# Patient Record
Sex: MALE | Race: ASIAN | NOT HISPANIC OR LATINO | URBAN - METROPOLITAN AREA
[De-identification: names, ages, dates, MRNs, and addresses within clinical notes are randomized per-mention and may not be internally consistent; named-entity substitution may affect disease eponyms.]

---

## 2017-07-14 ENCOUNTER — TRANSFERRED RECORDS (OUTPATIENT)
Dept: HEALTH INFORMATION MANAGEMENT | Facility: CLINIC | Age: 47
End: 2017-07-14

## 2017-07-17 ENCOUNTER — TRANSFERRED RECORDS (OUTPATIENT)
Dept: HEALTH INFORMATION MANAGEMENT | Facility: CLINIC | Age: 47
End: 2017-07-17

## 2017-07-18 ENCOUNTER — TRANSFERRED RECORDS (OUTPATIENT)
Dept: HEALTH INFORMATION MANAGEMENT | Facility: CLINIC | Age: 47
End: 2017-07-18

## 2017-07-19 ENCOUNTER — TRANSFERRED RECORDS (OUTPATIENT)
Dept: HEALTH INFORMATION MANAGEMENT | Facility: CLINIC | Age: 47
End: 2017-07-19

## 2017-08-08 ENCOUNTER — TRANSFERRED RECORDS (OUTPATIENT)
Dept: HEALTH INFORMATION MANAGEMENT | Facility: CLINIC | Age: 47
End: 2017-08-08

## 2017-08-22 ENCOUNTER — TRANSFERRED RECORDS (OUTPATIENT)
Dept: HEALTH INFORMATION MANAGEMENT | Facility: CLINIC | Age: 47
End: 2017-08-22

## 2017-08-24 ENCOUNTER — TRANSFERRED RECORDS (OUTPATIENT)
Dept: HEALTH INFORMATION MANAGEMENT | Facility: CLINIC | Age: 47
End: 2017-08-24

## 2017-08-25 ENCOUNTER — TRANSFERRED RECORDS (OUTPATIENT)
Dept: HEALTH INFORMATION MANAGEMENT | Facility: CLINIC | Age: 47
End: 2017-08-25

## 2017-08-29 ENCOUNTER — TRANSFERRED RECORDS (OUTPATIENT)
Dept: HEALTH INFORMATION MANAGEMENT | Facility: CLINIC | Age: 47
End: 2017-08-29

## 2017-08-30 ENCOUNTER — PRE VISIT (OUTPATIENT)
Dept: CARDIOLOGY | Facility: CLINIC | Age: 47
End: 2017-08-30

## 2017-08-31 ENCOUNTER — OFFICE VISIT (OUTPATIENT)
Dept: CARDIOLOGY | Facility: CLINIC | Age: 47
End: 2017-08-31
Attending: THORACIC SURGERY (CARDIOTHORACIC VASCULAR SURGERY)
Payer: COMMERCIAL

## 2017-08-31 VITALS
WEIGHT: 150 LBS | OXYGEN SATURATION: 98 % | DIASTOLIC BLOOD PRESSURE: 55 MMHG | HEIGHT: 64 IN | HEART RATE: 94 BPM | SYSTOLIC BLOOD PRESSURE: 100 MMHG | BODY MASS INDEX: 25.61 KG/M2

## 2017-08-31 DIAGNOSIS — I35.8 AORTIC VALVE ENDOCARDITIS: Primary | ICD-10-CM

## 2017-08-31 PROBLEM — Z87.891 HISTORY OF TOBACCO USE DISORDER: Status: ACTIVE | Noted: 2017-07-14

## 2017-08-31 PROBLEM — I50.31 ACUTE DIASTOLIC CONGESTIVE HEART FAILURE (H): Status: ACTIVE | Noted: 2017-08-24

## 2017-08-31 PROCEDURE — 99212 OFFICE O/P EST SF 10 MIN: CPT | Mod: ZF

## 2017-08-31 PROCEDURE — 99213 OFFICE O/P EST LOW 20 MIN: CPT | Mod: ZF

## 2017-08-31 RX ORDER — FUROSEMIDE 20 MG
20 TABLET ORAL DAILY
Status: ON HOLD | COMMUNITY
Start: 2017-08-25 | End: 2017-09-16

## 2017-08-31 RX ORDER — CEFTRIAXONE 1 G/1
2000 INJECTION, POWDER, FOR SOLUTION INTRAMUSCULAR; INTRAVENOUS EVERY 24 HOURS
Status: ON HOLD | COMMUNITY
Start: 2017-07-26 | End: 2017-09-16

## 2017-08-31 ASSESSMENT — PAIN SCALES - GENERAL: PAINLEVEL: NO PAIN (0)

## 2017-08-31 NOTE — LETTER
Date:September 19, 2017      Patient was self referred, no letter generated. Do not send.        [facility] Information

## 2017-08-31 NOTE — LETTER
8/31/2017      RE: Harvey Almanzar  [address] [place] [address]       Dear Colleague,    Thank you for the opportunity to participate in the care of your patient, Harvey Almanzar, at the [facility] at [facility]. Please see a copy of my visit note below.    ASKED BY REFERRING PHYSICIAN: Dr Jose Antonio Archer, [place], [place] to see patient in consultation of redo aortic root replacement for endocarditis.     CHIEF COMPLAINT: SOB and fatigue      HPI: Harvey is a 47 year old male with a history of persistent AV endocarditis. Patient had homograft AVR in 1986 for bicuspid AV. Revision in the [place] in 1998. In 7/2017 patient developed fevers and chills and was diagnosed with AV endocarditis (strep parasanginous). Mr Almanzar has been on IV antibiotics for this infection. Patient followed up with Dr. Archer on 8/22 as out patient. Patient developed orthopnea and presented to [facility] on 08/24 and was put on on IV diuretics and was much improved to baseline upon discharge.     Because of his surgical risk given 3nd reoperation, Dr. Archer referred patient to me for redo cardiac surgery.     Patient also with severe MR, on IZA in 7/2017, no obvious vegetation on this valve. Echocardiogram showed reduced EF from 7/2017 with EF 45%. Patient had an echo on 8/22 that showed severe AI, severe MR. Compared to prior study in 7/2017, AV vegetation appeared larger, increased LV size and reduced function.     PAST MEDICAL HISTORY:  Past Medical History:   Diagnosis Date     Acute diastolic congestive heart failure (H) 8/24/2017     Aortic valve replaced 1996    Overview:  Cadaver - tissue valve in 1996 at [facility]  Amoxicillin 2000 mg prior to dental work please.  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    FAMILY HISTORY:   No family history on file.    SOCIAL HISTORY:  Social History     Social History     Marital status: Single     Spouse name: N/A     Number of children: N/A     Years of education: N/A     Occupational History     Not on file.     Social History Main Topics     Smoking status: Former Smoker     Packs/day: 0.50     Years: 31.00     Smokeless tobacco: Not on file     Alcohol use No     Drug use: Not on file     Sexual activity: Not on file     Other Topics Concern     Not on file     Social History Narrative     No narrative on file        ALLERGIES:   No Known Allergies    CURRENT MEDICATIONS:   Prescription Medications as of 8/31/2017             cefTRIAXone (CEFTRIAXONE SODIUM) 1 GM vial Inject 2,000 mg into the vein every 24 hours    furosemide (LASIX) 20 MG tablet Take 20 mg by mouth daily          REVIEW OF SYSTEMS:   Gen: denies frequent headaches, double/blurry vision, insomnia, fatigue, unexplained weight loss/gain. No previous anesthesia reactions.  CV: denies chest pain, shortness of breath, palpitations, peripheral edema.   Pulm: denies shortness of breath, asthma or wheezing  GI/: denies liver or kidney problems, blood in stool or BRBPR, difficulty urinating  Endo: denies thyroid problems or Diabetes  Heme/Onc: denies bleeding or clotting disorders, no family problems with bleeding/clotting diorders  MS: no weakness, tremors or gait instability  Neuro: denies depression, memory problems, no dysesthesias, no previous strokes, no migraines, no dysphagia  Skin: No petechiae, purpura or rash.    PHYSICAL EXAMINATION:   /55 (BP Location: Left arm, Patient Position: Chair, Cuff Size: Adult Regular)  Pulse 94  Ht 1.626 m (5' 4\")  Wt 68 kg (150 lb)  SpO2 98%  BMI 25.75 kg/m2  General: alert and oriented x 3, pleasant, no acute " distress  CV: S1 S2, there is diastolic and systolic murmurs; No rubs or gallops, regular rate and rhythm, no peripheral edema, no carotid or abdomenal bruits, pulses in upper and lower extremities palpable  Pulm: bilateral breath sounds, clear to auscultation, easy work of breathing  GI: (+) bowel sounds, soft non-tender and non-distended  : voiding without problems  MS: moves all extremities x 4,  5+/5+ equal strength bilaterally  Neuro: pupils equal round and reactive to light, cranial nerves, II-XII grossly intact, no gross neurologic deficits noted    LABS:  BMP RESULTS:  No results found for: NA, POTASSIUM, CHLORIDE, CO2, ANIONGAP, GLC, BUN, CR, GFRESTIMATED, GFRESTBLACK, CHECO     CBC RESULTS:  No results found for: WBC, RBC, HGB, HCT, MCV, MCH, MCHC, RDW, PLT    No results found for: INR  No results found for: PTT  No results found for: UA  No results found for: A1C    PROCEDURES/IMAGING:    PROCEDURES  See scanned documents in patient's chart     ASSESSMENT/PLAN:     Harvey is a 47 year old male who presents with a history of persistent AV endocarditis. Patient had homograft AVR in 1986 for bicuspid AV. Revision in the [place] in [place]. In 7/2017 patient developed fevers and chills and was diagnosed with AV endocarditis (strep parasanginous).  Recent work up showed severe AI and Moderate to severe MR.    Redo aortic root replacement and possible MVR.  Benefits and risks of surgery were discussed with the patient. Patient wants to proceed with surgery.    Preop CT of chest with contrast for surgical planning.    Since my surgical schedule is booked to mid October I ask my colleague Dr. Leal to see patient and perform surgery because his schedule would accormodate sooner surgery time.       Approximately 50 minutes spent with this case including review of the clinical history and data; discussion with the patient and his family and coordination of the care     Thank you for including me in the care of  this kind patient. Do not hesitate to contact me with any questions.     Dr. Ulises Ruelas     Cardiothoracic Surgery  [facility]  No care team member to display  SELF, REFERRED    Please do not hesitate to contact me if you have any questions/concerns.     Sincerely,     Ulises Ruelas MD

## 2017-08-31 NOTE — PROGRESS NOTES
ASKED BY REFERRING PHYSICIAN: Dr Jose Antonio Archer, [place], [place] to see patient in consultation of redo aortic root replacement for endocarditis.     CHIEF COMPLAINT: SOB and fatigue      HPI: Harvey is a 47 year old male with a history of persistent AV endocarditis. Patient had homograft AVR in 1986 for bicuspid AV. Revision in the [place] in [place]. In 7/2017 patient developed fevers and chills and was diagnosed with AV endocarditis (strep parasanginous). Mr Almanzar has been on IV antibiotics for this infection. Patient followed up with Dr. Archer on 8/22 as out patient. Patient developed orthopnea and presented to [facility] on 08/24 and was put on on IV diuretics and was much improved to baseline upon discharge.     Because of his surgical risk given 3nd reoperation, Dr. Archer referred patient to me for redo cardiac surgery.     Patient also with severe MR, on IZA in 7/2017, no obvious vegetation on this valve. Echocardiogram showed reduced EF from 7/2017 with EF 45%. Patient had an echo on 8/22 that showed severe AI, severe MR. Compared to prior study in 7/2017, AV vegetation appeared larger, increased LV size and reduced function.     PAST MEDICAL HISTORY:  Past Medical History:   Diagnosis Date     Acute diastolic congestive heart failure (H) 8/24/2017     Aortic valve replaced 1996    Overview:  Cadaver - tissue valve in 1996 at [facility]  Amoxicillin 2000 mg prior to dental work please.  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    FAMILY HISTORY:   No family history on file.    SOCIAL HISTORY:  Social History     Social History     Marital status: Single     Spouse name: N/A     Number of children:  "N/A     Years of education: N/A     Occupational History     Not on file.     Social History Main Topics     Smoking status: Former Smoker     Packs/day: 0.50     Years: 31.00     Smokeless tobacco: Not on file     Alcohol use No     Drug use: Not on file     Sexual activity: Not on file     Other Topics Concern     Not on file     Social History Narrative     No narrative on file        ALLERGIES:   No Known Allergies    CURRENT MEDICATIONS:   Prescription Medications as of 8/31/2017             cefTRIAXone (CEFTRIAXONE SODIUM) 1 GM vial Inject 2,000 mg into the vein every 24 hours    furosemide (LASIX) 20 MG tablet Take 20 mg by mouth daily          REVIEW OF SYSTEMS:   Gen: denies frequent headaches, double/blurry vision, insomnia, fatigue, unexplained weight loss/gain. No previous anesthesia reactions.  CV: denies chest pain, shortness of breath, palpitations, peripheral edema.   Pulm: denies shortness of breath, asthma or wheezing  GI/: denies liver or kidney problems, blood in stool or BRBPR, difficulty urinating  Endo: denies thyroid problems or Diabetes  Heme/Onc: denies bleeding or clotting disorders, no family problems with bleeding/clotting diorders  MS: no weakness, tremors or gait instability  Neuro: denies depression, memory problems, no dysesthesias, no previous strokes, no migraines, no dysphagia  Skin: No petechiae, purpura or rash.    PHYSICAL EXAMINATION:   /55 (BP Location: Left arm, Patient Position: Chair, Cuff Size: Adult Regular)  Pulse 94  Ht 1.626 m (5' 4\")  Wt 68 kg (150 lb)  SpO2 98%  BMI 25.75 kg/m2  General: alert and oriented x 3, pleasant, no acute distress  CV: S1 S2, there is diastolic and systolic murmurs; No rubs or gallops, regular rate and rhythm, no peripheral edema, no carotid or abdomenal bruits, pulses in upper and lower extremities palpable  Pulm: bilateral breath sounds, clear to auscultation, easy work of breathing  GI: (+) bowel sounds, soft non-tender and " non-distended  : voiding without problems  MS: moves all extremities x 4,  5+/5+ equal strength bilaterally  Neuro: pupils equal round and reactive to light, cranial nerves, II-XII grossly intact, no gross neurologic deficits noted    LABS:  BMP RESULTS:  No results found for: NA, POTASSIUM, CHLORIDE, CO2, ANIONGAP, GLC, BUN, CR, GFRESTIMATED, GFRESTBLACK, CHECO     CBC RESULTS:  No results found for: WBC, RBC, HGB, HCT, MCV, MCH, MCHC, RDW, PLT    No results found for: INR  No results found for: PTT  No results found for: UA  No results found for: A1C    PROCEDURES/IMAGING:    PROCEDURES  See scanned documents in patient's chart     ASSESSMENT/PLAN:     Harvey is a 47 year old male who presents with a history of persistent AV endocarditis. Patient had homograft AVR in 1986 for bicuspid AV. Revision in the [place] in 1998. In 7/2017 patient developed fevers and chills and was diagnosed with AV endocarditis (strep parasanginous).  Recent work up showed severe AI and Moderate to severe MR.    Redo aortic root replacement and possible MVR.  Benefits and risks of surgery were discussed with the patient. Patient wants to proceed with surgery.    Preop CT of chest with contrast for surgical planning.    Since my surgical schedule is booked to mid October I ask my colleague Dr. Leal to see patient and perform surgery because his schedule would accormodate sooner surgery time.       Approximately 50 minutes spent with this case including review of the clinical history and data; discussion with the patient and his family and coordination of the care     Thank you for including me in the care of this kind patient. Do not hesitate to contact me with any questions.     Dr. Ulises Ruelas     Cardiothoracic Surgery  [facility]  No care team member to display  SELF, REFERRED

## 2017-08-31 NOTE — MR AVS SNAPSHOT
"              After Visit Summary   8/31/2017    Harvey Almanzar    MRN: [id number]           Patient Information     Date Of Birth          1970        Visit Information        Provider Department      8/31/2017 10:00 AM Ulises Ruelas MD [facility]        Today's Diagnoses     Aortic valve endocarditis    -  1       Follow-ups after your visit        Your next 10 appointments already scheduled     Sep 18, 2017  3:30 PM CDT   (Arrive by 3:15 PM)   New Patient Visit with Zeb Leal MD   [facility] ([facility])    [address]  3rd Floor  [facility] [address] [phone]            Sep 25, 2017   Procedure with Nicola Gorman MD   [facility], [place], Same Day Surgery (--)    [address] [address] [phone]              Who to contact     If you have questions or need follow up information about today's clinic visit or your schedule please contact [facility] directly at [phone].  Normal or non-critical lab and imaging results will be communicated to you by AMKAIhart, letter or phone within 4 business days after the clinic has received the results. If you do not hear from us within 7 days, please contact the clinic through AMKAIhart or phone. If you have a critical or abnormal lab result, we will notify you by phone as soon as possible.  Submit refill requests through Bigvest or call your pharmacy and they will forward the refill request to us. Please allow 3 business days for your refill to be completed.          Additional Information About Your Visit        AMKAIhart Information     Bigvest lets you send messages to your doctor, view your test results, renew your prescriptions, schedule appointments and more. To sign up, go to [email or web address] . Click on \"Log in\" on the left side of the screen, which will take you to the Welcome page. Then click on \"Sign up Now\" on the right side of the page. " "    You will be asked to enter the access code listed below, as well as some personal information. Please follow the directions to create your username and password.     Your access code is: [email or web address]  Expires: 2017  6:30 AM     Your access code will  in 90 days. If you need help or a new code, please call your [place] clinic or [phone].        Care EveryWhere ID     This is your Care EveryWhere ID. This could be used by other organizations to access your [place] medical records  [id number]        Your Vitals Were     Pulse Height Pulse Oximetry BMI (Body Mass Index)          94 1.626 m (5' 4\") 98% 25.75 kg/m2         Blood Pressure from Last 3 Encounters:   17 (!) 79/42   17 100/55    Weight from Last 3 Encounters:   17 64 kg (141 lb 1.5 oz)   17 68 kg (150 lb)               Primary Care Provider    None Specified       No address on file        Equal Access to Services     TE TEAGUE : Hadii aad ku hadasho Soomaali, waaxda luqadaha, qaybta kaalmada adeegyada, lamont poe . So [facility] [phone].    ATENCIÓN: Si habla español, tiene a sorensen disposición servicios gratuitos de asistencia lingüística. Llame al [phone].    We comply with applicable federal civil rights laws and [place] laws. We do not discriminate on the basis of race, color, national origin, age, disability sex, sexual orientation or gender identity.            Thank you!     Thank you for choosing [facility]  for your care. Our goal is always to provide you with excellent care. Hearing back from our patients is one way we can continue to improve our services. Please take a few minutes to complete the written survey that you may receive in the mail after your visit with us. Thank you!             Your Updated Medication List - Protect others around you: Learn how to safely use, store and throw away your medicines at www.disposemymeds.org.      Notice  As of " 8/31/2017 11:59 PM    You have not been prescribed any medications.

## 2017-08-31 NOTE — TELEPHONE ENCOUNTER
ASKED BY REFERRING PHYSICIAN: Dr Jose Antonio Archer, [place], [place]    CHIEF COMPLAINT: Pre Visit Planning - Done (New consult for Endocarditis from Dr Jose Antonio Archer at [facility] )      HPI: Harvey is a 47 year old male with a history of persistent AV endocarditis. Patient had homograft AVR in 1986 for bicuspid AV. Revision in the [place] in 1998. In 7/2017 patient developed fevers and chills and was diagnosed with AV endocarditis (strep parasanginous).  Mr Almanzar has been on IV antibiotics for this infection. Patient followed up with Dr. Archer on 8/22 as out patient.  Patient developed orthopnea and presented to [facility] on 08/24 and was put on on IV diuretics and was much improved to baseline upon discharge. CV surgery involved in his plan of care--he is high risk given 2nd reoperation. Patient also with severe MR, on IZA in 7/2017, no obvious vegetation on this valve. Echocardiogram showed reduced EF from 7/2017 with EF 45%. Patient had an echo on 8/22 that showed severe AI, severe MR. Compared to prior study in 7/2017, AV vegetation appeared larger, increased LV size and reduced function.         PROCEDURES  See scanned documents in patient's chart    ASSESSMENT/PLAN:   Harvey is a 47 year old male who presents with         Approximately 50 minutes spent with this case including review of the clinical history and data; discussion with the patient and his family and coordination of the care    Thank you for including me in the care of this kind patient. Do not hesitate to contact me with any questions.    Dr. Ulises Ruelas     Cardiothoracic Surgery  [facility]  No care team member to display

## 2017-08-31 NOTE — NURSING NOTE
Chief Complaint   Patient presents with     New Patient     New consult for Endocarditis from Dr Jose Antonio Archer at [facility]      Vitals were taken and medications were reconciled.  Selwyn Crawley, DHARA  9:32 AM

## 2017-08-31 NOTE — NURSING NOTE
Patient seen today for consultation for Aortic and mitral valve replacement, due to endocarditis and previous mitral valve repair.     Surgery procedure explained to patient and friend and all questions and concerns were answered and addressed.     Risks and benefits of surgery were discussed with patient (and all present) including risk of death, stroke, bleeding, cardiac ischemia, wound infection, renal failure, arrhythmias and possible pacemaker implantation. Patient accepts these risks and is willing to proceed with surgery.     Reviewed pre surgery tests and procedures needed and will call patient to schedule.  Patient will need CT tomorrow after his IZA at [facility] in the morning.      It is unknown if patient will have surgery at the  as Dr Ruelas will be out of town for 2 weeks and patient may need surgery sooner if he becomes symptomatic.     Pre surgery preparation folder with instructions for surgery preparation and recovery will be mailed to the patient.     Instructed patient on preparation for CT with and without contrast.     Patient verbalized understanding of all instructions and will call with any questions or concerns.

## 2017-08-31 NOTE — LETTER
8/31/2017      RE: Harvey Almanzar  [address] [place] [address]       Dear Colleague,    Thank you for the opportunity to participate in the care of your patient, Harvey Almaznar, at the [facility]. Please see a copy of my visit note below.    ASKED BY REFERRING PHYSICIAN: Dr Jose Antonio Archer, [place], [place] to see patient in consultation of redo aortic root replacement for endocarditis.     CHIEF COMPLAINT: SOB and fatigue      HPI: Harvey is a 47 year old male with a history of persistent AV endocarditis. Patient had homograft AVR in 1986 for bicuspid AV. Revision in the [place] in 1998. In 7/2017 patient developed fevers and chills and was diagnosed with AV endocarditis (strep parasanginous). Mr Almanzar has been on IV antibiotics for this infection. Patient followed up with Dr. Archer on 8/22 as out patient. Patient developed orthopnea and presented to [facility] on 08/24 and was put on on IV diuretics and was much improved to baseline upon discharge.     Because of his surgical risk given 3nd reoperation, Dr. Archer referred patient to me for redo cardiac surgery.     Patient also with severe MR, on IZA in 7/2017, no obvious vegetation on this valve. Echocardiogram showed reduced EF from 7/2017 with EF 45%. Patient had an echo on 8/22 that showed severe AI, severe MR. Compared to prior study in 7/2017, AV vegetation appeared larger, increased LV size and reduced function.     PAST MEDICAL HISTORY:  Past Medical History:   Diagnosis Date     Acute diastolic congestive heart failure (H) 8/24/2017     Aortic valve replaced 1996    Overview:  Cadaver - tissue valve in 1996 at [facility]  Amoxicillin 2000 mg prior to dental work please.  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    FAMILY HISTORY:   No family history on file.    SOCIAL HISTORY:  Social History     Social History     Marital status: Single     Spouse name: N/A     Number of children: N/A     Years of education: N/A     Occupational History     Not on file.     Social History Main Topics     Smoking status: Former Smoker     Packs/day: 0.50     Years: 31.00     Smokeless tobacco: Not on file     Alcohol use No     Drug use: Not on file     Sexual activity: Not on file     Other Topics Concern     Not on file     Social History Narrative     No narrative on file        ALLERGIES:   No Known Allergies    CURRENT MEDICATIONS:   Prescription Medications as of 8/31/2017             cefTRIAXone (CEFTRIAXONE SODIUM) 1 GM vial Inject 2,000 mg into the vein every 24 hours    furosemide (LASIX) 20 MG tablet Take 20 mg by mouth daily          REVIEW OF SYSTEMS:   Gen: denies frequent headaches, double/blurry vision, insomnia, fatigue, unexplained weight loss/gain. No previous anesthesia reactions.  CV: denies chest pain, shortness of breath, palpitations, peripheral edema.   Pulm: denies shortness of breath, asthma or wheezing  GI/: denies liver or kidney problems, blood in stool or BRBPR, difficulty urinating  Endo: denies thyroid problems or Diabetes  Heme/Onc: denies bleeding or clotting disorders, no family problems with bleeding/clotting diorders  MS: no weakness, tremors or gait instability  Neuro: denies depression, memory problems, no dysesthesias, no previous strokes, no migraines, no dysphagia  Skin: No petechiae, purpura or rash.    PHYSICAL EXAMINATION:   /55 (BP Location: Left arm, Patient Position: Chair, Cuff Size: Adult Regular)  Pulse 94  Ht 1.626 m (5' 4\")  Wt 68 kg (150 lb)  SpO2 98%  BMI 25.75 kg/m2  General: alert and oriented x 3, pleasant, no acute " distress  CV: S1 S2, there is diastolic and systolic murmurs; No rubs or gallops, regular rate and rhythm, no peripheral edema, no carotid or abdomenal bruits, pulses in upper and lower extremities palpable  Pulm: bilateral breath sounds, clear to auscultation, easy work of breathing  GI: (+) bowel sounds, soft non-tender and non-distended  : voiding without problems  MS: moves all extremities x 4,  5+/5+ equal strength bilaterally  Neuro: pupils equal round and reactive to light, cranial nerves, II-XII grossly intact, no gross neurologic deficits noted    LABS:  BMP RESULTS:  No results found for: NA, POTASSIUM, CHLORIDE, CO2, ANIONGAP, GLC, BUN, CR, GFRESTIMATED, GFRESTBLACK, CHECO     CBC RESULTS:  No results found for: WBC, RBC, HGB, HCT, MCV, MCH, MCHC, RDW, PLT    No results found for: INR  No results found for: PTT  No results found for: UA  No results found for: A1C    PROCEDURES/IMAGING:    PROCEDURES  See scanned documents in patient's chart     ASSESSMENT/PLAN:     Harvey is a 47 year old male who presents with a history of persistent AV endocarditis. Patient had homograft AVR in 1986 for bicuspid AV. Revision in the [place] in 1998. In 7/2017 patient developed fevers and chills and was diagnosed with AV endocarditis (strep parasanginous).  Recent work up showed severe AI and Moderate to severe MR.    Redo aortic root replacement and possible MVR.  Benefits and risks of surgery were discussed with the patient. Patient wants to proceed with surgery.    Preop CT of chest with contrast for surgical planning.    Since my surgical schedule is booked to mid October I ask my colleague Dr. Leal to see patient and perform surgery because his schedule would accormodate sooner surgery time.       Approximately 50 minutes spent with this case including review of the clinical history and data; discussion with the patient and his family and coordination of the care     Thank you for including me in the care of  this kind patient. Do not hesitate to contact me with any questions.     Dr. Ulises Ruelas     Cardiothoracic Surgery  [facility]  No care team member to display  SELF, REFERRED    Please do not hesitate to contact me if you have any questions/concerns.     Sincerely,     Ulises Ruelas MD

## 2017-09-05 ENCOUNTER — CARE COORDINATION (OUTPATIENT)
Dept: CARDIOLOGY | Facility: CLINIC | Age: 47
End: 2017-09-05

## 2017-09-06 NOTE — PROGRESS NOTES
I spoke to patient on Tuesday pm and explained to him that if possible he should wait for Dr Ruelas to return to do his surgery, if he doesn't want to wait he will call and ask Yara to schedule him with Dr Leal. (Dr Ruelas said he would speak to Dr Leal about this patient).  Finally if the patient becomes short of breath or has any other change in his health he is to go to the ER here at the  and he would get surgery.  Patient agreed to this plan, he was given the phone numbers to Tomasa Owens RN and Yara the surgery scheduler if he has questions.  Informed patient that he would get a call next Monday the 11th to be scheduled for surgery with Dr Ruelas.  Patient verbalized understanding.     Renée Pena RNCC  Cardiothoracic Surgery   O) [phone]

## 2017-09-08 ENCOUNTER — TELEPHONE (OUTPATIENT)
Dept: OTHER | Facility: CLINIC | Age: 47
End: 2017-09-08

## 2017-09-08 NOTE — TELEPHONE ENCOUNTER
Patient's primary care physician called questioning plan. Patient had completed his pre op CT scan. sheree Livingston  will call patient 9/11 to set up OR time. Dr Ruelas will out the town for 2 weeks, if patient wants surgery sooner Dr. Leal has agreed to proceed. Dr. Archer agrees with plan.

## 2017-09-12 ENCOUNTER — TELEPHONE (OUTPATIENT)
Dept: CARDIOLOGY | Facility: CLINIC | Age: 47
End: 2017-09-12

## 2017-09-12 NOTE — TELEPHONE ENCOUNTER
Per task, pt needs to schedule surgery with Dr. Ruelas. Talked with pt and offered him Dr. Ruelas's first opening 10/23. Pt states that they dont want to wait that long and would like to see someone else. Offered Dr. Leal for clinic 9/18 and poss surgery for 9/22. Pt ok with that. Scheduled clinic with Dr. eLal for 9/18 and surgery for 9/22. Will call back to schedule other pre-op test

## 2017-09-15 ENCOUNTER — APPOINTMENT (OUTPATIENT)
Dept: GENERAL RADIOLOGY | Facility: CLINIC | Age: 47
DRG: 216 | End: 2017-09-15
Attending: INTERNAL MEDICINE
Payer: COMMERCIAL

## 2017-09-15 ENCOUNTER — APPOINTMENT (OUTPATIENT)
Dept: CARDIOLOGY | Facility: CLINIC | Age: 47
DRG: 216 | End: 2017-09-15
Attending: INTERNAL MEDICINE
Payer: COMMERCIAL

## 2017-09-15 ENCOUNTER — HOSPITAL ENCOUNTER (INPATIENT)
Facility: CLINIC | Age: 47
LOS: 19 days | Discharge: HOME HEALTH | DRG: 216 | End: 2017-10-05
Attending: INTERNAL MEDICINE | Admitting: INTERNAL MEDICINE
Payer: COMMERCIAL

## 2017-09-15 DIAGNOSIS — I50.21 ACUTE SYSTOLIC CONGESTIVE HEART FAILURE (H): ICD-10-CM

## 2017-09-15 DIAGNOSIS — Z95.2 HX OF MITRAL VALVE REPLACEMENT WITH MECHANICAL VALVE: ICD-10-CM

## 2017-09-15 DIAGNOSIS — I35.8 ENDOCARDITIS OF AORTIC VALVE: ICD-10-CM

## 2017-09-15 DIAGNOSIS — G89.18 ACUTE POST-OPERATIVE PAIN: ICD-10-CM

## 2017-09-15 DIAGNOSIS — Z95.2 S/P AVR (AORTIC VALVE REPLACEMENT): Primary | ICD-10-CM

## 2017-09-15 DIAGNOSIS — I35.1 AORTIC VALVE INSUFFICIENCY DUE TO INFECTION: ICD-10-CM

## 2017-09-15 DIAGNOSIS — I63.512 ACUTE ISCHEMIC LEFT MCA STROKE (H): ICD-10-CM

## 2017-09-15 DIAGNOSIS — I34.0 NON-RHEUMATIC MITRAL REGURGITATION: ICD-10-CM

## 2017-09-15 LAB
ALBUMIN SERPL-MCNC: 3.1 G/DL (ref 3.4–5)
ALP SERPL-CCNC: 69 U/L (ref 40–150)
ALT SERPL W P-5'-P-CCNC: 21 U/L (ref 0–70)
ANION GAP SERPL CALCULATED.3IONS-SCNC: 15 MMOL/L (ref 3–14)
AST SERPL W P-5'-P-CCNC: 24 U/L (ref 0–45)
BASOPHILS # BLD AUTO: 0 10E9/L (ref 0–0.2)
BASOPHILS NFR BLD AUTO: 0.1 %
BILIRUB SERPL-MCNC: 2.5 MG/DL (ref 0.2–1.3)
BUN SERPL-MCNC: 36 MG/DL (ref 7–30)
CALCIUM SERPL-MCNC: 8.8 MG/DL (ref 8.5–10.1)
CHLORIDE SERPL-SCNC: 94 MMOL/L (ref 94–109)
CO2 SERPL-SCNC: 27 MMOL/L (ref 20–32)
CREAT SERPL-MCNC: 1.36 MG/DL (ref 0.66–1.25)
CRP SERPL-MCNC: 40 MG/L (ref 0–8)
DIFFERENTIAL METHOD BLD: ABNORMAL
EOSINOPHIL # BLD AUTO: 0 10E9/L (ref 0–0.7)
EOSINOPHIL NFR BLD AUTO: 0 %
ERYTHROCYTE [DISTWIDTH] IN BLOOD BY AUTOMATED COUNT: 15.5 % (ref 10–15)
ERYTHROCYTE [SEDIMENTATION RATE] IN BLOOD BY WESTERGREN METHOD: 42 MM/H (ref 0–15)
GFR SERPL CREATININE-BSD FRML MDRD: 56 ML/MIN/1.7M2
GLUCOSE SERPL-MCNC: 129 MG/DL (ref 70–99)
HCT VFR BLD AUTO: 36 % (ref 40–53)
HGB BLD-MCNC: 11.9 G/DL (ref 13.3–17.7)
IMM GRANULOCYTES # BLD: 0 10E9/L (ref 0–0.4)
IMM GRANULOCYTES NFR BLD: 0.2 %
LYMPHOCYTES # BLD AUTO: 2.2 10E9/L (ref 0.8–5.3)
LYMPHOCYTES NFR BLD AUTO: 25 %
MCH RBC QN AUTO: 29.5 PG (ref 26.5–33)
MCHC RBC AUTO-ENTMCNC: 33.1 G/DL (ref 31.5–36.5)
MCV RBC AUTO: 89 FL (ref 78–100)
MONOCYTES # BLD AUTO: 0.7 10E9/L (ref 0–1.3)
MONOCYTES NFR BLD AUTO: 7.5 %
NEUTROPHILS # BLD AUTO: 5.9 10E9/L (ref 1.6–8.3)
NEUTROPHILS NFR BLD AUTO: 67.2 %
NRBC # BLD AUTO: 0 10*3/UL
NRBC BLD AUTO-RTO: 0 /100
NT-PROBNP SERPL-MCNC: ABNORMAL PG/ML (ref 0–450)
PLATELET # BLD AUTO: 267 10E9/L (ref 150–450)
POTASSIUM SERPL-SCNC: 3.1 MMOL/L (ref 3.4–5.3)
PROT SERPL-MCNC: 7.1 G/DL (ref 6.8–8.8)
RBC # BLD AUTO: 4.04 10E12/L (ref 4.4–5.9)
SODIUM SERPL-SCNC: 137 MMOL/L (ref 133–144)
TROPONIN I SERPL-MCNC: 0.59 UG/L (ref 0–0.04)
WBC # BLD AUTO: 8.8 10E9/L (ref 4–11)

## 2017-09-15 PROCEDURE — 93010 ELECTROCARDIOGRAM REPORT: CPT | Mod: Z6 | Performed by: INTERNAL MEDICINE

## 2017-09-15 PROCEDURE — 85610 PROTHROMBIN TIME: CPT | Performed by: EMERGENCY MEDICINE

## 2017-09-15 PROCEDURE — 93306 TTE W/DOPPLER COMPLETE: CPT

## 2017-09-15 PROCEDURE — 86140 C-REACTIVE PROTEIN: CPT | Performed by: EMERGENCY MEDICINE

## 2017-09-15 PROCEDURE — 25000132 ZZH RX MED GY IP 250 OP 250 PS 637: Performed by: INTERNAL MEDICINE

## 2017-09-15 PROCEDURE — 99285 EMERGENCY DEPT VISIT HI MDM: CPT | Mod: 25 | Performed by: INTERNAL MEDICINE

## 2017-09-15 PROCEDURE — 71010 XR CHEST PORT 1 VW: CPT

## 2017-09-15 PROCEDURE — 25000128 H RX IP 250 OP 636: Performed by: INTERNAL MEDICINE

## 2017-09-15 PROCEDURE — 93306 TTE W/DOPPLER COMPLETE: CPT | Mod: 26 | Performed by: INTERNAL MEDICINE

## 2017-09-15 PROCEDURE — 99223 1ST HOSP IP/OBS HIGH 75: CPT | Mod: GC | Performed by: INTERNAL MEDICINE

## 2017-09-15 PROCEDURE — 93005 ELECTROCARDIOGRAM TRACING: CPT | Performed by: INTERNAL MEDICINE

## 2017-09-15 PROCEDURE — 96374 THER/PROPH/DIAG INJ IV PUSH: CPT | Performed by: INTERNAL MEDICINE

## 2017-09-15 PROCEDURE — 83880 ASSAY OF NATRIURETIC PEPTIDE: CPT | Performed by: EMERGENCY MEDICINE

## 2017-09-15 PROCEDURE — 85025 COMPLETE CBC W/AUTO DIFF WBC: CPT | Performed by: EMERGENCY MEDICINE

## 2017-09-15 PROCEDURE — 84484 ASSAY OF TROPONIN QUANT: CPT | Performed by: EMERGENCY MEDICINE

## 2017-09-15 PROCEDURE — 85652 RBC SED RATE AUTOMATED: CPT | Performed by: EMERGENCY MEDICINE

## 2017-09-15 PROCEDURE — 80053 COMPREHEN METABOLIC PANEL: CPT | Performed by: EMERGENCY MEDICINE

## 2017-09-15 RX ORDER — FUROSEMIDE 10 MG/ML
20 INJECTION INTRAMUSCULAR; INTRAVENOUS ONCE
Status: COMPLETED | OUTPATIENT
Start: 2017-09-15 | End: 2017-09-15

## 2017-09-15 RX ORDER — POTASSIUM CHLORIDE 1.5 G/1.58G
20 POWDER, FOR SOLUTION ORAL ONCE
Status: COMPLETED | OUTPATIENT
Start: 2017-09-15 | End: 2017-09-15

## 2017-09-15 RX ADMIN — POTASSIUM CHLORIDE 20 MEQ: 1.5 POWDER, FOR SOLUTION ORAL at 23:36

## 2017-09-15 RX ADMIN — FUROSEMIDE 20 MG: 10 INJECTION, SOLUTION INTRAVENOUS at 23:37

## 2017-09-15 ASSESSMENT — ENCOUNTER SYMPTOMS
NUMBNESS: 0
COUGH: 0
WHEEZING: 0
FEVER: 0
FATIGUE: 1
ADENOPATHY: 0
NECK PAIN: 0
ABDOMINAL PAIN: 0
LIGHT-HEADEDNESS: 0
CHILLS: 0
VOMITING: 0
HEADACHES: 0
NAUSEA: 0
DIFFICULTY URINATING: 0
PALPITATIONS: 0
WEAKNESS: 0
BACK PAIN: 0
CONFUSION: 0
SHORTNESS OF BREATH: 1

## 2017-09-15 NOTE — IP AVS SNAPSHOT
[address] [address]    Phone:  [phone]                                       After Visit Summary   9/15/2017    Harvey Almanzar    MRN: [id number]           After Visit Summary Signature Page     I have received my discharge instructions, and my questions have been answered. I have discussed any challenges I see with this plan with the nurse or doctor.    ..........................................................................................................................................  Patient/Patient Representative Signature      ..........................................................................................................................................  Patient Representative Print Name and Relationship to Patient    ..................................................               ................................................  Date                                            Time    ..........................................................................................................................................  Reviewed by Signature/Title    ...................................................              ..............................................  Date                                                            Time

## 2017-09-15 NOTE — IP AVS SNAPSHOT
MRN:[id number]                      After Visit Summary   9/15/2017    Harvey Almanzar    MRN: [id number]           Thank you!     Thank you for choosing [facility] for your care. Our goal is always to provide you with excellent care. Hearing back from our patients is one way we can continue to improve our services. Please take a few minutes to complete the written survey that you may receive in the mail after you visit with us. Thank you!        Patient Information     Date Of Birth          1970        Designated Caregiver       Most Recent Value    Caregiver    Will someone help with your care after discharge? yes    Name of designated caregiver Brother to help care for after discharge if needed     Phone number of caregiver [phone]    Caregiver address same as patient       About your hospital stay     You were admitted on:  September 16, 2017 You last received care in the:  [address] [place] [address]    You were discharged on:  October 5, 2017        Reason for your hospital stay       You had your aortic and mitral valve replaced with mechanical valves, as well as a one vessel coronary artery bypass on 9/21/17 with Dr Gorman and Dr Leal                  Who to Call     For medical emergencies, please call 911.  For non-urgent questions about your medical care, please call your primary care provider or clinic, [phone]  For questions related to your surgery, please call your surgery clinic        Attending Provider     Provider Specialty    Joaquin Winston MD Emergency Medicine    MICHELE Hua MD Cardiology    Zeb Leal MD Transplant       Primary Care Provider Office Phone # Fax #    Chapito Kaba [phone]       When to contact your care team       Call your primary doctor or surgery team if you have any of the following: temperature greater than 101 F,  increased shortness of breath, increased drainage, increased swelling or increased  pain.                  After Care Instructions     Activity       Your activity upon discharge: activity as tolerated and no driving for 3 weeks            Diet       Follow this diet upon discharge: Orders Placed This Encounter      Calorie Counts      Calorie Counts      Snacks/Supplements Adult: Ensure Plus (Adult); Between Meals      Regular Diet Adult Thin Liquids (water, ice chips, juice, milk, gelatin, ice cream, etc)            IV access       **Ordering Provider MUST call/page Care Coordinator/ to discuss arranging this service**    You are going home with the following vascular access device: PICC.            Monitor and record       blood pressure daily  pulse daily  weight every day            Wound care and dressings       Instructions to care for your wound at home:   You have dissolvable sutures under your skin that do not need to be removed.  Pat wound dressing dry after showers.  Skin glue will eventually fall off in 1-2 weeks.     Keep wound clean and dry, showers are okay after discharge, but don't let spray hit directly on incision. No baths or swimming for 1 month.  Clean wounds twice a day for 2 weeks with microklenz spray if available. Cover chest tube sites with gauze until they stop draining, then leave open.                  Follow-up Appointments     Follow Up and recommended labs and tests       ______________________________________________________    Dr. Chapito Kaba  [facility]   Phone: [phone]   Fax: [phone]    For INR and Warfarin monitoring (Goal=2.5-3.5).  Indication for Anticoagulation: Mechanical MVR.   Expected duration of therapy: Lifetime.   Dr. Chapito Kaba to follow.     Appointment with Dr. Kaba on 10/9/17 at 11:50 AM for INR lab draw and post hospitalization follow up.  ______________________________________________________            Follow Up and recommended labs and tests       Follow up with primary care  provider, NATALIE DRIVER, within 7 days to evaluate medication change, to evaluate treatment change, to evaluate after surgery, for hospital follow- up and to follow up on results.  The following labs/tests are recommended: INR check on Monday 10/9.  Needs weekly CBC, BMP, and CRP inflammation.                  Your next 10 appointments already scheduled     Oct 11, 2017 10:30 AM CDT   Cardiac Evaluation with [facility] Cardiac Rehab 1   [facility] [place] ([facility])    [address]st [address]  [facility] [id number] [phone]            Oct 17, 2017  3:00 PM CDT   (Arrive by 2:45 PM)   Return Visit with  CVTS   [facility] ([facility])    [address]  3rd Floor  [facility] [id number] [phone]            Oct 23, 2017  5:00 PM CDT   (Arrive by 4:45 PM)   New Patient Visit with Alvin Spencer MD   [facility] ([facility])    [address]  4th Floor  [facility] [id number] [phone]              Additional Services     Cardiac Rehab Referral       Your provider has referred you to: PREFERRED PROVIDERS:            Home infusion referral       Your provider has referred you to:   [facility]   Phone: [phone]   Fax: [phone]    For home IV abx and PICC line care.     Anticipated Length of Therapy: through 11/3/17.    Home Infusion Pharmacist to adjust therapy based on labs and clinical assessments: Yes    Labs:  May draw labs from Venous Catheter: Yes  Home Infusion Pharmacist to order labs based on therapy type and clinical assessments: Yes  Weekly labs to be drawn: CBC, BMP, and CRP inflammation.     Agency Staff to assess nursing needs for Infusion Therapy.    Access Device Management:  IV Access Type: PICC  Flush with Heparin and Normal Saline IVP PRN and  routine site care (per agency protocol) to maintain access device? Yes            Medication Therapy Management Referral       Reason for referral:  on more than 5 medications and managing chronic disease    This service is designed to help you get the most from your medications.  A specially trained pharmacist will work closely with you and your doctors  to solve any problems related to your medications and to help you get the   best results from taking them.      The Medication Therapy Management staff will call you to schedule an appointment.                  Further instructions from your care team       [facility]      AFTER YOU GO HOME FROM YOUR HEART SURGERY    Avoid lifting anything greater than ten pounds for 6 weeks after surgery and then less than 20 pounds for an additional 6 weeks.    No driving for 4 weeks after surgery or while on pain medication.    Avoid strenuous activities such as bowling, vacuuming, raking, shoveling, golf or tennis for 12 weeks after your surgery. It is okay to resume sex if you feel comfortable in doing so. You may have to try different positions with your partner.     Splint your chest incision by hugging a pillow or bringing your arms across your chest when coughing or sneezing. Avoid pushing off with your arms when getting up for the first month if you have had your sternum opened.    Shower or wash your incisions daily with soap and water (or as instructed), pat dry. Keep wound clean and dry, showers are okay after discharge, but don't let spray hit directly on incision. No baths or swimming for 1 month.  Clean wounds twice a day for 2 weeks with microklenz spray if available. Cover chest tube sites with gauze until they stop draining, then leave open. It is not abnormal for chest tube sites to drain yellowish/clear fluid for up to 2-3 weeks after surgery.   Watch for signs of infection: increased redness, tenderness, warmth or any drainage that  appears infected (pus like) or is persistent.  Also a temperature > 100.5 F or chills. Call your surgeon or primary care provider's office immediately. Remove any skin glue left on incisions after 10 days. This will not affect your incision and can speed up healing.    Exercise is very important in your recovery. Please follow the guidelines set up for you in your cardiac rehab classes at the hospital. If outpatient cardiac rehab was ordered for you, we highly recommend you participate. If you have problems arranging your cardiac rehab, please call [phone].     Avoid sitting for prolonged periods of time, try to walk every hour during the day. If you have a leg incision, elevate your leg often when you are not walking.    Check your weight when you get home from the hospital and continue to check it daily through your recovery for at least a month. If you notice a weight gain of 2-3 pounds in a week, notify your primary care physician, cardiologist or surgeon.    Bowel activity may be slow after surgery. If necessary, you may take an over the counter laxative such as Milk of Magnesia or Miralax. You may have stool softeners prescribed (docusate sodium, Senokot). We recommend using stool softeners while using narcotics for pain (oxycodone/percocet, hydrocodone/vicodin).      DENTAL VISITS AFTER SURGERY  If you have had your heart valve repaired or replaced, we do not recommend having any dental work done for 6 months and you will need to take an antibiotic prior to dental visits from now on.  Please notify your dentist before any procedure for the proper treatment needed. The antibiotic is taken by mouth one hour prior to visit. This includes routine cleanings.    DO NOT SMOKE.  IF YOU NEED HELP QUITTING, PLEASE TALK WITH YOUR CARDIOLOGIST OR PRIMARY DOCTOR.    You are on a blood thinner, follow the instructions you were given in the hospital and DO NOT SKIP this medication. Try and take it the same time  everyday. Your primary care physician or coumadin clinic will manage the dosing.     REGARDING PRESCRIPTION REFILLS.  If you need a refill on your pain medication contact us.  All other medications will be adjusted, discontinued and re-filled by your primary care physician and/or your cardiologist as they were prior to your surgery. We have given you enough for one to three month with possibly one refill.    POST-OPERATIVE CLINIC VISITS  You have a follow up visit with CVTS Surgery Advance Care Practitioners on 10/17/17 at 3 pm (arrive 2:45pm) at the [facility].  You will then return to the care of your primary physician and your cardiologist.   It is important to call for an appointment to see your cardiologist in 4-6 weeks after surgery and your primary care physician in 2-4 weeks after surgery. If there is a need to return to see CT Surgery please call our  at [phone].    SURGICAL QUESTIONS  Please call Renée Pena with any surgical questions, her phone number is listed below.  She can assist you with your needs and contact other surgery care team members as indicated.    For general questions or concerns, please call the Cardiothoracic Surgery Department at [phone] 8-4:30 M-F.   On weekends or after hours, please call [phone] and ask the  to   page the Cardiothoracic Surgery fellow on call.      Thank you,    Your Cardiothoracic Surgery Team  Renée Pena RN Care Coordinator-  [phone]   Thu Lema PA-C                        Warfarin Instruction     You have started taking a medicine called warfarin. This is a blood-thinning medicine (anticoagulant). It helps prevent and treat blood clots.      Before leaving the hospital, make sure you know how much to take and how long to take it.      You will need regular blood tests to make sure your blood is clotting safely. It is very important to see your  "doctor for regular blood tests.    Talk to your doctor before taking any new medicine (this includes over-the-counter drugs and herbal products). Many medicines can interact with warfarin. This may cause more bleeding or too much clotting.     Eating a lot of vitamin K--found in green, leafy vegetables--can change the way warfarin works in your body. Do NOT avoid these foods. Instead, try to eat the same amount each day.     Bleeding is the most common side-effect of warfarin. You may notice bleeding gums, a bloody nose, bruises and bleeding longer when you cut yourself. See a doctor at once if:   o You cough up blood  o You find blood in your stool (poop)  o You have a deep cut, or a cut that bleeds longer than 10 minutes   o You have a bad cut, hard fall, accident or hit your head (go to urgent care or the emergency room).    For women who can get pregnant: This medicine can harm an unborn baby. Be very careful not to get pregnant while taking this medicine. If you think you might be pregnant, call your doctor right away.    For more information, read \"Guide to Warfarin Therapy,  the booklet you received in the hospital.        Pending Results     Date and Time Order Name Status Description    10/4/2017 1444 IR PICC Vascular In process     9/21/2017 1134 Fungus Culture, non-blood Preliminary     9/21/2017 1124 Fungus Culture, non-blood Preliminary             Statement of Approval     Ordered          10/05/17 0854  I have reviewed and agree with all the recommendations and orders detailed in this document.  EFFECTIVE NOW     Approved and electronically signed by:  Diogenes Kraft PA             Admission Information     Date & Time Provider Department Dept. Phone    9/15/2017 Zeb Leal MD [address] [facility] [phone]      Your Vitals Were     Blood Pressure Pulse Temperature Respirations Height Weight    97/61 (BP Location: Left arm) 93 97.1  F (36.2  C) (Oral) 18 1.626 m (5' 4\") 64.2 kg (141 " "lb 9.6 oz)    Pulse Oximetry BMI (Body Mass Index)                97% 24.31 kg/m2          [email or web address] Information     [email or web address] lets you send messages to your doctor, view your test results, renew your prescriptions, schedule appointments and more. To sign up, go to [email or web address] . Click on \"Log in\" on the left side of the screen, which will take you to the Welcome page. Then click on \"Sign up Now\" on the right side of the page.     You will be asked to enter the access code listed below, as well as some personal information. Please follow the directions to create your username and password.     Your access code is: [email or web address]  Expires: 2017  6:30 AM     Your access code will  in 90 days. If you need help or a new code, please call your [place] clinic or [phone].        Care EveryWhere ID     This is your Care EveryWhere ID. This could be used by other organizations to access your [place] medical records  [id number]        Equal Access to Services     [facility] Hospital[facility] : Hadii mateusz ramon Somickie, waaxda luqadaha, qaybta kaalmamarguerite adefabiano, lamont poe . So [facility] [phone].    ATENCIÓN: Si habla español, tiene a sorensen disposición servicios gratuitos de asistencia lingüística. Llame al [phone].    We comply with applicable federal civil rights laws and [place] laws. We do not discriminate on the basis of race, color, national origin, age, disability, sex, sexual orientation, or gender identity.               Review of your medicines      START taking        Dose / Directions    aspirin 81 MG chewable tablet   Used for:  S/P AVR (aortic valve replacement), Hx of mitral valve replacement with mechanical valve        Dose:  81 mg   Take 1 tablet (81 mg) by mouth daily   Quantity:  120 tablet   Refills:  0       cefTRIAXone 2 GM vial   Commonly known as:  ROCEPHIN   Indication:  Endocarditis   Used for:  Endocarditis of aortic valve        Dose:  2 g   Inject " 2 g into the vein every 24 hours   Quantity:  600 mL   Refills:  0       furosemide 20 MG tablet   Commonly known as:  LASIX   Used for:  S/P AVR (aortic valve replacement), Hx of mitral valve replacement with mechanical valve        Dose:  20 mg   Take 1 tablet (20 mg) by mouth daily   Quantity:  30 tablet   Refills:  1       levofloxacin 500 MG tablet   Commonly known as:  LEVAQUIN   Indication:  Endocarditis   Used for:  Endocarditis of aortic valve        Dose:  500 mg   Take 1 tablet (500 mg) by mouth daily   Quantity:  35 tablet   Refills:  0       melatonin 3 MG tablet   Used for:  S/P AVR (aortic valve replacement), Hx of mitral valve replacement with mechanical valve        Dose:  3 mg   Take 1 tablet (3 mg) by mouth nightly as needed for sleep   Quantity:  30 tablet   Refills:  0       order for DME   Used for:  Acute systolic congestive heart failure (H), Endocarditis of aortic valve        Equipment being ordered: Walker Wheels () Treatment Diagnosis: Gait instability   Quantity:  1 each   Refills:  0       oxyCODONE 5 MG IR tablet   Commonly known as:  ROXICODONE   Used for:  Acute post-operative pain        Dose:  5 mg   Take 1 tablet (5 mg) by mouth every 6 hours as needed for moderate to severe pain   Quantity:  20 tablet   Refills:  0       potassium chloride SA 10 MEQ CR tablet   Commonly known as:  K-DUR/KLOR-CON M   Used for:  S/P AVR (aortic valve replacement), Hx of mitral valve replacement with mechanical valve        Dose:  10 mEq   Take 1 tablet (10 mEq) by mouth daily   Quantity:  30 tablet   Refills:  1       rosuvastatin 10 MG tablet   Commonly known as:  CRESTOR   Used for:  Acute ischemic left MCA stroke (H)        Dose:  10 mg   Take 1 tablet (10 mg) by mouth daily   Quantity:  30 tablet   Refills:  1       senna-docusate 8.6-50 MG per tablet   Commonly known as:  SENOKOT-S;PERICOLACE   Used for:  Acute post-operative pain        Dose:  1-2 tablet   Take 1-2 tablets by mouth 2  times daily as needed (constipation )   Quantity:  50 tablet   Refills:  0       warfarin 2 MG tablet   Commonly known as:  COUMADIN   Used for:  Hx of mitral valve replacement with mechanical valve, S/P AVR (aortic valve replacement)        Take 2 mg (1 tablet) PO at 6 pm on 10/5/17, then take 3 mg (1.5 tabs) until next INR check, then dose per goal 2.5-3.5   Quantity:  120 tablet   Refills:  1            Where to get your medicines      These medications were sent to [facility] - [place], [place] - [address]  [address] [address]     Phone:  [phone]     aspirin 81 MG chewable tablet    furosemide 20 MG tablet    levofloxacin 500 MG tablet    melatonin 3 MG tablet    potassium chloride SA 10 MEQ CR tablet    rosuvastatin 10 MG tablet    senna-docusate 8.6-50 MG per tablet    warfarin 2 MG tablet         Some of these will need a paper prescription and others can be bought over the counter. Ask your nurse if you have questions.     Bring a paper prescription for each of these medications     cefTRIAXone 2 GM vial    order for DME    oxyCODONE 5 MG IR tablet               ANTIBIOTIC INSTRUCTION     You've Been Prescribed an Antibiotic - Now What?  Your healthcare team thinks that you or your loved one might have an infection. Some infections can be treated with antibiotics, which are powerful, life-saving drugs. Like all medications, antibiotics have side effects and should only be used when necessary. There are some important things you should know about your antibiotic treatment.      Your healthcare team may run tests before you start taking an antibiotic.    Your team may take samples (e.g., from your blood, urine or other areas) to run tests to look for bacteria. These test can be important to determine if you need an antibiotic at all and, if you do, which antibiotic will work best.      Within a few days, your healthcare team might change or even stop your  antibiotic.    Your team may start you on an antibiotic while they are working to find out what is making you sick.    Your team might change your antibiotic because test results show that a different antibiotic would be better to treat your infection.    In some cases, once your team has more information, they learn that you do not need an antibiotic at all. They may find out that you don't have an infection, or that the antibiotic you're taking won't work against your infection. For example, an infection caused by a virus can't be treated with antibiotics. Staying on an antibiotic when you don't need it is more likely to be harmful than helpful.      You may experience side effects from your antibiotic.    Like all medications, antibiotics have side effects. Some of these can be serious.    Let you healthcare team know if you have any known allergies when you are admitted to the hospital.    One significant side effect of nearly all antibiotics is the risk of severe and sometimes deadly diarrhea caused by Clostridium difficile (C. Difficile). This occurs when a person takes antibiotics because some good germs are destroyed. Antibiotic use allows C. diificile to take over, putting patients at high risk for this serious infection.    As a patient or caregiver, it is important to understand your or your loved one's antibiotic treatment. It is especially important for caregivers to speak up when patients can't speak for themselves. Here are some important questions to ask your healthcare team.    What infection is this antibiotic treating and how do you know I have that infection?    What side effects might occur from this antibiotic?    How long will I need to take this antibiotic?    Is it safe to take this antibiotic with other medications or supplements (e.g., vitamins) that I am taking?     Are there any special directions I need to know about taking this antibiotic? For example, should I take it with  food?    How will I be monitored to know whether my infection is responding to the antibiotic?    What tests may help to make sure the right antibiotic is prescribed for me?      Information provided by:  www.cdc.gov/getsmart  U.S. Department of Health and Human Services  Centers for disease Control and Prevention  National Center for Emerging and Zoonotic Infectious Diseases  Division of Healthcare Quality Promotion         Protect others around you: Learn how to safely use, store and throw away your medicines at www.disposemymeds.org.             Medication List: This is a list of all your medications and when to take them. Check marks below indicate your daily home schedule. Keep this list as a reference.      Medications           Morning Afternoon Evening Bedtime As Needed    aspirin 81 MG chewable tablet   Take 1 tablet (81 mg) by mouth daily   Last time this was given:  81 mg on 10/5/2017  8:25 AM   Next Dose Due:  Due tomorrow morning 10/6/17  8 AM         Due 10/6   8 AM                       cefTRIAXone 2 GM vial   Commonly known as:  ROCEPHIN   Inject 2 g into the vein every 24 hours   Last time this was given:  2 g on 10/5/2017 11:40 AM   Next Dose Due:  Due 10/6  At 12 noon             Due 12 noon 10/6                   furosemide 20 MG tablet   Commonly known as:  LASIX   Take 1 tablet (20 mg) by mouth daily   Last time this was given:  20 mg on 10/5/2017  8:24 AM   Next Dose Due:  Due tomorrow morning 10/6   8 AM         Due 10/6  8 AM                       levofloxacin 500 MG tablet   Commonly known as:  LEVAQUIN   Take 1 tablet (500 mg) by mouth daily   Last time this was given:  500 mg on 10/5/2017 11:42 AM   Next Dose Due:  Due tomorrow 10/6  12 noon             Due 10/6   12 noon                   melatonin 3 MG tablet   Take 1 tablet (3 mg) by mouth nightly as needed for sleep   Last time this was given:  3 mg on 10/4/2017 12:06 AM   Next Dose Due:  May start any time after discharge as needed  for sleep                     May take if needed for sleep  Tonight 10 PM           order for DME   Equipment being ordered: Walker Wheels () Treatment Diagnosis: Gait instability                                oxyCODONE 5 MG IR tablet   Commonly known as:  ROXICODONE   Take 1 tablet (5 mg) by mouth every 6 hours as needed for moderate to severe pain   Last time this was given:  10 mg on 10/4/2017 11:54 PM   Next Dose Due:  May start any time after discharge if needed for pain as prescribed                                potassium chloride SA 10 MEQ CR tablet   Commonly known as:  K-DUR/KLOR-CON M   Take 1 tablet (10 mEq) by mouth daily   Last time this was given:  20 mEq on 10/5/2017 10:33 AM   Next Dose Due:  Due 10/6  8 AM         Due 10/6   8 AM                       rosuvastatin 10 MG tablet   Commonly known as:  CRESTOR   Take 1 tablet (10 mg) by mouth daily   Next Dose Due:  Due 10/6  8 AM         Due 10/6  8 AM                       senna-docusate 8.6-50 MG per tablet   Commonly known as:  SENOKOT-S;PERICOLACE   Take 1-2 tablets by mouth 2 times daily as needed (constipation )   Last time this was given:  2 tablets on 10/5/2017  8:25 AM   Next Dose Due:  May start any time after discharge as needed for constipation                                warfarin 2 MG tablet   Commonly known as:  COUMADIN   Take 2 mg (1 tablet) PO at 6 pm on 10/5/17, then take 3 mg (1.5 tabs) until next INR check, then dose per goal 2.5-3.5   Last time this was given:  4 mg on 10/4/2017  6:23 PM   Next Dose Due:  Dose due this evening 10/5 at 6 PM,  2mg ( one tablet)                 Take 2mg (1 tablet) oral at 6 PM 10/5                           More Information        Eating Heart-Healthy Foods  Eating has a big impact on your heart health. In fact, eating healthier can improve several of your heart risks at once. For instance, it helps you manage weight, cholesterol, and blood pressure. Here are ideas to help you make  heart-healthy changes without giving up all the foods and flavors you love.  Getting started    Talk with your health care provider about eating plans, such as the DASH or Mediterranean diet. You may also be referred to a dietitian.    Change a few things at a time. Give yourself time to get used to a few eating changes before adding more.    Work to create a tasty, healthy eating plan that you can stick to for the rest of your life.    Goals for healthy eating  Below are some tips to improve your eating habits:    Limit saturated fats and trans fats. Saturated fats raise your levels of cholesterol, so keep these fats to a minimum. They are found in foods such as fatty meats, whole milk, cheese, and palm and coconut oils. Avoid trans fats because they lower good cholesterol as well as raise bad cholesterol. Trans fats are most often found in processed foods.    Reduce sodium (salt) intake. Eating too much salt may increase your blood pressure. Limit your sodium intake to 2,300 milligrams (mg) per day, or less if your health care provider recommends it. Dining out less often and eating fewer processed foods are two great ways to decrease the amount of salt you consume.    Managing calories. A calorie is a unit of energy. Your body burns calories for fuel, but if you eat more calories than your body burns, the extras are stored as fat. Your health care provider can help you create a diet plan to manage your calories. This will likely include eating healthier foods as well as exercising regularly. To help you track your progress, keep a diary to record what you eat and how often you exercise.  Choose the right foods  Aim to make these foods staples of your diet. If you have diabetes, you may have different recommendations than what is listed here:    Fruits and vegetable provide plenty of nutrients without a lot of calories. At meals, fill half your plate with these foods. Split the other half of your plate between  whole grains and lean protein.    Whole grains are high in fiber and rich in vitamins and nutrients. Good choices include whole-wheat bread, pasta, and brown rice.    Lean proteins give you nutrition with less fat. Good choices include fish, skinless chicken, and beans.    Low-fat or nonfat dairy provides nutrients without a lot of fat. Try low-fat or nonfat milk, cheese, or yogurt.    Healthy fats can be good for you in small amounts. These are unsaturated fats, such as olive oil, nuts, and fish. Try to have at least 2 servings per week of fatty fish such as salmon, sardines, mackerel, rainbow trout, and albacore tuna. These contain omega-3 fatty acids, which are good for your heart. Flaxseed is another source of a heart-healthy fat.  More on heart healthy eating    Read food labels  Healthy eating starts at the grocery store. Be sure to pay attention to food labels on packaged foods. Look for products that are high in fiber and protein, and low in saturated fat, cholesterol, and sodium. Avoid products that contain trans fat. And pay close attention to serving size. For instance, if you plan to eat two servings, double all the numbers on the label.  Prepare food right  A key part of healthy cooking is cutting down on added fat and salt. Look on the internet for lower-fat, lower-sodium recipes. Also, try these tips:    Remove fat from meat and skin from poultry before cooking.    Skim fat from the surface of soups and sauces.    Broil, boil, bake, steam, grill, and microwave food without added fats.    Choose ingredients that spice up your food without adding calories, fat, or sodium. Try these items: horseradish, hot sauce, lemon, mustard, nonfat salad dressings, and vinegar. For salt-free herbs and spices, try basil, cilantro, cinnamon, pepper, and rosemary.  Date Last Reviewed: 6/25/2015 2000-2017 [employer]. [address] [place] [address], [place] [address]. All rights reserved. This information is not  intended as a substitute for professional medical care. Always follow your healthcare professional's instructions.                Exercise for a Healthier Heart  You may wonder how you can improve the health of your heart. If you re thinking about exercise, you re on the right track. You don t need to become an athlete, but you do need a certain amount of brisk exercise to help strengthen your heart. If you have been diagnosed with a heart condition, your doctor may recommend exercise to help stabilize your condition. To help make exercise a habit, choose safe, fun activities.     Exercise with a friend. When activity is fun, you're more likely to stick with it.     Be sure to check with your healthcare provider before starting an exercise program.   Why exercise?  Exercising regularly offers many healthy rewards. It can help you do all of the following:    Improve your blood cholesterol level to help prevent further heart trouble    Lower your blood pressure to help prevent a stroke or heart attack    Control diabetes, or reduce your risk of getting this disease    Improve your heart and lung function    Reach and maintain a healthy weight    Make your muscles stronger and more limber so you can stay active    Prevent falls and fractures by slowing the loss of bone mass (osteoporosis)    Manage stress better    Reduce your blood pressure    Improve your sense of self and your body image  Exercise tips  Ease into your routine. Set small goals. Then build on them.  Exercise on most days. Aim for a total of 150 or more minutes of moderate to  vigorous intensity activity each week. Consider 40 minutes, 3 to 4 times a week. For best results, activity should last for 40 minutes on average. It is OK to work up to the 40 minute period over time. Examples of moderate-intensity activity is walking 1 mile in 15 minutes or 30 to 45 minutes of yard work.  Step up your daily activity level. Along with your exercise program, try  being more active throughout the day. Walk instead of drive. Do more household tasks or yard work.  Choose one or more activities you enjoy. Walking is one of the easiest things you can do. You can also try swimming, riding a bike, dancing, or taking an exercise class.  Stop exercising and call your doctor if you:    Have chest pain or feel dizzy or lightheaded    Feel burning, tightness, pressure, or heaviness in your chest, neck, shoulders, back, or arms    Have unusual shortness of breath    Have increased joint or muscle pain    Have palpitations or an irregular heartbeat   Date Last Reviewed: 5/1/2016 2000-2017 [employer]. [address] [place] [address], [place] [address]. All rights reserved. This information is not intended as a substitute for professional medical care. Always follow your healthcare professional's instructions.                Tips for Quitting Smoking (Cardiovascular)  Quitting smoking is a gift to yourself, one of the best things you can do to keep your heart disease from getting worse. Smoking reduces oxygen flow to your heart by speeding the buildup of plaque and changing the health of your blood vessels. This increases your risk for heart attack, also known as acute myocardial infarction, or AMI. Quitting helps reduce smoking's harmful effects. You may have tried to quit before, but don t give up. Try again. Many smokers try 4 or 5 times before they succeed. It is never too early to benefit from smoking cessation, especially if you already have chronic conditions such as high blood pressure and high cholesterol that put you at increased risk for cardiovascular disease.     You ll have the best chance of success if you join a stop-smoking group and have the support of your doctor, family and friends.     Line up help    Ask for the support of your family and friends.    Join a smoking cessation class, or ask your healthcare provider for a referral to a psychologist who  specializes in helping people quit smoking.     Ask your healthcare provider about nicotine replacement products and prescription medicines that can help you quit.  Set a quit date    Choose a date within the next 2 to 4 weeks.    After picking a day, adele it in bold letters on a calendar.     Your quit list  Ideas to stop smoking include:  1. Start by giving up cigarettes at the times you least need them.  2. Keeping a piece of fruit close by at the times you are most vulnerable to reach for a cigarette.  3. Using a nicotine replacement product instead of a cigarette.  Write down a few more ideas.     Set limits    Limit where you can smoke. Pick one room or a porch, and smoke only in that place.    Make smoking outdoors a house rule. Other smokers won t tempt you as much.    Speak to smokers around you about your intent to stop smoking so they can show consideration for you and limit their smoking around you.    Hang a list of   quit benefits  in the spot where you smoke. Put one on the refrigerator and one on your car dashboard.     For more information    smokefree.gov/czeq-mj-xh-expert    National [employer] [place] Smoking Quitline: 877-44U-QUIT (171-043-4979)      Date Last Reviewed: 3/26/2016    1844-9530 [employer]. [address] [place] [address], [place] [address]. All rights reserved. This information is not intended as a substitute for professional medical care. Always follow your healthcare professional's instructions.

## 2017-09-15 NOTE — LETTER
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]  Non-rheumatic mitral regurgitation [I34.0]  Admit Date/Time: 9/15/2017 10:17 PM  Discharge Date: 10/5/2017  Payor Source: Payor: PREF[employer]EDONE / Plan: [employer] COMM HEALTH PLAN OPEN ACCESS / Product Type: HMO /     Reason for Communication Hand-off Referral: Continuation of Care    Discharge Plan: Discharge to home with home infusion and OP CR       Concern for non-adherence with plan of care:   No  Discharge Needs Assessment:  Needs       Most Recent Value    Anticipated Changes Related to Illness other (see comments) [POSSIBLE POST SURGICAL RESTRICTIONS ]    Equipment Currently Used at Home none    Other Resources Other (see comment) [[facility]]    Other -- [[facility]]    Home Infusion Provider -- [[facility] (Ph: [phone] Fax: [phone])]        Follow-up specialty is recommended: Yes    Follow-up plan:  Future Appointments  Date Time Provider Department Center   10/11/2017 10:30 AM 1, Ur Cardiac Rehab [facility]   10/17/2017 3:00 PM UC CVTS [facility]   10/23/2017 5:00 [facility]       Any outstanding tests or procedures:        Referrals     Future Labs/Procedures    Cardiac Rehab Referral     Comments:    Your provider has referred you to: PREFERRED PROVIDERS:    Home infusion referral     Comments:    Your provider has referred you to:   [facility]   Phone: [phone]   Fax: [phone]    For home IV abx and PICC line care.      Anticipated Length of Therapy: through 11/3/17.    Home Infusion Pharmacist to adjust therapy based on labs and clinical assessments: Yes    Labs:  May draw labs from Venous Catheter: Yes  Home Infusion Pharmacist to order labs based on therapy type and clinical assessments: Yes  Weekly labs to be drawn: CBC, BMP, and CRP inflammation.     Agency Staff to assess nursing needs for Infusion Therapy.    Access Device Management:  IV Access Type: PICC  Flush with Heparin and Normal Saline IVP PRN and routine site care (per agency protocol) to maintain access device? Yes    Medication Therapy Management Referral     Comments:    Reason for referral:  on more than 5 medications and managing chronic disease    This service is designed to help you get the most from your medications.  A specially trained pharmacist will work closely with you and your doctors  to solve any problems related to your medications and to help you get the   best results from taking them.      The Medication Therapy Management staff will call you to schedule an appointment.        Key Recommendations:  Post hospitalization follow up, home infusion, new INR and warfarin monitoring through PCP, has appointment 10/9/17  Lesly Jay RN BSN  6C Unit Care Coordinator  Phone number: [phone]  Pager: [phone]

## 2017-09-16 LAB
ALBUMIN UR-MCNC: 10 MG/DL
ANION GAP SERPL CALCULATED.3IONS-SCNC: 12 MMOL/L (ref 3–14)
APPEARANCE UR: CLEAR
BILIRUB UR QL STRIP: NEGATIVE
BUN SERPL-MCNC: 39 MG/DL (ref 7–30)
CALCIUM SERPL-MCNC: 8.7 MG/DL (ref 8.5–10.1)
CHLORIDE SERPL-SCNC: 95 MMOL/L (ref 94–109)
CO2 SERPL-SCNC: 29 MMOL/L (ref 20–32)
COLOR UR AUTO: YELLOW
CREAT SERPL-MCNC: 1.3 MG/DL (ref 0.66–1.25)
ERYTHROCYTE [DISTWIDTH] IN BLOOD BY AUTOMATED COUNT: 15.4 % (ref 10–15)
GFR SERPL CREATININE-BSD FRML MDRD: 59 ML/MIN/1.7M2
GLUCOSE SERPL-MCNC: 111 MG/DL (ref 70–99)
GLUCOSE UR STRIP-MCNC: NEGATIVE MG/DL
GRAN CASTS #/AREA URNS LPF: 1 /LPF
HCT VFR BLD AUTO: 35.3 % (ref 40–53)
HGB BLD-MCNC: 11.6 G/DL (ref 13.3–17.7)
HGB UR QL STRIP: NEGATIVE
HYALINE CASTS #/AREA URNS LPF: 76 /LPF (ref 0–2)
KETONES UR STRIP-MCNC: 5 MG/DL
LEUKOCYTE ESTERASE UR QL STRIP: NEGATIVE
MAGNESIUM SERPL-MCNC: 2.3 MG/DL (ref 1.6–2.3)
MCH RBC QN AUTO: 29.1 PG (ref 26.5–33)
MCHC RBC AUTO-ENTMCNC: 32.9 G/DL (ref 31.5–36.5)
MCV RBC AUTO: 89 FL (ref 78–100)
MUCOUS THREADS #/AREA URNS LPF: PRESENT /LPF
NITRATE UR QL: NEGATIVE
PH UR STRIP: 5 PH (ref 5–7)
PLATELET # BLD AUTO: 248 10E9/L (ref 150–450)
POTASSIUM SERPL-SCNC: 3.1 MMOL/L (ref 3.4–5.3)
POTASSIUM SERPL-SCNC: 3.7 MMOL/L (ref 3.4–5.3)
RBC # BLD AUTO: 3.99 10E12/L (ref 4.4–5.9)
RBC #/AREA URNS AUTO: 1 /HPF (ref 0–2)
SODIUM SERPL-SCNC: 136 MMOL/L (ref 133–144)
SOURCE: ABNORMAL
SP GR UR STRIP: 1.01 (ref 1–1.03)
SQUAMOUS #/AREA URNS AUTO: <1 /HPF (ref 0–1)
TRANS CELLS #/AREA URNS HPF: <1 /HPF (ref 0–1)
UROBILINOGEN UR STRIP-MCNC: NORMAL MG/DL (ref 0–2)
WBC # BLD AUTO: 7.9 10E9/L (ref 4–11)
WBC #/AREA URNS AUTO: 2 /HPF (ref 0–2)

## 2017-09-16 PROCEDURE — 25000128 H RX IP 250 OP 636: Performed by: INTERNAL MEDICINE

## 2017-09-16 PROCEDURE — 87040 BLOOD CULTURE FOR BACTERIA: CPT | Performed by: INTERNAL MEDICINE

## 2017-09-16 PROCEDURE — 36620 INSERTION CATHETER ARTERY: CPT

## 2017-09-16 PROCEDURE — 83735 ASSAY OF MAGNESIUM: CPT | Performed by: INTERNAL MEDICINE

## 2017-09-16 PROCEDURE — 25000132 ZZH RX MED GY IP 250 OP 250 PS 637: Performed by: INTERNAL MEDICINE

## 2017-09-16 PROCEDURE — 27210136 ZZH KIT CATH ARTERIAL EXT SUPPLY

## 2017-09-16 PROCEDURE — 80048 BASIC METABOLIC PNL TOTAL CA: CPT | Performed by: INTERNAL MEDICINE

## 2017-09-16 PROCEDURE — 36415 COLL VENOUS BLD VENIPUNCTURE: CPT | Performed by: INTERNAL MEDICINE

## 2017-09-16 PROCEDURE — 40000014 ZZH STATISTIC ARTERIAL MONITORING DAILY

## 2017-09-16 PROCEDURE — 84132 ASSAY OF SERUM POTASSIUM: CPT | Performed by: INTERNAL MEDICINE

## 2017-09-16 PROCEDURE — 25000128 H RX IP 250 OP 636: Performed by: STUDENT IN AN ORGANIZED HEALTH CARE EDUCATION/TRAINING PROGRAM

## 2017-09-16 PROCEDURE — 25000128 H RX IP 250 OP 636

## 2017-09-16 PROCEDURE — 85027 COMPLETE CBC AUTOMATED: CPT | Performed by: INTERNAL MEDICINE

## 2017-09-16 PROCEDURE — 93005 ELECTROCARDIOGRAM TRACING: CPT

## 2017-09-16 PROCEDURE — 99233 SBSQ HOSP IP/OBS HIGH 50: CPT | Mod: 25 | Performed by: INTERNAL MEDICINE

## 2017-09-16 PROCEDURE — 20000004 ZZH R&B ICU UMMC

## 2017-09-16 PROCEDURE — 81001 URINALYSIS AUTO W/SCOPE: CPT | Performed by: INTERNAL MEDICINE

## 2017-09-16 PROCEDURE — 40000275 ZZH STATISTIC RCP TIME EA 10 MIN

## 2017-09-16 PROCEDURE — 93010 ELECTROCARDIOGRAM REPORT: CPT | Mod: GC | Performed by: INTERNAL MEDICINE

## 2017-09-16 RX ORDER — AMOXICILLIN 250 MG
1-2 CAPSULE ORAL 2 TIMES DAILY PRN
Status: DISCONTINUED | OUTPATIENT
Start: 2017-09-16 | End: 2017-10-05 | Stop reason: HOSPADM

## 2017-09-16 RX ORDER — POTASSIUM CHLORIDE 14.9 MG/ML
20 INJECTION INTRAVENOUS
Status: DISCONTINUED | OUTPATIENT
Start: 2017-09-16 | End: 2017-09-17

## 2017-09-16 RX ORDER — POTASSIUM CL/LIDO/0.9 % NACL 10MEQ/0.1L
10 INTRAVENOUS SOLUTION, PIGGYBACK (ML) INTRAVENOUS
Status: DISCONTINUED | OUTPATIENT
Start: 2017-09-16 | End: 2017-09-17

## 2017-09-16 RX ORDER — FUROSEMIDE 10 MG/ML
20 INJECTION INTRAMUSCULAR; INTRAVENOUS ONCE
Status: COMPLETED | OUTPATIENT
Start: 2017-09-16 | End: 2017-09-16

## 2017-09-16 RX ORDER — MAGNESIUM SULFATE HEPTAHYDRATE 40 MG/ML
4 INJECTION, SOLUTION INTRAVENOUS EVERY 4 HOURS PRN
Status: DISCONTINUED | OUTPATIENT
Start: 2017-09-16 | End: 2017-09-17

## 2017-09-16 RX ORDER — ONDANSETRON 2 MG/ML
4 INJECTION INTRAMUSCULAR; INTRAVENOUS EVERY 6 HOURS PRN
Status: DISCONTINUED | OUTPATIENT
Start: 2017-09-16 | End: 2017-09-21

## 2017-09-16 RX ORDER — POTASSIUM CHLORIDE 1.5 G/1.58G
20-40 POWDER, FOR SOLUTION ORAL
Status: DISCONTINUED | OUTPATIENT
Start: 2017-09-16 | End: 2017-09-17

## 2017-09-16 RX ORDER — CEFTRIAXONE 2 G/1
2 INJECTION, POWDER, FOR SOLUTION INTRAMUSCULAR; INTRAVENOUS EVERY 24 HOURS
Status: DISCONTINUED | OUTPATIENT
Start: 2017-09-16 | End: 2017-09-22

## 2017-09-16 RX ORDER — POTASSIUM CHLORIDE 750 MG/1
20-40 TABLET, EXTENDED RELEASE ORAL
Status: DISCONTINUED | OUTPATIENT
Start: 2017-09-16 | End: 2017-09-17

## 2017-09-16 RX ORDER — POTASSIUM CHLORIDE 7.45 MG/ML
10 INJECTION INTRAVENOUS
Status: DISCONTINUED | OUTPATIENT
Start: 2017-09-16 | End: 2017-09-17

## 2017-09-16 RX ORDER — ONDANSETRON 4 MG/1
4 TABLET, ORALLY DISINTEGRATING ORAL EVERY 6 HOURS PRN
Status: DISCONTINUED | OUTPATIENT
Start: 2017-09-16 | End: 2017-09-21

## 2017-09-16 RX ORDER — ACETAMINOPHEN 325 MG/1
650 TABLET ORAL EVERY 4 HOURS PRN
Status: DISCONTINUED | OUTPATIENT
Start: 2017-09-16 | End: 2017-09-26

## 2017-09-16 RX ORDER — POLYETHYLENE GLYCOL 3350 17 G/17G
17 POWDER, FOR SOLUTION ORAL DAILY PRN
Status: DISCONTINUED | OUTPATIENT
Start: 2017-09-16 | End: 2017-09-26

## 2017-09-16 RX ORDER — NALOXONE HYDROCHLORIDE 0.4 MG/ML
.1-.4 INJECTION, SOLUTION INTRAMUSCULAR; INTRAVENOUS; SUBCUTANEOUS
Status: DISCONTINUED | OUTPATIENT
Start: 2017-09-16 | End: 2017-09-20

## 2017-09-16 RX ADMIN — FUROSEMIDE 20 MG: 10 INJECTION, SOLUTION INTRAVENOUS at 18:53

## 2017-09-16 RX ADMIN — SODIUM NITROPRUSSIDE 0.25 MCG/KG/MIN: 25 INJECTION, SOLUTION, CONCENTRATE INTRAVENOUS at 05:39

## 2017-09-16 RX ADMIN — ONDANSETRON 4 MG: 2 INJECTION INTRAMUSCULAR; INTRAVENOUS at 11:56

## 2017-09-16 RX ADMIN — CEFTRIAXONE 2 G: 2 INJECTION, POWDER, FOR SOLUTION INTRAMUSCULAR; INTRAVENOUS at 10:15

## 2017-09-16 RX ADMIN — POTASSIUM CHLORIDE 40 MEQ: 750 TABLET, EXTENDED RELEASE ORAL at 06:08

## 2017-09-16 RX ADMIN — ONDANSETRON 4 MG: 2 INJECTION INTRAMUSCULAR; INTRAVENOUS at 17:34

## 2017-09-16 RX ADMIN — POTASSIUM CHLORIDE 20 MEQ: 750 TABLET, EXTENDED RELEASE ORAL at 08:34

## 2017-09-16 RX ADMIN — FUROSEMIDE 20 MG: 10 INJECTION, SOLUTION INTRAVENOUS at 14:07

## 2017-09-16 ASSESSMENT — ACTIVITIES OF DAILY LIVING (ADL)
RETIRED_EATING: 0-->INDEPENDENT
TOILETING: 0-->INDEPENDENT
SWALLOWING: 0-->SWALLOWS FOODS/LIQUIDS WITHOUT DIFFICULTY
TRANSFERRING: 0-->INDEPENDENT
RETIRED_COMMUNICATION: 0-->UNDERSTANDS/COMMUNICATES WITHOUT DIFFICULTY
COGNITION: 0 - NO COGNITION ISSUES REPORTED
BATHING: 0-->INDEPENDENT
FALL_HISTORY_WITHIN_LAST_SIX_MONTHS: NO
AMBULATION: 0-->INDEPENDENT
DRESS: 0-->INDEPENDENT

## 2017-09-16 NOTE — H&P
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 No acute kidney injury present.   - Trend creatinine   - UA    FEN: NPO   PPX: Mechanical  CODE: FULL  DISPO: Requires inpatient management of decompensated heart failure and AI     Rasheed Abraham MD  PGY-3 Internal Medicine  [phone]    CARDIOLOGY STAFF NOTE  I have seen and examined the patient with the Cards 1 team. I agree with the note above that reflects my assessment and plan of care. Patient seen on 9/16/2017. Please refer to progress note dated 9/16/2017 for details of assessment and plan  Isma Knight MD [facility]  Cardiology - Electrophysiology        Chief Complaint   Dyspnea    History is obtained from the patient    History of Present Illness   Harvey Almanzar is a 47 year old male who presents with dyspnea.     He reports a remote history of a bicuspid aortic valve s/p bioprosthetic AVR. He subsequently presented to [facility] in July 2017 with fevers and was diagnosed with streptococcal endocarditis of the aortic valve. At that time,  he was found to have severe AI and moderate to severe MR (not thought to be secondary to endocarditis as no vegetations were identified on IZA). The decision was made not to operate at that time.     Since then, he has continued on IV ceftriaxone. He reports that he was doing well until he felt more short of breath early this morning. He reports orthopnea. He denies chest pain, palpitations, lightheadedness, syncope, fevers, chills, sweats, weight changes, leg swelling, or new rashes.     In the ED, he was given a dose of IV lasix. He reports that his breathing already feels improved from prior to presentation. He denies any other symptoms at this time.      Past Medical History    Bioprosthetic aortic valve  Aortic valve endocarditis  Moderate to severe MR  Chronic systolic heart failure 45-50%  Former tobacco use    Cardiac studies:  Transesophageal echo 7/17/17 -   Normal LV and RV size and systolic function.    LVEF 60%.    Mild to moderate LA dilatation.    Mild RA dilatation.    Multiple aortic valve vegetations.  **Severe AR. **  Thickened aortic    root walls- no lucencies to suggest abscess, but thickening may reflect    edema and possible early infectiion in the aortic root tissues.   **Moderate to severe MR..    Posterior jet-- anterior leaflet overrides    posterior leaflet.   No definitive vegetaion seen on mitral valve.   Mild TR.   Negative bubble study.      CT Angio coronary 7/18/17 -   Calcium scoring not performed on this study (unreliable due to significant   calcification noted in the     aortic root and atypical proximal right coronary artery calcification).    No coronary calcification was     noted in the left main, LAD, left circumflex coronary arteries.      Heavily calcified aortic homograft.      Left main, left anterior descending coronary artery and branches, and left   circumflex coronary artery and     branches: No significant occlusive atherosclerotic disease noted.      RCA:  Severely calcified ostial/proximal right coronary artery with likely   severe occlusive right coronary     artery disease.  No significant contrast was noted in the proximal right   coronary artery which has a     discrete ring-shaped area of calcification in the distal third of the   proximal right coronary artery segment.      Mid and distal right coronary artery has no significant occlusive   atherosclerotic disease     Past Surgical History   Bioprosthetic AV    Prior to Admission Medications   Prior to Admission Medications   Prescriptions Last Dose Informant Patient Reported? Taking?   cefTRIAXone (CEFTRIAXONE SODIUM) 1 GM vial   Yes No   Sig: Inject 2,000 mg into the vein every 24 hours   furosemide (LASIX) 20 MG tablet   Yes No   Sig: Take 20 mg by mouth daily      Facility-Administered Medications: None     Allergies   No Known Allergies    Social History   I have reviewed this patient's social history and updated it with pertinent information if needed. Harvey Barnettnhzack  reports that he has quit smoking. He has a 15.50 pack-year smoking history. He has never used smokeless tobacco. He reports that he does not drink alcohol.    Family History   I have reviewed this patient's family history and updated it with pertinent information if needed.   No family history on file.    Review of Systems   The 10 point Review of Systems is negative other than noted in the HPI.     Physical Exam   Temp: 97.6  F (36.4  C) Temp src: Oral BP: 106/59   Heart Rate: 100 Resp: 23 SpO2: 94 % O2 Device: None (Room air)    Vital Signs with Ranges  Temp:  [97.6  F (36.4  C)] 97.6  F (36.4  C)  Heart Rate:  [] 100  Resp:  [23-34] 23  BP: ()/(30-64) 106/59  SpO2:  [94 %-100 %] 94 %  150 lbs 0 oz    Constitutional: No acute distress  Eyes: No scleral icterus, EOMi  ENT: No oropharyngeal lesions  Respiratory: Clear to auscultation b/l, no crackles  Cardiovascular: Tachycardic, regular, III/VI diastolic murmur, warm  extremities, intact pulses  GI: BS+, soft, nontender  Lymph/Hematologic: Scant bruising lower abdomen   Genitourinary: NA  Skin: No rashes, splinter hemorrhages, or osler's nodes  Musculoskeletal: No joint effusions or erythema  Neurologic: AOx3, nonfocal   Neuropsychiatric: Pleasant affect     Data   Results for orders placed or performed during the hospital encounter of 09/15/17 (from the past 24 hour(s))   CBC with platelets differential   Result Value Ref Range    WBC 8.8 4.0 - 11.0 10e9/L    RBC Count 4.04 (L) 4.4 - 5.9 10e12/L    Hemoglobin 11.9 (L) 13.3 - 17.7 g/dL    Hematocrit 36.0 (L) 40.0 - 53.0 %    MCV 89 78 - 100 fl    MCH 29.5 26.5 - 33.0 pg    MCHC 33.1 31.5 - 36.5 g/dL    RDW 15.5 (H) 10.0 - 15.0 %    Platelet Count 267 150 - 450 10e9/L    Diff Method Automated Method     % Neutrophils 67.2 %    % Lymphocytes 25.0 %    % Monocytes 7.5 %    % Eosinophils 0.0 %    % Basophils 0.1 %    % Immature Granulocytes 0.2 %    Nucleated RBCs 0 0 /100    Absolute Neutrophil 5.9 1.6 - 8.3 10e9/L    Absolute Lymphocytes 2.2 0.8 - 5.3 10e9/L    Absolute Monocytes 0.7 0.0 - 1.3 10e9/L    Absolute Eosinophils 0.0 0.0 - 0.7 10e9/L    Absolute Basophils 0.0 0.0 - 0.2 10e9/L    Abs Immature Granulocytes 0.0 0 - 0.4 10e9/L    Absolute Nucleated RBC 0.0    Nt probnp inpatient   Result Value Ref Range    N-Terminal Pro BNP Inpatient 08638 (H) 0 - 450 pg/mL   Comprehensive metabolic panel   Result Value Ref Range    Sodium 137 133 - 144 mmol/L    Potassium 3.1 (L) 3.4 - 5.3 mmol/L    Chloride 94 94 - 109 mmol/L    Carbon Dioxide 27 20 - 32 mmol/L    Anion Gap 15 (H) 3 - 14 mmol/L    Glucose 129 (H) 70 - 99 mg/dL    Urea Nitrogen 36 (H) 7 - 30 mg/dL    Creatinine 1.36 (H) 0.66 - 1.25 mg/dL    GFR Estimate 56 (L) >60 mL/min/1.7m2    GFR Estimate If Black 68 >60 mL/min/1.7m2    Calcium 8.8 8.5 - 10.1 mg/dL    Bilirubin Total 2.5 (H) 0.2 - 1.3 mg/dL    Albumin 3.1 (L) 3.4 - 5.0 g/dL    Protein Total 7.1 6.8 - 8.8 g/dL     Alkaline Phosphatase 69 40 - 150 U/L    ALT 21 0 - 70 U/L    AST 24 0 - 45 U/L   CRP inflammation   Result Value Ref Range    CRP Inflammation 40.0 (H) 0.0 - 8.0 mg/L   Troponin I   Result Value Ref Range    Troponin I ES 0.586 (HH) 0.000 - 0.045 ug/L   Erythrocyte sedimentation rate auto   Result Value Ref Range    Sed Rate 42 (H) 0 - 15 mm/h   Chest  XR, 1 view portable    Impression    Impression:   1. Marked cardiomegaly.  2. Streaky and platelike opacities within both lung bases, likely  corresponding with intrafissural fluid noted on the prior CT as well  as mild bibasilar atelectasis.  3. Small bilateral pleural effusion.   Echocardiogram Complete    Narrative    [id number]  [id number]  [id number]  [id number]^GURVINDER^ADRIANNE^OKSANA           [facility],[place]  Echocardiography Laboratory  [address] [place] [id number]     Name: HERBERTH MENDENHALL  MRN: [id number]  : 1970  Study Date: 09/15/2017 11:10 PM  Age: 47 yrs  Gender: Male  Patient Location: [facility]  Reason For Study: Aortic Valve Disorder  Ordering Physician: ADRIANNE HOLLINS  Performed By: Angeline Joshua RDCS     BSA: 1.7 m2  Height: 64 in  Weight: 150 lb  BP: 102/58 mmHg  _____________________________________________________________________________  __        Procedure  Echocardiogram with two-dimensional, color and spectral Doppler performed.  _____________________________________________________________________________  __        Interpretation Summary  Severe left ventricular dilation is present.  Biplane LV EF 46%.  Global right ventricular function is moderately reduced.  Severe mitral insufficiency is present.  Prosthetic aortic valve with significant thickening of aortic root.Possible  vegetation or severe degenerative changes on aortic prosthetic valve with  severe aortic regurgitation.Post operative changes in aortic root or related  to infection.  Right ventricular systolic pressure is 48mmHg  above the right atrial pressure.  Dilation of the inferior vena cava is present with abnormal respiratory  variation in diameter.  No pericardial effusion is present.  _____________________________________________________________________________  __        Left Ventricle  Severe left ventricular dilation is present. Borderline concentric wall  thickening consistent with left ventricular hypertrophy is present. Left  ventricular diastolic function is indeterminate. Biplane LV EF 46%. No  regional wall motion abnormalities are seen.     Right Ventricle  Mild right ventricular dilation is present. Global right ventricular function  is moderately reduced.     Atria  The right atria appears normal. Severe left atrial enlargement is present.        Mitral Valve  Severe mitral insufficiency is present.     Aortic Valve  Prosthetic aortic valve with significant thickening of aortic root.Possible  vegetation or severe degenerative changes on aortic prosthetic valve with  severe aortic regurgitation.Post operative changes in aortic root or related  to infection. The mean AoV pressure gradient is 47.0 mmHg. The calculated  aortic valve are is 1.3 cm^2.     Tricuspid Valve  The tricuspid valve is normal. Trace to mild tricuspid insufficiency is  present. Right ventricular systolic pressure is 48mmHg above the right atrial  pressure.     Pulmonic Valve  The pulmonic valve cannot be assessed.     Vessels  The pulmonary artery cannot be assessed. Ascending aorta 4.1 cm. Dilation of  the inferior vena cava is present with abnormal respiratory variation in  diameter.     Pericardium  No pericardial effusion is present.     _____________________________________________________________________________  __  MMode/2D Measurements & Calculations  IVSd: 1.1 cm  LVIDd: 6.6 cm  LVIDs: 5.1 cm  LVPWd: 1.1 cm     FS: 23.0 %  EDV(Teich): 223.6 ml  ESV(Teich): 122.7 ml  LV mass(C)d: 332.3 grams  LV mass(C)dI: 192.0 grams/m2  asc Aorta Diam: 4.1  cm  LVOT diam: 2.3 cm  LVOT area: 4.2 cm2  LA Volume (BP): 154.0 ml  LA Volume Index (BP): 89.0 ml/m2        Doppler Measurements & Calculations  MV E max mathew: 151.0 cm/sec  MV dec slope: 1248 cm/sec2     Ao V2 max: 435.0 cm/sec  Ao max P.0 mmHg  Ao V2 mean: 321.0 cm/sec  Ao mean P.0 mmHg  Ao V2 VTI: 87.7 cm  LISA(I,D): 1.3 cm2  LISA(V,D): 1.2 cm2  AI P1/2t: 136.3 msec  LV V1 max P.2 mmHg  LV V1 max: 124.0 cm/sec  LV V1 VTI: 27.6 cm  SV(LVOT): 114.7 ml  SI(LVOT): 66.2 ml/m2  TR max mathew: 347.0 cm/sec  TR max P.2 mmHg  LISA Index (cm2/m2): 0.76  Lateral E/e': 9.2  Medial E/e': 18.3           _____________________________________________________________________________  __           Report approved by: Mark Prince 2017 01:03 AM

## 2017-09-16 NOTE — PLAN OF CARE
Problem: Goal Outcome Summary  Goal: Goal Outcome Summary  Outcome: No Change  Nipride remains stable at 0.25mcg/kg/min. No titration needed to keep MAPS 65-75 all day. Patient intermittently nauseous throughout day, given Zofran x2. Had one episode of scant clear emesis with small speckle of red color. Patient had minimal UOP all day, minimal response to 20mg IV Lasix. Patient reports dyspnea at rest, no visual signs of dyspnea present to writer. Nasal cannula at 2lpm used for when sleeping due to desatting into 80's. Patient reports he feels helpless and sad about how deconditioned he currently feels. Updated on plan of care at bedside. See flowsheets for further details.       Plan: NPO at midnight for IZA tomorrow, possible Chest CT per CVTS consult. Will update service with changes or concerns regarding patient condition.

## 2017-09-16 NOTE — CONSULTS
[facility] SERVICE CONSULTATION     Patient:  Harvey Almanzar   Date of birth 1970, Medical record number [id number]  Date of Visit:  09/16/2017  Date of Admission: 9/15/2017  Consult Requester:MICHELE Hua MD            Assessment and Recommendations:     ASSESSMENT:  1. 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 Biplane LV EF 46%.  Global right ventricular function is moderately reduced.Severe mitral insufficiency is present.Prosthetic aortic valve with significant thickening of aortic root.Possible vegetation or severe degenerative changes on aortic prosthetic valve with severe aortic regurgitation.Post operative changes in aortic root or related to infection. Right ventricular systolic pressure is 48mmHg above the right atrial pressure. Dilation of the inferior vena cava is present with abnormal respiratory variation in diameter. No pericardial effusion is present.  IZA 8/24/17  CONCLUSION:   Low normal LV systolic function.  Mild LV dilation. Mild inferior    Hypokinesis.Normal RV size and function. Bioprosthetic aortic valve with multiple mobile vegetations.  Severe aortic insufficiency.  There appears to be trace perivalvular  regurgitation which originates from the anterior portion of the sewing ring.  Thickening of the aortic root which may be consistent with root abscess as previously noted without gross dehiscence.    Moderate to severe mitral regurgitation. There are no vegetations appreciated on the mitral valve. Patient has known endocarditis of bioprosthetic aortic valve with  possible root abscess. There are no significant  changes in comparison    with the transthoracic echo from 8/24/17. Left Ventricular Ejection Fraction: 50 %   - Blood cx 7/13,  7/14, 7/15 positive for Strep parasanguinas ( sensitive to Ceftriaxone LONG 0.094 , Penicillin LONG 0.19 )   Blood cx 7/16,7/17, 8/25 - No growth  Blood cx 9/16/17 - pending x2    RECOMMENDATION:  1. Appropriately treated with Ceftriaxone 2 gram IV daily  and Genta 3mg/kg daily x 2 weeks with ongoing Cefriaxone ( week 8) ( normally - 4 weeks duration)   2. Has been afebrile, no chills, no sweating since antibiotics   3. Worsening of breathing status recently     4. Follow-up culture results     Will follow     Thank you for allowing us to participate in the care of this patient    Belinda Bryant MD, M.Med.Sc  Infectious Diseases   Pager : [phone]  ______________________________________________________________    Consult Question:.  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     Blood cx 7/13,  7/14, 7/15 positive for Strep parasanguinas ( sensitive to Ceftriaxone LONG 0.094 , Penicillin LONG 0.19 )   Blood cx 7/17,7/16, 8/25 - No growth     He was treated with Ceftriaxone 2 gram IV daily and gentamicin 3mg/kg daily x 2 weeks and is still on Ceftriaxone at home .his Cr went up to 1.51 .   He denies any more fever, chills, profuse sweating after he started  antibiotic treatment  but has developed shortness of breath.  No nausea, vomiting, diarrhea.minimal cough.    He also went to see his dentist for exam and regular cleaning  and audiologist recently.     Imagings  TTE 9/16/17   Interpretation Summary  Severe left ventricular dilation is present.  Biplane LV EF 46%.  Global right ventricular function is moderately reduced.  Severe mitral insufficiency is present.  Prosthetic aortic valve with significant thickening of aortic root.Possible vegetation or severe degenerative changes on aortic prosthetic valve with severe aortic regurgitation.Post operative changes in aortic root or related to infection.  Right ventricular systolic pressure is 48mmHg above the right atrial pressure.  Dilation of the inferior vena cava is present with abnormal respiratory variation in diameter.  No pericardial effusion is present.    CXR 9/15/17  Impression:   1. Marked cardiomegaly.  2. Streaky and platelike opacities within both lung bases, likely corresponding with intrafissural fluid noted on the prior CT as well as mild bibasilar atelectasis.  3. Small bilateral pleural effusions which are loculated in the fissures.    IZA 8/24/17  CONCLUSION:   Low normal LV systolic function.  Mild LV dilation. Mild inferior    hypokinesis.    Normal RV size and function.    Bioprosthetic aortic valve with multiple mobile vegetations.  Severe    aortic insufficiency.  There appears to be trace perivalvular    regurgitation which originates from the anterior portion of the sewing    ring.  Thickening of the aortic root which may be consistent with root    abscess as previously noted without gross dehiscence.    Moderate to severe mitral regurgitation.     There are no vegetations appreciated on the mitral valve.      Patient has known endocarditis of bioprosthetic aortic valve with    possible root abscess. There are no significant changes in comparison    with the transthoracic echo from 8/24/17.      Left Ventricular Ejection Fraction: 50 %        TTE 8/24/17  CONCLUSION:    Limited echocardiogram.     Mild LV dilatation. Mild LVH.    Mildly reduced LV systolic function, EF 45%. Inferior wall is    slightly worse than other walls (probable no significant change on side by  side comparison with 08/22/2017 echo)    Normal RV size.     Borderline RV hypokinesis.     Moderate to severe LA dilatation.     Limited doppler/color suggests at least moderate AI, Severe MR,     mild to moderate TR , Estimated RVSP 61 mmHg + RAP (Patient     known to have bioprosthetic aortic valve endocarditis)    No significant pericardial effusion    Left Ventricular Ejection Fraction: 45 %     CT chest w contrast 9/1/17   IMPRESSION:   1. Cardiomegaly with calcification of the aortic valve and proximal ascending aorta. Ascending aortic dilation measuring 4 cm. Prominent mediastinal lymph nodes.  3. Loculated bilateral pleural effusions with associated atelectasis.   4. Predominantly upper lungs centrilobular emphysema. Superimposed  mild groundglass opacities, which could represent mild edema.  5. Enlarged main pulmonary, likely pulmonary hypertension.    Recent culture results include:  Culture Micro   Date Value Ref Range Status   09/16/2017 No growth after 1 hour  Preliminary   09/16/2017 No growth after 1 hour  Preliminary          Review of Systems:   CONSTITUTIONAL:  No fevers or chills  EYES: negative for icterus  ENT:  negative for hearing loss, tinnitus and sore throat  RESPIRATORY: see HPI   CARDIOVASCULAR:  negative for chest pain, dyspnea  GASTROINTESTINAL:  negative for nausea, vomiting, diarrhea and constipation  GENITOURINARY:  negative for dysuria  HEME:  No easy bruising  INTEGUMENT:  negative for rash and pruritus  NEURO:  Negative for headache           Past Medical History:     Past Medical History:   Diagnosis Date     Acute diastolic congestive heart failure (H) 8/24/2017     Aortic valve replaced 1996     Overview:  Cadaver - tissue valve in 1996 at [facility]  Amoxicillin 2000 mg prior to dental work please.  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            Family History:   Non contributory          Social History:     Social History   Substance Use Topics     Smoking status: Former Smoker     Packs/day: 0.50     Years: 31.00     Smokeless tobacco: Never Used     Alcohol use No     History   Sexual Activity     Sexual activity: Not on file            Current Medications (antimicrobials listed in bold):       cefTRIAXone  2 g Intravenous Q24H     furosemide  20 mg Intravenous Once            Allergies:   No Known Allergies         Physical Exam:   Vitals were reviewed  Patient Vitals for the past 24 hrs:   BP Temp Temp src Heart Rate Resp SpO2 Height Weight   09/16/17 1230 99/53 - - 101 - 97 % - -   09/16/17 1215 110/57 - - 101 - 96 % - -   09/16/17 1200 109/56 97.7  F (36.5  C) Oral 102 20 95 % - -   09/16/17 1145 108/61 - - - - - - -   09/16/17 1130 113/65 - - 104 - 97 % - -   09/16/17 1115 99/54 - - 100 - 96 % - -   09/16/17 1100 95/52 - - 99 20 97 % - -   09/16/17 1045 101/55 - - 102 - 97 % - -   09/16/17 1030 97/63 - - 95 - 99 % - -   09/16/17 1015 95/57 - - 96 - 100 % - -   09/16/17 1000 (!) 85/44 - - 97 17 99 % - -   09/16/17 0945 95/58 - - 95 - 98 % - -   09/16/17 0930 95/60 - - 95 - 99 % - -   09/16/17 0915 97/58 - - 95 - 94 % - -   09/16/17 0900 104/57 - - 96 16 100 % - -   09/16/17 0845 100/58 - - 98 - 100 % - -   09/16/17 0830 97/52 - - 92 - 99 % - -   09/16/17 0815 (!) 84/40 - - 94 - 99 % - -   09/16/17 0800 102/61 97.8  F (36.6  C) Oral 96 14 100 % - -   09/16/17 0745 93/57 - - 95 - 100 % - -   09/16/17 0730 (!) 79/44 - - 94 - 99 % - -   09/16/17 0715 98/56 - - 91 - 100 % - -  "  09/16/17 0700 (!) 88/57 - - 94 16 97 % - -   09/16/17 0645 95/57 - - 94 14 98 % - -   09/16/17 0630 99/56 - - 93 - 97 % - -   09/16/17 0615 101/52 - - 94 - 100 % - -   09/16/17 0600 104/59 - - 96 12 - - -   09/16/17 0545 111/55 - - 98 - (!) 88 % - -   09/16/17 0530 103/51 - - 94 - 96 % - -   09/16/17 0515 106/57 - - 95 - 96 % - -   09/16/17 0500 105/55 - - 95 - 97 % - -   09/16/17 0445 99/55 - - 97 14 98 % - -   09/16/17 0437 100/57 - - 97 - 97 % - -   09/16/17 0430 - - - 98 - 94 % - -   09/16/17 0415 93/58 - - 97 - 96 % - -   09/16/17 0400 - 96.3  F (35.7  C) Oral 99 14 95 % 1.626 m (5' 4\") 64.8 kg (142 lb 13.7 oz)   09/16/17 0315 101/57 96.3  F (35.7  C) Oral 95 13 95 % - -   09/16/17 0300 100/45 - - 97 28 94 % - -   09/16/17 0245 102/65 - - 99 14 98 % - -   09/16/17 0230 90/45 - - 99 11 96 % - -   09/16/17 0215 97/59 - - 100 11 95 % - -   09/16/17 0200 107/58 - - 101 10 96 % - -   09/16/17 0145 98/59 - - 99 28 98 % - -   09/16/17 0142 - 98.2  F (36.8  C) Oral - - - - -   09/16/17 0130 104/58 - - 100 16 98 % - -   09/16/17 0115 (!) 87/56 - - 101 28 95 % - -   09/16/17 0045 96/57 - - - - - - -   09/16/17 0030 102/57 - - 102 27 93 % - -   09/16/17 0015 103/58 - - 100 18 98 % - -   09/16/17 0000 106/57 - - 100 9 98 % - -   09/15/17 2345 110/73 - - 102 (!) 31 99 % - -   09/15/17 2330 105/59 - - 101 24 95 % - -   09/15/17 2315 106/59 - - 100 23 94 % - -   09/15/17 2300 102/58 - - 102 (!) 34 99 % - -   09/15/17 2245 116/52 - - 105 23 96 % - -   09/15/17 2230 104/64 - - 106 26 97 % - -   09/15/17 2202 (!) 90/30 97.6  F (36.4  C) Oral 98 - 100 % 1.626 m (5' 4\") 68 kg (150 lb)     Ranges for his vital signs:  Temp:  [96.3  F (35.7  C)-98.2  F (36.8  C)] 97.7  F (36.5  C)  Heart Rate:  [] 101  Resp:  [9-34] 20  BP: ()/(30-73) 99/53  SpO2:  [88 %-100 %] 97 %    Physical Examination:  GENERAL:  Alert, oriented, in no acute distress.   HEENT:  Head is normocephalic, atraumatic   EYES:  Eyes have anicteric sclerae " without conjunctival injection or stigmata of endocarditis.    ENT:  Oropharynx is moist without exudates or ulcers. Tongue is midline  NECK:  Supple. No  Cervical lymphadenopathy  LUNGS: Clear but decreased breath sounds in bases  CARDIOVASCULAR:  Tachy with 3/6 SERINA precordial ,  Extremities - minimal pedal edema bilateral   ABDOMEN:  Normal bowel sounds, soft, nontender. No appreciable hepatosplenomegaly  SKIN:  No acute rashes.  Line(s) are in place without any surrounding erythema or exudate. No stigmata of endocarditis.  NEUROLOGIC:  Grossly nonfocal. Active x4 extremities         Laboratory Data:     Inflammatory Markers    Recent Labs   Lab Test  09/15/17   2217   SED  42*   CRP  40.0*       Hematology Studies  Recent Labs   Lab Test  09/16/17 0413  09/15/17   2217   WBC  7.9  8.8   ANEU   --   5.9   AEOS   --   0.0   HGB  11.6*  11.9*   MCV  89  89   PLT  248  267     Metabolic Studies   Recent Labs   Lab Test  09/16/17 0413  09/15/17   2217   NA  136  137   POTASSIUM  3.1*  3.1*   CHLORIDE  95  94   CO2  29  27   BUN  39*  36*   CR  1.30*  1.36*   GFRESTIMATED  59*  56*     Hepatic Studies  Recent Labs   Lab Test  09/15/17   2217   BILITOTAL  2.5*   ALKPHOS  69   ALBUMIN  3.1*   AST  24   ALT  21     Microbiology:  Culture Micro   Date Value Ref Range Status   09/16/2017 No growth after 1 hour  Preliminary   09/16/2017 No growth after 1 hour  Preliminary     Urine Studies    Recent Labs   Lab Test  09/16/17   0844   LEUKEST  Negative   WBCU  2

## 2017-09-16 NOTE — CONSULTS
Patient seen and examined. Investigations reviewed.  Will review op notes from prior surgery.  Will follow up. Full consult dictated.

## 2017-09-16 NOTE — PLAN OF CARE
Problem: Goal Outcome Summary  Goal: Goal Outcome Summary  Patient admission initiated upon arrival from Emergency department just before 0400, blood cultures sent, blood work as ordered sent, urine culture still needed supplies bedside, patient voided just prior to arrival on unit .      Arterial line placement not completed see Physician note for details, invasive procedure checklist and consent complete, right radial site with some redness. PICC line placement verified for use of Nipride via Xray by Dr. Johnson bedside.      Nipride started with goal MAP 65-75.      Nasal canula oxygen 2L while sleeping for SPO2 87-88, room air awake 92-97%, frequent dry cough.      Patient will continue to be monitored and assessed. See MAR, flow sheets, and remainder of chart for further details.

## 2017-09-16 NOTE — PROGRESS NOTES
Cardiology Progress Note  Harvey Almanzar MRN: [id number]  Age: 47 year old, : 1970  Date: @todaysdater@             Subjective     ASPEN overnight. Feels breathing improved, however has not ambulated. No chest pain, lightheadedness or palpations. No f/c (now or prior to arrival).           Objective     B/P: 102/61, T: 97.8, P: Data Unavailable, R: 14    Intake/Output Summary (Last 24 hours) at 17 0904  Last data filed at 17 0800   Gross per 24 hour   Intake              235 ml   Output              175 ml   Net               60 ml     Vitals:    09/15/17 2202 17 0400   Weight: 68 kg (150 lb) 64.8 kg (142 lb 13.7 oz)         Gen: AA&Ox3, no acute distress  HEENT:AT/ NC, PERRL b/l, EOM grossly intact, mucous membranes pink, moist without plaque or exudate  BACK: no CVA tenderness, no midline bony tenderness  PULM/THORAX: Faint bibasilar crackles.   CV: harsh diastolic murmur heard best at RUSB (c/w AI), systolic murmur at apex (c/w MR)  ABD: soft, nontender, nondistended. Normoactive bowel sounds x 4, no HSM appreciated.  EXT: trace pedal edema, no clubbing or cyanosis. LE warm well perfused No asymmetrical edema or tenderness to palpation in calves bilaterally.            Data:          Lab Results   Component Value Date     2017    Lab Results   Component Value Date    CHLORIDE 95 2017    Lab Results   Component Value Date    BUN 39 2017      Lab Results   Component Value Date    POTASSIUM 3.1 2017    Lab Results   Component Value Date    CO2 29 2017    Lab Results   Component Value Date    CR 1.30 2017        Lab Results   Component Value Date    NTBNPI 66937 (H) 09/15/2017     Lab Results   Component Value Date    WBC 7.9 2017    HGB 11.6 (L) 2017    HCT 35.3 (L) 2017    MCV 89 2017     2017               Medications     Current Facility-Administered Medications:       naloxone (NARCAN) injection 0.1-0.4 mg, 0.1-0.4 mg, Intravenous, Q2 Min PRN, Everardo Abraham MD     acetaminophen (TYLENOL) tablet 650 mg, 650 mg, Oral, Q4H PRN, Everardo Abraham MD     ondansetron (ZOFRAN-ODT) ODT tab 4 mg, 4 mg, Oral, Q6H PRN **OR** ondansetron (ZOFRAN) injection 4 mg, 4 mg, Intravenous, Q6H PRN, Everardo Abraham MD     polyethylene glycol (MIRALAX/GLYCOLAX) Packet 17 g, 17 g, Oral, Daily PRN, Evreardo Abraham MD     senna-docusate (SENOKOT-S;PERICOLACE) 8.6-50 MG per tablet 1-2 tablet, 1-2 tablet, Oral, BID PRN, Everardo Abraham MD     potassium chloride SA (K-DUR/KLOR-CON M) CR tablet 20-40 mEq, 20-40 mEq, Oral, Q2H PRN, Everardo Abraham MD, 20 mEq at 09/16/17 0834     potassium chloride (KLOR-CON) Packet 20-40 mEq, 20-40 mEq, Oral or Feeding Tube, Q2H PRN, Everardo Abraham MD     potassium chloride 10 mEq in 100 mL intermittent infusion, 10 mEq, Intravenous, Q1H PRN, Everardo Abraham MD     potassium chloride 10 mEq in 100 mL intermittent infusion with 10 mg lidocaine, 10 mEq, Intravenous, Q1H PRN, Everardo Abraham MD     potassium chloride 20 mEq in 100 mL NON-STANDARD CONC intermittent infusion, 20 mEq, Intravenous, Q1H PRN, Everardo Abraham MD     magnesium sulfate 4 g in 100 mL sterile water (premade), 4 g, Intravenous, Q4H PRN, Everardo Abraham MD     cefTRIAXone (ROCEPHIN) 2 g vial to attach to  ml bag for ADULTS or NS 50 ml bag for PEDS, 2 g, Intravenous, Q24H, Everardo Abraham MD     nitroPRUsside (NIPRIDE) 50 mg, sodium thiosulfate 500 mg in D5W 125 mL IV infusion (conc: 0.4mg/mL), 0.25-5 mcg/kg/min, Intravenous, Continuous, Ash Johnson MD, Last Rate: 2.4 mL/hr at 09/16/17 0539, 0.25 mcg/kg/min at 09/16/17 0539      ECHO 9/15  Interpretation Summary  Severe left ventricular dilation is present.  Biplane LV EF 46%.  Global right ventricular function is moderately reduced.  Severe mitral  insufficiency is present.  Prosthetic aortic valve with significant thickening of aortic root.Possible  vegetation or severe degenerative changes on aortic prosthetic valve with  severe aortic regurgitation.Post operative changes in aortic root or related  to infection.  Right ventricular systolic pressure is 48mmHg above the right atrial pressure.  Dilation of the inferior vena cava is present with abnormal respiratory  variation in diameter.  No pericardial effusion is present.  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     # Troponinemia  - Unlikely due to ACS given clinical scenario, more likely due to above      # Creatinine elevation, unclear CKD vs JANA  Creatinine labile, anywhere from 0.9 - 1.5 since July of 2017. No acute kidney injury present.   - daily BMP  - UA pending  - avoid nephrotoxins      FEN: NPO   PPX: Mechanical  CODE: FULL  DISPO: Requires inpatient management of decompensated heart  failure and AI      Patient was discussed with staff attending, Dr. Knight, who agrees with the above assessment and plan.    Frantz Pena MD  PGY-2 Internal Medicine  Cardiology Service  Pager: [phone]              CARDIOLOGY STAFF NOTE  I have seen and examined the patient with the Cards 1 team. I agree with the note above that reflects my assessment and plan of care.  Isma Knight MD [facility]  Cardiology - Electrophysiology

## 2017-09-16 NOTE — ED NOTES
Pt presents to ED with c/o SOB starting this evening. Patient has a hx of CHF and currently on abx for endocarditis. Patient hypotensive, denies chest pain.

## 2017-09-16 NOTE — ED PROVIDER NOTES
History     Chief Complaint   Patient presents with     Shortness of Breath     HPI  Harvey Almanzar is a 47 year old male who presents with dyspnea. He has a history of congenital aortic valve. He had replacement witrh a cadaver bioprosthetic valve in 1996 in [place] and reported valve revision in 1998. He presented to [facility] with dyspnea in early July of this year. He had blood cultures positive for strep parasanguinous and he was found to have multiple vegetations on the prosthetic aortic valve with severe aortic insufficiency and severe mitral regurgitation. He was treated with IV lasix for heart failure and has been on IVceftriaxone since discharge. He was to receive 4-6 weeks of antibiotics with consideration for aortic valve replacement following this. He was referred to CV surgery here due to the anticipated complexity of the procedure, but has not yet been seen by Dr Ruelas in clinic. He presents today with worsened dyspnea starting this evening. He has no chst pain. He has had no fevers or chills. He has no cough or sputum. He has no nausea or vomiting. Hehas no leg swelling.    PAST MEDICAL HISTORY:   Past Medical History:   Diagnosis Date     Acute diastolic congestive heart failure (H) 8/24/2017     Aortic valve replaced 1996    Overview:  Cadaver - tissue valve in 1996 at [facility]  Amoxicillin 2000 mg prior to dental work please.  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 No pertinent surgical history.    FAMILY HISTORY: No family history on file.    SOCIAL HISTORY:   Social History   Substance Use Topics     Smoking status: Former Smoker     Packs/day: 0.50     Years: 31.00     Smokeless tobacco: Never Used      "Alcohol use No         I have reviewed the Medications, Allergies, Past Medical and Surgical History, and Social History in the Epic system.    Review of Systems   Constitutional: Positive for fatigue. Negative for chills and fever.   HENT: Negative for congestion.    Eyes: Negative for visual disturbance.   Respiratory: Positive for shortness of breath. Negative for cough and wheezing.    Cardiovascular: Negative for chest pain, palpitations and leg swelling.   Gastrointestinal: Negative for abdominal pain, nausea and vomiting.   Genitourinary: Negative for decreased urine volume and difficulty urinating.   Musculoskeletal: Negative for back pain and neck pain.   Skin: Negative for rash.   Neurological: Negative for weakness, light-headedness, numbness and headaches.   Hematological: Negative for adenopathy.   Psychiatric/Behavioral: Negative for confusion.       Physical Exam   BP: (!) 90/30  Heart Rate: 98  Temp: 97.6  F (36.4  C)  Height: 162.6 cm (5' 4\")  Weight: 68 kg (150 lb)  SpO2: 100 %  Physical Exam   Constitutional: He is oriented to person, place, and time. He appears well-developed and well-nourished. No distress.   HENT:   Head: Normocephalic and atraumatic.   Right Ear: External ear normal.   Left Ear: External ear normal.   Nose: Nose normal.   Mouth/Throat: Oropharynx is clear and moist.   Eyes: EOM are normal. Pupils are equal, round, and reactive to light. No scleral icterus.   Neck: Normal range of motion. Neck supple. No JVD present.   Cardiovascular: Normal rate, regular rhythm, S1 normal, S2 normal and normal pulses.  Exam reveals no friction rub.    Murmur heard.   Systolic murmur is present with a grade of 5/6    Diastolic murmur is present with a grade of 5/6   Pulmonary/Chest: Effort normal and breath sounds normal. He has no wheezes. He has no rales.   Abdominal: Soft. Bowel sounds are normal. There is no tenderness. There is no rebound and no guarding.   Musculoskeletal: He exhibits no " edema or tenderness.   Lymphadenopathy:     He has no cervical adenopathy.   Neurological: He is alert and oriented to person, place, and time. He displays normal reflexes. No cranial nerve deficit. He exhibits normal muscle tone. Coordination normal.   Skin: Skin is warm and dry.   Psychiatric: He has a normal mood and affect. His behavior is normal.   Nursing note and vitals reviewed.      ED Course     ED Course     Procedures             EKG Interpretation:      Interpreted by ADRIANNE HOLLINS  Time reviewed: 2247  Symptoms at time of EKG: dyspnea   Rhythm: sinus tachycardia  Rate: 100-110  Axis: Normal  Ectopy: none  Conduction: Long QTc  ST Segments/ T Waves: ST Segement Elevation V1, V2, V3 and V4 and ST Segment Depression V5 and V6  Q Waves: none  Comparison to prior: No old EKG available    Clinical Impression: Sinus tachycardia. Long corrected QT interval. LVH/Strain pattern, cannot exclude ischemia.    Labs/Imaging    Results for orders placed or performed during the hospital encounter of 09/15/17 (from the past 24 hour(s))   CBC with platelets differential   Result Value Ref Range    WBC 8.8 4.0 - 11.0 10e9/L    RBC Count 4.04 (L) 4.4 - 5.9 10e12/L    Hemoglobin 11.9 (L) 13.3 - 17.7 g/dL    Hematocrit 36.0 (L) 40.0 - 53.0 %    MCV 89 78 - 100 fl    MCH 29.5 26.5 - 33.0 pg    MCHC 33.1 31.5 - 36.5 g/dL    RDW 15.5 (H) 10.0 - 15.0 %    Platelet Count 267 150 - 450 10e9/L    Diff Method Automated Method     % Neutrophils 67.2 %    % Lymphocytes 25.0 %    % Monocytes 7.5 %    % Eosinophils 0.0 %    % Basophils 0.1 %    % Immature Granulocytes 0.2 %    Nucleated RBCs 0 0 /100    Absolute Neutrophil 5.9 1.6 - 8.3 10e9/L    Absolute Lymphocytes 2.2 0.8 - 5.3 10e9/L    Absolute Monocytes 0.7 0.0 - 1.3 10e9/L    Absolute Eosinophils 0.0 0.0 - 0.7 10e9/L    Absolute Basophils 0.0 0.0 - 0.2 10e9/L    Abs Immature Granulocytes 0.0 0 - 0.4 10e9/L    Absolute Nucleated RBC 0.0    Nt probnp inpatient   Result Value Ref  Range    N-Terminal Pro BNP Inpatient 61523 (H) 0 - 450 pg/mL   Comprehensive metabolic panel   Result Value Ref Range    Sodium 137 133 - 144 mmol/L    Potassium 3.1 (L) 3.4 - 5.3 mmol/L    Chloride 94 94 - 109 mmol/L    Carbon Dioxide 27 20 - 32 mmol/L    Anion Gap 15 (H) 3 - 14 mmol/L    Glucose 129 (H) 70 - 99 mg/dL    Urea Nitrogen 36 (H) 7 - 30 mg/dL    Creatinine 1.36 (H) 0.66 - 1.25 mg/dL    GFR Estimate 56 (L) >60 mL/min/1.7m2    GFR Estimate If Black 68 >60 mL/min/1.7m2    Calcium 8.8 8.5 - 10.1 mg/dL    Bilirubin Total 2.5 (H) 0.2 - 1.3 mg/dL    Albumin 3.1 (L) 3.4 - 5.0 g/dL    Protein Total 7.1 6.8 - 8.8 g/dL    Alkaline Phosphatase 69 40 - 150 U/L    ALT 21 0 - 70 U/L    AST 24 0 - 45 U/L   CRP inflammation   Result Value Ref Range    CRP Inflammation 40.0 (H) 0.0 - 8.0 mg/L   Troponin I   Result Value Ref Range    Troponin I ES 0.586 (HH) 0.000 - 0.045 ug/L   Erythrocyte sedimentation rate auto   Result Value Ref Range    Sed Rate 42 (H) 0 - 15 mm/h   Chest  XR, 1 view portable    Impression    Impression:   1. Marked cardiomegaly.  2. Streaky and platelike opacities within both lung bases, likely  corresponding with intrafissural fluid noted on the prior CT as well  as mild bibasilar atelectasis.  3. Small bilateral pleural effusion.         Assessments & Plan (with Medical Decision Making)   Impression:  Middle aged male with history of bioprosthetic valve endocarditis. He presents with acute on chronic CHF. He has marginal systolic pressure with widened pulse pressure. He has acute on chronic congestive failure with cardiomegaly, interstitial edema, elevated troponin and marked elevation of BNP. He is oxygenating adequately. With severely incompetent aortic valve with mitral insufficiency, demand ischemia and marginal systolic pressures he presents a difficult acute management challenge. Given additional lasix in the ED. He appears comfortable supine at rest and is oxygenating well. Await  echocardiogram result. Will admit to 4E. May need to treat with nipride, but I am hesitant to start in the ED given his marginal systolic pressures. It may be safer to start this in the unit with availability of invasive monitoring. He appears to have significant AI and probable MR with development of LV dilation.     I have reviewed the nursing notes.    I have reviewed the findings, diagnosis, plan and need for follow up with the patient.    New Prescriptions    No medications on file       Final diagnoses:   Acute systolic congestive heart failure (H)   Aortic valve insufficiency due to infection   Non-rheumatic mitral regurgitation   Endocarditis of aortic valve       9/15/2017   [facility], [place], EMERGENCY DEPARTMENT     Joaquin Winston MD  09/16/17 0029

## 2017-09-17 ENCOUNTER — PRE VISIT (OUTPATIENT)
Dept: CARDIOLOGY | Facility: CLINIC | Age: 47
End: 2017-09-17

## 2017-09-17 LAB
ABO + RH BLD: NORMAL
ABO + RH BLD: NORMAL
ANION GAP SERPL CALCULATED.3IONS-SCNC: 11 MMOL/L (ref 3–14)
BLD GP AB SCN SERPL QL: NORMAL
BLOOD BANK CMNT PATIENT-IMP: NORMAL
BUN SERPL-MCNC: 47 MG/DL (ref 7–30)
CALCIUM SERPL-MCNC: 8.5 MG/DL (ref 8.5–10.1)
CHLORIDE SERPL-SCNC: 98 MMOL/L (ref 94–109)
CO2 SERPL-SCNC: 28 MMOL/L (ref 20–32)
CREAT SERPL-MCNC: 1.4 MG/DL (ref 0.66–1.25)
ERYTHROCYTE [DISTWIDTH] IN BLOOD BY AUTOMATED COUNT: 15.8 % (ref 10–15)
GFR SERPL CREATININE-BSD FRML MDRD: 54 ML/MIN/1.7M2
GLUCOSE SERPL-MCNC: 109 MG/DL (ref 70–99)
HCT VFR BLD AUTO: 34.2 % (ref 40–53)
HGB BLD-MCNC: 11.3 G/DL (ref 13.3–17.7)
INTERPRETATION ECG - MUSE: NORMAL
MAGNESIUM SERPL-MCNC: 2.3 MG/DL (ref 1.6–2.3)
MCH RBC QN AUTO: 29 PG (ref 26.5–33)
MCHC RBC AUTO-ENTMCNC: 33 G/DL (ref 31.5–36.5)
MCV RBC AUTO: 88 FL (ref 78–100)
PLATELET # BLD AUTO: 219 10E9/L (ref 150–450)
POTASSIUM SERPL-SCNC: 3.5 MMOL/L (ref 3.4–5.3)
RBC # BLD AUTO: 3.9 10E12/L (ref 4.4–5.9)
SODIUM SERPL-SCNC: 136 MMOL/L (ref 133–144)
SPECIMEN EXP DATE BLD: NORMAL
WBC # BLD AUTO: 6.2 10E9/L (ref 4–11)

## 2017-09-17 PROCEDURE — 86900 BLOOD TYPING SEROLOGIC ABO: CPT | Performed by: HOSPITALIST

## 2017-09-17 PROCEDURE — 83735 ASSAY OF MAGNESIUM: CPT | Performed by: INTERNAL MEDICINE

## 2017-09-17 PROCEDURE — 20000004 ZZH R&B ICU UMMC

## 2017-09-17 PROCEDURE — 36415 COLL VENOUS BLD VENIPUNCTURE: CPT | Performed by: INTERNAL MEDICINE

## 2017-09-17 PROCEDURE — 25000128 H RX IP 250 OP 636

## 2017-09-17 PROCEDURE — 86850 RBC ANTIBODY SCREEN: CPT | Performed by: HOSPITALIST

## 2017-09-17 PROCEDURE — 80048 BASIC METABOLIC PNL TOTAL CA: CPT | Performed by: INTERNAL MEDICINE

## 2017-09-17 PROCEDURE — 99233 SBSQ HOSP IP/OBS HIGH 50: CPT | Mod: GC | Performed by: INTERNAL MEDICINE

## 2017-09-17 PROCEDURE — 25000128 H RX IP 250 OP 636: Performed by: INTERNAL MEDICINE

## 2017-09-17 PROCEDURE — 86901 BLOOD TYPING SEROLOGIC RH(D): CPT | Performed by: HOSPITALIST

## 2017-09-17 PROCEDURE — 87040 BLOOD CULTURE FOR BACTERIA: CPT | Performed by: INTERNAL MEDICINE

## 2017-09-17 PROCEDURE — 85027 COMPLETE CBC AUTOMATED: CPT | Performed by: INTERNAL MEDICINE

## 2017-09-17 RX ORDER — POTASSIUM CHLORIDE 750 MG/1
20-40 TABLET, EXTENDED RELEASE ORAL
Status: DISCONTINUED | OUTPATIENT
Start: 2017-09-17 | End: 2017-09-21

## 2017-09-17 RX ORDER — POTASSIUM CHLORIDE 7.45 MG/ML
10 INJECTION INTRAVENOUS
Status: DISCONTINUED | OUTPATIENT
Start: 2017-09-17 | End: 2017-09-21

## 2017-09-17 RX ORDER — FUROSEMIDE 10 MG/ML
40 INJECTION INTRAMUSCULAR; INTRAVENOUS ONCE
Status: COMPLETED | OUTPATIENT
Start: 2017-09-17 | End: 2017-09-17

## 2017-09-17 RX ORDER — POTASSIUM CL/LIDO/0.9 % NACL 10MEQ/0.1L
10 INTRAVENOUS SOLUTION, PIGGYBACK (ML) INTRAVENOUS
Status: DISCONTINUED | OUTPATIENT
Start: 2017-09-17 | End: 2017-09-21

## 2017-09-17 RX ORDER — MAGNESIUM SULFATE HEPTAHYDRATE 40 MG/ML
4 INJECTION, SOLUTION INTRAVENOUS EVERY 4 HOURS PRN
Status: DISCONTINUED | OUTPATIENT
Start: 2017-09-17 | End: 2017-09-21

## 2017-09-17 RX ORDER — POTASSIUM CHLORIDE 1.5 G/1.58G
20-40 POWDER, FOR SOLUTION ORAL
Status: DISCONTINUED | OUTPATIENT
Start: 2017-09-17 | End: 2017-09-21

## 2017-09-17 RX ORDER — POTASSIUM CHLORIDE 14.9 MG/ML
20 INJECTION INTRAVENOUS
Status: DISCONTINUED | OUTPATIENT
Start: 2017-09-17 | End: 2017-09-21

## 2017-09-17 RX ADMIN — CEFTRIAXONE 2 G: 2 INJECTION, POWDER, FOR SOLUTION INTRAMUSCULAR; INTRAVENOUS at 11:00

## 2017-09-17 RX ADMIN — FUROSEMIDE 40 MG: 10 INJECTION, SOLUTION INTRAVENOUS at 10:06

## 2017-09-17 RX ADMIN — POTASSIUM CHLORIDE 10 MEQ: 7.46 INJECTION, SOLUTION INTRAVENOUS at 16:14

## 2017-09-17 RX ADMIN — POTASSIUM CHLORIDE 10 MEQ: 7.46 INJECTION, SOLUTION INTRAVENOUS at 10:06

## 2017-09-17 RX ADMIN — SODIUM NITROPRUSSIDE 2 MCG/KG/MIN: 25 INJECTION, SOLUTION, CONCENTRATE INTRAVENOUS at 04:48

## 2017-09-17 NOTE — PROVIDER NOTIFICATION
MD notified about pt's increasing need for more Nipride to maintain MAPs above 65. Cards resident to bedside to evaluate patient. No new orders at this time, will continue to monitor.

## 2017-09-17 NOTE — PLAN OF CARE
Problem: Goal Outcome Summary  Goal: Goal Outcome Summary  Patient transferred from  due to staffing needs; AVSS; T.max 97.7;  NSR/ST;  continues to c/o WEBBER; on RA while awake but on 2L NC when asleep /ambulating; denies chest pain/lightheadeaness/nausea; on 0.25mcg/kg/min Nipride to maintain MAP goal 65-75;  Poor appetite and po intake; remaining K+ replaced and recheck in the AM per protocol; tea colored urine; had one small soft BM; up with SBA; 1 SL PICC and 2 PIV saline locked; plan for possible IZA tomorrow; continue to monitor and with POC.

## 2017-09-17 NOTE — PROGRESS NOTES
[facility]: PROGRESS NOTE  Harvey Almanzar : 1970 Sex: male:   Medical record number [id number] Attending Physician: MICHELE Hua MD  Date of Service: 2017    ASSESSMENT :  Harvey Almanzar is a 47 year old male with history of bacterial endocarditis involving the native aortic valve s/p AVR in  with revision of the valve in , ureteral stone presented to ER on 17 for 2 weeks of fever, chills, sweatings . He was hospitalized at [facility] from -17 , diagnosed with  Streptococcus parasanguinis bacteremia and prosthetic aortic valve endocarditis with severe aortic regurgitation.     1. Recent Streptococcus parasanguinis bacteremia and prosthetic aortic valve endocarditis   - Blood cx ,  , 7/15 positive for Strep parasanguinas ( sensitive to Ceftriaxone LONG 0.094 , Penicillin LONG 0.19 )   - Blood cx ,,  - No growth  - on ceftriaxone 2 gram IV daily and 2 weeks of gentamicin ( with increasing Cr )    - TTE 17  - Severe left ventricular dilation is present. Biplane LV EF 46%.  Global right ventricular function is moderately reduced.Severe mitral insufficiency is present.Prosthetic aortic valve with significant thickening of aortic root.Possible vegetation or severe degenerative changes on aortic prosthetic valve with severe aortic regurgitation.Post operative changes in aortic root or related to infection. Right ventricular systolic pressure is 48mmHg above the right atrial pressure. Dilation of the inferior vena cava is present with abnormal respiratory variation in diameter. No pericardial effusion is present.    IZA 17  CONCLUSION:   Low normal LV systolic function.  Mild LV dilation. Mild inferior    Hypokinesis.Normal RV size and function. Bioprosthetic aortic valve with multiple mobile vegetations.  Severe aortic insufficiency.  There appears to be trace perivalvular  regurgitation which originates from  "the anterior portion of the sewing ring.  Thickening of the aortic root which may be consistent with root abscess as previously noted without gross dehiscence.    Moderate to severe mitral regurgitation. There are no vegetations appreciated on the mitral valve. Patient has known endocarditis of bioprosthetic aortic valve with  possible root abscess. There are no significant changes in comparison    with the transthoracic echo from 8/24/17. Left Ventricular Ejection Fraction: 50 %     RECOMMENDATION:  1. Appropriately treated with Ceftriaxone 2 gram IV daily  and Genta 3mg/kg daily x 2 weeks with ongoing Cefriaxone ( week 8) ( normally - 4 weeks duration)   2. Has been afebrile, no chills, no sweating since antibiotics ( no signs/symptoms of active infection)   3.  Follow-up culture results. Plan for IZA       ID will continue to follow this patient. This patient will be transferred to the [facility] ID Service on Monday for ongoing ID services. Please contact the [facility] faculty for any further questions on this patient after 8AM Monday morning.    Belinda Bryant MD, M.Med.Sc.  Pager: [phone]     SUBJECTIVE: (Recommend ? 4 systems)   No fever, chills, sweats. less short of breath. No chest pain. Gait steady     ROS:(Recommend ? 2systems)   A five-point review of systems was obtained and was negative with the exception of that which is described above.  No Known Allergies    No current outpatient prescriptions on file.   CURRENT ANTI-INFECTIVES:   Ceftriaxone    EXAMINATION: (Recommend ? 5 systems)   Vital Signs: /56  Temp 97.7  F (36.5  C) (Oral)  Resp 20  Ht 1.626 m (5' 4\")  Wt 64 kg (141 lb 1.5 oz)  SpO2 94%  BMI 24.22 kg/m2   GENERAL:  Alert, oriented, in no acute distress.   HEENT:  Head is normocephalic, atraumatic   EYES:  Eyes have anicteric sclerae without conjunctival injection    ENT:  Oropharynx is moist without exudates or ulcers. Tongue is midline  NECK:  Supple. No  " Cervical lymphadenopathy  LUNGS: Clear but decreased breath sounds in bases  CARDIOVASCULAR:  Tachy with 3/6 SERINA precordial ,  Extremities - minimal pedal edema bilateral   ABDOMEN:  Normal bowel sounds, soft, nontender. No appreciable hepatosplenomegaly  SKIN:  No acute rashes.  Line(s) are in place without any surrounding erythema or exudate. No stigmata of endocarditis.  NEUROLOGIC:  Grossly nonfocal. Active x4 extremities    NEW DATA/RESULTS:   Culture Micro   Date Value Ref Range Status   09/17/2017 No growth after 10 hours  Preliminary   09/17/2017 No growth after 10 hours  Preliminary   09/16/2017 No growth after 1 day  Preliminary   09/16/2017 No growth after 1 day  Preliminary     Recent Labs   Lab Test  09/15/17   2217   CRP  40.0*     Recent Labs   Lab Test  09/17/17   0443  09/16/17   0413  09/15/17   2217   WBC  6.2  7.9  8.8     Recent Labs   Lab Test  09/17/17   0443  09/16/17   0413  09/15/17   2217   CR  1.40*  1.30*  1.36*   GFRESTIMATED  54*  59*  56*     Hematology Studies  Recent Labs   Lab Test  09/17/17   0443  09/16/17   0413  09/15/17   2217   WBC  6.2  7.9  8.8   ANEU   --    --   5.9   AEOS   --    --   0.0   HCT  34.2*  35.3*  36.0*   PLT  219  248  267     Metabolic  Recent Labs   Lab Test  09/17/17   0443  09/16/17   0413  09/15/17   2217   NA  136  136  137   BUN  47*  39*  36*   CO2  28  29  27   CR  1.40*  1.30*  1.36*   GFRESTIMATED  54*  59*  56*     Hepatic Studies  Recent Labs   Lab Test  09/15/17   2217   BILITOTAL  2.5*   ALKPHOS  69   ALBUMIN  3.1*   AST  24   ALT  21     Immunologlobulins  Recent Labs   Lab Test  09/15/17   2217   SED  42*     Imagings  TTE 9/16/17   Interpretation Summary  Severe left ventricular dilation is present.  Biplane LV EF 46%.  Global right ventricular function is moderately reduced.  Severe mitral insufficiency is present.  Prosthetic aortic valve with significant thickening of aortic root.Possible vegetation or severe degenerative changes on  aortic prosthetic valve with severe aortic regurgitation.Post operative changes in aortic root or related to infection.  Right ventricular systolic pressure is 48mmHg above the right atrial pressure.  Dilation of the inferior vena cava is present with abnormal respiratory variation in diameter.  No pericardial effusion is present.     CXR 9/15/17  Impression:   1. Marked cardiomegaly.  2. Streaky and platelike opacities within both lung bases, likely corresponding with intrafissural fluid noted on the prior CT as well as mild bibasilar atelectasis.  3. Small bilateral pleural effusions which are loculated in the fissures.     IZA 8/24/17  CONCLUSION:   Low normal LV systolic function.  Mild LV dilation. Mild inferior    hypokinesis.    Normal RV size and function.    Bioprosthetic aortic valve with multiple mobile vegetations.  Severe    aortic insufficiency.  There appears to be trace perivalvular    regurgitation which originates from the anterior portion of the sewing    ring.  Thickening of the aortic root which may be consistent with root    abscess as previously noted without gross dehiscence.    Moderate to severe mitral regurgitation.     There are no vegetations appreciated on the mitral valve.      Patient has known endocarditis of bioprosthetic aortic valve with    possible root abscess. There are no significant changes in comparison    with the transthoracic echo from 8/24/17.     Left Ventricular Ejection Fraction: 50 %       TTE 8/24/17  CONCLUSION:    Limited echocardiogram.     Mild LV dilatation. Mild LVH.    Mildly reduced LV systolic function, EF 45%. Inferior wall is  slightly worse than other walls (probable no significant change on side  by side comparison with 08/22/2017 echo)    Normal RV size.     Borderline RV hypokinesis.     Moderate to severe LA dilatation.     Limited doppler/color suggests at least moderate AI, Severe MR,     mild to moderate TR , Estimated RVSP 61 mmHg + RAP  (Patient     known to have bioprosthetic aortic valve endocarditis)    No significant pericardial effusion    Left Ventricular Ejection Fraction: 45 %     CT chest w contrast 9/1/17   IMPRESSION:   1. Cardiomegaly with calcification of the aortic valve and proximal ascending aorta. Ascending aortic dilation measuring 4 cm. Prominent mediastinal lymph nodes.  3. Loculated bilateral pleural effusions with associated atelectasis.   4. Predominantly upper lungs centrilobular emphysema. Superimposed  mild groundglass opacities, which could represent mild edema.  5. Enlarged main pulmonary, likely pulmonary hypertension.

## 2017-09-17 NOTE — PLAN OF CARE
Problem: Goal Outcome Summary  Goal: Goal Outcome Summary  Outcome: No Change  Neuro: Alert and oriented x4. Calls appropriately. Denies pain. Afebrile.  Cardiac: Sinus rhythm in the 90s overnight. BP on the lower end, requiring more Nipride overnight. Currently at 2 mcg/kg/min. 2+ pulses on all extremities.  Resp: On 2L NC overnight, satting well. Pt reports dyspnea, but not visually dyspneic to this writer. Lung sounds diminished.   GI: Bowel sounds active. NPO except for meds for IZA today. No BM on shift.   : Pt voiding spontaneously using urinal.   Drips: Nipride at 2 mcg/kg/min.   Skin: Intact, no issues.   Access: R) single lumen midline. L) PIV, saline locked.      Plan: IZA scheduled for today. Continue with current plan of care. Notify MD of any changes.

## 2017-09-18 ENCOUNTER — APPOINTMENT (OUTPATIENT)
Dept: CARDIOLOGY | Facility: CLINIC | Age: 47
DRG: 216 | End: 2017-09-18
Attending: PHYSICIAN ASSISTANT
Payer: COMMERCIAL

## 2017-09-18 LAB
ANION GAP SERPL CALCULATED.3IONS-SCNC: 14 MMOL/L (ref 3–14)
BUN SERPL-MCNC: 56 MG/DL (ref 7–30)
CALCIUM SERPL-MCNC: 8.2 MG/DL (ref 8.5–10.1)
CHLORIDE SERPL-SCNC: 96 MMOL/L (ref 94–109)
CO2 SERPL-SCNC: 26 MMOL/L (ref 20–32)
CREAT SERPL-MCNC: 1.72 MG/DL (ref 0.66–1.25)
ERYTHROCYTE [DISTWIDTH] IN BLOOD BY AUTOMATED COUNT: 15.9 % (ref 10–15)
GFR SERPL CREATININE-BSD FRML MDRD: 43 ML/MIN/1.7M2
GLUCOSE SERPL-MCNC: 104 MG/DL (ref 70–99)
HCT VFR BLD AUTO: 31.9 % (ref 40–53)
HGB BLD-MCNC: 10.6 G/DL (ref 13.3–17.7)
INTERPRETATION ECG - MUSE: NORMAL
MAGNESIUM SERPL-MCNC: 2.5 MG/DL (ref 1.6–2.3)
MCH RBC QN AUTO: 29.2 PG (ref 26.5–33)
MCHC RBC AUTO-ENTMCNC: 33.2 G/DL (ref 31.5–36.5)
MCV RBC AUTO: 88 FL (ref 78–100)
PLATELET # BLD AUTO: 190 10E9/L (ref 150–450)
POTASSIUM SERPL-SCNC: 4.2 MMOL/L (ref 3.4–5.3)
RBC # BLD AUTO: 3.63 10E12/L (ref 4.4–5.9)
SODIUM SERPL-SCNC: 136 MMOL/L (ref 133–144)
WBC # BLD AUTO: 8.3 10E9/L (ref 4–11)

## 2017-09-18 PROCEDURE — 99232 SBSQ HOSP IP/OBS MODERATE 35: CPT | Mod: 25 | Performed by: INTERNAL MEDICINE

## 2017-09-18 PROCEDURE — 25000132 ZZH RX MED GY IP 250 OP 250 PS 637: Performed by: HOSPITALIST

## 2017-09-18 PROCEDURE — 93312 ECHO TRANSESOPHAGEAL: CPT | Mod: 26 | Performed by: INTERNAL MEDICINE

## 2017-09-18 PROCEDURE — 25000128 H RX IP 250 OP 636: Performed by: INTERNAL MEDICINE

## 2017-09-18 PROCEDURE — 87040 BLOOD CULTURE FOR BACTERIA: CPT | Performed by: INTERNAL MEDICINE

## 2017-09-18 PROCEDURE — 93325 DOPPLER ECHO COLOR FLOW MAPG: CPT | Mod: 26 | Performed by: INTERNAL MEDICINE

## 2017-09-18 PROCEDURE — 93320 DOPPLER ECHO COMPLETE: CPT

## 2017-09-18 PROCEDURE — 83735 ASSAY OF MAGNESIUM: CPT | Performed by: INTERNAL MEDICINE

## 2017-09-18 PROCEDURE — 25000125 ZZHC RX 250: Performed by: INTERNAL MEDICINE

## 2017-09-18 PROCEDURE — 25000132 ZZH RX MED GY IP 250 OP 250 PS 637: Performed by: INTERNAL MEDICINE

## 2017-09-18 PROCEDURE — 80048 BASIC METABOLIC PNL TOTAL CA: CPT | Performed by: INTERNAL MEDICINE

## 2017-09-18 PROCEDURE — 85027 COMPLETE CBC AUTOMATED: CPT | Performed by: INTERNAL MEDICINE

## 2017-09-18 PROCEDURE — 20000004 ZZH R&B ICU UMMC

## 2017-09-18 PROCEDURE — 93320 DOPPLER ECHO COMPLETE: CPT | Mod: 26 | Performed by: INTERNAL MEDICINE

## 2017-09-18 RX ORDER — HEPARIN SODIUM 5000 [USP'U]/.5ML
5000 INJECTION, SOLUTION INTRAVENOUS; SUBCUTANEOUS EVERY 12 HOURS
Status: DISCONTINUED | OUTPATIENT
Start: 2017-09-18 | End: 2017-09-20

## 2017-09-18 RX ORDER — FENTANYL CITRATE 50 UG/ML
25 INJECTION, SOLUTION INTRAMUSCULAR; INTRAVENOUS
Status: DISPENSED | OUTPATIENT
Start: 2017-09-18 | End: 2017-09-18

## 2017-09-18 RX ORDER — LANOLIN ALCOHOL/MO/W.PET/CERES
3 CREAM (GRAM) TOPICAL
Status: DISCONTINUED | OUTPATIENT
Start: 2017-09-18 | End: 2017-10-05 | Stop reason: HOSPADM

## 2017-09-18 RX ADMIN — SODIUM NITROPRUSSIDE 0.65 MCG/KG/MIN: 25 INJECTION, SOLUTION, CONCENTRATE INTRAVENOUS at 13:26

## 2017-09-18 RX ADMIN — MIDAZOLAM 1 MG: 1 INJECTION INTRAMUSCULAR; INTRAVENOUS at 10:35

## 2017-09-18 RX ADMIN — FENTANYL CITRATE 50 MCG: 50 INJECTION INTRAMUSCULAR; INTRAVENOUS at 10:35

## 2017-09-18 RX ADMIN — LIDOCAINE HYDROCHLORIDE 30 ML: 20 SOLUTION ORAL; TOPICAL at 21:47

## 2017-09-18 RX ADMIN — MIDAZOLAM 0.5 MG: 1 INJECTION INTRAMUSCULAR; INTRAVENOUS at 10:44

## 2017-09-18 RX ADMIN — FENTANYL CITRATE 25 MCG: 50 INJECTION INTRAMUSCULAR; INTRAVENOUS at 10:31

## 2017-09-18 RX ADMIN — OMEPRAZOLE 20 MG: 20 CAPSULE, DELAYED RELEASE ORAL at 08:28

## 2017-09-18 RX ADMIN — MIDAZOLAM 0.5 MG: 1 INJECTION INTRAMUSCULAR; INTRAVENOUS at 10:30

## 2017-09-18 RX ADMIN — MELATONIN TAB 3 MG 3 MG: 3 TAB at 00:43

## 2017-09-18 RX ADMIN — HEPARIN SODIUM 5000 UNITS: 5000 INJECTION, SOLUTION INTRAVENOUS; SUBCUTANEOUS at 21:05

## 2017-09-18 RX ADMIN — HEPARIN SODIUM 5000 UNITS: 5000 INJECTION, SOLUTION INTRAVENOUS; SUBCUTANEOUS at 11:20

## 2017-09-18 RX ADMIN — CEFTRIAXONE 2 G: 2 INJECTION, POWDER, FOR SOLUTION INTRAMUSCULAR; INTRAVENOUS at 11:19

## 2017-09-18 NOTE — PROGRESS NOTES
Cardiology Progress Note  Harvey Almanzar MRN: [id number]  Age: 47 year old, : 1970  Date: 2017              Subjective     ASPEN overnight. Notes ongoing nausea and heartburn, no abdomnal pain, has been having BM's no diarrhea. Tolerating PO intake. No chest pain. Has not ambulated in halls. No f/c.           Objective     B/P: 102/61, T: 97.8, P: Data Unavailable, R: 14    Intake/Output Summary (Last 24 hours) at 17 0904  Last data filed at 17 0800   Gross per 24 hour   Intake              235 ml   Output              175 ml   Net               60 ml     Vitals:    09/15/17 2202 17 0400 17 0615   Weight: 68 kg (150 lb) 64.8 kg (142 lb 13.7 oz) 64 kg (141 lb 1.5 oz)     Gen: AA&Ox3, no acute distress  HEENT:AT/ NC, PERRL b/l, EOM grossly intact, mucous membranes pink, moist without plaque or exudate  BACK: no CVA tenderness, no midline bony tenderness  PULM/THORAX: Faint bibasilar crackles.   CV: harsh diastolic murmur heard best at RUSB (c/w AI), systolic murmur at apex (c/w MR)  ABD: soft, nontender, nondistended. Normoactive bowel sounds x 4, no HSM appreciated.  EXT: trace pedal edema, no clubbing or cyanosis. LE warm well perfused No asymmetrical edema or tenderness to palpation in calves bilaterally.            Data:          Lab Results   Component Value Date     2017    Lab Results   Component Value Date    CHLORIDE 95 2017    Lab Results   Component Value Date    BUN 39 2017      Lab Results   Component Value Date    POTASSIUM 3.1 2017    Lab Results   Component Value Date    CO2 29 2017    Lab Results   Component Value Date    CR 1.30 2017        Lab Results   Component Value Date    NTBNPI 00216 (H) 09/15/2017     Lab Results   Component Value Date    WBC 8.3 2017    HGB 10.6 (L) 2017    HCT 31.9 (L) 2017    MCV 88 2017     2017               Medications      Current Facility-Administered Medications:      melatonin tablet 3 mg, 3 mg, Oral, At Bedtime PRN, Quique Luevano MD, 3 mg at 09/18/17 0043     sodium chloride (PF) 0.9% PF flush 3 mL, 3 mL, Intravenous, Q1H PRN, Frantz Pena MD     sodium chloride (PF) 0.9% PF flush 3 mL, 3 mL, Intravenous, Q8H, Frantz Pena MD, 3 mL at 09/17/17 1605     May take oral meds with a sip of water, the morning of IZA procedure., , Does not apply, Continuous PRN, Frantz Pena MD     HOLD: Insulin - REGULAR AM of procedure IF diabetic, , Does not apply, Jean ROMERO Joseph J, MD     HOLD: Insulin - RAPID/SHORT acting AM of procedure IF diabetic, , Does not apply, Jean ROMERO Joseph J, MD     HOLD: Oral hypoglycemics AM of procedure IF diabetic, , Does not apply, Jean ROMERO Joseph J, MD     Give   of usual dose of LONG ACTING insulin AM of procedure IF diabetic, , Does not apply, Continuous PRN, Frantz Pena MD     magnesium sulfate 2 g in NS intermittent infusion (PharMEDium or FV Cmpd), 2 g, Intravenous, Daily PRN, Frantz Pena MD     magnesium sulfate 4 g in 100 mL sterile water (premade), 4 g, Intravenous, Q4H PRN, Frantz Pena MD     potassium chloride SA (K-DUR/KLOR-CON M) CR tablet 20-40 mEq, 20-40 mEq, Oral, Q2H PRN, Frantz Pena MD     potassium chloride (KLOR-CON) Packet 20-40 mEq, 20-40 mEq, Oral or Feeding Tube, Q2H PRN, Frantz Pena MD     potassium chloride 10 mEq in 100 mL intermittent infusion, 10 mEq, Intravenous, Q1H PRN, Frantz Pena MD, Last Rate: 100 mL/hr at 09/17/17 1614, 10 mEq at 09/17/17 1614     potassium chloride 10 mEq in 100 mL intermittent infusion with 10 mg lidocaine, 10 mEq, Intravenous, Q1H PRN, Frantz Pena MD     potassium chloride 20 mEq in 100 mL NON-STANDARD CONC intermittent infusion, 20 mEq, Intravenous, Q1H PRN, Frantz Pena MD     pneumococcal vaccine (PNEUMOVAX 23-rita) injection 0.5 mL, 0.5 mL, Intramuscular, Prior to  discharge, MICHELE Hua MD     naloxone (NARCAN) injection 0.1-0.4 mg, 0.1-0.4 mg, Intravenous, Q2 Min PRN, Everardo Abraham MD     acetaminophen (TYLENOL) tablet 650 mg, 650 mg, Oral, Q4H PRN, Everardo Abraham MD     ondansetron (ZOFRAN-ODT) ODT tab 4 mg, 4 mg, Oral, Q6H PRN **OR** ondansetron (ZOFRAN) injection 4 mg, 4 mg, Intravenous, Q6H PRN, Everardo Abraham MD, 4 mg at 09/16/17 1734     polyethylene glycol (MIRALAX/GLYCOLAX) Packet 17 g, 17 g, Oral, Daily PRN, Everardo Abraham MD     senna-docusate (SENOKOT-S;PERICOLACE) 8.6-50 MG per tablet 1-2 tablet, 1-2 tablet, Oral, BID PRN, Everardo Abraham MD     cefTRIAXone (ROCEPHIN) 2 g vial to attach to  ml bag for ADULTS or NS 50 ml bag for PEDS, 2 g, Intravenous, Q24H, Everardo Abraham MD, Last Rate: 200 mL/hr at 09/16/17 1015, 2 g at 09/17/17 1100     nitroPRUsside (NIPRIDE) 50 mg, sodium thiosulfate 500 mg in D5W 125 mL IV infusion (conc: 0.4mg/mL), 0.25-5 mcg/kg/min, Intravenous, Continuous, Frantz Pena MD, Last Rate: 6.3 mL/hr at 09/18/17 0640, 0.65 mcg/kg/min at 09/18/17 0640      ECHO 9/15  Interpretation Summary  Severe left ventricular dilation is present.  Biplane LV EF 46%.  Global right ventricular function is moderately reduced.  Severe mitral insufficiency is present.  Prosthetic aortic valve with significant thickening of aortic root.Possible  vegetation or severe degenerative changes on aortic prosthetic valve with  severe aortic regurgitation.Post operative changes in aortic root or related  to infection.  Right ventricular systolic pressure is 48mmHg above the right atrial pressure.  Dilation of the inferior vena cava is present with abnormal respiratory  variation in diameter.  No pericardial effusion is present.  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 Ruelas to see today)   - nipride gtt ( slow titrate: 0.25  mcg/kg/min every 30 minutes to keep MAP 65-75  mmHg)  - hold diuresis with rising Cr   - Continue ceftriaxone Q24H  - ID consult, appreciate recs for antibiotic management (no change in abx)   - IZA today  - consider coronary cath (has RCA dz on CT coronary angio from sept '17) if required still by [facility].     # Troponinemia  - Unlikely due to ACS given clinical scenario, more likely due to above      # Creatinine elevation, unclear CKD vs JANA  Creatinine labile, anywhere from 0.9 - 1.5 since July of 2017. No acute kidney injury present. UA unremarkable. 9/18 Cr up to 1.72, will hold diuresis.   - hold diuresis 9/18  - daily BMP  - avoid nephrotoxins      FEN: NPO   PPX: Mechanical, sub q heparin   CODE: FULL  DISPO: Requires inpatient management of decompensated heart failure and AI      Patient was discussed with staff attending, Dr. Austin, who agrees with the above assessment and plan.    Frantz Pena MD  PGY-2 Internal Medicine  Cardiology Service  Pager: [phone]

## 2017-09-18 NOTE — PROGRESS NOTES
[facility] ID SERVICE: PROGRESS NOTE  Harvey Almanzar : 1970 Sex: male:   Medical record number [id number] Attending Physician: MICHELE Hua MD  Date of Service: 2017    ASSESSMENT :  Harvey Almanzar is a 47 year old male with history of bacterial endocarditis involving the native aortic valve s/p AVR in  with revision of the valve in , ureteral stone presented to ER on 17 for 2 weeks of fever, chills, sweatings . He was hospitalized at 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     1. Recent Streptococcus parasanguinis bacteremia and prosthetic aortic valve endocarditis   - Blood cx ,  , 7/15 positive for Strep parasanguinas (sensitive to Ceftriaxone LONG 0.094 ,Penicillin LONG 0.19 )   - Blood cx ,,  - No growth  - on ceftriaxone 2 gram IV daily and 2 weeks of gentamicin ( with increasing Cr )    - TTE 17  - Severe left ventricular dilation is present. Biplane LV EF 46%.  Global right ventricular function is moderately reduced.Severe mitral insufficiency is present.Prosthetic aortic valve with significant thickening of aortic root.Possible vegetation or severe degenerative changes on aortic prosthetic valve with severe aortic regurgitation.Post operative changes in aortic root or related to infection. Right ventricular systolic pressure is 48mmHg above the right atrial pressure. Dilation of the inferior vena cava is present with abnormal respiratory variation in diameter. No pericardial effusion is present.    IZA 17  CONCLUSION:   Low normal LV systolic function.  Mild LV dilation. Mild inferior    Hypokinesis.Normal RV size and function. Bioprosthetic aortic valve with multiple mobile vegetations.  Severe aortic insufficiency.  There appears to be trace perivalvular  regurgitation which originates from the  anterior portion of the sewing ring.  Thickening of the aortic root which may be consistent with root abscess as previously noted without gross dehiscence.    Moderate to severe mitral regurgitation. There are no vegetations appreciated on the mitral valve. Patient has known endocarditis of bioprosthetic aortic valve with  possible root abscess. There are no significant changes in comparison    with the transthoracic echo from 8/24/17. Left Ventricular Ejection Fraction: 50 %     IZA 9/18/17  Interpretation Summary  Severely degenerated bioprosthetic valve in the aortic position. Leaflets are heavily calcified and prolapse into the LVOT, resulting in severe aortic insufficiency ( ms with flow reversal in the descending aorta). There is some degree of aortic stenosis that is difficult to quantify but is likely moderate (peak velocity 4.1 m/s, mean gradient 34 mmHg). There is no convincing evidence of vegetation or aortic root abscess. No vegetations seen on other valves. Severe left ventricular dilation is present. Mildly (EF 45-50%) reduced left  ventricular function is present. Global right ventricular function is moderately to severely reduced. Moderate functional tricuspid insufficiency secondary to RV dilatation. Moderate-severe functional mitral regurgitation secondary to LV dilatation.    RECOMMENDATION:  1. Appropriately treated with Ceftriaxone 2 gram IV daily  and Genta 3mg/kg daily x 2 weeks with ongoing Cefriaxone ( week 8) ( normally - 4 weeks duration)   - blood cx 9/16 x2, 9/17 x2 - negative  9/18 x 2 - pending    2. Has been afebrile, no chills, no sweating since antibiotics ( no signs/symptoms of active infection)   3. Continue Ceftraxone for now. Await surgical input. Do not think this is active infection      ID will continue to follow this patient.     Belinda Bryant MD, M.Med.Sc.  Pager: [phone]     SUBJECTIVE: (Recommend ? 4 systems)   No fever, chills, sweats. less short of breath. No  "chest pain. No diarrhea    ROS:(Recommend ? 2systems)   A five-point review of systems was obtained and was negative with the exception of that which is described above.  No Known Allergies    No current outpatient prescriptions on file.   CURRENT ANTI-INFECTIVES:   Ceftriaxone    EXAMINATION: (Recommend ? 5 systems)   Vital Signs: BP 99/58  Temp 97.2  F (36.2  C) (Axillary)  Resp 22  Ht 1.626 m (5' 4\")  Wt 64 kg (141 lb 1.5 oz)  SpO2 96%  BMI 24.22 kg/m2   GENERAL:  Alert, oriented, in no acute distress.   HEENT:  Head is normocephalic, atraumatic   EYES:  Eyes have anicteric sclerae without conjunctival injection    ENT:  Oropharynx is moist without exudates or ulcers. Tongue is midline  NECK:  Supple. No  Cervical lymphadenopathy  LUNGS: Clear but decreased breath sounds in bases  CARDIOVASCULAR:  Tachy with 3/6 SERINA precordial ,  Extremities - minimal pedal edema bilateral   ABDOMEN:  Normal bowel sounds, soft, nontender. No appreciable hepatosplenomegaly  SKIN:  No acute rashes.  Line(s) are in place without any surrounding erythema or exudate. No stigmata of endocarditis.  NEUROLOGIC:  Grossly nonfocal. Active x4 extremities    NEW DATA/RESULTS:   Culture Micro   Date Value Ref Range Status   09/18/2017 No growth after 11 hours  Preliminary   09/18/2017 No growth after 11 hours  Preliminary   09/17/2017 No growth after 1 day  Preliminary   09/17/2017 No growth after 1 day  Preliminary   09/16/2017 No growth after 2 days  Preliminary     Recent Labs   Lab Test  09/15/17   2217   CRP  40.0*     Recent Labs   Lab Test  09/18/17   0327 09/17/17   0443  09/16/17   0413  09/15/17   2217   WBC  8.3  6.2  7.9  8.8     Recent Labs   Lab Test  09/18/17   0327 09/17/17   0443  09/16/17   0413  09/15/17   2217   CR  1.72*  1.40*  1.30*  1.36*   GFRESTIMATED  43*  54*  59*  56*     Hematology Studies  Recent Labs   Lab Test  09/18/17   0327  09/17/17   0443  09/16/17   0413  09/15/17   2217   WBC  8.3  6.2  7.9  8.8 "   ANEU   --    --    --   5.9   AEOS   --    --    --   0.0   HCT  31.9*  34.2*  35.3*  36.0*   PLT  190  219  248  267     Metabolic  Recent Labs   Lab Test  09/18/17   0327  09/17/17   0443  09/16/17   0413   NA  136  136  136   BUN  56*  47*  39*   CO2  26  28  29   CR  1.72*  1.40*  1.30*   GFRESTIMATED  43*  54*  59*     Hepatic Studies  Recent Labs   Lab Test  09/15/17   2217   BILITOTAL  2.5*   ALKPHOS  69   ALBUMIN  3.1*   AST  24   ALT  21     Immunologlobulins  Recent Labs   Lab Test  09/15/17   2217   SED  42*     Imagings  TTE 9/16/17   Interpretation Summary  Severe left ventricular dilation is present.  Biplane LV EF 46%.  Global right ventricular function is moderately reduced.  Severe mitral insufficiency is present.  Prosthetic aortic valve with significant thickening of aortic root.Possible vegetation or severe degenerative changes on aortic prosthetic valve with severe aortic regurgitation.Post operative changes in aortic root or related to infection.  Right ventricular systolic pressure is 48mmHg above the right atrial pressure.  Dilation of the inferior vena cava is present with abnormal respiratory variation in diameter.  No pericardial effusion is present.     CXR 9/15/17  Impression:   1. Marked cardiomegaly.  2. Streaky and platelike opacities within both lung bases, likely corresponding with intrafissural fluid noted on the prior CT as well as mild bibasilar atelectasis.  3. Small bilateral pleural effusions which are loculated in the fissures.     IZA 8/24/17  CONCLUSION:   Low normal LV systolic function.  Mild LV dilation. Mild inferior    hypokinesis.    Normal RV size and function.    Bioprosthetic aortic valve with multiple mobile vegetations.  Severe    aortic insufficiency.  There appears to be trace perivalvular    regurgitation which originates from the anterior portion of the sewing    ring.  Thickening of the aortic root which may be consistent with root    abscess as previously  noted without gross dehiscence.    Moderate to severe mitral regurgitation.     There are no vegetations appreciated on the mitral valve.      Patient has known endocarditis of bioprosthetic aortic valve with    possible root abscess. There are no significant changes in comparison    with the transthoracic echo from 8/24/17.     Left Ventricular Ejection Fraction: 50 %       TTE 8/24/17  CONCLUSION:    Limited echocardiogram.     Mild LV dilatation. Mild LVH.    Mildly reduced LV systolic function, EF 45%. Inferior wall is  slightly worse than other walls (probable no significant change on side  by side comparison with 08/22/2017 echo)    Normal RV size.     Borderline RV hypokinesis.     Moderate to severe LA dilatation.     Limited doppler/color suggests at least moderate AI, Severe MR,     mild to moderate TR , Estimated RVSP 61 mmHg + RAP (Patient     known to have bioprosthetic aortic valve endocarditis)    No significant pericardial effusion    Left Ventricular Ejection Fraction: 45 %     CT chest w contrast 9/1/17   IMPRESSION:   1. Cardiomegaly with calcification of the aortic valve and proximal ascending aorta. Ascending aortic dilation measuring 4 cm. Prominent mediastinal lymph nodes.  3. Loculated bilateral pleural effusions with associated atelectasis.   4. Predominantly upper lungs centrilobular emphysema. Superimposed  mild groundglass opacities, which could represent mild edema.  5. Enlarged main pulmonary, likely pulmonary hypertension.

## 2017-09-18 NOTE — PLAN OF CARE
Problem: Goal Outcome Summary  Goal: Goal Outcome Summary  Outcome: No Change  Neuro: Intact.     CV: Hemodynamically unstable, Sinus rhythm, Maintaining maps between 65 and 75. Nipride currently on hold.      Resp: Sounds clear and sating 96% on 2 LPM NC.     GI: On a low saturated fat diet. 1 BM today.     : Void spontaneously using urinal.      Skin: Intact. Access: single lumen picc and 1 piv.      Plan: Hemodynamic monitoring, Possible IZA today.

## 2017-09-18 NOTE — PROGRESS NOTES
Care Coordinator- Assessment Note     Admission Date/Time:  9/15/2017  Attending MD:  MICHELE Hua MD     Data  Chart reviewed, discussed with interdisciplinary team.   Patient was admitted for:   1. Acute systolic congestive heart failure (H)    2. Aortic valve insufficiency due to infection    3. Non-rheumatic mitral regurgitation    4. Endocarditis of aortic valve         RNCC Assessment  Concerns with insurance coverage for discharge needs: None.  Current Living Situation: Patient lives with spouse.  Support System: Supportive  Services Involved: none at admit  Transportation: Family or Friend will provide  Barriers to Discharge: none identified    Assessment: Per Chart review: the patient doesn't appear to have any complex needs during admission or at discharge.  Patient is independent in mobility.  Plan is for the patient to return to previous situation. RNCC will continue to follow and assess if needs arise.       Plan  Anticipated Discharge Date: TBD  Anticipated Discharge Plan:  Anticipate home    CTS Handoff completed: jessica Benites RN, MSN, PHN  RNCCPH: [phone]  Pager: [phone]  Covering for : Eric Esparza, ICU RNCC

## 2017-09-18 NOTE — PLAN OF CARE
Problem: Goal Outcome Summary  Goal: Goal Outcome Summary  Neuro: AAOX4 but has been sleeping most of the day since having IZA earlier today; pt had received  Total of 75mcg Fentanyl and 2mg Versed during exam; arouses easy to voice; denies pain; MAETC; PERRLA; received      CV: Nipride gtt titrated to maintain MAP goal 65-75, currently at 0.5mcg/kg/min;  Denies chest pain; T.max 98.2; IZA done at bedside      Pulm: lung sounds CTA; on 2L NC    GI: on low sat fat diet but has poor appetite and intake even with encouragement; no BM, started on scheduled Omeprazole 2/2 c/o heartburn.      :  Voids spont; dark bjorn urine     Lines: SL PICC and 1 PIV

## 2017-09-19 ENCOUNTER — APPOINTMENT (OUTPATIENT)
Dept: GENERAL RADIOLOGY | Facility: CLINIC | Age: 47
DRG: 216 | End: 2017-09-19
Attending: PHYSICIAN ASSISTANT
Payer: COMMERCIAL

## 2017-09-19 ENCOUNTER — APPOINTMENT (OUTPATIENT)
Dept: CARDIOLOGY | Facility: CLINIC | Age: 47
DRG: 216 | End: 2017-09-19
Attending: HOSPITALIST
Payer: COMMERCIAL

## 2017-09-19 LAB
ALBUMIN UR-MCNC: 30 MG/DL
ANION GAP SERPL CALCULATED.3IONS-SCNC: 15 MMOL/L (ref 3–14)
ANION GAP SERPL CALCULATED.3IONS-SCNC: 23 MMOL/L (ref 3–14)
APPEARANCE UR: ABNORMAL
BASE DEFICIT BLDV-SCNC: 1.3 MMOL/L
BILIRUB UR QL STRIP: NEGATIVE
BUN SERPL-MCNC: 71 MG/DL (ref 7–30)
BUN SERPL-MCNC: 84 MG/DL (ref 7–30)
CALCIUM SERPL-MCNC: 8.4 MG/DL (ref 8.5–10.1)
CALCIUM SERPL-MCNC: 8.6 MG/DL (ref 8.5–10.1)
CHLORIDE SERPL-SCNC: 91 MMOL/L (ref 94–109)
CHLORIDE SERPL-SCNC: 93 MMOL/L (ref 94–109)
CO2 SERPL-SCNC: 17 MMOL/L (ref 20–32)
CO2 SERPL-SCNC: 24 MMOL/L (ref 20–32)
COLOR UR AUTO: YELLOW
CREAT SERPL-MCNC: 2.04 MG/DL (ref 0.66–1.25)
CREAT SERPL-MCNC: 2.08 MG/DL (ref 0.66–1.25)
ERYTHROCYTE [DISTWIDTH] IN BLOOD BY AUTOMATED COUNT: 16.7 % (ref 10–15)
ERYTHROCYTE [DISTWIDTH] IN BLOOD BY AUTOMATED COUNT: 17 % (ref 10–15)
GFR SERPL CREATININE-BSD FRML MDRD: 34 ML/MIN/1.7M2
GFR SERPL CREATININE-BSD FRML MDRD: 35 ML/MIN/1.7M2
GLUCOSE SERPL-MCNC: 129 MG/DL (ref 70–99)
GLUCOSE SERPL-MCNC: 86 MG/DL (ref 70–99)
GLUCOSE UR STRIP-MCNC: NEGATIVE MG/DL
HCO3 BLDV-SCNC: 24 MMOL/L (ref 21–28)
HCT VFR BLD AUTO: 32.2 % (ref 40–53)
HCT VFR BLD AUTO: 35.1 % (ref 40–53)
HGB BLD-MCNC: 10.4 G/DL (ref 13.3–17.7)
HGB BLD-MCNC: 11.3 G/DL (ref 13.3–17.7)
HGB UR QL STRIP: ABNORMAL
HYALINE CASTS #/AREA URNS LPF: 31 /LPF (ref 0–2)
INR PPP: 2.57 (ref 0.86–1.14)
KETONES UR STRIP-MCNC: 5 MG/DL
LACTATE BLD-SCNC: 11.1 MMOL/L (ref 0.7–2)
LACTATE BLD-SCNC: 11.5 MMOL/L (ref 0.7–2)
LACTATE BLD-SCNC: 5.6 MMOL/L (ref 0.7–2)
LEUKOCYTE ESTERASE UR QL STRIP: NEGATIVE
MAGNESIUM SERPL-MCNC: 3 MG/DL (ref 1.6–2.3)
MCH RBC QN AUTO: 29.3 PG (ref 26.5–33)
MCH RBC QN AUTO: 29.4 PG (ref 26.5–33)
MCHC RBC AUTO-ENTMCNC: 32.2 G/DL (ref 31.5–36.5)
MCHC RBC AUTO-ENTMCNC: 32.3 G/DL (ref 31.5–36.5)
MCV RBC AUTO: 91 FL (ref 78–100)
MCV RBC AUTO: 91 FL (ref 78–100)
MUCOUS THREADS #/AREA URNS LPF: PRESENT /LPF
NITRATE UR QL: NEGATIVE
O2/TOTAL GAS SETTING VFR VENT: NORMAL %
OXYHGB MFR BLDV: 29 %
PCO2 BLDV: 44 MM HG (ref 40–50)
PH BLDV: 7.35 PH (ref 7.32–7.43)
PH UR STRIP: 5 PH (ref 5–7)
PLATELET # BLD AUTO: 133 10E9/L (ref 150–450)
PLATELET # BLD AUTO: 173 10E9/L (ref 150–450)
PO2 BLDV: 25 MM HG (ref 25–47)
POTASSIUM BLD-SCNC: 4.2 MMOL/L (ref 3.4–5.3)
POTASSIUM SERPL-SCNC: 4.2 MMOL/L (ref 3.4–5.3)
POTASSIUM SERPL-SCNC: 4.3 MMOL/L (ref 3.4–5.3)
RBC # BLD AUTO: 3.54 10E12/L (ref 4.4–5.9)
RBC # BLD AUTO: 3.86 10E12/L (ref 4.4–5.9)
RBC #/AREA URNS AUTO: 1 /HPF (ref 0–2)
SODIUM SERPL-SCNC: 132 MMOL/L (ref 133–144)
SODIUM SERPL-SCNC: 132 MMOL/L (ref 133–144)
SOURCE: ABNORMAL
SP GR UR STRIP: 1.01 (ref 1–1.03)
SQUAMOUS #/AREA URNS AUTO: <1 /HPF (ref 0–1)
TRANS CELLS #/AREA URNS HPF: <1 /HPF (ref 0–1)
UROBILINOGEN UR STRIP-MCNC: NORMAL MG/DL (ref 0–2)
UUN UR-MCNC: 438 MG/DL
UUN/CREAT 24H UR: 3 G/G CR
WBC # BLD AUTO: 11.9 10E9/L (ref 4–11)
WBC # BLD AUTO: 12.3 10E9/L (ref 4–11)
WBC #/AREA URNS AUTO: 2 /HPF (ref 0–2)

## 2017-09-19 PROCEDURE — 25000128 H RX IP 250 OP 636: Performed by: INTERNAL MEDICINE

## 2017-09-19 PROCEDURE — 80048 BASIC METABOLIC PNL TOTAL CA: CPT | Performed by: HOSPITALIST

## 2017-09-19 PROCEDURE — 4A133B3 MONITORING OF ARTERIAL PRESSURE, PULMONARY, PERCUTANEOUS APPROACH: ICD-10-PCS | Performed by: INTERNAL MEDICINE

## 2017-09-19 PROCEDURE — 82805 BLOOD GASES W/O2 SATURATION: CPT | Performed by: INTERNAL MEDICINE

## 2017-09-19 PROCEDURE — 27210946 ZZH KIT HC TOTES DISP CR8

## 2017-09-19 PROCEDURE — 20000004 ZZH R&B ICU UMMC

## 2017-09-19 PROCEDURE — 40000196 ZZH STATISTIC RAPCV CVP MONITORING

## 2017-09-19 PROCEDURE — 25000132 ZZH RX MED GY IP 250 OP 250 PS 637: Performed by: HOSPITALIST

## 2017-09-19 PROCEDURE — 85027 COMPLETE CBC AUTOMATED: CPT | Performed by: INTERNAL MEDICINE

## 2017-09-19 PROCEDURE — C1769 GUIDE WIRE: HCPCS

## 2017-09-19 PROCEDURE — 27210787 ZZH MANIFOLD CR2

## 2017-09-19 PROCEDURE — C1894 INTRO/SHEATH, NON-LASER: HCPCS

## 2017-09-19 PROCEDURE — 99152 MOD SED SAME PHYS/QHP 5/>YRS: CPT | Performed by: INTERNAL MEDICINE

## 2017-09-19 PROCEDURE — 40000048 ZZH STATISTIC DAILY SWAN MONITORING

## 2017-09-19 PROCEDURE — 85027 COMPLETE CBC AUTOMATED: CPT | Performed by: HOSPITALIST

## 2017-09-19 PROCEDURE — 86900 BLOOD TYPING SEROLOGIC ABO: CPT | Performed by: HOSPITALIST

## 2017-09-19 PROCEDURE — 84132 ASSAY OF SERUM POTASSIUM: CPT | Performed by: INTERNAL MEDICINE

## 2017-09-19 PROCEDURE — B2111ZZ FLUOROSCOPY OF MULTIPLE CORONARY ARTERIES USING LOW OSMOLAR CONTRAST: ICD-10-PCS | Performed by: INTERNAL MEDICINE

## 2017-09-19 PROCEDURE — 25000128 H RX IP 250 OP 636: Performed by: HOSPITALIST

## 2017-09-19 PROCEDURE — 83735 ASSAY OF MAGNESIUM: CPT | Performed by: INTERNAL MEDICINE

## 2017-09-19 PROCEDURE — 99153 MOD SED SAME PHYS/QHP EA: CPT

## 2017-09-19 PROCEDURE — 84540 ASSAY OF URINE/UREA-N: CPT | Performed by: INTERNAL MEDICINE

## 2017-09-19 PROCEDURE — 40000275 ZZH STATISTIC RCP TIME EA 10 MIN

## 2017-09-19 PROCEDURE — 83605 ASSAY OF LACTIC ACID: CPT | Performed by: INTERNAL MEDICINE

## 2017-09-19 PROCEDURE — 4A023N8 MEASUREMENT OF CARDIAC SAMPLING AND PRESSURE, BILATERAL, PERCUTANEOUS APPROACH: ICD-10-PCS | Performed by: INTERNAL MEDICINE

## 2017-09-19 PROCEDURE — 4A1239Z MONITORING OF CARDIAC OUTPUT, PERCUTANEOUS APPROACH: ICD-10-PCS | Performed by: INTERNAL MEDICINE

## 2017-09-19 PROCEDURE — 99233 SBSQ HOSP IP/OBS HIGH 50: CPT | Mod: 25 | Performed by: INTERNAL MEDICINE

## 2017-09-19 PROCEDURE — 93460 R&L HRT ART/VENTRICLE ANGIO: CPT

## 2017-09-19 PROCEDURE — 81001 URINALYSIS AUTO W/SCOPE: CPT | Performed by: INTERNAL MEDICINE

## 2017-09-19 PROCEDURE — 85610 PROTHROMBIN TIME: CPT | Performed by: HOSPITALIST

## 2017-09-19 PROCEDURE — 86901 BLOOD TYPING SEROLOGIC RH(D): CPT | Performed by: HOSPITALIST

## 2017-09-19 PROCEDURE — 25000132 ZZH RX MED GY IP 250 OP 250 PS 637: Performed by: INTERNAL MEDICINE

## 2017-09-19 PROCEDURE — 86923 COMPATIBILITY TEST ELECTRIC: CPT | Performed by: HOSPITALIST

## 2017-09-19 PROCEDURE — 02HQ32Z INSERTION OF MONITORING DEVICE INTO RIGHT PULMONARY ARTERY, PERCUTANEOUS APPROACH: ICD-10-PCS | Performed by: INTERNAL MEDICINE

## 2017-09-19 PROCEDURE — 71010 XR CHEST PORT 1 VW: CPT

## 2017-09-19 PROCEDURE — 93456 R HRT CORONARY ARTERY ANGIO: CPT | Mod: 26 | Performed by: INTERNAL MEDICINE

## 2017-09-19 PROCEDURE — 25000125 ZZHC RX 250: Performed by: INTERNAL MEDICINE

## 2017-09-19 PROCEDURE — 27211089 ZZH KIT ACIST INJECTOR CR3

## 2017-09-19 PROCEDURE — 80048 BASIC METABOLIC PNL TOTAL CA: CPT | Performed by: INTERNAL MEDICINE

## 2017-09-19 PROCEDURE — 99152 MOD SED SAME PHYS/QHP 5/>YRS: CPT

## 2017-09-19 PROCEDURE — 27211181 ZZH BALLOON TIP PRESSURE CR5

## 2017-09-19 PROCEDURE — 86850 RBC ANTIBODY SCREEN: CPT | Performed by: HOSPITALIST

## 2017-09-19 RX ORDER — ARGATROBAN 1 MG/ML
150 INJECTION, SOLUTION INTRAVENOUS
Status: DISCONTINUED | OUTPATIENT
Start: 2017-09-19 | End: 2017-09-19

## 2017-09-19 RX ORDER — FENTANYL CITRATE 50 UG/ML
25-50 INJECTION, SOLUTION INTRAMUSCULAR; INTRAVENOUS
Status: DISCONTINUED | OUTPATIENT
Start: 2017-09-19 | End: 2017-09-19

## 2017-09-19 RX ORDER — NITROGLYCERIN 5 MG/ML
100-500 VIAL (ML) INTRAVENOUS
Status: COMPLETED | OUTPATIENT
Start: 2017-09-19 | End: 2017-09-19

## 2017-09-19 RX ORDER — HEPARIN SODIUM 1000 [USP'U]/ML
1000-10000 INJECTION, SOLUTION INTRAVENOUS; SUBCUTANEOUS EVERY 5 MIN PRN
Status: DISCONTINUED | OUTPATIENT
Start: 2017-09-19 | End: 2017-09-19

## 2017-09-19 RX ORDER — DOPAMINE HYDROCHLORIDE 160 MG/100ML
2-20 INJECTION, SOLUTION INTRAVENOUS CONTINUOUS PRN
Status: DISCONTINUED | OUTPATIENT
Start: 2017-09-19 | End: 2017-09-19

## 2017-09-19 RX ORDER — POTASSIUM CHLORIDE 29.8 MG/ML
20 INJECTION INTRAVENOUS
Status: DISCONTINUED | OUTPATIENT
Start: 2017-09-19 | End: 2017-09-19

## 2017-09-19 RX ORDER — PROTAMINE SULFATE 10 MG/ML
25-100 INJECTION, SOLUTION INTRAVENOUS EVERY 5 MIN PRN
Status: DISCONTINUED | OUTPATIENT
Start: 2017-09-19 | End: 2017-09-19

## 2017-09-19 RX ORDER — NICARDIPINE HYDROCHLORIDE 2.5 MG/ML
100 INJECTION INTRAVENOUS
Status: DISCONTINUED | OUTPATIENT
Start: 2017-09-19 | End: 2017-09-19

## 2017-09-19 RX ORDER — LORAZEPAM 2 MG/ML
.5-2 INJECTION INTRAMUSCULAR EVERY 4 HOURS PRN
Status: DISCONTINUED | OUTPATIENT
Start: 2017-09-19 | End: 2017-09-19

## 2017-09-19 RX ORDER — ONDANSETRON 2 MG/ML
4 INJECTION INTRAMUSCULAR; INTRAVENOUS EVERY 4 HOURS PRN
Status: DISCONTINUED | OUTPATIENT
Start: 2017-09-19 | End: 2017-09-19

## 2017-09-19 RX ORDER — HEPARIN SODIUM 10000 [USP'U]/100ML
100-1000 INJECTION, SOLUTION INTRAVENOUS CONTINUOUS PRN
Status: DISCONTINUED | OUTPATIENT
Start: 2017-09-19 | End: 2017-09-19

## 2017-09-19 RX ORDER — METOPROLOL TARTRATE 1 MG/ML
5 INJECTION, SOLUTION INTRAVENOUS EVERY 5 MIN PRN
Status: DISCONTINUED | OUTPATIENT
Start: 2017-09-19 | End: 2017-09-19

## 2017-09-19 RX ORDER — ASPIRIN 325 MG
325 TABLET ORAL
Status: DISCONTINUED | OUTPATIENT
Start: 2017-09-19 | End: 2017-09-19

## 2017-09-19 RX ORDER — PHENYLEPHRINE HCL IN 0.9% NACL 1 MG/10 ML
20-100 SYRINGE (ML) INTRAVENOUS
Status: DISCONTINUED | OUTPATIENT
Start: 2017-09-19 | End: 2017-09-19

## 2017-09-19 RX ORDER — CLOPIDOGREL BISULFATE 75 MG/1
300-600 TABLET ORAL
Status: DISCONTINUED | OUTPATIENT
Start: 2017-09-19 | End: 2017-09-19

## 2017-09-19 RX ORDER — ADENOSINE 3 MG/ML
12-12000 INJECTION, SOLUTION INTRAVENOUS
Status: DISCONTINUED | OUTPATIENT
Start: 2017-09-19 | End: 2017-09-19

## 2017-09-19 RX ORDER — NALOXONE HYDROCHLORIDE 0.4 MG/ML
0.4 INJECTION, SOLUTION INTRAMUSCULAR; INTRAVENOUS; SUBCUTANEOUS EVERY 5 MIN PRN
Status: DISCONTINUED | OUTPATIENT
Start: 2017-09-19 | End: 2017-09-19

## 2017-09-19 RX ORDER — METHYLPREDNISOLONE SODIUM SUCCINATE 125 MG/2ML
125 INJECTION, POWDER, LYOPHILIZED, FOR SOLUTION INTRAMUSCULAR; INTRAVENOUS
Status: DISCONTINUED | OUTPATIENT
Start: 2017-09-19 | End: 2017-09-19

## 2017-09-19 RX ORDER — NALOXONE HYDROCHLORIDE 0.4 MG/ML
.2-.4 INJECTION, SOLUTION INTRAMUSCULAR; INTRAVENOUS; SUBCUTANEOUS
Status: ACTIVE | OUTPATIENT
Start: 2017-09-19 | End: 2017-09-20

## 2017-09-19 RX ORDER — PHYTONADIONE 5 MG/1
10 TABLET ORAL ONCE
Status: COMPLETED | OUTPATIENT
Start: 2017-09-19 | End: 2017-09-19

## 2017-09-19 RX ORDER — DOBUTAMINE HYDROCHLORIDE 200 MG/100ML
2-20 INJECTION INTRAVENOUS CONTINUOUS PRN
Status: DISCONTINUED | OUTPATIENT
Start: 2017-09-19 | End: 2017-09-19

## 2017-09-19 RX ORDER — CLOPIDOGREL BISULFATE 75 MG/1
75 TABLET ORAL
Status: DISCONTINUED | OUTPATIENT
Start: 2017-09-19 | End: 2017-09-19

## 2017-09-19 RX ORDER — NITROGLYCERIN 20 MG/100ML
.07-2 INJECTION INTRAVENOUS CONTINUOUS PRN
Status: DISCONTINUED | OUTPATIENT
Start: 2017-09-19 | End: 2017-09-19

## 2017-09-19 RX ORDER — ATROPINE SULFATE 0.1 MG/ML
0.5 INJECTION INTRAVENOUS EVERY 5 MIN PRN
Status: ACTIVE | OUTPATIENT
Start: 2017-09-19 | End: 2017-09-20

## 2017-09-19 RX ORDER — ENALAPRILAT 1.25 MG/ML
1.25-2.5 INJECTION INTRAVENOUS
Status: DISCONTINUED | OUTPATIENT
Start: 2017-09-19 | End: 2017-09-19

## 2017-09-19 RX ORDER — VERAPAMIL HYDROCHLORIDE 2.5 MG/ML
1-5 INJECTION, SOLUTION INTRAVENOUS
Status: DISCONTINUED | OUTPATIENT
Start: 2017-09-19 | End: 2017-09-19

## 2017-09-19 RX ORDER — IOPAMIDOL 755 MG/ML
50 INJECTION, SOLUTION INTRAVASCULAR ONCE
Status: COMPLETED | OUTPATIENT
Start: 2017-09-19 | End: 2017-09-19

## 2017-09-19 RX ORDER — MAGNESIUM HYDROXIDE 1200 MG/15ML
1000 LIQUID ORAL CONTINUOUS PRN
Status: DISCONTINUED | OUTPATIENT
Start: 2017-09-19 | End: 2017-09-19

## 2017-09-19 RX ORDER — ATROPINE SULFATE 0.1 MG/ML
.5-1 INJECTION INTRAVENOUS
Status: DISCONTINUED | OUTPATIENT
Start: 2017-09-19 | End: 2017-09-19

## 2017-09-19 RX ORDER — ARGATROBAN 1 MG/ML
350 INJECTION, SOLUTION INTRAVENOUS
Status: DISCONTINUED | OUTPATIENT
Start: 2017-09-19 | End: 2017-09-19

## 2017-09-19 RX ORDER — FLUMAZENIL 0.1 MG/ML
0.2 INJECTION, SOLUTION INTRAVENOUS
Status: DISCONTINUED | OUTPATIENT
Start: 2017-09-19 | End: 2017-09-19

## 2017-09-19 RX ORDER — NALOXONE HYDROCHLORIDE 0.4 MG/ML
.1-.4 INJECTION, SOLUTION INTRAMUSCULAR; INTRAVENOUS; SUBCUTANEOUS
Status: DISCONTINUED | OUTPATIENT
Start: 2017-09-19 | End: 2017-09-21

## 2017-09-19 RX ORDER — PROMETHAZINE HYDROCHLORIDE 25 MG/ML
6.25-25 INJECTION, SOLUTION INTRAMUSCULAR; INTRAVENOUS EVERY 4 HOURS PRN
Status: DISCONTINUED | OUTPATIENT
Start: 2017-09-19 | End: 2017-09-19

## 2017-09-19 RX ORDER — PRASUGREL 10 MG/1
10-60 TABLET, FILM COATED ORAL
Status: DISCONTINUED | OUTPATIENT
Start: 2017-09-19 | End: 2017-09-19

## 2017-09-19 RX ORDER — PROTAMINE SULFATE 10 MG/ML
1-5 INJECTION, SOLUTION INTRAVENOUS
Status: DISCONTINUED | OUTPATIENT
Start: 2017-09-19 | End: 2017-09-19

## 2017-09-19 RX ORDER — LIDOCAINE HYDROCHLORIDE 10 MG/ML
30 INJECTION, SOLUTION EPIDURAL; INFILTRATION; INTRACAUDAL; PERINEURAL
Status: DISCONTINUED | OUTPATIENT
Start: 2017-09-19 | End: 2017-09-19

## 2017-09-19 RX ORDER — SODIUM CHLORIDE 9 MG/ML
INJECTION, SOLUTION INTRAVENOUS
Status: DISCONTINUED
Start: 2017-09-19 | End: 2017-09-19 | Stop reason: HOSPADM

## 2017-09-19 RX ORDER — CALCIUM CARBONATE 500 MG/1
500-1000 TABLET, CHEWABLE ORAL ONCE
Status: COMPLETED | OUTPATIENT
Start: 2017-09-19 | End: 2017-09-19

## 2017-09-19 RX ORDER — NITROGLYCERIN 5 MG/ML
100-200 VIAL (ML) INTRAVENOUS
Status: DISCONTINUED | OUTPATIENT
Start: 2017-09-19 | End: 2017-09-19

## 2017-09-19 RX ORDER — NITROGLYCERIN 0.4 MG/1
0.4 TABLET SUBLINGUAL EVERY 5 MIN PRN
Status: DISCONTINUED | OUTPATIENT
Start: 2017-09-19 | End: 2017-09-19

## 2017-09-19 RX ORDER — DIPHENHYDRAMINE HYDROCHLORIDE 50 MG/ML
25-50 INJECTION INTRAMUSCULAR; INTRAVENOUS
Status: DISCONTINUED | OUTPATIENT
Start: 2017-09-19 | End: 2017-09-19

## 2017-09-19 RX ORDER — EPTIFIBATIDE 2 MG/ML
1 INJECTION, SOLUTION INTRAVENOUS CONTINUOUS PRN
Status: DISCONTINUED | OUTPATIENT
Start: 2017-09-19 | End: 2017-09-19

## 2017-09-19 RX ORDER — DEXTROSE MONOHYDRATE 25 G/50ML
12.5-5 INJECTION, SOLUTION INTRAVENOUS EVERY 30 MIN PRN
Status: DISCONTINUED | OUTPATIENT
Start: 2017-09-19 | End: 2017-09-19

## 2017-09-19 RX ORDER — FUROSEMIDE 10 MG/ML
40 INJECTION INTRAMUSCULAR; INTRAVENOUS ONCE
Status: COMPLETED | OUTPATIENT
Start: 2017-09-19 | End: 2017-09-19

## 2017-09-19 RX ORDER — SODIUM NITROPRUSSIDE 25 MG/ML
100-200 INJECTION INTRAVENOUS
Status: DISCONTINUED | OUTPATIENT
Start: 2017-09-19 | End: 2017-09-19

## 2017-09-19 RX ORDER — EPTIFIBATIDE 2 MG/ML
180 INJECTION, SOLUTION INTRAVENOUS EVERY 10 MIN PRN
Status: DISCONTINUED | OUTPATIENT
Start: 2017-09-19 | End: 2017-09-19

## 2017-09-19 RX ORDER — FLUMAZENIL 0.1 MG/ML
0.2 INJECTION, SOLUTION INTRAVENOUS
Status: ACTIVE | OUTPATIENT
Start: 2017-09-19 | End: 2017-09-20

## 2017-09-19 RX ORDER — FUROSEMIDE 10 MG/ML
20-100 INJECTION INTRAMUSCULAR; INTRAVENOUS
Status: DISCONTINUED | OUTPATIENT
Start: 2017-09-19 | End: 2017-09-19

## 2017-09-19 RX ORDER — ASPIRIN 81 MG/1
81-324 TABLET, CHEWABLE ORAL
Status: DISCONTINUED | OUTPATIENT
Start: 2017-09-19 | End: 2017-09-19

## 2017-09-19 RX ORDER — HYDRALAZINE HYDROCHLORIDE 20 MG/ML
10-20 INJECTION INTRAMUSCULAR; INTRAVENOUS
Status: DISCONTINUED | OUTPATIENT
Start: 2017-09-19 | End: 2017-09-19

## 2017-09-19 RX ORDER — POTASSIUM CHLORIDE 7.45 MG/ML
10 INJECTION INTRAVENOUS
Status: DISCONTINUED | OUTPATIENT
Start: 2017-09-19 | End: 2017-09-19

## 2017-09-19 RX ORDER — NIFEDIPINE 10 MG/1
10 CAPSULE ORAL
Status: DISCONTINUED | OUTPATIENT
Start: 2017-09-19 | End: 2017-09-19

## 2017-09-19 RX ADMIN — PHYTONADIONE 10 MG: 5 TABLET ORAL at 18:35

## 2017-09-19 RX ADMIN — IOPAMIDOL 70 ML: 755 INJECTION, SOLUTION INTRAVASCULAR at 15:30

## 2017-09-19 RX ADMIN — CALCIUM CARBONATE (ANTACID) CHEW TAB 500 MG 1000 MG: 500 CHEW TAB at 00:21

## 2017-09-19 RX ADMIN — NITROGLYCERIN 200 MCG: 5 INJECTION, SOLUTION INTRAVENOUS at 14:46

## 2017-09-19 RX ADMIN — OMEPRAZOLE 20 MG: 20 CAPSULE, DELAYED RELEASE ORAL at 07:28

## 2017-09-19 RX ADMIN — FUROSEMIDE 40 MG: 10 INJECTION, SOLUTION INTRAVENOUS at 17:44

## 2017-09-19 RX ADMIN — MIDAZOLAM HYDROCHLORIDE 0.5 MG: 1 INJECTION, SOLUTION INTRAMUSCULAR; INTRAVENOUS at 14:58

## 2017-09-19 RX ADMIN — FENTANYL CITRATE 25 MCG: 50 INJECTION, SOLUTION INTRAMUSCULAR; INTRAVENOUS at 14:58

## 2017-09-19 RX ADMIN — HEPARIN SODIUM 5000 UNITS: 1000 INJECTION, SOLUTION INTRAVENOUS; SUBCUTANEOUS at 14:46

## 2017-09-19 RX ADMIN — SODIUM CHLORIDE 500 ML: 9 INJECTION, SOLUTION INTRAVENOUS at 07:29

## 2017-09-19 RX ADMIN — CEFTRIAXONE 2 G: 2 INJECTION, POWDER, FOR SOLUTION INTRAMUSCULAR; INTRAVENOUS at 10:23

## 2017-09-19 RX ADMIN — HEPARIN SODIUM 5000 UNITS: 5000 INJECTION, SOLUTION INTRAVENOUS; SUBCUTANEOUS at 22:20

## 2017-09-19 RX ADMIN — MIDAZOLAM HYDROCHLORIDE 0.5 MG: 1 INJECTION, SOLUTION INTRAMUSCULAR; INTRAVENOUS at 14:27

## 2017-09-19 RX ADMIN — MELATONIN TAB 3 MG 3 MG: 3 TAB at 01:15

## 2017-09-19 RX ADMIN — FENTANYL CITRATE 25 MCG: 50 INJECTION, SOLUTION INTRAMUSCULAR; INTRAVENOUS at 14:27

## 2017-09-19 RX ADMIN — NICARDIPINE HYDROCHLORIDE 200 MCG: 2.5 INJECTION INTRAVENOUS at 14:46

## 2017-09-19 RX ADMIN — SODIUM CHLORIDE 2.5 MG/HR: 9 INJECTION, SOLUTION INTRAVENOUS at 07:43

## 2017-09-19 RX ADMIN — Medication 1500 UNITS: at 14:46

## 2017-09-19 NOTE — PROCEDURES
CARDIAC CATH REPORT:   PROCEDURES PERFORMED:   Right heart catherization  Aortagram  Coronary Angiography  Left Heart Catheterization    PHYSICIANS:  Attending Physician: Neo Trujillo MD PhD  Cardiology Fellow: Gloria Wilcox    INDICATION:  Harvey Almanzar is a 47 year old male with history of bicuspid valve s/p bioprosthetic aortic valve replacement and homograft and reimplantation of the coronary arteries.  He is admitted with aortic valve endocarditis with planning for repeat AVR and homograft replacement.  He is referred for pre-operative coronary angiogram and RHC.    Because of his dyspnea, he was transferred on BiPap and was on 10/5 40% during the case.    DESCRIPTION:  1. Consent obtained with discussion of risks.  All questions were answered.  2. Sterile prep and procedure.  3. Location with Sheaths:   Lf Radial Arterial  5 Fr Slender 10 cm [short]  Rt IJ  7 Fr 10 cm [short]  4. Access: Local anesthetic with lidocaine.  A standard 18 guage needle with ultrasound guidance was used to establish vascular access using a modified Seldinger technique within the right internal jugular vein.  A micropuncture 21 guage needle with ultrasound guidance was used to establish vascular access using a modified Seldinger technique within the left radial artery.  5. Diagnostic Catheters:   4 Fr  JL 4 to engage the LM; RCA could not be engaged despite use of multiple catheters.  It was closed on aortogram.  7 Fr  Absaraka Marcello  6. Guiding Catheters:  None  6. Estimated blood loss: < 25 ml    MEDICATIONS:  The procedure was performed under conscious sedation for 52 minutes from 14:27 to 15:19.  The patient was assessed immediately before the first sedation medication was administered.  Midazolam 1 mg and Fentanyl 50 mcg were administered.  Heart rate, BP, respiration, oxygen saturation and patient responses were monitored throughout the procedure with the assistance of the RN under my supervision.  >> IA Nicardipine and  IA NTG were administered to prophylax against radial artery spasm.  >> IV UFH 5000 U was administered to achieve anticoagulation.  >> Antiplatelet Therapy: None    Procedures:    HEMODYNAMICS:  BSA 1.7  1. HR 78 bpm  2. BP 94/53/71 mmHg  3. RA 21/19/17   4. RV 64/17  5. PA 64/37/47   6. PCW 33/47/37   7. PA sat 39.1%   9. Hgb 10.6 g/dL   10. Roseanna CO 2.92   11. Roseanna CI 1.73   12. TD CO 2.47   13. TD CI 1.46   14. PVR 3.43   15. SVR 1489     CORONARY ANGIOGRAM:   Patient has prosthetic aortic valve with ascending aortic graft with reimplanted coronaries.    2. Dominance: Right  3. The LM has some distal mild luminal irregularities.  4. LAD: Type 3 [LAD supplies the entire apex].. The LAD is a large vessel and gives rise to septal perforators, large D1 and tiny D2 and D3.  The pLAD has a 20% stenosis at the D1 bifurcation. mLAD has a 30% stenosis, dLAD has luminal irregularities.  D1 has luminal irregularities.    5. LCX is initially a large vessel and gives rise to a very large OM1 with multiple small branches.  The LCx becomes a small vessel distal to OM1.  LCx has luminal irregularities.  The pOM1 has a 20% stenosis.   6. RCA has  with perfusion of the dRCA, RPDA, and RPLA by left to right collaterals.     LEFT HEART CATHETERIZATION:  1. LVEDP 37 mmHg.  2. Left ventriculography not performed  3. Severe AI is present  4. Severe MR is present    Sheath Removal:  The left radial  sheath was manually removed in the cardiac catheterization laboratory.  Right internal jugular Phoenix Marcello sutured in.    Contrast: Isovue, 70 ml     Fluoroscopy Time: 18.5 min    COMPLICATIONS:  1. None    SUMMARY:   >> High right sided filling pressures.  >> High left sided filling pressures.  >> Severe pulmonary artery hypertension, post-capillary  >> Reduced cardiac output, 2.92 L/min with index 1.73 L/min/m2  >> Single vessel obstructive CAD - RCA   >> Severe Aortic Insufficiency  >> Severe Mitral Valve Regurgitation    PLAN:   >>  Continued medical management and lifestyle modification for cardiovascular risk factor optimization.   >> Return to the primary inpatient team for further evaluation and management.    The attending interventional cardiologist was present and supervised all critical aspects the procedure.    Findings discussed with Dr. Trujillo.    See CVIS report for final draft.    Gloria Wilcox   Cardiology Fellow      ATTENDING ATTESTATION: I was present and supervised the cardiology fellow throughout the procedure and reviewed the findings with the fellow at the completion of the procedure.  I agree with the documentation above.    Neo Trujillo MD, PhD  Interventional/Critical Care Cardiology  [phone]    September 19, 2017

## 2017-09-19 NOTE — PLAN OF CARE
Problem: Goal Outcome Summary  Goal: Goal Outcome Summary  Outcome: No Change  Neuro: Intact.      CV: Hemodynamically unstable, Sinus rhythm, Maintaining maps between 65 and 75. Nipride currently on 0.34      Resp: Sounds coarse and diminished bases, sating 96% on 2 LPM NC. Complains of SOB. MD aware.      GI: NPO except meds since 0000.      : Inadequate urine production. MD notified regarding elevated creatinine. Awaiting orders.      Skin: Intact. Access: single lumen picc and 1 piv.       Plan: Hemodynamic monitoring, Possible angiogram today.

## 2017-09-19 NOTE — PLAN OF CARE
Problem: Goal Outcome Summary  Goal: Goal Outcome Summary  Outcome: No Change  Alert and oriented. Cath lab with SWAN placement. Marginal urine output. VSS. Nicardipine running. Slightly hypotensive when sleeping. LS diminished. On 2L. Came up from Cath lab on bipap currently on stand-by. Continue to monitor, notify MD with any concerns.

## 2017-09-19 NOTE — PROGRESS NOTES
Cardiology Progress Note  Harvey Almanzar MRN: [id number]  Age: 47 year old, : 1970  Date: 2017              Subjective     ASPEN overnight. Notes SOB overnight which has since improved. Tolerating PO intake. No chest pain. Has not ambulated in halls. No f/c.           Objective     B/P: 102/61, T: 97.8, P: Data Unavailable, R: 14    Intake/Output Summary (Last 24 hours) at 17 0904  Last data filed at 17 0800   Gross per 24 hour   Intake              235 ml   Output              175 ml   Net               60 ml     Vitals:    09/15/17 2202 17 0400 17 0615   Weight: 68 kg (150 lb) 64.8 kg (142 lb 13.7 oz) 64 kg (141 lb 1.5 oz)     Gen: AA&Ox3, no acute distress  HEENT:AT/ NC, PERRL b/l, EOM grossly intact, mucous membranes pink, moist without plaque or exudate  BACK: no CVA tenderness, no midline bony tenderness  PULM/THORAX: Faint bibasilar crackles.   CV: harsh diastolic murmur heard best at RUSB (c/w AI), systolic murmur at apex (c/w MR)  ABD: soft, nontender, nondistended. Normoactive bowel sounds x 4, no HSM appreciated.  EXT: trace pedal edema, no clubbing or cyanosis. LE warm well perfused No asymmetrical edema or tenderness to palpation in calves bilaterally.            Data:          Lab Results   Component Value Date     2017    Lab Results   Component Value Date    CHLORIDE 95 2017    Lab Results   Component Value Date    BUN 39 2017      Lab Results   Component Value Date    POTASSIUM 3.1 2017    Lab Results   Component Value Date    CO2 29 2017    Lab Results   Component Value Date    CR 1.30 2017        Lab Results   Component Value Date    NTBNPI 12441 (H) 09/15/2017     Lab Results   Component Value Date    WBC 11.9 (H) 2017    HGB 11.3 (L) 2017    HCT 35.1 (L) 2017    MCV 91 2017     2017               Medications     Current Facility-Administered  Medications:      niCARdipine (CARDENE) 100 mg in NaCl 0.9 % 250 mL infusion, 2.5-15 mg/hr, Intravenous, Continuous, Frantz Pena MD, Stopped at 09/19/17 1159     abciximab (REOPRO) injection 16 mg, 0.25 mg/kg, Intravenous, Once PRN, Gloria Wilcox MD     abciximab (REOPRO) 9 mg in D5W 250 mL infusion, 0.125 mcg/kg/min, Intravenous, Continuous PRN, Gloria Wilcox MD     adenosine (ADENOCARD) injection 12-12,000 mcg, 12-12,000 mcg, INTRACORONARY, q1 min prn, Gloria Wilcox MD     adenosine (diagnostic) (ADENOSCAN) IV infusion - for Cath Lab (FFR), 140 mcg/kg/min, Intravenous, Continuous PRN, Gloria Wilcox MD     amiodarone (NEXTERONE) injection 150-300 mg, 150-300 mg, Intravenous, Q10 Min PRN, Gloria Wilcox MD     aspirin chewable tablet  mg,  mg, Oral, Once PRN, Gloria Wilcox MD     aspirin tablet 325 mg, 325 mg, Oral, Once PRN, Gloria Wilcox MD     atropine injection 0.5-1 mg, 0.5-1 mg, Intravenous, q1 min prn, Gloria Wilcox MD     argatroban (ACOVA) bolus from infusion pump 22,400 mcg, 350 mcg/kg, Intravenous, Once PRN, Gloria Wilcox MD     argatroban (ACOVA) bolus from infusion pump 9,600 mcg, 150 mcg/kg, Intravenous, Once PRN, Gloria Wilcox MD     argatroban (ACOVA) infusion 250 mg/250 mL, 25 mcg/kg/min, Intravenous, Continuous PRN, Gloria Wilcox MD     bivalirudin (ANGIOMAX) bolus from infusion pump 48 mg, 0.75 mg/kg, Intravenous, Once PRN, Gloria Wilcox MD     bivalirudin (ANGIOMAX) 250 mg in NaCl 0.9 % 50 mL infusion, 1.75 mg/kg/hr, Intravenous, Continuous PRN, Gloria Wilcox MD     clopidogrel (PLAVIX) tablet 75 mg, 75 mg, Oral, Once PRN, Gloria Wilcox MD     clopidogrel (PLAVIX) tablet 300-600 mg, 300-600 mg, Oral, Once PRN, Gloria Wilcox MD     dextrose 50 % injection 12.5-50 mL, 12.5-50 mL, Intravenous, Q30 Min PRN, Gloria Wilcox MD     diphenhydrAMINE (BENADRYL) injection 25-50 mg, 25-50 mg,  Intravenous, Once PRN, Gloria Wilcox MD     DOBUTamine 500 mg in dextrose 5% 250 mL (adult std), 2-20 mcg/kg/min, Intravenous, Continuous PRN, Gloria Wilcox MD     DOPamine 400 mg in dextrose 5% 250 mL (adult std) - premix, 2-20 mcg/kg/min, Intravenous, Continuous PRN, Gloria Wilcox MD     enalaprilat (VASOTEC) injection 1.25-2.5 mg, 1.25-2.5 mg, Intravenous, Once PRN, Gloria Wilcox MD     EPINEPHrine (ADRENALIN) injection 0.3 mg, 0.3 mg, Subcutaneous, Q3 Min PRN, Gloria Wilcox MD     EPINEPHrine (ADRENALIN) injection 0.3 mg, 0.3 mg, Intravenous, Q5 Min PRN, Gloria Wilcox MD     EPINEPHrine (ADRENALIN) injection 1 mg, 1 mg, Intravenous, Q5 Min PRN, Gloria Wilcox MD     epoprostenol (FLOLAN) 0.2 mg in glycine diluent 100 mL infusion, 2 ng/kg/min, Intravenous, Continuous PRN, Gloria Wilcox MD     eptifibatide (INTEGRILIN) 2 mg/mL loading dose, 180 mcg/kg, Intravenous, Q10 Min PRN, Gloria Wilcox MD     eptifibatide (INTEGRILIN) 200 mg/100 mL infusion, 1 mcg/kg/min, Intravenous, Continuous PRN, Gloria Wilcox MD     flumazenil (ROMAZICON) injection 0.2 mg, 0.2 mg, Intravenous, q1 min prn, Gloria Wilcox MD     furosemide (LASIX) injection  mg,  mg, Intravenous, Once PRN, Gloria Wilcox MD     heparin (porcine) injection 1,000-10,000 Units, 1,000-10,000 Units, Intravenous, Q5 Min PRN, Gloria Wilcox MD, 5,000 Units at 09/19/17 1446     heparin infusion 25,000 units in D5W 250 mL, 100-1,000 Units/hr, Intravenous, Continuous PRN, Gloria Wilcox MD     hydrALAZINE (APRESOLINE) injection 10-20 mg, 10-20 mg, Intravenous, Q15 Min PRN, Gloria Wilcox MD     lidocaine (PF) (XYLOCAINE) 1 % injection 300 mg, 30 mL, Subcutaneous, Once PRN, Gloria Wilcox MD     LORazepam (ATIVAN) injection 0.5-2 mg, 0.5-2 mg, Intravenous, Q4H PRN, Gloria Wilcox MD     methylPREDNISolone sodium succinate (solu-MEDROL) injection 125 mg, 125 mg,  Intravenous, Once PRN, Gloria Wilcox MD     metoprolol (LOPRESSOR) injection 5 mg, 5 mg, Intravenous, Q5 Min PRN, Gloria Wilcox MD     naloxone (NARCAN) injection 0.4 mg, 0.4 mg, Intravenous, Q5 Min PRN, Gloria Wilcox MD     niCARdipine (CARDENE) injection 100 mcg, 100 mcg, INTRA-ARTERIAL, q1 min prn, Gloria Wilcox MD, 200 mcg at 09/19/17 1446     niCARdipine (CARDENE) injection 100 mcg, 100 mcg, INTRACORONARY, q1 min prn, Gloria Wilcox MD     NIFEdipine (PROCARDIA) capsule 10 mg, 10 mg, Oral, Once PRN, Gloria Wilcox MD     nitroGLYcerin (NITROSTAT) sublingual tablet 0.4 mg, 0.4 mg, Sublingual, Q5 Min PRN, Gloria Wilcox MD     nitroGLYcerin injection 100-200 mcg, 100-200 mcg, INTRACORONARY, q1 min prn, Gloria Wilcox MD     nitroGLYcerin 50 mg in D5W 250 mL (adult std) infusion, 0.07-2 mcg/kg/min, Intravenous, Continuous PRN, Gloria Wilcox MD     nitroPRUsside (NIPRIDE) injection 100-200 mcg, 100-200 mcg, INTRACORONARY, Q2 Min PRN, Gloria Wilcox MD     nitroPRUsside (NIPRIDE) 50 mg, sodium thiosulfate 500 mg in D5W 125 mL IV infusion (conc: 0.4mg/mL), 0.25-10 mcg/kg/min, Intravenous, Continuous PRN, Gloria Wilcox MD     0.9% sodium chloride BOLUS, 500-1,000 mL, Intravenous, Once PRN, Gloria Wilcox MD     ondansetron (ZOFRAN) injection 4 mg, 4 mg, Intravenous, Q4H PRN, Gloria Wilcox MD     phenylephrine (VASILE-SYNEPHRINE) injection  mcg,  mcg, INTRACORONARY, Q3 Min PRN, Gloria Wilcox MD     phenylephrine (VASILE-SYNEPHRINE) 50 mg in NaCl 0.9 % 250 mL infusion, 0.5-6 mcg/kg/min, Intravenous, Continuous, Gloria Wilcox MD     potassium chloride 10 mEq in 100 mL intermittent infusion, 10 mEq, Intravenous, Once PRN, Gloria Wilcox MD     potassium chloride 20 mEq in 50 mL intermittent infusion, 20 mEq, Intravenous, Once PRN, Gloria Wilcox MD     prasugrel (EFFIENT) tablet 10-60 mg, 10-60 mg, Oral, Once PRN, Jeri  Gloria SAGE MD     promethazine (PHENERGAN) IV injection 6.25-25 mg, 6.25-25 mg, Intravenous, Q4H PRN, Gloria Wilcox MD     protamine injection 1-5 mg, 1-5 mg, Intravenous, Once PRN, Gloria Wilcox MD     protamine injection  mg,  mg, Intravenous, Q5 Min PRN, Gloria Wilcox MD     sodium bicarbonate 8.4 % injection 50 mEq, 50 mEq, Intravenous, Q5 Min PRN, Gloria Wilcox MD     ticagrelor (BRILINTA) tablet  mg,  mg, Oral, Once PRN, Gloria Wilcox MD     vasopressin (PITRESSIN) injection 40 Units, 40 Units, Intravenous, Once PRN, Gloria Wilcox MD     verapamil (ISOPTIN) injection 1-5 mg, 1-5 mg, INTRA-ARTERIAL, Once PRN, Gloria Wilcox MD     fentaNYL (PF) (SUBLIMAZE) injection 25-50 mcg, 25-50 mcg, Intravenous, Q2 Min PRN, Gloria Wilcox MD, 25 mcg at 09/19/17 1427     midazolam (VERSED) injection 0.5-2 mg, 0.5-2 mg, Intravenous, Q2 Min PRN, Gloria Wilcox MD, 0.5 mg at 09/19/17 1427     heparin 1500 units in 1500 mL NS (1:1 concentration)-Table Solution, 1,500 mL, TABLE SOLN, Continuous PRN, Gloria Wilcox MD, 1,500 Units at 09/19/17 1446     heparin 500 units in 500 mL NS (1:1 concentration)- Table Solution for ACIST catheter, 500 mL, TABLE SOLN, Continuous PRN, Gloria Wilcox MD     sodium chloride 0.9% (bottle) irrigation 1,000 mL, 1,000 mL, TABLE SOLN, Continuous PRN, Gloria Wilcox MD     melatonin tablet 3 mg, 3 mg, Oral, At Bedtime PRN, Quique Luevano MD, 3 mg at 09/19/17 0115     omeprazole (priLOSEC) CR capsule 20 mg, 20 mg, Oral, QAM AC, Frantz Pena MD, 20 mg at 09/19/17 0728     heparin sodium PF injection 5,000 Units, 5,000 Units, Subcutaneous, Q12H, Frantz Pena MD, Stopped at 09/19/17 0937     sodium chloride (PF) 0.9% PF flush 3 mL, 3 mL, Intravenous, Q1H PRN, Frantz Pena MD     sodium chloride (PF) 0.9% PF flush 3 mL, 3 mL, Intravenous, Q8H, Frantz Pena MD, 3 mL at 09/19/17 0729     May  take oral meds with a sip of water, the morning of IZA procedure., , Does not apply, Continuous PRN, Frantz Pena MD     HOLD: Insulin - REGULAR AM of procedure IF diabetic, , Does not apply, Jean ROMERO Joseph J, MD     HOLD: Insulin - RAPID/SHORT acting AM of procedure IF diabetic, , Does not apply, Jean ROMERO Joseph J, MD     HOLD: Oral hypoglycemics AM of procedure IF diabetic, , Does not apply, Jean ROMERO Joseph J, MD     Give   of usual dose of LONG ACTING insulin AM of procedure IF diabetic, , Does not apply, Continuous PRN, Frantz Pena MD     magnesium sulfate 2 g in NS intermittent infusion (PharMEDium or [facility]V Cmpd), 2 g, Intravenous, Daily PRN, Frantz Pena MD     magnesium sulfate 4 g in 100 mL sterile water (premade), 4 g, Intravenous, Q4H PRN, Frantz Pena MD     potassium chloride SA (K-DUR/KLOR-CON M) CR tablet 20-40 mEq, 20-40 mEq, Oral, Q2H PRN, Frantz Pena MD     potassium chloride (KLOR-CON) Packet 20-40 mEq, 20-40 mEq, Oral or Feeding Tube, Q2H PRN, Frantz Pena MD     potassium chloride 10 mEq in 100 mL intermittent infusion, 10 mEq, Intravenous, Q1H PRN, Frantz Pena MD, Last Rate: 100 mL/hr at 09/17/17 1614, 10 mEq at 09/17/17 1614     potassium chloride 10 mEq in 100 mL intermittent infusion with 10 mg lidocaine, 10 mEq, Intravenous, Q1H PRN, Frantz Pena MD     potassium chloride 20 mEq in 100 mL NON-STANDARD CONC intermittent infusion, 20 mEq, Intravenous, Q1H PRN, Frantz Pena MD     pneumococcal vaccine (PNEUMOVAX 23-rita) injection 0.5 mL, 0.5 mL, Intramuscular, Prior to discharge, MICHELE Hua MD     naloxone (NARCAN) injection 0.1-0.4 mg, 0.1-0.4 mg, Intravenous, Q2 Min PRN, Everardo Abraham MD     acetaminophen (TYLENOL) tablet 650 mg, 650 mg, Oral, Q4H PRN, Everardo Abraham MD     ondansetron (ZOFRAN-ODT) ODT tab 4 mg, 4 mg, Oral, Q6H PRN **OR** ondansetron (ZOFRAN) injection 4 mg, 4 mg, Intravenous, Q6H PRN,  Everardo Abraham MD, 4 mg at 09/16/17 1734     polyethylene glycol (MIRALAX/GLYCOLAX) Packet 17 g, 17 g, Oral, Daily PRN, Everardo Abraham MD     senna-docusate (SENOKOT-S;PERICOLACE) 8.6-50 MG per tablet 1-2 tablet, 1-2 tablet, Oral, BID PRN, Everardo Abraham MD     cefTRIAXone (ROCEPHIN) 2 g vial to attach to  ml bag for ADULTS or NS 50 ml bag for PEDS, 2 g, Intravenous, Q24H, Everardo Abraham MD, Last Rate: 200 mL/hr at 09/16/17 1015, 2 g at 09/19/17 1023      ECHO 9/15  Interpretation Summary  Severe left ventricular dilation is present.  Biplane LV EF 46%.  Global right ventricular function is moderately reduced.  Severe mitral insufficiency is present.  Prosthetic aortic valve with significant thickening of aortic root.Possible  vegetation or severe degenerative changes on aortic prosthetic valve with  severe aortic regurgitation.Post operative changes in aortic root or related  to infection.  Right ventricular systolic pressure is 48mmHg above the right atrial pressure.  Dilation of the inferior vena cava is present with abnormal respiratory  variation in diameter.  No pericardial effusion is present.  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  # Severe mitral regurgitation   -CV surgery consult, appreciate recs   - d/c nipride gtt (possibly contributing to lactic acidosis)   - start nicardipine  - hold diuresis with rising Cr   - Continue ceftriaxone Q24H  - ID consult,  appreciate recs for antibiotic management (no change in abx)   - RHC today (showed elevated right and left sided pressures with low CI (1.7)     # Lactic Acidosis   Lactic acid 11, possibly 2/2 to cardiogenic shock vs nipride  -500 ml bolus this AM, lactate re-check is down trending   -diureses with 40 mg IV lasix given high left sided pressures     # Troponinemia  - Unlikely due to ACS given clinical scenario, more likely due to above      # Creatinine elevation, unclear CKD vs JANA  Creatinine labile, anywhere from 0.9 - 1.5 since July of 2017. No acute kidney injury present. UA unremarkable. 9/18 Cr up to 1.72, held diuresis, continues to rise 9/19, suspect 2/2 to cardiogenic shock.    - hold diuresis 9/18  - daily BMP  - avoid nephrotoxins      FEN: NPO   PPX: Mechanical, sub q heparin   CODE: FULL  DISPO: Requires inpatient management of decompensated heart failure and AI      Patient was discussed with staff attending, Dr. Austin, who agrees with the above assessment and plan.    Frantz Pena MD  PGY-2 Internal Medicine  Cardiology Service  Pager: [phone]

## 2017-09-20 ENCOUNTER — TRANSFERRED RECORDS (OUTPATIENT)
Dept: HEALTH INFORMATION MANAGEMENT | Facility: CLINIC | Age: 47
End: 2017-09-20

## 2017-09-20 ENCOUNTER — ANESTHESIA EVENT (OUTPATIENT)
Dept: SURGERY | Facility: CLINIC | Age: 47
DRG: 216 | End: 2017-09-20
Payer: COMMERCIAL

## 2017-09-20 LAB
ABO + RH BLD: NORMAL
ABO + RH BLD: NORMAL
ANION GAP SERPL CALCULATED.3IONS-SCNC: 17 MMOL/L (ref 3–14)
BASE DEFICIT BLDV-SCNC: 1.8 MMOL/L
BLD GP AB SCN SERPL QL: NORMAL
BLD PROD TYP BPU: NORMAL
BLOOD BANK CMNT PATIENT-IMP: NORMAL
BUN SERPL-MCNC: 91 MG/DL (ref 7–30)
CALCIUM SERPL-MCNC: 8.4 MG/DL (ref 8.5–10.1)
CHLORIDE SERPL-SCNC: 93 MMOL/L (ref 94–109)
CO2 SERPL-SCNC: 21 MMOL/L (ref 20–32)
CREAT SERPL-MCNC: 1.93 MG/DL (ref 0.66–1.25)
ERYTHROCYTE [DISTWIDTH] IN BLOOD BY AUTOMATED COUNT: 17.2 % (ref 10–15)
GFR SERPL CREATININE-BSD FRML MDRD: 37 ML/MIN/1.7M2
GLUCOSE SERPL-MCNC: 96 MG/DL (ref 70–99)
HCO3 BLDV-SCNC: 24 MMOL/L (ref 21–28)
HCT VFR BLD AUTO: 33 % (ref 40–53)
HGB BLD-MCNC: 10.6 G/DL (ref 13.3–17.7)
INR PPP: 2.65 (ref 0.86–1.14)
LACTATE BLD-SCNC: 6.7 MMOL/L (ref 0.7–2)
MAGNESIUM SERPL-MCNC: 3.4 MG/DL (ref 1.6–2.3)
MCH RBC QN AUTO: 29.3 PG (ref 26.5–33)
MCHC RBC AUTO-ENTMCNC: 32.1 G/DL (ref 31.5–36.5)
MCV RBC AUTO: 91 FL (ref 78–100)
NUM BPU REQUESTED: 4
O2/TOTAL GAS SETTING VFR VENT: NORMAL %
OXYHGB MFR BLDV: 35 %
PCO2 BLDV: 42 MM HG (ref 40–50)
PH BLDV: 7.36 PH (ref 7.32–7.43)
PLATELET # BLD AUTO: 115 10E9/L (ref 150–450)
PO2 BLDV: 27 MM HG (ref 25–47)
POTASSIUM SERPL-SCNC: 4.2 MMOL/L (ref 3.4–5.3)
RBC # BLD AUTO: 3.62 10E12/L (ref 4.4–5.9)
SODIUM SERPL-SCNC: 131 MMOL/L (ref 133–144)
SPECIMEN EXP DATE BLD: NORMAL
WBC # BLD AUTO: 13.1 10E9/L (ref 4–11)

## 2017-09-20 PROCEDURE — 99233 SBSQ HOSP IP/OBS HIGH 50: CPT | Mod: GC | Performed by: INTERNAL MEDICINE

## 2017-09-20 PROCEDURE — 20000004 ZZH R&B ICU UMMC

## 2017-09-20 PROCEDURE — 93010 ELECTROCARDIOGRAM REPORT: CPT | Performed by: INTERNAL MEDICINE

## 2017-09-20 PROCEDURE — 83735 ASSAY OF MAGNESIUM: CPT | Performed by: INTERNAL MEDICINE

## 2017-09-20 PROCEDURE — 25000128 H RX IP 250 OP 636: Performed by: INTERNAL MEDICINE

## 2017-09-20 PROCEDURE — 25000128 H RX IP 250 OP 636: Performed by: HOSPITALIST

## 2017-09-20 PROCEDURE — 85027 COMPLETE CBC AUTOMATED: CPT | Performed by: INTERNAL MEDICINE

## 2017-09-20 PROCEDURE — 25000132 ZZH RX MED GY IP 250 OP 250 PS 637: Performed by: INTERNAL MEDICINE

## 2017-09-20 PROCEDURE — 25000125 ZZHC RX 250: Performed by: PHYSICIAN ASSISTANT

## 2017-09-20 PROCEDURE — 82805 BLOOD GASES W/O2 SATURATION: CPT | Performed by: INTERNAL MEDICINE

## 2017-09-20 PROCEDURE — 93005 ELECTROCARDIOGRAM TRACING: CPT

## 2017-09-20 PROCEDURE — 40000048 ZZH STATISTIC DAILY SWAN MONITORING

## 2017-09-20 PROCEDURE — 85610 PROTHROMBIN TIME: CPT | Performed by: INTERNAL MEDICINE

## 2017-09-20 PROCEDURE — 80048 BASIC METABOLIC PNL TOTAL CA: CPT | Performed by: INTERNAL MEDICINE

## 2017-09-20 PROCEDURE — 83605 ASSAY OF LACTIC ACID: CPT | Performed by: INTERNAL MEDICINE

## 2017-09-20 PROCEDURE — 40000275 ZZH STATISTIC RCP TIME EA 10 MIN

## 2017-09-20 RX ORDER — CEFAZOLIN SODIUM 1 G/3ML
1 INJECTION, POWDER, FOR SOLUTION INTRAMUSCULAR; INTRAVENOUS SEE ADMIN INSTRUCTIONS
Status: DISCONTINUED | OUTPATIENT
Start: 2017-09-21 | End: 2017-09-21 | Stop reason: HOSPADM

## 2017-09-20 RX ORDER — VANCOMYCIN HYDROCHLORIDE 1 G/200ML
1000 INJECTION, SOLUTION INTRAVENOUS
Status: DISCONTINUED | OUTPATIENT
Start: 2017-09-20 | End: 2017-09-21 | Stop reason: HOSPADM

## 2017-09-20 RX ORDER — VANCOMYCIN HYDROCHLORIDE 1 G/200ML
1000 INJECTION, SOLUTION INTRAVENOUS SEE ADMIN INSTRUCTIONS
Status: DISCONTINUED | OUTPATIENT
Start: 2017-09-21 | End: 2017-09-21 | Stop reason: HOSPADM

## 2017-09-20 RX ORDER — MUPIROCIN 20 MG/G
1 OINTMENT TOPICAL 2 TIMES DAILY
Status: COMPLETED | OUTPATIENT
Start: 2017-09-20 | End: 2017-09-20

## 2017-09-20 RX ORDER — DOBUTAMINE HYDROCHLORIDE 200 MG/100ML
5 INJECTION INTRAVENOUS CONTINUOUS
Status: DISCONTINUED | OUTPATIENT
Start: 2017-09-20 | End: 2017-09-22

## 2017-09-20 RX ORDER — HEPARIN SODIUM 5000 [USP'U]/.5ML
5000 INJECTION, SOLUTION INTRAVENOUS; SUBCUTANEOUS EVERY 12 HOURS
Status: DISPENSED | OUTPATIENT
Start: 2017-09-20 | End: 2017-09-21

## 2017-09-20 RX ORDER — FUROSEMIDE 10 MG/ML
40 INJECTION INTRAMUSCULAR; INTRAVENOUS ONCE
Status: COMPLETED | OUTPATIENT
Start: 2017-09-20 | End: 2017-09-20

## 2017-09-20 RX ORDER — CEFAZOLIN SODIUM 2 G/100ML
2 INJECTION, SOLUTION INTRAVENOUS
Status: COMPLETED | OUTPATIENT
Start: 2017-09-20 | End: 2017-09-21

## 2017-09-20 RX ADMIN — OMEPRAZOLE 20 MG: 20 CAPSULE, DELAYED RELEASE ORAL at 07:58

## 2017-09-20 RX ADMIN — MUPIROCIN 1 G: 20 OINTMENT TOPICAL at 11:02

## 2017-09-20 RX ADMIN — FUROSEMIDE 40 MG: 10 INJECTION, SOLUTION INTRAVENOUS at 11:02

## 2017-09-20 RX ADMIN — FUROSEMIDE 40 MG: 10 INJECTION, SOLUTION INTRAVENOUS at 07:21

## 2017-09-20 RX ADMIN — HEPARIN SODIUM 5000 UNITS: 5000 INJECTION, SOLUTION INTRAVENOUS; SUBCUTANEOUS at 09:34

## 2017-09-20 RX ADMIN — MUPIROCIN 1 G: 20 OINTMENT TOPICAL at 19:41

## 2017-09-20 RX ADMIN — DOBUTAMINE HYDROCHLORIDE 5 MCG/KG/MIN: 200 INJECTION INTRAVENOUS at 10:05

## 2017-09-20 RX ADMIN — CEFTRIAXONE 2 G: 2 INJECTION, POWDER, FOR SOLUTION INTRAMUSCULAR; INTRAVENOUS at 09:34

## 2017-09-20 NOTE — PROGRESS NOTES
"                              Cardiology Progress Note  Harvye Almanzar MRN: [id number]  Age: 47 year old, : 1970  Date: 2017              Subjective     Overnight had soft MAPs, stopped nicardipine and held next lasix dose  Feels OK this AM, breathing is stable          Objective     BP (!) 98/31  Temp 98.5  F (36.9  C) (Oral)  Resp 18  Ht 1.626 m (5' 4\")  Wt 64 kg (141 lb 1.5 oz)  SpO2 100%  BMI 24.22 kg/m2        Intake/Output Summary (Last 24 hours) at 17 0720  Last data filed at 17 0200   Gross per 24 hour   Intake           905.84 ml   Output             1000 ml   Net           -94.16 ml         Vitals:    09/15/17 2202 17 0400 17 0615   Weight: 68 kg (150 lb) 64.8 kg (142 lb 13.7 oz) 64 kg (141 lb 1.5 oz)     Gen: AA&Ox3, no acute distress  HEENT:AT/ NC, PERRL b/l, EOM grossly intact, mucous membranes pink, moist without plaque or exudate  BACK: no CVA tenderness, no midline bony tenderness  PULM/THORAX: Faint bibasilar crackles.   CV: harsh diastolic murmur heard best at RUSB (c/w AI), systolic murmur at apex (c/w MR)  ABD: soft, nontender, nondistended. Normoactive bowel sounds x 4, no HSM appreciated.  EXT: trace pedal edema, no clubbing or cyanosis. LE warm well perfused No asymmetrical edema or tenderness to palpation in calves bilaterally.            Data:          Lab Results   Component Value Date     2017    Lab Results   Component Value Date    CHLORIDE 95 2017    Lab Results   Component Value Date    BUN 39 2017      Lab Results   Component Value Date    POTASSIUM 3.1 2017    Lab Results   Component Value Date    CO2 29 2017    Lab Results   Component Value Date    CR 1.30 2017        Lab Results   Component Value Date    NTBNPI 35397 (H) 09/15/2017     Lab Results   Component Value Date    WBC 13.1 (H) 2017    HGB 10.6 (L) 2017    HCT 33.0 (L) 2017    MCV 91 2017     (L) " 09/20/2017               Medications     Current Facility-Administered Medications:      furosemide (LASIX) injection 40 mg, 40 mg, Intravenous, Once, MICHELE Hua MD     niCARdipine (CARDENE) 100 mg in NaCl 0.9 % 250 mL infusion, 2.5-15 mg/hr, Intravenous, Continuous, Frantz Pena MD, Stopped at 09/19/17 1932     HOLD:  Metformin and metformin containing medications if patient received IV contrast, , Does not apply, HOLD, Gloria Wilcox MD     naloxone (NARCAN) injection 0.1-0.4 mg, 0.1-0.4 mg, Intravenous, Q2 Min PRN, Gloria Wilcox MD     melatonin tablet 3 mg, 3 mg, Oral, At Bedtime PRN, Quique Luevano MD, 3 mg at 09/19/17 0115     omeprazole (priLOSEC) CR capsule 20 mg, 20 mg, Oral, QAM AC, Frantz Pena MD, 20 mg at 09/19/17 0728     heparin sodium PF injection 5,000 Units, 5,000 Units, Subcutaneous, Q12H, Frantz Pena MD, 5,000 Units at 09/19/17 2220     magnesium sulfate 2 g in NS intermittent infusion ([employer]ar[employer] or [facility] Cmpd), 2 g, Intravenous, Daily PRN, Frantz Pena MD     magnesium sulfate 4 g in 100 mL sterile water (premade), 4 g, Intravenous, Q4H PRN, Frantz Pena MD     potassium chloride SA (K-DUR/KLOR-CON M) CR tablet 20-40 mEq, 20-40 mEq, Oral, Q2H PRN, Frantz Pena MD     potassium chloride (KLOR-CON) Packet 20-40 mEq, 20-40 mEq, Oral or Feeding Tube, Q2H PRN, Frantz Pena MD     potassium chloride 10 mEq in 100 mL intermittent infusion, 10 mEq, Intravenous, Q1H PRN, Frantz Pena MD, Last Rate: 100 mL/hr at 09/17/17 1614, 10 mEq at 09/17/17 1614     potassium chloride 10 mEq in 100 mL intermittent infusion with 10 mg lidocaine, 10 mEq, Intravenous, Q1H PRN, Frantz Pena MD     potassium chloride 20 mEq in 100 mL NON-STANDARD CONC intermittent infusion, 20 mEq, Intravenous, Q1H PRN, Frantz Pena MD     pneumococcal vaccine (PNEUMOVAX 23-rita) injection 0.5 mL, 0.5 mL, Intramuscular, Prior to discharge, MICHELE Hua,  MD     naloxone (NARCAN) injection 0.1-0.4 mg, 0.1-0.4 mg, Intravenous, Q2 Min PRN, Everardo Abraham MD     acetaminophen (TYLENOL) tablet 650 mg, 650 mg, Oral, Q4H PRN, Everardo Abraham MD     ondansetron (ZOFRAN-ODT) ODT tab 4 mg, 4 mg, Oral, Q6H PRN **OR** ondansetron (ZOFRAN) injection 4 mg, 4 mg, Intravenous, Q6H PRN, Everardo Abraham MD, 4 mg at 09/16/17 1734     polyethylene glycol (MIRALAX/GLYCOLAX) Packet 17 g, 17 g, Oral, Daily PRN, Everardo Abraham MD     senna-docusate (SENOKOT-S;PERICOLACE) 8.6-50 MG per tablet 1-2 tablet, 1-2 tablet, Oral, BID PRN, Everardo Abraham MD     cefTRIAXone (ROCEPHIN) 2 g vial to attach to  ml bag for ADULTS or NS 50 ml bag for PEDS, 2 g, Intravenous, Q24H, Everardo Abraham MD, Last Rate: 200 mL/hr at 09/16/17 1015, 2 g at 09/19/17 1023      ECHO 9/15  Interpretation Summary  Severe left ventricular dilation is present.  Biplane LV EF 46%.  Global right ventricular function is moderately reduced.  Severe mitral insufficiency is present.  Prosthetic aortic valve with significant thickening of aortic root.Possible  vegetation or severe degenerative changes on aortic prosthetic valve with  severe aortic regurgitation.Post operative changes in aortic root or related  to infection.  Right ventricular systolic pressure is 48mmHg above the right atrial pressure.  Dilation of the inferior vena cava is present with abnormal respiratory  variation in diameter.  No pericardial effusion is present.  _____________________________________________________________________________      RHC:  BSA 1.7  1. HR 78 bpm  2. BP 94/53/71 mmHg  3. RA 21/19/17   4. RV 64/19  5. PA 64/37/47   6. PCW 33/47/37   7. PA sat 39.1%   8. PCW sat not performed  9. Hgb 10.6 g/dL   10. Roseanna CO 2.92   11. Roseanna CI 1.73   12. TD CO 2.47   13. TD CI 1.46   14. PVR 3.43   15. TPR 16.12    CORONARY ANGIOGRAM:      Patient has prosthetic aortic valve with ascending aortic  graft with reimplanted coronaries.    1. Both coronary arteries arise from their respective cusps.  2. Dominance: Right  3. The LM has some distal mild luminal irregularities.  4. LAD: Type 3 [LAD supplies the entire apex].. The LAD is a large vessel and gives rise to septal perforators, large D1 and tiny D2 and D3.  The pLAD has a 20% stenosis near D1 takeoff prior to first large septal.  mLAD has a 30% stenosis, dLAD has luminal irregularities.  D1 has luminal irregularities.    5. LCX is initially a large vessel and gives rise to a very large OM1 with multiple small branches.  The LCx becomes a small vessel after OM1 takeoff.  LCx has luminal irregularities.  The pOM1 has a 20% stenosis.      An ascending aortagram was performed to find RCA ostium.     6. RCA has  with evidence of left to right collaterals.         Assessment and Plan:   Harvey Almanzar is a 47 year old male with history of bicuspid valve s/p bioprosthetic aortic valve replacement and recent diagnosis of aortic valve endocarditis on IV antibiotics who presents with dyspnea secondary to acute on chronic decompensated heart failure in the setting of severe AI and MR.      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  # Severe mitral regurgitation   - CV surgery consult, surgery tomorrow given clinical decline  - Nicardipine as tolerated for afterload reduction  - Diuresis as able given elevated pressures, unfortunately this has been limited by his MAPs  - Continue ceftriaxone Q24H  - ID consult, appreciate recs for antibiotic management (no change in abx)   - Will try low dose dobutamine today    # Coronary Artery Disease  - Consideration of a bypass to the rPDA given mid-RCA  should be considered by operative team  - Given findings of CAD, he needs an ASA and statin at discharge    # Lactic Acidosis   Improved from 11 to 5 and is now staying around  5-6.  MAPs dropped yesterday but now appear stable.  Needs close hemodynamic monitoring but as we are unable to afterload reduce or diurese will not trend unless his vitals change    # Troponinemia  - Unlikely due to ACS given clinical scenario, more likely due to above      # Creatinine elevation, unclear CKD vs JANA  Creatinine labile, anywhere from 0.9 - 1.5 since July of 2017. No acute kidney injury present. UA unremarkable. Cr stable ~2.0 in setting of cardiogenic shock 2/2 valvular disease and endocarditis.  - daily BMP  - avoid nephrotoxins      FEN: NPO at midnight  PPX: Mechanical, sub q heparin held at midnight  CODE: FULL  DISPO: Surgery tomorrow then on CVTS team      Patient was discussed with staff attending, Dr. Austin, who agrees with the above assessment and plan.    Neo Gaston MD  Cardiology Fellow

## 2017-09-20 NOTE — PROVIDER NOTIFICATION
Resident and team repeatedly notified about low blood pressures, with MAP in the 50s, and BP 70s/40s. Remains hemodynamically stable otherwise. Dobutamine remains at a straight rate, see flowsheets. Nicardipine has been off since early morning. Continue to monitor.

## 2017-09-20 NOTE — PLAN OF CARE
Problem: Goal Outcome Summary  Goal: Goal Outcome Summary  Outcome: No Change  Status: Intermittently uncomfortable overnight due to bed positions.  D/I: Patient on unit 4A Surgical/Neuro ICU s/p: diagnostic swan placement for surgery to remove occlusion on Thursday 09/22/17.  Neuro- intact. Moves all extremities spontaneously, anxious. Patient was restless overnight, switching from recliner to bed and back.  CV- VSS, swan @56, just in diagnostically. Keep MAPs above 60. Cardene turned off at 2030 on 09/19/17 due to pressures dropping. MD called for 2 cuff pressures where MAPs were below 60 and patient changed positions and blood pressure went back up.    Pulm-Alternated between BiPAP @40% and 3 liters nasal cannula. Lung sounds diminished.    GI- low carb, low sodium diet. Frequent ice water replacement. 40mg IV lasix given for low urine output. Urine output about 200cc every 2 hours.   - urine output low, lasix given.  Skin-intact. Right radial board on to prevent twisting of arm. Skin on right arm bruised.   Gtts- 5cc TKO.  Pain-denies  Electrolytes- replaced per protocol.  See flow sheets for further interventions and assessments.  P: Continue to monitor pt closely, Notify MD of changes/concerns.

## 2017-09-20 NOTE — PROGRESS NOTES
CVTS:     Planning AV replacement tomorrow with Dr Leal.   Pre-op orders written.   Consent to be obtained by CVTS fellow.       Diogenes Kraft PA-C  Cardiothoracic Surgery  Pager [phone]    9:50 AM   September 20, 2017

## 2017-09-20 NOTE — ANESTHESIA PREPROCEDURE EVALUATION
Anesthesia Evaluation     .             ROS/MED HX    ENT/Pulmonary:  - neg pulmonary ROS     Neurologic:  - neg neurologic ROS     Cardiovascular:     (+) ----. : . CHF Last EF: 60 date: 9/18/17 . . :. 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 Previous cardiac testing Echodate:[id number]:Interpretation Summary  Severely degenerated bioprosthetic valve in the aortic position. Leaflets are  heavily calcified and prolapse into the LVOT, resulting in severe aortic  insufficiency ( ms with flow reversal in the descending aorta). There  is some degree of aortic stenosis that is difficult to quantify but is likely  moderate (peak velocity 4.1 m/s, mean gradient 34 mmHg). There is no  convincing evidence of vegetation or aortic root abscess.  No vegetations seen on other valves.  Severe left ventricular dilation is present. Mildly (EF 45-50%) reduced left  ventricular function is present.  Global right ventricular function is moderately to severely reduced.  Moderate functional tricuspid insufficiency secondary to RV dilatation.  Moderate-severe functional mitral regurgitation secondary to LV dilatation.date: results:ECG reviewed date:9-20-17 results:Sinus, atrial enlargementCath date: 9-19-17 results:Obstructing lesion in RCA  PA 64/37 (47)  CO 2.92  CI 1.73            METS/Exercise Tolerance:     Hematologic:  - neg hematologic  ROS       Musculoskeletal:  - neg musculoskeletal ROS       GI/Hepatic:  - neg GI/hepatic ROS       Renal/Genitourinary:     (+) chronic renal disease, type: ARF,       Endo:  - neg endo ROS       Psychiatric:  - neg psychiatric ROS       Infectious Disease:   (+) Other Infectious Disease strep endocarditis      Malignancy:      - no malignancy   Other:                   ANESTHESIA PREOP EVALUATION    Procedure: Procedure(s):  Redo Sternotomy, Aortic Valve Repair Possible Replacement, Mitral Valve Repair or Replacement - Wound Class:    - Wound Class:    -  Wound Class:    - Wound Class:     HPI: Harvey Almanzar is a 47 year old male who has a previous history of AVR in 1996 who has 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 He has been stabalized in the ICU on furosemide and nicardipine in conjunction with antibiotics. His systolic function is affected but preserved. There is reversal of flow in the pulmonary veins but he has no history of RV dysfunction or pulmonary hypertension.     PMHx/PSHx/ROS:  Past Medical History:   Diagnosis Date     Acute diastolic congestive heart failure (H) 8/24/2017     Aortic valve replaced 1996    Overview:  Cadaver - tissue valve in 1996 at [facility]  Amoxicillin 2000 mg prior to dental work please.  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 No pertinent surgical history.    ROS as stated above    Soc Hx:   Social History   Substance Use Topics     Smoking status: Former Smoker     Packs/day: 0.50     Years: 31.00     Smokeless tobacco: Never Used     Alcohol use No       Allergies: No Known Allergies    Meds:   No prescriptions prior to admission.       No current outpatient prescriptions on file.       Physical Exam:  VS: Temp:  [36.6  C (97.8  F)-36.9  C (98.5  F)] 36.6  C (97.8  F)  Heart Rate:  [75-94] 90  Resp:  [16-26] 18  BP: ()/(30-68) 81/47  FiO2 (%):  [40 %] 40 %  SpO2:  [88 %-100 %] 90 %   90%, Weight   Wt Readings from Last 2 Encounters:   09/17/17 64 kg (141 lb 1.5 oz)   08/31/17 68 kg (150 lb)       Labs:    BMP:  Recent Labs   Lab Test  09/20/17   0409   NA  131*   POTASSIUM  4.2   CHLORIDE  93*   CO2  21   BUN  91*   CR  1.93*   GLC  96   CHECO  8.4*      LFTs:   Recent Labs   Lab Test  09/15/17   2217   PROTTOTAL  7.1   ALBUMIN  3.1*   BILITOTAL  2.5*   ALKPHOS  69   AST  24   ALT  21     CBC:   Recent Labs   Lab Test  09/20/17   0409   WBC  13.1*   RBC  3.62*   HGB  10.6*   HCT  33.0*   MCV  91   MCH  29.3   MCHC  32.1   RDW  17.2*   PLT  115*     Coags:  Recent Labs   Lab Test  09/20/17   0409   INR  2.65*           Patient discussed with Staff Anesthesiologist.    Philip White  Anesthesia Resident CA-3  Pager [phone]  September 20, 2017, 5:33 PM      Physical Exam      Airway     Dental     Cardiovascular   Rhythm and rate: regular and normal      Pulmonary                     Anesthesia Plan      History & Physical Review  History and physical reviewed and following examination; no interval change.    ASA Status:  4 .    NPO Status:  > 8 hours    Plan for General and ETT with Intravenous induction. Maintenance will be Balanced.    PONV prophylaxis:  Dexamethasone or Solumedrol  Additional equipment: 2nd IV, Arterial Line, Central Line, PA Catheter and IZA      Postoperative Care  Postoperative pain management:  IV analgesics, Oral pain medications and Multi-modal analgesia.  Plan for postoperative opioid use.    Consents  Anesthetic plan, risks, benefits and alternatives discussed with:  Patient.  Use of blood products discussed: Yes.   Use of blood products discussed with Patient.  Consented to blood products.  .        I have seen and evaluated the patient myself and agree with the above assessment and plan.  I have explained the risks/benefits of anesthesia to the patient who understands and agrees to proceed.    Whitney Dill MD  Staff Anesthesiologist  Pager [phone]

## 2017-09-20 NOTE — PROGRESS NOTES
[facility] ID SERVICE: PROGRESS NOTE  Harvey Almanzar : 1970 Sex: male:   Medical record number [id number] Attending Physician: MICHELE Hua MD  Date of Service: 2017    ASSESSMENT :  Harvey Almanzar is a 47 year old male with history of bacterial endocarditis involving the native aortic valve s/p AVR in  with revision of the valve in , ureteral stone presented to ER on 17 for 2 weeks of fever, chills, sweatings . 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     1. Recent Streptococcus parasanguinis bacteremia and prosthetic aortic valve endocarditis   - Blood cx ,  , 7/15 positive for Strep parasanguinas (sensitive to Ceftriaxone LONG 0.094 ,Penicillin LONG 0.19 )   - Blood cx ,,  - No growth  - on ceftriaxone 2 gram IV daily and 2 weeks of gentamicin ( with increasing Cr )    - TTE 17  - Severe left ventricular dilation is present. Biplane LV EF 46%.  Global right ventricular function is moderately reduced.Severe mitral insufficiency is present.Prosthetic aortic valve with significant thickening of aortic root.Possible vegetation or severe degenerative changes on aortic prosthetic valve with severe aortic regurgitation.Post operative changes in aortic root or related to infection. Right ventricular systolic pressure is 48mmHg above the right atrial pressure. Dilation of the inferior vena cava is present with abnormal respiratory variation in diameter. No pericardial effusion is present.    IZA 17  CONCLUSION:   Low normal LV systolic function.  Mild LV dilation. Mild inferior    Hypokinesis.Normal RV size and function. Bioprosthetic aortic valve with multiple mobile vegetations.  Severe aortic insufficiency.  There appears to be trace perivalvular  regurgitation which originates from the  anterior portion of the sewing ring.  Thickening of the aortic root which may be consistent with root abscess as previously noted without gross dehiscence.    Moderate to severe mitral regurgitation. There are no vegetations appreciated on the mitral valve. Patient has known endocarditis of bioprosthetic aortic valve with  possible root abscess. There are no significant changes in comparison    with the transthoracic echo from 8/24/17. Left Ventricular Ejection Fraction: 50 %     IZA 9/18/17  Interpretation Summary  Severely degenerated bioprosthetic valve in the aortic position. Leaflets are heavily calcified and prolapse into the LVOT, resulting in severe aortic insufficiency ( ms with flow reversal in the descending aorta). There is some degree of aortic stenosis that is difficult to quantify but is likely moderate (peak velocity 4.1 m/s, mean gradient 34 mmHg). There is no convincing evidence of vegetation or aortic root abscess. No vegetations seen on other valves. Severe left ventricular dilation is present. Mildly (EF 45-50%) reduced left  ventricular function is present. Global right ventricular function is moderately to severely reduced. Moderate functional tricuspid insufficiency secondary to RV dilatation. Moderate-severe functional mitral regurgitation secondary to LV dilatation.    RECOMMENDATION:  1. Appropriately treated with Ceftriaxone 2 gram IV daily  and Genta 3mg/kg daily x 2 weeks with ongoing Cefriaxone ( week 8) ( normally - 4 weeks duration)   - blood cx 9/16 x2, 9/17 x2 - negative  9/18 x 2 - pending    2. Has been afebrile, no chills, no sweating since antibiotics  ( no signs/symptoms of active infection)   3. Do not think this is active infection but continue Ceftriaxone until after Surgery . Plan for surgery tomorrow.      ID will continue to follow this patient.     Belinda Bryant MD, M.Med.Sc.  Pager: [phone]     SUBJECTIVE: (Recommend ? 4 systems)   No fever, chills, sweats.  "less short of breath. No chest pain. No diarrhea    ROS:(Recommend ? 2systems)   A five-point review of systems was obtained and was negative with the exception of that which is described above.  No Known Allergies    No current outpatient prescriptions on file.   CURRENT ANTI-INFECTIVES:   Ceftriaxone    EXAMINATION: (Recommend ? 5 systems)   Vital Signs: BP (!) 98/31  Temp 98.5  F (36.9  C) (Oral)  Resp 18  Ht 1.626 m (5' 4\")  Wt 64 kg (141 lb 1.5 oz)  SpO2 100%  BMI 24.22 kg/m2   GENERAL:  Alert, oriented, in no acute distress.   HEENT:  Head is normocephalic, atraumatic   EYES:  Eyes have anicteric sclerae without conjunctival injection    ENT:  Oropharynx is moist without exudates or ulcers. Tongue is midline  NECK:  Supple. No  Cervical lymphadenopathy  LUNGS: Clear but decreased breath sounds in bases  CARDIOVASCULAR:  Tachy with 3/6 SERINA precordial ,  Extremities - minimal pedal edema bilateral   ABDOMEN:  Normal bowel sounds, soft, nontender. No appreciable hepatosplenomegaly  SKIN:  No acute rashes.  Line(s) are in place without any surrounding erythema or exudate. No stigmata of endocarditis.  NEUROLOGIC:  Grossly nonfocal. Active x4 extremities    NEW DATA/RESULTS:   Culture Micro   Date Value Ref Range Status   09/18/2017 No growth after 2 days  Preliminary   09/18/2017 No growth after 2 days  Preliminary   09/17/2017 No growth after 3 days  Preliminary   09/17/2017 No growth after 3 days  Preliminary   09/16/2017 No growth after 4 days  Preliminary     Recent Labs   Lab Test  09/15/17   2217   CRP  40.0*     Recent Labs   Lab Test  09/20/17   0409  09/19/17   1658  09/19/17   0412  09/18/17   0327  09/17/17   0443  09/16/17   0413   WBC  13.1*  12.3*  11.9*  8.3  6.2  7.9     Recent Labs   Lab Test  09/20/17   0409  09/19/17   1658  09/19/17   0412  09/18/17   0327   CR  1.93*  2.04*  2.08*  1.72*   GFRESTIMATED  37*  35*  34*  43*     Hematology Studies  Recent Labs   Lab Test  09/20/17   0409  " 09/19/17   1658  09/19/17   0412  09/18/17   0327  09/17/17   0443  09/16/17   0413  09/15/17   2217   WBC  13.1*  12.3*  11.9*  8.3  6.2  7.9  8.8   ANEU   --    --    --    --    --    --   5.9   AEOS   --    --    --    --    --    --   0.0   HCT  33.0*  32.2*  35.1*  31.9*  34.2*  35.3*  36.0*   PLT  115*  133*  173  190  219  248  267     Metabolic  Recent Labs   Lab Test  09/20/17   0409  09/19/17   1658  09/19/17   0412   NA  131*  132*  132*   BUN  91*  84*  71*   CO2  21  24  17*   CR  1.93*  2.04*  2.08*   GFRESTIMATED  37*  35*  34*     Hepatic Studies  Recent Labs   Lab Test  09/15/17   2217   BILITOTAL  2.5*   ALKPHOS  69   ALBUMIN  3.1*   AST  24   ALT  21     Immunologlobulins  Recent Labs   Lab Test  09/15/17   2217   SED  42*     Imagings  TTE 9/16/17   Interpretation Summary  Severe left ventricular dilation is present.  Biplane LV EF 46%.  Global right ventricular function is moderately reduced.  Severe mitral insufficiency is present.  Prosthetic aortic valve with significant thickening of aortic root.Possible vegetation or severe degenerative changes on aortic prosthetic valve with severe aortic regurgitation.Post operative changes in aortic root or related to infection.  Right ventricular systolic pressure is 48mmHg above the right atrial pressure.  Dilation of the inferior vena cava is present with abnormal respiratory variation in diameter.  No pericardial effusion is present.     CXR 9/15/17  Impression:   1. Marked cardiomegaly.  2. Streaky and platelike opacities within both lung bases, likely corresponding with intrafissural fluid noted on the prior CT as well as mild bibasilar atelectasis.  3. Small bilateral pleural effusions which are loculated in the fissures.     IZA 8/24/17  CONCLUSION:   Low normal LV systolic function.  Mild LV dilation. Mild inferior    hypokinesis.    Normal RV size and function.    Bioprosthetic aortic valve with multiple mobile vegetations.  Severe    aortic  insufficiency.  There appears to be trace perivalvular    regurgitation which originates from the anterior portion of the sewing    ring.  Thickening of the aortic root which may be consistent with root    abscess as previously noted without gross dehiscence.    Moderate to severe mitral regurgitation.     There are no vegetations appreciated on the mitral valve.      Patient has known endocarditis of bioprosthetic aortic valve with    possible root abscess. There are no significant changes in comparison    with the transthoracic echo from 8/24/17.     Left Ventricular Ejection Fraction: 50 %       TTE 8/24/17  CONCLUSION:    Limited echocardiogram.     Mild LV dilatation. Mild LVH.    Mildly reduced LV systolic function, EF 45%. Inferior wall is  slightly worse than other walls (probable no significant change on side  by side comparison with 08/22/2017 echo)    Normal RV size.     Borderline RV hypokinesis.     Moderate to severe LA dilatation.     Limited doppler/color suggests at least moderate AI, Severe MR,     mild to moderate TR , Estimated RVSP 61 mmHg + RAP (Patient     known to have bioprosthetic aortic valve endocarditis)    No significant pericardial effusion    Left Ventricular Ejection Fraction: 45 %     CT chest w contrast 9/1/17   IMPRESSION:   1. Cardiomegaly with calcification of the aortic valve and proximal ascending aorta. Ascending aortic dilation measuring 4 cm. Prominent mediastinal lymph nodes.  3. Loculated bilateral pleural effusions with associated atelectasis.   4. Predominantly upper lungs centrilobular emphysema. Superimposed  mild groundglass opacities, which could represent mild edema.  5. Enlarged main pulmonary, likely pulmonary hypertension.

## 2017-09-20 NOTE — PROGRESS NOTES
Social Work Services Progress Note    Hospital Day: 6  Date of Initial Social Work Evaluation:  Not yet completed  Collaborated with:  Team rounds, chart review    Data:    Plan for valve replacement surgery on Thursday.    Intervention:    Attempted to meet with pt to complete SW assessment, but he was sound asleep and no family was present.      Assessment:   Per RNCC assessment, pt lives at home with his spouse and was independent. No needs were identified and pt will likely be able to return home upon d/c.    Plan:    Anticipated Disposition:  TBD pending medical course, but likely will be able to return home.    Barriers to d/c plan:  Plan for OR tomorrow.    Follow Up:  SW will continue to follow to complete SW assessment and to make arrangements for a safe d/c plan if appropriate.    MENDY Alvarado, [facility]  Adult ICU Clinical   Pager [phone]

## 2017-09-20 NOTE — PLAN OF CARE
Problem: Goal Outcome Summary  Goal: Goal Outcome Summary  Pt remains neuro unchanged. Hypotensive while sleeping, MD aware. Nicardipine has been off all day. Dobutamine at straight rate. Adequate urine output. PA pressures 50s-60s/20s. VSS. Continue to monitor, notify MD with any concerns.

## 2017-09-21 ENCOUNTER — APPOINTMENT (OUTPATIENT)
Dept: GENERAL RADIOLOGY | Facility: CLINIC | Age: 47
DRG: 216 | End: 2017-09-21
Attending: THORACIC SURGERY (CARDIOTHORACIC VASCULAR SURGERY)
Payer: COMMERCIAL

## 2017-09-21 ENCOUNTER — ANESTHESIA (OUTPATIENT)
Dept: SURGERY | Facility: CLINIC | Age: 47
DRG: 216 | End: 2017-09-21
Payer: COMMERCIAL

## 2017-09-21 ENCOUNTER — APPOINTMENT (OUTPATIENT)
Dept: CARDIOLOGY | Facility: CLINIC | Age: 47
DRG: 216 | End: 2017-09-21
Attending: INTERNAL MEDICINE
Payer: COMMERCIAL

## 2017-09-21 LAB
ALBUMIN SERPL-MCNC: 2.7 G/DL (ref 3.4–5)
ALP SERPL-CCNC: 68 U/L (ref 40–150)
ALT SERPL W P-5'-P-CCNC: 2305 U/L (ref 0–70)
ANGLE RATE OF CLOT GROWTH: 34.7 DEG (ref 59–74)
ANGLE RATE OF CLOT GROWTH: 55.6 DEG (ref 59–74)
ANGLE RATE OF CLOT GROWTH: 70 DEG (ref 59–74)
ANGLE RATE OF CLOT GROWTH: 71.2 DEG (ref 59–74)
ANGLE RATE OF CLOT GROWTH: ABNORMAL DEG (ref 59–74)
ANION GAP SERPL CALCULATED.3IONS-SCNC: 12 MMOL/L (ref 3–14)
ANION GAP SERPL CALCULATED.3IONS-SCNC: 12 MMOL/L (ref 3–14)
ANION GAP SERPL CALCULATED.3IONS-SCNC: 13 MMOL/L (ref 3–14)
APTT PPP: 37 SEC (ref 22–37)
APTT PPP: 48 SEC (ref 22–37)
APTT PPP: 48 SEC (ref 22–37)
APTT PPP: 57 SEC (ref 22–37)
APTT PPP: >240 SEC (ref 22–37)
AST SERPL W P-5'-P-CCNC: 2729 U/L (ref 0–45)
BASE DEFICIT BLDA-SCNC: 0.9 MMOL/L
BASE DEFICIT BLDA-SCNC: 1.2 MMOL/L
BASE DEFICIT BLDA-SCNC: 1.6 MMOL/L
BASE DEFICIT BLDA-SCNC: 1.7 MMOL/L
BASE DEFICIT BLDA-SCNC: 2.4 MMOL/L
BASE DEFICIT BLDA-SCNC: 2.4 MMOL/L
BASE DEFICIT BLDA-SCNC: 3.4 MMOL/L
BASE DEFICIT BLDA-SCNC: 3.6 MMOL/L
BASE DEFICIT BLDV-SCNC: 1.6 MMOL/L
BASE EXCESS BLDA CALC-SCNC: 1.1 MMOL/L
BASE EXCESS BLDA CALC-SCNC: 1.3 MMOL/L
BASE EXCESS BLDA CALC-SCNC: 1.9 MMOL/L
BASE EXCESS BLDA CALC-SCNC: 3.6 MMOL/L
BASE EXCESS BLDV CALC-SCNC: 3.4 MMOL/L
BASE EXCESS BLDV CALC-SCNC: 4.2 MMOL/L
BASOPHILS # BLD AUTO: 0 10E9/L (ref 0–0.2)
BASOPHILS NFR BLD AUTO: 0.1 %
BILIRUB SERPL-MCNC: 4.3 MG/DL (ref 0.2–1.3)
BLD PROD TYP BPU: NORMAL
BLD UNIT ID BPU: 0
BLOOD PRODUCT CODE: NORMAL
BPU ID: NORMAL
BUN SERPL-MCNC: 57 MG/DL (ref 7–30)
BUN SERPL-MCNC: 58 MG/DL (ref 7–30)
BUN SERPL-MCNC: 78 MG/DL (ref 7–30)
CA-I BLD-MCNC: 3.1 MG/DL (ref 4.4–5.2)
CA-I BLD-MCNC: 3.5 MG/DL (ref 4.4–5.2)
CA-I BLD-MCNC: 3.5 MG/DL (ref 4.4–5.2)
CA-I BLD-MCNC: 3.6 MG/DL (ref 4.4–5.2)
CA-I BLD-MCNC: 3.7 MG/DL (ref 4.4–5.2)
CA-I BLD-MCNC: 4 MG/DL (ref 4.4–5.2)
CA-I BLD-MCNC: 4.2 MG/DL (ref 4.4–5.2)
CA-I BLD-MCNC: 4.6 MG/DL (ref 4.4–5.2)
CA-I BLD-MCNC: 4.8 MG/DL (ref 4.4–5.2)
CA-I BLD-MCNC: 6.2 MG/DL (ref 4.4–5.2)
CALCIUM SERPL-MCNC: 10.3 MG/DL (ref 8.5–10.1)
CALCIUM SERPL-MCNC: 8.1 MG/DL (ref 8.5–10.1)
CALCIUM SERPL-MCNC: 9.8 MG/DL (ref 8.5–10.1)
CHLORIDE SERPL-SCNC: 105 MMOL/L (ref 94–109)
CHLORIDE SERPL-SCNC: 108 MMOL/L (ref 94–109)
CHLORIDE SERPL-SCNC: 94 MMOL/L (ref 94–109)
CI HYPERCOAGULATION INDEX: 0 RATIO (ref 0–3)
CI HYPERCOAGULATION INDEX: 1 RATIO (ref 0–3)
CI HYPERCOAGULATION INDEX: ABNORMAL RATIO (ref 0–3)
CI HYPOCOAGULATION INDEX: 6 RATIO (ref 0–3)
CI HYPOCOAGULATION INDEX: 8.9 RATIO (ref 0–3)
CI HYPOCOAGULATION INDEX: ABNORMAL RATIO (ref 0–3)
CLOT LYSIS 30M P MA LENFR BLD TEG: 0 % (ref 0–8)
CLOT LYSIS 30M P MA LENFR BLD TEG: 2 % (ref 0–8)
CLOT LYSIS 30M P MA LENFR BLD TEG: ABNORMAL % (ref 0–8)
CLOT STRENGTH BLD TEG: 2.6 KD/SC (ref 5.3–13.2)
CLOT STRENGTH BLD TEG: 6.1 KD/SC (ref 5.3–13.2)
CLOT STRENGTH BLD TEG: 9 KD/SC (ref 5.3–13.2)
CLOT STRENGTH BLD TEG: 9.3 KD/SC (ref 5.3–13.2)
CLOT STRENGTH BLD TEG: ABNORMAL KD/SC (ref 5.3–13.2)
CO2 SERPL-SCNC: 24 MMOL/L (ref 20–32)
CO2 SERPL-SCNC: 24 MMOL/L (ref 20–32)
CO2 SERPL-SCNC: 26 MMOL/L (ref 20–32)
CREAT SERPL-MCNC: 1.07 MG/DL (ref 0.66–1.25)
CREAT SERPL-MCNC: 1.18 MG/DL (ref 0.66–1.25)
CREAT SERPL-MCNC: 1.23 MG/DL (ref 0.66–1.25)
DIFFERENTIAL METHOD BLD: ABNORMAL
EOSINOPHIL # BLD AUTO: 0 10E9/L (ref 0–0.7)
EOSINOPHIL NFR BLD AUTO: 0 %
ERYTHROCYTE [DISTWIDTH] IN BLOOD BY AUTOMATED COUNT: 15.9 % (ref 10–15)
ERYTHROCYTE [DISTWIDTH] IN BLOOD BY AUTOMATED COUNT: 16 % (ref 10–15)
ERYTHROCYTE [DISTWIDTH] IN BLOOD BY AUTOMATED COUNT: 17.9 % (ref 10–15)
ERYTHROCYTE [DISTWIDTH] IN BLOOD BY AUTOMATED COUNT: 18 % (ref 10–15)
FIBRINOGEN PPP-MCNC: 102 MG/DL (ref 200–420)
FIBRINOGEN PPP-MCNC: 151 MG/DL (ref 200–420)
FIBRINOGEN PPP-MCNC: 183 MG/DL (ref 200–420)
FIBRINOGEN PPP-MCNC: 250 MG/DL (ref 200–420)
GFR SERPL CREATININE-BSD FRML MDRD: 63 ML/MIN/1.7M2
GFR SERPL CREATININE-BSD FRML MDRD: 66 ML/MIN/1.7M2
GFR SERPL CREATININE-BSD FRML MDRD: 74 ML/MIN/1.7M2
GLUCOSE BLD-MCNC: 105 MG/DL (ref 70–99)
GLUCOSE BLD-MCNC: 121 MG/DL (ref 70–99)
GLUCOSE BLD-MCNC: 123 MG/DL (ref 70–99)
GLUCOSE BLD-MCNC: 125 MG/DL (ref 70–99)
GLUCOSE BLD-MCNC: 128 MG/DL (ref 70–99)
GLUCOSE BLD-MCNC: 130 MG/DL (ref 70–99)
GLUCOSE BLD-MCNC: 130 MG/DL (ref 70–99)
GLUCOSE BLD-MCNC: 132 MG/DL (ref 70–99)
GLUCOSE BLD-MCNC: 135 MG/DL (ref 70–99)
GLUCOSE BLD-MCNC: 153 MG/DL (ref 70–99)
GLUCOSE BLD-MCNC: 161 MG/DL (ref 70–99)
GLUCOSE BLDC GLUCOMTR-MCNC: 107 MG/DL (ref 70–99)
GLUCOSE BLDC GLUCOMTR-MCNC: 120 MG/DL (ref 70–99)
GLUCOSE BLDC GLUCOMTR-MCNC: 141 MG/DL (ref 70–99)
GLUCOSE SERPL-MCNC: 108 MG/DL (ref 70–99)
GLUCOSE SERPL-MCNC: 116 MG/DL (ref 70–99)
GLUCOSE SERPL-MCNC: 128 MG/DL (ref 70–99)
GRAM STN SPEC: NORMAL
HCO3 BLD-SCNC: 21 MMOL/L (ref 21–28)
HCO3 BLD-SCNC: 22 MMOL/L (ref 21–28)
HCO3 BLD-SCNC: 23 MMOL/L (ref 21–28)
HCO3 BLD-SCNC: 24 MMOL/L (ref 21–28)
HCO3 BLD-SCNC: 25 MMOL/L (ref 21–28)
HCO3 BLD-SCNC: 26 MMOL/L (ref 21–28)
HCO3 BLDV-SCNC: 25 MMOL/L (ref 21–28)
HCO3 BLDV-SCNC: 28 MMOL/L (ref 21–28)
HCO3 BLDV-SCNC: 30 MMOL/L (ref 21–28)
HCT VFR BLD AUTO: 27.2 % (ref 40–53)
HCT VFR BLD AUTO: 28.2 % (ref 40–53)
HCT VFR BLD AUTO: 31 % (ref 40–53)
HCT VFR BLD AUTO: 31.3 % (ref 40–53)
HGB BLD-MCNC: 10.1 G/DL (ref 13.3–17.7)
HGB BLD-MCNC: 10.3 G/DL (ref 13.3–17.7)
HGB BLD-MCNC: 6.3 G/DL (ref 13.3–17.7)
HGB BLD-MCNC: 7.1 G/DL (ref 13.3–17.7)
HGB BLD-MCNC: 7.6 G/DL (ref 13.3–17.7)
HGB BLD-MCNC: 7.7 G/DL (ref 13.3–17.7)
HGB BLD-MCNC: 8.1 G/DL (ref 13.3–17.7)
HGB BLD-MCNC: 8.3 G/DL (ref 13.3–17.7)
HGB BLD-MCNC: 8.3 G/DL (ref 13.3–17.7)
HGB BLD-MCNC: 8.5 G/DL (ref 13.3–17.7)
HGB BLD-MCNC: 8.9 G/DL (ref 13.3–17.7)
HGB BLD-MCNC: 9 G/DL (ref 13.3–17.7)
HGB BLD-MCNC: 9.1 G/DL (ref 13.3–17.7)
HGB BLD-MCNC: 9.2 G/DL (ref 13.3–17.7)
HGB BLD-MCNC: 9.4 G/DL (ref 13.3–17.7)
HGB BLD-MCNC: 9.9 G/DL (ref 13.3–17.7)
IMM GRANULOCYTES # BLD: 0 10E9/L (ref 0–0.4)
IMM GRANULOCYTES NFR BLD: 0.3 %
INR PPP: 1.04 (ref 0.86–1.14)
INR PPP: 1.18 (ref 0.86–1.14)
INR PPP: 1.24 (ref 0.86–1.14)
INR PPP: 2.01 (ref 0.86–1.14)
INR PPP: 2.48 (ref 0.86–1.14)
INR PPP: 2.54 (ref 0.86–1.14)
INR PPP: 5.83 (ref 0.86–1.14)
INTERPRETATION ECG - MUSE: NORMAL
K TIME TO SPEC CLOT STRENGTH: 1.3 MIN (ref 1–3)
K TIME TO SPEC CLOT STRENGTH: 1.4 MIN (ref 1–3)
K TIME TO SPEC CLOT STRENGTH: 2.6 MIN (ref 1–3)
K TIME TO SPEC CLOT STRENGTH: 7.8 MIN (ref 1–3)
K TIME TO SPEC CLOT STRENGTH: ABNORMAL MIN (ref 1–3)
LACTATE BLD-SCNC: 2 MMOL/L (ref 0.7–2)
LACTATE BLD-SCNC: 2.1 MMOL/L (ref 0.7–2)
LACTATE BLD-SCNC: 2.2 MMOL/L (ref 0.7–2)
LACTATE BLD-SCNC: 2.2 MMOL/L (ref 0.7–2)
LACTATE BLD-SCNC: 2.3 MMOL/L (ref 0.7–2)
LACTATE BLD-SCNC: 4.4 MMOL/L (ref 0.7–2)
LACTATE BLD-SCNC: 5.3 MMOL/L (ref 0.7–2)
LACTATE BLD-SCNC: 5.7 MMOL/L (ref 0.7–2)
LACTATE BLD-SCNC: 6.4 MMOL/L (ref 0.7–2)
LACTATE BLD-SCNC: 6.7 MMOL/L (ref 0.7–2)
LACTATE BLD-SCNC: 6.8 MMOL/L (ref 0.7–2)
LY60 LYSIS AT 60 MINUTES: 0.4 % (ref 0–15)
LY60 LYSIS AT 60 MINUTES: 1.1 % (ref 0–15)
LY60 LYSIS AT 60 MINUTES: 4.6 % (ref 0–15)
LY60 LYSIS AT 60 MINUTES: ABNORMAL % (ref 0–15)
LYMPHOCYTES # BLD AUTO: 0.2 10E9/L (ref 0.8–5.3)
LYMPHOCYTES NFR BLD AUTO: 3.4 %
MA MAXIMUM CLOT STRENGTH: 33.9 MM (ref 55–74)
MA MAXIMUM CLOT STRENGTH: 54.8 MM (ref 55–74)
MA MAXIMUM CLOT STRENGTH: 64.4 MM (ref 55–74)
MA MAXIMUM CLOT STRENGTH: 65 MM (ref 55–74)
MA MAXIMUM CLOT STRENGTH: ABNORMAL MM (ref 55–74)
MAGNESIUM SERPL-MCNC: 3.9 MG/DL (ref 1.6–2.3)
MAGNESIUM SERPL-MCNC: 4 MG/DL (ref 1.6–2.3)
MCH RBC QN AUTO: 29.1 PG (ref 26.5–33)
MCH RBC QN AUTO: 29.2 PG (ref 26.5–33)
MCH RBC QN AUTO: 29.3 PG (ref 26.5–33)
MCH RBC QN AUTO: 29.3 PG (ref 26.5–33)
MCHC RBC AUTO-ENTMCNC: 31.9 G/DL (ref 31.5–36.5)
MCHC RBC AUTO-ENTMCNC: 32.3 G/DL (ref 31.5–36.5)
MCHC RBC AUTO-ENTMCNC: 32.3 G/DL (ref 31.5–36.5)
MCHC RBC AUTO-ENTMCNC: 32.7 G/DL (ref 31.5–36.5)
MCV RBC AUTO: 90 FL (ref 78–100)
MCV RBC AUTO: 90 FL (ref 78–100)
MCV RBC AUTO: 91 FL (ref 78–100)
MCV RBC AUTO: 91 FL (ref 78–100)
MONOCYTES # BLD AUTO: 0.4 10E9/L (ref 0–1.3)
MONOCYTES NFR BLD AUTO: 5.7 %
NEUTROPHILS # BLD AUTO: 6.1 10E9/L (ref 1.6–8.3)
NEUTROPHILS NFR BLD AUTO: 90.5 %
NRBC # BLD AUTO: 0.1 10*3/UL
NRBC BLD AUTO-RTO: 2 /100
NUM BPU REQUESTED: 15
NUM BPU REQUESTED: 5
O2/TOTAL GAS SETTING VFR VENT: 100 %
O2/TOTAL GAS SETTING VFR VENT: 70 %
O2/TOTAL GAS SETTING VFR VENT: 96 %
O2/TOTAL GAS SETTING VFR VENT: ABNORMAL %
O2/TOTAL GAS SETTING VFR VENT: ABNORMAL %
OXYHGB MFR BLD: 96 % (ref 92–100)
OXYHGB MFR BLD: 98 % (ref 92–100)
OXYHGB MFR BLDV: 61 %
OXYHGB MFR BLDV: 62 %
PCO2 BLD: 30 MM HG (ref 35–45)
PCO2 BLD: 35 MM HG (ref 35–45)
PCO2 BLD: 38 MM HG (ref 35–45)
PCO2 BLD: 38 MM HG (ref 35–45)
PCO2 BLD: 39 MM HG (ref 35–45)
PCO2 BLD: 40 MM HG (ref 35–45)
PCO2 BLD: 40 MM HG (ref 35–45)
PCO2 BLD: 41 MM HG (ref 35–45)
PCO2 BLD: 44 MM HG (ref 35–45)
PCO2 BLD: 44 MM HG (ref 35–45)
PCO2 BLD: 46 MM HG (ref 35–45)
PCO2 BLD: 57 MM HG (ref 35–45)
PCO2 BLDV: 37 MM HG (ref 40–50)
PCO2 BLDV: 50 MM HG (ref 40–50)
PCO2 BLDV: 55 MM HG (ref 40–50)
PH BLD: 7.26 PH (ref 7.35–7.45)
PH BLD: 7.32 PH (ref 7.35–7.45)
PH BLD: 7.34 PH (ref 7.35–7.45)
PH BLD: 7.37 PH (ref 7.35–7.45)
PH BLD: 7.38 PH (ref 7.35–7.45)
PH BLD: 7.38 PH (ref 7.35–7.45)
PH BLD: 7.39 PH (ref 7.35–7.45)
PH BLD: 7.39 PH (ref 7.35–7.45)
PH BLD: 7.41 PH (ref 7.35–7.45)
PH BLD: 7.41 PH (ref 7.35–7.45)
PH BLD: 7.43 PH (ref 7.35–7.45)
PH BLD: 7.54 PH (ref 7.35–7.45)
PH BLDV: 7.3 PH (ref 7.32–7.43)
PH BLDV: 7.34 PH (ref 7.32–7.43)
PH BLDV: 7.48 PH (ref 7.32–7.43)
PHOSPHATE SERPL-MCNC: 1.3 MG/DL (ref 2.5–4.5)
PHOSPHATE SERPL-MCNC: 3.4 MG/DL (ref 2.5–4.5)
PLATELET # BLD AUTO: 121 10E9/L (ref 150–450)
PLATELET # BLD AUTO: 135 10E9/L (ref 150–450)
PLATELET # BLD AUTO: 27 10E9/L (ref 150–450)
PLATELET # BLD AUTO: 80 10E9/L (ref 150–450)
PLATELET # BLD AUTO: 82 10E9/L (ref 150–450)
PLATELET # BLD AUTO: 82 10E9/L (ref 150–450)
PO2 BLD: 114 MM HG (ref 80–105)
PO2 BLD: 168 MM HG (ref 80–105)
PO2 BLD: 170 MM HG (ref 80–105)
PO2 BLD: 259 MM HG (ref 80–105)
PO2 BLD: 263 MM HG (ref 80–105)
PO2 BLD: 305 MM HG (ref 80–105)
PO2 BLD: 356 MM HG (ref 80–105)
PO2 BLD: 358 MM HG (ref 80–105)
PO2 BLD: 364 MM HG (ref 80–105)
PO2 BLD: 396 MM HG (ref 80–105)
PO2 BLD: 416 MM HG (ref 80–105)
PO2 BLD: 99 MM HG (ref 80–105)
PO2 BLDV: 30 MM HG (ref 25–47)
PO2 BLDV: 36 MM HG (ref 25–47)
PO2 BLDV: 51 MM HG (ref 25–47)
POTASSIUM BLD-SCNC: 3.7 MMOL/L (ref 3.4–5.3)
POTASSIUM BLD-SCNC: 3.9 MMOL/L (ref 3.4–5.3)
POTASSIUM BLD-SCNC: 4.1 MMOL/L (ref 3.4–5.3)
POTASSIUM BLD-SCNC: 4.2 MMOL/L (ref 3.4–5.3)
POTASSIUM BLD-SCNC: 4.4 MMOL/L (ref 3.4–5.3)
POTASSIUM BLD-SCNC: 4.8 MMOL/L (ref 3.4–5.3)
POTASSIUM BLD-SCNC: 4.9 MMOL/L (ref 3.4–5.3)
POTASSIUM BLD-SCNC: 5 MMOL/L (ref 3.4–5.3)
POTASSIUM BLD-SCNC: 5 MMOL/L (ref 3.4–5.3)
POTASSIUM BLD-SCNC: 5.3 MMOL/L (ref 3.4–5.3)
POTASSIUM BLD-SCNC: 5.7 MMOL/L (ref 3.4–5.3)
POTASSIUM SERPL-SCNC: 3.5 MMOL/L (ref 3.4–5.3)
POTASSIUM SERPL-SCNC: 4 MMOL/L (ref 3.4–5.3)
POTASSIUM SERPL-SCNC: 4.3 MMOL/L (ref 3.4–5.3)
PROT SERPL-MCNC: 5.7 G/DL (ref 6.8–8.8)
R TIME UNTIL CLOT FORMS: 12.6 MIN (ref 4–9)
R TIME UNTIL CLOT FORMS: 6.8 MIN (ref 4–9)
R TIME UNTIL CLOT FORMS: 7.3 MIN (ref 4–9)
R TIME UNTIL CLOT FORMS: 7.6 MIN (ref 4–9)
R TIME UNTIL CLOT FORMS: >90 MIN (ref 4–9)
RBC # BLD AUTO: 3.04 10E12/L (ref 4.4–5.9)
RBC # BLD AUTO: 3.12 10E12/L (ref 4.4–5.9)
RBC # BLD AUTO: 3.4 10E12/L (ref 4.4–5.9)
RBC # BLD AUTO: 3.45 10E12/L (ref 4.4–5.9)
SODIUM BLD-SCNC: 129 MMOL/L (ref 133–144)
SODIUM BLD-SCNC: 131 MMOL/L (ref 133–144)
SODIUM BLD-SCNC: 132 MMOL/L (ref 133–144)
SODIUM BLD-SCNC: 133 MMOL/L (ref 133–144)
SODIUM BLD-SCNC: 134 MMOL/L (ref 133–144)
SODIUM BLD-SCNC: 139 MMOL/L (ref 133–144)
SODIUM SERPL-SCNC: 133 MMOL/L (ref 133–144)
SODIUM SERPL-SCNC: 142 MMOL/L (ref 133–144)
SODIUM SERPL-SCNC: 144 MMOL/L (ref 133–144)
SPECIMEN SOURCE: NORMAL
SPECIMEN SOURCE: NORMAL
TRANSFUSION STATUS PATIENT QL: NORMAL
WBC # BLD AUTO: 6 10E9/L (ref 4–11)
WBC # BLD AUTO: 6.6 10E9/L (ref 4–11)
WBC # BLD AUTO: 6.7 10E9/L (ref 4–11)
WBC # BLD AUTO: 7.2 10E9/L (ref 4–11)

## 2017-09-21 PROCEDURE — 83605 ASSAY OF LACTIC ACID: CPT

## 2017-09-21 PROCEDURE — 25000555 ZZHC RX FACTOR IP 250 OP 636: Performed by: ANESTHESIOLOGY

## 2017-09-21 PROCEDURE — 83605 ASSAY OF LACTIC ACID: CPT | Performed by: THORACIC SURGERY (CARDIOTHORACIC VASCULAR SURGERY)

## 2017-09-21 PROCEDURE — 02RG0JZ REPLACEMENT OF MITRAL VALVE WITH SYNTHETIC SUBSTITUTE, OPEN APPROACH: ICD-10-PCS | Performed by: SURGERY

## 2017-09-21 PROCEDURE — 40000275 ZZH STATISTIC RCP TIME EA 10 MIN

## 2017-09-21 PROCEDURE — 85027 COMPLETE CBC AUTOMATED: CPT | Performed by: INTERNAL MEDICINE

## 2017-09-21 PROCEDURE — 25000132 ZZH RX MED GY IP 250 OP 250 PS 637: Performed by: THORACIC SURGERY (CARDIOTHORACIC VASCULAR SURGERY)

## 2017-09-21 PROCEDURE — 82803 BLOOD GASES ANY COMBINATION: CPT | Performed by: INTERNAL MEDICINE

## 2017-09-21 PROCEDURE — 27210995 ZZH RX 272: Performed by: SURGERY

## 2017-09-21 PROCEDURE — 25000128 H RX IP 250 OP 636: Performed by: INTERNAL MEDICINE

## 2017-09-21 PROCEDURE — 82805 BLOOD GASES W/O2 SATURATION: CPT | Performed by: THORACIC SURGERY (CARDIOTHORACIC VASCULAR SURGERY)

## 2017-09-21 PROCEDURE — 87102 FUNGUS ISOLATION CULTURE: CPT | Performed by: SURGERY

## 2017-09-21 PROCEDURE — 83605 ASSAY OF LACTIC ACID: CPT | Performed by: INTERNAL MEDICINE

## 2017-09-21 PROCEDURE — S0017 INJECTION, AMINOCAPROIC ACID: HCPCS | Performed by: STUDENT IN AN ORGANIZED HEALTH CARE EDUCATION/TRAINING PROGRAM

## 2017-09-21 PROCEDURE — 93010 ELECTROCARDIOGRAM REPORT: CPT | Performed by: INTERNAL MEDICINE

## 2017-09-21 PROCEDURE — 37000008 ZZH ANESTHESIA TECHNICAL FEE, 1ST 30 MIN: Performed by: SURGERY

## 2017-09-21 PROCEDURE — P9037 PLATE PHERES LEUKOREDU IRRAD: HCPCS | Performed by: INTERNAL MEDICINE

## 2017-09-21 PROCEDURE — 82803 BLOOD GASES ANY COMBINATION: CPT | Performed by: ANESTHESIOLOGY

## 2017-09-21 PROCEDURE — 85396 CLOTTING ASSAY WHOLE BLOOD: CPT | Performed by: ANESTHESIOLOGY

## 2017-09-21 PROCEDURE — 27210447 ZZH PACK CELL SAVER CSP: Performed by: SURGERY

## 2017-09-21 PROCEDURE — 40000986 XR ABDOMEN PORT F1 VW

## 2017-09-21 PROCEDURE — 85610 PROTHROMBIN TIME: CPT | Performed by: THORACIC SURGERY (CARDIOTHORACIC VASCULAR SURGERY)

## 2017-09-21 PROCEDURE — 82330 ASSAY OF CALCIUM: CPT

## 2017-09-21 PROCEDURE — 84132 ASSAY OF SERUM POTASSIUM: CPT | Performed by: INTERNAL MEDICINE

## 2017-09-21 PROCEDURE — 25000125 ZZHC RX 250: Performed by: SURGERY

## 2017-09-21 PROCEDURE — 82330 ASSAY OF CALCIUM: CPT | Performed by: INTERNAL MEDICINE

## 2017-09-21 PROCEDURE — 85396 CLOTTING ASSAY WHOLE BLOOD: CPT | Performed by: INTERNAL MEDICINE

## 2017-09-21 PROCEDURE — 27210460 ZZH PUMP APP ADULT PERFUSION: Performed by: SURGERY

## 2017-09-21 PROCEDURE — 82330 ASSAY OF CALCIUM: CPT | Performed by: ANESTHESIOLOGY

## 2017-09-21 PROCEDURE — 85384 FIBRINOGEN ACTIVITY: CPT | Performed by: INTERNAL MEDICINE

## 2017-09-21 PROCEDURE — P9041 ALBUMIN (HUMAN),5%, 50ML: HCPCS

## 2017-09-21 PROCEDURE — 27210794 ZZH OR GENERAL SUPPLY STERILE: Performed by: SURGERY

## 2017-09-21 PROCEDURE — 82947 ASSAY GLUCOSE BLOOD QUANT: CPT

## 2017-09-21 PROCEDURE — 25000128 H RX IP 250 OP 636: Performed by: PHYSICIAN ASSISTANT

## 2017-09-21 PROCEDURE — 25000125 ZZHC RX 250: Performed by: THORACIC SURGERY (CARDIOTHORACIC VASCULAR SURGERY)

## 2017-09-21 PROCEDURE — 82330 ASSAY OF CALCIUM: CPT | Performed by: THORACIC SURGERY (CARDIOTHORACIC VASCULAR SURGERY)

## 2017-09-21 PROCEDURE — 25000128 H RX IP 250 OP 636

## 2017-09-21 PROCEDURE — 87075 CULTR BACTERIA EXCEPT BLOOD: CPT | Performed by: SURGERY

## 2017-09-21 PROCEDURE — 87077 CULTURE AEROBIC IDENTIFY: CPT | Performed by: SURGERY

## 2017-09-21 PROCEDURE — 85730 THROMBOPLASTIN TIME PARTIAL: CPT | Performed by: INTERNAL MEDICINE

## 2017-09-21 PROCEDURE — 25000128 H RX IP 250 OP 636: Performed by: SURGERY

## 2017-09-21 PROCEDURE — 93325 DOPPLER ECHO COLOR FLOW MAPG: CPT

## 2017-09-21 PROCEDURE — 84100 ASSAY OF PHOSPHORUS: CPT | Performed by: INTERNAL MEDICINE

## 2017-09-21 PROCEDURE — 27211024 ZZHC OR SUPPLY OTHER OPNP: Performed by: SURGERY

## 2017-09-21 PROCEDURE — 25800025 ZZH RX 258: Performed by: THORACIC SURGERY (CARDIOTHORACIC VASCULAR SURGERY)

## 2017-09-21 PROCEDURE — 87070 CULTURE OTHR SPECIMN AEROBIC: CPT | Performed by: SURGERY

## 2017-09-21 PROCEDURE — 25000125 ZZHC RX 250: Performed by: STUDENT IN AN ORGANIZED HEALTH CARE EDUCATION/TRAINING PROGRAM

## 2017-09-21 PROCEDURE — 85018 HEMOGLOBIN: CPT | Performed by: INTERNAL MEDICINE

## 2017-09-21 PROCEDURE — P9016 RBC LEUKOCYTES REDUCED: HCPCS | Performed by: HOSPITALIST

## 2017-09-21 PROCEDURE — 84295 ASSAY OF SERUM SODIUM: CPT | Performed by: INTERNAL MEDICINE

## 2017-09-21 PROCEDURE — 85610 PROTHROMBIN TIME: CPT | Performed by: HOSPITALIST

## 2017-09-21 PROCEDURE — 36000074 ZZH SURGERY LEVEL 6 1ST 30 MIN - UMMC: Performed by: SURGERY

## 2017-09-21 PROCEDURE — 40000986 XR CHEST PORT 1 VW

## 2017-09-21 PROCEDURE — 06BQ4ZZ EXCISION OF LEFT SAPHENOUS VEIN, PERCUTANEOUS ENDOSCOPIC APPROACH: ICD-10-PCS | Performed by: SURGERY

## 2017-09-21 PROCEDURE — 41000019 ZZH PERA-PERFUSION EACH ADDTL 15 MIN: Performed by: SURGERY

## 2017-09-21 PROCEDURE — 85025 COMPLETE CBC W/AUTO DIFF WBC: CPT | Performed by: INTERNAL MEDICINE

## 2017-09-21 PROCEDURE — 37000009 ZZH ANESTHESIA TECHNICAL FEE, EACH ADDTL 15 MIN: Performed by: SURGERY

## 2017-09-21 PROCEDURE — 85027 COMPLETE CBC AUTOMATED: CPT | Performed by: THORACIC SURGERY (CARDIOTHORACIC VASCULAR SURGERY)

## 2017-09-21 PROCEDURE — 82947 ASSAY GLUCOSE BLOOD QUANT: CPT | Performed by: INTERNAL MEDICINE

## 2017-09-21 PROCEDURE — 36000076 ZZH SURGERY LEVEL 6 EA 15 ADDTL MIN - UMMC: Performed by: SURGERY

## 2017-09-21 PROCEDURE — 82947 ASSAY GLUCOSE BLOOD QUANT: CPT | Performed by: ANESTHESIOLOGY

## 2017-09-21 PROCEDURE — 021009W BYPASS CORONARY ARTERY, ONE ARTERY FROM AORTA WITH AUTOLOGOUS VENOUS TISSUE, OPEN APPROACH: ICD-10-PCS | Performed by: SURGERY

## 2017-09-21 PROCEDURE — 25000128 H RX IP 250 OP 636: Performed by: STUDENT IN AN ORGANIZED HEALTH CARE EDUCATION/TRAINING PROGRAM

## 2017-09-21 PROCEDURE — 80053 COMPREHEN METABOLIC PANEL: CPT | Performed by: HOSPITALIST

## 2017-09-21 PROCEDURE — 20000004 ZZH R&B ICU UMMC

## 2017-09-21 PROCEDURE — 84100 ASSAY OF PHOSPHORUS: CPT | Performed by: THORACIC SURGERY (CARDIOTHORACIC VASCULAR SURGERY)

## 2017-09-21 PROCEDURE — 94002 VENT MGMT INPAT INIT DAY: CPT

## 2017-09-21 PROCEDURE — 00000146 ZZHCL STATISTIC GLUCOSE BY METER IP

## 2017-09-21 PROCEDURE — 84132 ASSAY OF SERUM POTASSIUM: CPT

## 2017-09-21 PROCEDURE — 93005 ELECTROCARDIOGRAM TRACING: CPT

## 2017-09-21 PROCEDURE — 87186 SC STD MICRODIL/AGAR DIL: CPT | Performed by: SURGERY

## 2017-09-21 PROCEDURE — 41000018 ZZH PER-PERFUSION 1ST 30 MIN: Performed by: SURGERY

## 2017-09-21 PROCEDURE — 85730 THROMBOPLASTIN TIME PARTIAL: CPT | Performed by: THORACIC SURGERY (CARDIOTHORACIC VASCULAR SURGERY)

## 2017-09-21 PROCEDURE — 80048 BASIC METABOLIC PNL TOTAL CA: CPT | Performed by: THORACIC SURGERY (CARDIOTHORACIC VASCULAR SURGERY)

## 2017-09-21 PROCEDURE — 02BF0ZZ EXCISION OF AORTIC VALVE, OPEN APPROACH: ICD-10-PCS | Performed by: SURGERY

## 2017-09-21 PROCEDURE — 84295 ASSAY OF SERUM SODIUM: CPT

## 2017-09-21 PROCEDURE — 5A1221Z PERFORMANCE OF CARDIAC OUTPUT, CONTINUOUS: ICD-10-PCS | Performed by: SURGERY

## 2017-09-21 PROCEDURE — 83735 ASSAY OF MAGNESIUM: CPT | Performed by: INTERNAL MEDICINE

## 2017-09-21 PROCEDURE — 84295 ASSAY OF SERUM SODIUM: CPT | Performed by: ANESTHESIOLOGY

## 2017-09-21 PROCEDURE — 87176 TISSUE HOMOGENIZATION CULTR: CPT | Performed by: SURGERY

## 2017-09-21 PROCEDURE — 82803 BLOOD GASES ANY COMBINATION: CPT

## 2017-09-21 PROCEDURE — 85610 PROTHROMBIN TIME: CPT | Performed by: INTERNAL MEDICINE

## 2017-09-21 PROCEDURE — 27810169 ZZH OR IMPLANT GENERAL: Performed by: SURGERY

## 2017-09-21 PROCEDURE — 83735 ASSAY OF MAGNESIUM: CPT | Performed by: THORACIC SURGERY (CARDIOTHORACIC VASCULAR SURGERY)

## 2017-09-21 PROCEDURE — P9059 PLASMA, FRZ BETWEEN 8-24HOUR: HCPCS | Performed by: INTERNAL MEDICINE

## 2017-09-21 PROCEDURE — 02RF0JZ REPLACEMENT OF AORTIC VALVE WITH SYNTHETIC SUBSTITUTE, OPEN APPROACH: ICD-10-PCS | Performed by: SURGERY

## 2017-09-21 PROCEDURE — 84132 ASSAY OF SERUM POTASSIUM: CPT | Performed by: ANESTHESIOLOGY

## 2017-09-21 PROCEDURE — 93503 INSERT/PLACE HEART CATHETER: CPT

## 2017-09-21 PROCEDURE — C9132 KCENTRA, PER I.U.: HCPCS | Performed by: ANESTHESIOLOGY

## 2017-09-21 PROCEDURE — 85027 COMPLETE CBC AUTOMATED: CPT | Performed by: HOSPITALIST

## 2017-09-21 PROCEDURE — 40000344 ZZHCL STATISTIC THAWING COMPONENT: Performed by: INTERNAL MEDICINE

## 2017-09-21 PROCEDURE — 40000048 ZZH STATISTIC DAILY SWAN MONITORING

## 2017-09-21 PROCEDURE — 25000125 ZZHC RX 250

## 2017-09-21 PROCEDURE — P9012 CRYOPRECIPITATE EACH UNIT: HCPCS | Performed by: INTERNAL MEDICINE

## 2017-09-21 PROCEDURE — 85049 AUTOMATED PLATELET COUNT: CPT | Performed by: INTERNAL MEDICINE

## 2017-09-21 PROCEDURE — 83605 ASSAY OF LACTIC ACID: CPT | Performed by: ANESTHESIOLOGY

## 2017-09-21 PROCEDURE — 87205 SMEAR GRAM STAIN: CPT | Performed by: SURGERY

## 2017-09-21 PROCEDURE — 93355 ECHO TRANSESOPHAGEAL (TEE): CPT | Mod: 26 | Performed by: INTERNAL MEDICINE

## 2017-09-21 PROCEDURE — 80048 BASIC METABOLIC PNL TOTAL CA: CPT | Performed by: INTERNAL MEDICINE

## 2017-09-21 DEVICE — VALVE MITRAL ST JUDE 27MM 27MJ-501: Type: IMPLANTABLE DEVICE | Site: HEART | Status: FUNCTIONAL

## 2017-09-21 DEVICE — IMPLANTABLE DEVICE: Type: IMPLANTABLE DEVICE | Site: HEART | Status: FUNCTIONAL

## 2017-09-21 RX ORDER — PROCHLORPERAZINE MALEATE 5 MG
5-10 TABLET ORAL EVERY 6 HOURS PRN
Status: DISCONTINUED | OUTPATIENT
Start: 2017-09-21 | End: 2017-10-05 | Stop reason: HOSPADM

## 2017-09-21 RX ORDER — AMOXICILLIN 250 MG
1-2 CAPSULE ORAL 2 TIMES DAILY
Status: DISCONTINUED | OUTPATIENT
Start: 2017-09-21 | End: 2017-10-04

## 2017-09-21 RX ORDER — MEPERIDINE HYDROCHLORIDE 25 MG/ML
12.5-25 INJECTION INTRAMUSCULAR; INTRAVENOUS; SUBCUTANEOUS
Status: DISCONTINUED | OUTPATIENT
Start: 2017-09-21 | End: 2017-09-24

## 2017-09-21 RX ORDER — POTASSIUM CHLORIDE 1.5 G/1.58G
20-40 POWDER, FOR SOLUTION ORAL
Status: DISCONTINUED | OUTPATIENT
Start: 2017-09-21 | End: 2017-10-05 | Stop reason: HOSPADM

## 2017-09-21 RX ORDER — MAGNESIUM SULFATE HEPTAHYDRATE 40 MG/ML
4 INJECTION, SOLUTION INTRAVENOUS EVERY 4 HOURS PRN
Status: DISCONTINUED | OUTPATIENT
Start: 2017-09-21 | End: 2017-10-05 | Stop reason: HOSPADM

## 2017-09-21 RX ORDER — DEXTROSE MONOHYDRATE 25 G/50ML
25-50 INJECTION, SOLUTION INTRAVENOUS
Status: DISCONTINUED | OUTPATIENT
Start: 2017-09-21 | End: 2017-10-05 | Stop reason: HOSPADM

## 2017-09-21 RX ORDER — LIDOCAINE 40 MG/G
CREAM TOPICAL
Status: DISCONTINUED | OUTPATIENT
Start: 2017-09-21 | End: 2017-10-05 | Stop reason: HOSPADM

## 2017-09-21 RX ORDER — NALOXONE HYDROCHLORIDE 0.4 MG/ML
.1-.4 INJECTION, SOLUTION INTRAMUSCULAR; INTRAVENOUS; SUBCUTANEOUS
Status: DISCONTINUED | OUTPATIENT
Start: 2017-09-21 | End: 2017-09-21

## 2017-09-21 RX ORDER — HYDROMORPHONE HYDROCHLORIDE 1 MG/ML
.3-.5 INJECTION, SOLUTION INTRAMUSCULAR; INTRAVENOUS; SUBCUTANEOUS
Status: DISCONTINUED | OUTPATIENT
Start: 2017-09-21 | End: 2017-10-05 | Stop reason: HOSPADM

## 2017-09-21 RX ORDER — DEXTROSE MONOHYDRATE, SODIUM CHLORIDE, AND POTASSIUM CHLORIDE 50; 1.49; 4.5 G/1000ML; G/1000ML; G/1000ML
INJECTION, SOLUTION INTRAVENOUS CONTINUOUS
Status: DISCONTINUED | OUTPATIENT
Start: 2017-09-21 | End: 2017-09-22

## 2017-09-21 RX ORDER — PROPOFOL 10 MG/ML
5-75 INJECTION, EMULSION INTRAVENOUS CONTINUOUS
Status: DISCONTINUED | OUTPATIENT
Start: 2017-09-21 | End: 2017-09-23

## 2017-09-21 RX ORDER — PROTAMINE SULFATE 10 MG/ML
INJECTION, SOLUTION INTRAVENOUS PRN
Status: DISCONTINUED | OUTPATIENT
Start: 2017-09-21 | End: 2017-09-21

## 2017-09-21 RX ORDER — HYDRALAZINE HYDROCHLORIDE 20 MG/ML
10 INJECTION INTRAMUSCULAR; INTRAVENOUS EVERY 30 MIN PRN
Status: DISCONTINUED | OUTPATIENT
Start: 2017-09-21 | End: 2017-10-05 | Stop reason: HOSPADM

## 2017-09-21 RX ORDER — SODIUM CHLORIDE 9 MG/ML
INJECTION, SOLUTION INTRAVENOUS CONTINUOUS PRN
Status: DISCONTINUED | OUTPATIENT
Start: 2017-09-21 | End: 2017-09-21

## 2017-09-21 RX ORDER — DIPHENHYDRAMINE HYDROCHLORIDE 50 MG/ML
25 INJECTION INTRAMUSCULAR; INTRAVENOUS EVERY 6 HOURS PRN
Status: DISCONTINUED | OUTPATIENT
Start: 2017-09-21 | End: 2017-10-05 | Stop reason: HOSPADM

## 2017-09-21 RX ORDER — HEPARIN SODIUM 1000 [USP'U]/ML
INJECTION, SOLUTION INTRAVENOUS; SUBCUTANEOUS PRN
Status: DISCONTINUED | OUTPATIENT
Start: 2017-09-21 | End: 2017-09-21

## 2017-09-21 RX ORDER — ETOMIDATE 2 MG/ML
INJECTION INTRAVENOUS PRN
Status: DISCONTINUED | OUTPATIENT
Start: 2017-09-21 | End: 2017-09-21

## 2017-09-21 RX ORDER — MUPIROCIN 20 MG/G
0.5 OINTMENT TOPICAL 2 TIMES DAILY
Status: DISPENSED | OUTPATIENT
Start: 2017-09-21 | End: 2017-09-26

## 2017-09-21 RX ORDER — PROPOFOL 10 MG/ML
INJECTION, EMULSION INTRAVENOUS CONTINUOUS PRN
Status: DISCONTINUED | OUTPATIENT
Start: 2017-09-21 | End: 2017-09-21

## 2017-09-21 RX ORDER — LIDOCAINE 50 MG/G
2 PATCH TOPICAL
Status: DISCONTINUED | OUTPATIENT
Start: 2017-09-22 | End: 2017-10-05 | Stop reason: HOSPADM

## 2017-09-21 RX ORDER — CYCLOBENZAPRINE HCL 5 MG
10 TABLET ORAL 3 TIMES DAILY PRN
Status: DISCONTINUED | OUTPATIENT
Start: 2017-09-21 | End: 2017-10-05 | Stop reason: HOSPADM

## 2017-09-21 RX ORDER — ALBUMIN, HUMAN INJ 5% 5 %
500-1000 SOLUTION INTRAVENOUS
Status: DISCONTINUED | OUTPATIENT
Start: 2017-09-21 | End: 2017-10-05 | Stop reason: HOSPADM

## 2017-09-21 RX ORDER — ASPIRIN 325 MG
325 TABLET ORAL DAILY
Status: DISCONTINUED | OUTPATIENT
Start: 2017-09-22 | End: 2017-09-28

## 2017-09-21 RX ORDER — DIPHENHYDRAMINE HCL 25 MG
25 CAPSULE ORAL EVERY 6 HOURS PRN
Status: DISCONTINUED | OUTPATIENT
Start: 2017-09-21 | End: 2017-10-05 | Stop reason: HOSPADM

## 2017-09-21 RX ORDER — ALBUTEROL SULFATE 90 UG/1
6 AEROSOL, METERED RESPIRATORY (INHALATION) EVERY 4 HOURS PRN
Status: DISCONTINUED | OUTPATIENT
Start: 2017-09-21 | End: 2017-10-05 | Stop reason: HOSPADM

## 2017-09-21 RX ORDER — POTASSIUM CHLORIDE 14.9 MG/ML
20 INJECTION INTRAVENOUS
Status: DISCONTINUED | OUTPATIENT
Start: 2017-09-21 | End: 2017-09-23 | Stop reason: RX

## 2017-09-21 RX ORDER — ALBUTEROL SULFATE 0.83 MG/ML
2.5 SOLUTION RESPIRATORY (INHALATION)
Status: DISCONTINUED | OUTPATIENT
Start: 2017-09-21 | End: 2017-10-05 | Stop reason: HOSPADM

## 2017-09-21 RX ORDER — POTASSIUM CHLORIDE 750 MG/1
20-40 TABLET, EXTENDED RELEASE ORAL
Status: DISCONTINUED | OUTPATIENT
Start: 2017-09-21 | End: 2017-10-05 | Stop reason: HOSPADM

## 2017-09-21 RX ORDER — NICOTINE POLACRILEX 4 MG
15-30 LOZENGE BUCCAL
Status: DISCONTINUED | OUTPATIENT
Start: 2017-09-21 | End: 2017-10-05 | Stop reason: HOSPADM

## 2017-09-21 RX ORDER — ACETAMINOPHEN 325 MG/1
975 TABLET ORAL EVERY 8 HOURS
Status: DISCONTINUED | OUTPATIENT
Start: 2017-09-21 | End: 2017-09-21

## 2017-09-21 RX ORDER — ACETAMINOPHEN 325 MG/1
650 TABLET ORAL EVERY 4 HOURS PRN
Status: DISCONTINUED | OUTPATIENT
Start: 2017-09-24 | End: 2017-09-21

## 2017-09-21 RX ORDER — VANCOMYCIN HYDROCHLORIDE 1 G/200ML
1000 INJECTION, SOLUTION INTRAVENOUS EVERY 12 HOURS
Status: COMPLETED | OUTPATIENT
Start: 2017-09-22 | End: 2017-09-22

## 2017-09-21 RX ORDER — OXYCODONE HYDROCHLORIDE 5 MG/1
5-10 TABLET ORAL
Status: DISCONTINUED | OUTPATIENT
Start: 2017-09-21 | End: 2017-10-05 | Stop reason: HOSPADM

## 2017-09-21 RX ORDER — POTASSIUM CL/LIDO/0.9 % NACL 10MEQ/0.1L
10 INTRAVENOUS SOLUTION, PIGGYBACK (ML) INTRAVENOUS
Status: DISCONTINUED | OUTPATIENT
Start: 2017-09-21 | End: 2017-10-05 | Stop reason: HOSPADM

## 2017-09-21 RX ORDER — PROPOFOL 10 MG/ML
10-20 INJECTION, EMULSION INTRAVENOUS EVERY 30 MIN PRN
Status: DISCONTINUED | OUTPATIENT
Start: 2017-09-21 | End: 2017-09-23

## 2017-09-21 RX ORDER — ONDANSETRON 4 MG/1
4 TABLET, ORALLY DISINTEGRATING ORAL EVERY 6 HOURS PRN
Status: DISCONTINUED | OUTPATIENT
Start: 2017-09-21 | End: 2017-10-05 | Stop reason: HOSPADM

## 2017-09-21 RX ORDER — MILRINONE LACTATE 0.2 MG/ML
.125-.75 INJECTION, SOLUTION INTRAVENOUS CONTINUOUS
Status: DISCONTINUED | OUTPATIENT
Start: 2017-09-21 | End: 2017-09-22

## 2017-09-21 RX ORDER — FENTANYL CITRATE 50 UG/ML
50-100 INJECTION, SOLUTION INTRAMUSCULAR; INTRAVENOUS
Status: DISCONTINUED | OUTPATIENT
Start: 2017-09-21 | End: 2017-09-22

## 2017-09-21 RX ORDER — SODIUM CHLORIDE, SODIUM LACTATE, POTASSIUM CHLORIDE, CALCIUM CHLORIDE 600; 310; 30; 20 MG/100ML; MG/100ML; MG/100ML; MG/100ML
INJECTION, SOLUTION INTRAVENOUS CONTINUOUS PRN
Status: DISCONTINUED | OUTPATIENT
Start: 2017-09-21 | End: 2017-09-21

## 2017-09-21 RX ORDER — DEXMEDETOMIDINE HYDROCHLORIDE 4 UG/ML
.2-.7 INJECTION, SOLUTION INTRAVENOUS CONTINUOUS
Status: DISCONTINUED | OUTPATIENT
Start: 2017-09-21 | End: 2017-09-24

## 2017-09-21 RX ORDER — POTASSIUM CHLORIDE 7.45 MG/ML
10 INJECTION INTRAVENOUS
Status: DISCONTINUED | OUTPATIENT
Start: 2017-09-21 | End: 2017-10-05 | Stop reason: HOSPADM

## 2017-09-21 RX ORDER — FENTANYL CITRATE 50 UG/ML
INJECTION, SOLUTION INTRAMUSCULAR; INTRAVENOUS PRN
Status: DISCONTINUED | OUTPATIENT
Start: 2017-09-21 | End: 2017-09-21

## 2017-09-21 RX ORDER — NALOXONE HYDROCHLORIDE 0.4 MG/ML
.1-.4 INJECTION, SOLUTION INTRAMUSCULAR; INTRAVENOUS; SUBCUTANEOUS
Status: DISCONTINUED | OUTPATIENT
Start: 2017-09-21 | End: 2017-10-05 | Stop reason: HOSPADM

## 2017-09-21 RX ORDER — CALCIUM CHLORIDE 100 MG/ML
INJECTION INTRAVENOUS; INTRAVENTRICULAR PRN
Status: DISCONTINUED | OUTPATIENT
Start: 2017-09-21 | End: 2017-09-21

## 2017-09-21 RX ORDER — CEFAZOLIN SODIUM 1 G/3ML
1 INJECTION, POWDER, FOR SOLUTION INTRAMUSCULAR; INTRAVENOUS EVERY 8 HOURS
Status: DISCONTINUED | OUTPATIENT
Start: 2017-09-21 | End: 2017-09-21

## 2017-09-21 RX ORDER — ONDANSETRON 2 MG/ML
4 INJECTION INTRAMUSCULAR; INTRAVENOUS EVERY 6 HOURS PRN
Status: DISCONTINUED | OUTPATIENT
Start: 2017-09-21 | End: 2017-10-05 | Stop reason: HOSPADM

## 2017-09-21 RX ORDER — LIDOCAINE HYDROCHLORIDE 20 MG/ML
INJECTION, SOLUTION INFILTRATION; PERINEURAL PRN
Status: DISCONTINUED | OUTPATIENT
Start: 2017-09-21 | End: 2017-09-21

## 2017-09-21 RX ADMIN — ROCURONIUM BROMIDE 25 MG: 10 INJECTION INTRAVENOUS at 16:30

## 2017-09-21 RX ADMIN — OXYCODONE HYDROCHLORIDE 5 MG: 5 TABLET ORAL at 22:43

## 2017-09-21 RX ADMIN — SODIUM CHLORIDE: 9 INJECTION, SOLUTION INTRAVENOUS at 08:35

## 2017-09-21 RX ADMIN — NOREPINEPHRINE BITARTRATE 0.03 MCG/KG/MIN: 1 INJECTION INTRAVENOUS at 13:29

## 2017-09-21 RX ADMIN — ROCURONIUM BROMIDE 50 MG: 10 INJECTION INTRAVENOUS at 11:54

## 2017-09-21 RX ADMIN — POTASSIUM CHLORIDE 20 MEQ: 14.9 INJECTION, SOLUTION INTRAVENOUS at 04:22

## 2017-09-21 RX ADMIN — EPINEPHRINE 0.03 MCG/KG/MIN: 1 INJECTION INTRAMUSCULAR; INTRAVENOUS; SUBCUTANEOUS at 13:29

## 2017-09-21 RX ADMIN — AMINOCAPROIC ACID 5 G/HR: 250 INJECTION, SOLUTION INTRAVENOUS at 08:48

## 2017-09-21 RX ADMIN — MIDAZOLAM HYDROCHLORIDE 2 MG: 1 INJECTION, SOLUTION INTRAMUSCULAR; INTRAVENOUS at 16:19

## 2017-09-21 RX ADMIN — MIDAZOLAM HYDROCHLORIDE 1 MG: 1 INJECTION, SOLUTION INTRAMUSCULAR; INTRAVENOUS at 10:55

## 2017-09-21 RX ADMIN — ROCURONIUM BROMIDE 40 MG: 10 INJECTION INTRAVENOUS at 15:44

## 2017-09-21 RX ADMIN — FENTANYL CITRATE 50 MCG: 50 INJECTION, SOLUTION INTRAMUSCULAR; INTRAVENOUS at 14:12

## 2017-09-21 RX ADMIN — HEPARIN SODIUM 5000 UNITS: 1000 INJECTION, SOLUTION INTRAVENOUS; SUBCUTANEOUS at 09:36

## 2017-09-21 RX ADMIN — HUMAN INSULIN 0.2 UNITS/HR: 100 INJECTION, SOLUTION SUBCUTANEOUS at 10:21

## 2017-09-21 RX ADMIN — MIDAZOLAM HYDROCHLORIDE 2 MG: 1 INJECTION, SOLUTION INTRAMUSCULAR; INTRAVENOUS at 07:29

## 2017-09-21 RX ADMIN — ROCURONIUM BROMIDE 50 MG: 10 INJECTION INTRAVENOUS at 13:45

## 2017-09-21 RX ADMIN — CALCIUM CHLORIDE 1000 MG: 100 INJECTION, SOLUTION INTRAVENOUS at 15:43

## 2017-09-21 RX ADMIN — AMINOCAPROIC ACID 1 G/HR: 250 INJECTION, SOLUTION INTRAVENOUS at 09:48

## 2017-09-21 RX ADMIN — MIDAZOLAM HYDROCHLORIDE 1 MG: 1 INJECTION, SOLUTION INTRAMUSCULAR; INTRAVENOUS at 13:45

## 2017-09-21 RX ADMIN — FENTANYL CITRATE 200 MCG: 50 INJECTION, SOLUTION INTRAMUSCULAR; INTRAVENOUS at 14:00

## 2017-09-21 RX ADMIN — CEFAZOLIN 1 G: 1 INJECTION, POWDER, FOR SOLUTION INTRAMUSCULAR; INTRAVENOUS at 11:56

## 2017-09-21 RX ADMIN — FENTANYL CITRATE 250 MCG: 50 INJECTION, SOLUTION INTRAMUSCULAR; INTRAVENOUS at 08:54

## 2017-09-21 RX ADMIN — ROCURONIUM BROMIDE 100 MG: 10 INJECTION INTRAVENOUS at 07:29

## 2017-09-21 RX ADMIN — ROCURONIUM BROMIDE 50 MG: 10 INJECTION INTRAVENOUS at 08:54

## 2017-09-21 RX ADMIN — MIDAZOLAM HYDROCHLORIDE 1 MG: 1 INJECTION, SOLUTION INTRAMUSCULAR; INTRAVENOUS at 12:10

## 2017-09-21 RX ADMIN — CEFAZOLIN SODIUM 2 G: 2 INJECTION, SOLUTION INTRAVENOUS at 07:56

## 2017-09-21 RX ADMIN — CALCIUM CHLORIDE 1000 MG: 100 INJECTION, SOLUTION INTRAVENOUS at 16:43

## 2017-09-21 RX ADMIN — ROCURONIUM BROMIDE 50 MG: 10 INJECTION INTRAVENOUS at 10:30

## 2017-09-21 RX ADMIN — PROPOFOL 40 MCG/KG/MIN: 10 INJECTION, EMULSION INTRAVENOUS at 16:15

## 2017-09-21 RX ADMIN — MIDAZOLAM HYDROCHLORIDE 2 MG: 1 INJECTION, SOLUTION INTRAMUSCULAR; INTRAVENOUS at 15:37

## 2017-09-21 RX ADMIN — CEFAZOLIN 1 G: 1 INJECTION, POWDER, FOR SOLUTION INTRAMUSCULAR; INTRAVENOUS at 13:59

## 2017-09-21 RX ADMIN — POTASSIUM CHLORIDE, DEXTROSE MONOHYDRATE AND SODIUM CHLORIDE: 150; 5; 450 INJECTION, SOLUTION INTRAVENOUS at 19:00

## 2017-09-21 RX ADMIN — MUPIROCIN 0.5 G: 20 OINTMENT TOPICAL at 20:14

## 2017-09-21 RX ADMIN — LIDOCAINE HYDROCHLORIDE 100 MG: 20 INJECTION, SOLUTION INFILTRATION; PERINEURAL at 07:29

## 2017-09-21 RX ADMIN — CEFAZOLIN 1 G: 1 INJECTION, POWDER, FOR SOLUTION INTRAMUSCULAR; INTRAVENOUS at 09:55

## 2017-09-21 RX ADMIN — PROTHROMBIN, COAGULATION FACTOR VII HUMAN, COAGULATION FACTOR IX HUMAN, COAGULATION FACTOR X HUMAN, PROTEIN C, PROTEIN S HUMAN, AND WATER 1657 UNITS: KIT at 08:28

## 2017-09-21 RX ADMIN — SODIUM CHLORIDE: 9 INJECTION, SOLUTION INTRAVENOUS at 07:56

## 2017-09-21 RX ADMIN — VANCOMYCIN HYDROCHLORIDE 1000 MG: 1 INJECTION, SOLUTION INTRAVENOUS at 07:56

## 2017-09-21 RX ADMIN — PROTAMINE SULFATE 200 MG: 10 INJECTION, SOLUTION INTRAVENOUS at 14:32

## 2017-09-21 RX ADMIN — CEFAZOLIN 1 G: 1 INJECTION, POWDER, FOR SOLUTION INTRAMUSCULAR; INTRAVENOUS at 16:00

## 2017-09-21 RX ADMIN — COAGULATION FACTOR VIIA (RECOMBINANT) 3 MG: KIT at 15:32

## 2017-09-21 RX ADMIN — FENTANYL CITRATE 150 MCG: 50 INJECTION, SOLUTION INTRAMUSCULAR; INTRAVENOUS at 09:24

## 2017-09-21 RX ADMIN — SENNOSIDES AND DOCUSATE SODIUM 1 TABLET: 8.6; 5 TABLET ORAL at 20:15

## 2017-09-21 RX ADMIN — SODIUM CHLORIDE: 9 INJECTION, SOLUTION INTRAVENOUS at 07:04

## 2017-09-21 RX ADMIN — CEFTRIAXONE 2 G: 2 INJECTION, POWDER, FOR SOLUTION INTRAMUSCULAR; INTRAVENOUS at 20:15

## 2017-09-21 RX ADMIN — HEPARIN SODIUM 21000 UNITS: 1000 INJECTION, SOLUTION INTRAVENOUS; SUBCUTANEOUS at 10:19

## 2017-09-21 RX ADMIN — VANCOMYCIN HYDROCHLORIDE 1000 MG: 1 INJECTION, SOLUTION INTRAVENOUS at 15:56

## 2017-09-21 RX ADMIN — ETOMIDATE 6 MG: 2 INJECTION INTRAVENOUS at 07:29

## 2017-09-21 RX ADMIN — FENTANYL CITRATE 250 MCG: 50 INJECTION, SOLUTION INTRAMUSCULAR; INTRAVENOUS at 07:29

## 2017-09-21 RX ADMIN — SODIUM CHLORIDE, POTASSIUM CHLORIDE, SODIUM LACTATE AND CALCIUM CHLORIDE: 600; 310; 30; 20 INJECTION, SOLUTION INTRAVENOUS at 07:32

## 2017-09-21 RX ADMIN — SODIUM CHLORIDE: 9 INJECTION, SOLUTION INTRAVENOUS at 07:42

## 2017-09-21 RX ADMIN — HEPARIN SODIUM 5000 UNITS: 1000 INJECTION, SOLUTION INTRAVENOUS; SUBCUTANEOUS at 09:05

## 2017-09-21 NOTE — ANESTHESIA PROCEDURE NOTES
Central Line Procedure Note  Staff:     Anesthesiologist:  SIN WHITTEN    Resident/CRNA:  DO LUGO    Central line placed by Resident/CRNA in the presence of a teaching physician    Location: In OR after induction  Procedure Start/Stop Times:     patient identified, IV checked, risks and benefits discussed, informed consent, monitors and equipment checked, pre-op evaluation and at physician/surgeon's request      Correct Patient: Yes      Correct Position: Yes      Correct Site: Yes      Correct Procedure: Yes      Correct Laterality:  N/A  Line Placement:     Procedure:  Central Line    Insertion laterality:  Left    Insertion site:  Internal Jugular    Position:  Trendelenburg      Maximal Sterile Barriers: All elements of maximal sterile barrier technique followed      (Maximal sterile barriers include:   Sterile gown, Sterile Gloves, Mask, Cap, Whole body draped, hand hygiene and acceptable skin prep).Skin Prep: Chloraprep         Injection Technique:  Seldinger Technique and ultrasound guided    Sterile Ultrasound Technique:  Sterile probe cover and Sterile gel    Vein evaluated via U/S for patency/adequacy of catheter insertion and is adequate.  Using realtime U/S imaging the vein was punctured, and needle was observed entering vein on U/S      Permanent Image entered into patient's record      Local skin infiltration:  None    Catheter size:  9 Fr, 2 lumen 11.5 cm (MAC)    Catheter length at skin (cm):  15    Cath secured with: suture      Dressing:  Tegaderm    Complications:  None obvious    Blood aspirated all lumens: Yes      All Lumens Flushed: Yes      Verification method:  Placement to be verified post-op

## 2017-09-21 NOTE — ANESTHESIA PROCEDURE NOTES
Arterial Line Procedure Note  Staff:     Anesthesiologist:  SIN WHITTEN    Resident/CRNA:  DO LUGO    Arterial line performed by resident/CRNA in presence of a teaching physician    Location: ICU  Procedure Start/Stop Times:      9/21/2017 6:40 AM     9/21/2017 6:50 AM    patient identified, IV checked, site marked, risks and benefits discussed, informed consent, monitors and equipment checked, pre-op evaluation and at physician/surgeon's request      Correct Patient: Yes      Correct Position: Yes      Correct Site: Yes      Correct Procedure: Yes      Correct Laterality:  Yes    Site Marked:  Yes  Line Placement:     Procedure:  Arterial Line    Insertion Site:  Radial    Insertion laterality:  Left    Skin Prep: Chloraprep      Patient Prep: patient draped, mask, sterile gloves, hat and hand hygiene      Local skin infiltration:  1% lidocaine    amount (mL):  2    Ultrasound Guided?: Yes      Artery evaluated via ultrasound confirming patency.   Using realtime imaging, the artery was punctured and the needle was observed entering the artery.      A permanent image is entered into patient's chart.      Catheter size:  20 gauge, 12 cm    Cath secured with: suture      Dressing:  Tegaderm    Complications:  None obvious    Arterial waveform: Yes      IBP within 10% of NIBP: Yes

## 2017-09-21 NOTE — PLAN OF CARE
Problem: Goal Outcome Summary  Goal: Goal Outcome Summary  Outcome: No Change  Status  D/I: Patient on unit 4A Surgical/Neuro ICU   Neuro- alert and oriented, no neuro deficits noted  CV- sinus rhythm, occasional PAC's, sitting in the 90's  Pulm- BP low 100's to 70's systolic at times, goal of MAP between 60-75, tends to drop in the 50's MAP positionally, service aware; 4L nasal cannula, reports of feeling short of breath  GI/- voiding spontaneously, bjorn urine, no bowel movement over night, has been NPO since midnight for procedure today  Gtts- Dobutamine at 9.6  Electrolytes- Potassium replaced  Notified Cards team about elevated ALT/AST labs  Patient scheduled for OR procedure today at 0700, presurgical scrub done, NPO since midnight, SQ heparin held  See flow sheets for further interventions and assessments.  P: Continue to monitor pt closely, Notify MD of changes/concerns.

## 2017-09-21 NOTE — ANESTHESIA PROCEDURE NOTES
Perioperative IZA Report  Anesthesia Information  IZA probe placed and report generated by:   HARMAN PRESTON in the presence of a teaching physician :  SIN WHITTEN    I personally reviewed the images and the fellow/resident interpretation.  I agree with the fellow/resident interpretation as amended by myself. SIN WHITTEN  Images for this study have been archived.     Echocardiogram Comments: Pre-procedure: Tricuspid annulus measures 3.7cm. Mild to Moderate tricuspid regurgitation. At least Moderate RV systolic dysfunction (TAPSE 10mm). LV is dilated, and hypertrophied with low normal EF. The infero-septal wall appears hypokinetic. MR is at least moderate, eccentric posteriorly directed jet 2/2 restriction of both, P>A leaflets. There is restricted opening too with at least mild MS. The left atrium appears quite dilated. A bioprosthetic AV is seen to prolapse into the LVOT. AI is severe; there was reversal of flow in the descending aorta. The IVC is large with decreased respiratory variability. There appears to be a loculated pleural effusion on the left.     Post-bypass: EF is reduced ~45%. Gradient across mechanical MV is low and there is no significant leak, leaflets open appropriately. The AV with mild AI seen from deep TG view; gradient initially 30-38, but decreased to 28 with reducing epinephrine and fluid challenge.     Preanesthesia Checklist:  Patient identified.  General Procedure Information  Modalities:  3D, 2D, CW Doppler and PW Doppler    AVR/MVR  Echocardiographic and Doppler Measurements  Right Ventricle:  Cavity size normal.   Hypertrophy not present.   Thrombus not present.    Global function moderately impaired.     Left Ventricle:  Cavity size dilated.   Hypertrophy present.   Thrombus not present.   Global Function mildly impaired.   Ejection Fraction 50%.        Valves  Aortic Valve: Annulus bioprosthetic.  Stenosis not present.  Pressure Half-time: 150 ms.  Regurgitation +4.   Leaflets perforated and thickened.  Leaflet motions prolapsed.    Mitral Valve: Annulus dilated.  Stenosis mild.  Mean Gradient: 5 mmHg.  Regurgitation +3.  Leaflets thickened.  Leaflet motions restricted.    Tricuspid Valve: Annulus normal.  Stenosis not present.  Regurgitation +2.  Leaflets normal.    Pulmonic Valve: Annulus normal.  Regurgitation +1.      Aorta: Ascending Aorta: Dissection not present.    Aortic Arch: Dissection not present.     Descending Aorta: Size normal.   Dissection not present.         Right Atrium:  Size normal.    Left Atrium: Size dilated.  Left atrial appendage normal.     Atrial Septum: Intra-atrial septal morphology normal.     Ventricular Septum: Intra-ventricular septum morphology normal.       Other Findings:   Pericardium:  normal.  Pleural Effusion:  left. .  Pulmonary Venous Flow:  blunted (decreased) systolic flow.  Cornoary sinus catheter present.  Coronary sinus size (mm):  0.8.  .  Post Intervention Findings  Procedure(s) performed:  Mitral Valve Repair/Replace and Aortic Valve Repair/Replace. Global function:  Worsened (See comments).   Regional wall motion:  Unchanged   Surgeon(s) notified of all postintervention findings:  Yes  .  Mitral Valve:  Valve replaced with mechanical valve  No paravalvular leak.  .   Aortic Valve:. No EVETTE present.  No paravalvular leak.  .  .  .  .  .  .    Aortic valve gradient between 30-39 on several exams, surgeon aware; following turning epi down and fluid challenge, gradient of 28 was achieved.     Echocardiogram Comments  Pre-procedure: Tricuspid annulus measures 3.7cm. Mild to Moderate tricuspid regurgitation. At least Moderate RV systolic dysfunction (TAPSE 10mm). LV is dilated, and hypertrophied with low normal EF. The infero-septal wall appears hypokinetic. MR is at least moderate, eccentric posteriorly directed jet 2/2 restriction of both, P>A leaflets. There is restricted opening too with at least mild MS. The left atrium appears  quite dilated. A bioprosthetic AV is seen to prolapse into the LVOT. AI is severe; there was reversal of flow in the descending aorta. The IVC is large with decreased respiratory variability. There appears to be a loculated pleural effusion on the left.     Post-bypass: EF is reduced ~45%. Gradient across mechanical MV is low and there is no significant leak, leaflets open appropriately. The AV with mild AI seen from deep TG view; gradient initially 30-38, but decreased to 28 with reducing epinephrine and fluid challenge.

## 2017-09-21 NOTE — ANESTHESIA PROCEDURE NOTES
IZA Probe Insertion Note  Probe Inserted by:  HARMAN PRESTON in the presence of a teaching physician  SIN WHITTEN   Insertion method: IZA probe easily inserted   Bite block used:   Yes      Probe type:  Adult 3D   Insertion complications: No complications.      Billing for IZA report is being done by Anesthesiology

## 2017-09-21 NOTE — BRIEF OP NOTE
[facility], [place]    Brief Operative Note    Pre-operative diagnosis: Aortic Stenosis   Post-operative diagnosis Endocarditis  Procedure: Procedure(s):  Left groin cutdown, Redo Median Sternotomy, Lysis of adhesions, Left mini-thoracotomy,Coronary artery bypass graft x 1 using the left greater saphenous vein, using endovein harvest, Mitral valve replacement using St. Jerald Mechanical 27mm, Aortic Valve Replacement using a 17mm St. Jerald Mechanical, On Cardiopulmonary Bypass Pump - Wound Class: I-Clean   - Wound Class: I-Clean   - Wound Class: I-Clean  Surgeon: Surgeon(s) and Role:     * Nicola Goramn MD - Primary     * Zeb Leal MD - Assisting     * Ho Mcleod MD - Assisting     * Thu Castillo PA-C - Assisting  Anesthesia: General   Estimated blood loss: 1000cc  Drains: 32  Fr angle anterior mediastinal chest tube, 32 straight anterior mediastinal chest tube, 28 Fr Right pleural chest tube  Specimens:   ID Type Source Tests Collected by Time Destination   1 : Aortic Leaflet Tissue Heart ANAEROBIC BACTERIAL CULTURE, FUNGUS CULTURE, GRAM STAIN, TISSUE CULTURE AEROBIC BACTERIAL Nicola Gorman MD 9/21/2017 11:22 AM    2 : mitral valve leaflet Tissue Heart ANAEROBIC BACTERIAL CULTURE, FUNGUS CULTURE, GRAM STAIN, TISSUE CULTURE AEROBIC BACTERIAL Nicola Gorman MD 9/21/2017 11:33 AM    A : delete this specimen Tissue Heart SURGICAL PATHOLOGY EXAM Nicola Gorman MD 9/21/2017 12:03 PM      Findings:   Significant calcification to previous homograft.  Attempt to left intercostal incision for mini-thoracotomy.  Significant scar tissue.  Replacement of mitral valve and aortic valve.  Diffuse ooze present during case...  Complications: None.  Implants: None.

## 2017-09-21 NOTE — ANESTHESIA PROCEDURE NOTES
PA Catheter Insertion Note  Anesthesiologist: SIN WHITTEN  Resident:  DO LUGO   PA Catheter placed by resident/CRNA in the presence of a teaching physician.  Introducer: Introducer placed as part of procedure (SEE separate note)   Skin prep:  Chloraprep Cap, hand hygiene, Sterile gloves, Sterile gown, Mask and Full body drape    PA Catheter type:  CCO    Distance catheter advanced:  54 cm.    Appropriate RA, RV, PA  waveforms?: Yes    Dressing:  Biopatch and Tegaderm    Complications:  None apparent

## 2017-09-21 NOTE — ANESTHESIA POSTPROCEDURE EVALUATION
Patient: Harvey Almanzar    Procedure(s):  Left groin cutdown, Redo Median Sternotomy, Lysis of adhesions, Left mini-thoracotomy,Coronary artery bypass graft x 1 using the left greater saphenous vein, using endovein harvest, Mitral valve replacement using St. Jerald Mechanical 27mm, Aortic Valve Replacement using a 17mm St. Jerald Mechanical, On Cardiopulmonary Bypass Pump - Wound Class: I-Clean   - Wound Class: I-Clean   - Wound Class: I-Clean    Diagnosis:Aortic Stenosis   Diagnosis Additional Information: No value filed.    Anesthesia Type:  General, ETT    Note:  Anesthesia Post Evaluation    Patient location during evaluation: ICU  Patient participation: Unable to evaluate secondary to administered sedation  Level of consciousness: obtunded/minimal responses  Pain management: adequate  Airway patency: patent  Cardiovascular status: acceptable  Respiratory status: acceptable and ETT  Hydration status: acceptable  PONV: none     Anesthetic complications: None    Comments: Patient transported directly from the OR to the ICU sedated and intubated.  VSS throughout.  On epinephrine and norepinephrine infusions.  Report given to ICU service.        Last vitals:  Vitals:    09/21/17 0615 09/21/17 0630 09/21/17 0645   BP: (!) 89/47 92/54 91/49   Resp:      Temp:      SpO2: 100%  98%         Electronically Signed By: Whitney Dill MD  September 21, 2017  5:32 PM

## 2017-09-21 NOTE — ANESTHESIA CARE TRANSFER NOTE
Patient: Harvey Almanzar    Procedure(s):  Left groin cutdown, Redo Median Sternotomy, Lysis of adhesions, Left mini-thoracotomy,Coronary artery bypass graft x 1 using the left greater saphenous vein, using endovein harvest, Mitral valve replacement using St. Jerald Mechanical 27mm, Aortic Valve Replacement using a 17mm St. Jerald Mechanical, On Cardiopulmonary Bypass Pump - Wound Class: I-Clean   - Wound Class: I-Clean   - Wound Class: I-Clean    Diagnosis: Aortic Stenosis   Diagnosis Additional Information: No value filed.    Anesthesia Type:   General, ETT     Note:  Airway :ETT  Patient transferred to:ICU  Comments: Pt placed on full monitors. Ambu @ 10L/min. Tx'd to 4E. VSS en route. Placed on vent on arrival. Report to RN.       Vitals: (Last set prior to Anesthesia Care Transfer)    CRNA VITALS  9/21/2017 1642 - 9/21/2017 1735      9/21/2017             EKG: Sinus rhythm                Electronically Signed By: EMEKA Conway CRNA  September 21, 2017  5:35 PM

## 2017-09-22 ENCOUNTER — APPOINTMENT (OUTPATIENT)
Dept: GENERAL RADIOLOGY | Facility: CLINIC | Age: 47
DRG: 216 | End: 2017-09-22
Attending: THORACIC SURGERY (CARDIOTHORACIC VASCULAR SURGERY)
Payer: COMMERCIAL

## 2017-09-22 ENCOUNTER — APPOINTMENT (OUTPATIENT)
Dept: GENERAL RADIOLOGY | Facility: CLINIC | Age: 47
DRG: 216 | End: 2017-09-22
Attending: PHYSICIAN ASSISTANT
Payer: COMMERCIAL

## 2017-09-22 LAB
ANION GAP SERPL CALCULATED.3IONS-SCNC: 6 MMOL/L (ref 3–14)
ANION GAP SERPL CALCULATED.3IONS-SCNC: 7 MMOL/L (ref 3–14)
ANION GAP SERPL CALCULATED.3IONS-SCNC: 8 MMOL/L (ref 3–14)
BACTERIA SPEC CULT: NO GROWTH
BACTERIA SPEC CULT: NO GROWTH
BACTERIA SPEC CULT: NORMAL
BASE EXCESS BLDA CALC-SCNC: 3.2 MMOL/L
BASE EXCESS BLDA CALC-SCNC: 4.7 MMOL/L
BASE EXCESS BLDA CALC-SCNC: 4.9 MMOL/L
BASE EXCESS BLDA CALC-SCNC: 5.2 MMOL/L
BASE EXCESS BLDV CALC-SCNC: 3.9 MMOL/L
BASE EXCESS BLDV CALC-SCNC: 5.2 MMOL/L
BASE EXCESS BLDV CALC-SCNC: 6.2 MMOL/L
BASE EXCESS BLDV CALC-SCNC: 6.4 MMOL/L
BLD PROD TYP BPU: NORMAL
BLD UNIT ID BPU: 0
BLD UNIT ID BPU: 0
BLOOD PRODUCT CODE: NORMAL
BLOOD PRODUCT CODE: NORMAL
BPU ID: NORMAL
BPU ID: NORMAL
BUN SERPL-MCNC: 57 MG/DL (ref 7–30)
BUN SERPL-MCNC: 58 MG/DL (ref 7–30)
BUN SERPL-MCNC: 63 MG/DL (ref 7–30)
CA-I BLD-MCNC: 5 MG/DL (ref 4.4–5.2)
CALCIUM SERPL-MCNC: 8.5 MG/DL (ref 8.5–10.1)
CALCIUM SERPL-MCNC: 9.1 MG/DL (ref 8.5–10.1)
CALCIUM SERPL-MCNC: 9.1 MG/DL (ref 8.5–10.1)
CHLORIDE SERPL-SCNC: 108 MMOL/L (ref 94–109)
CHLORIDE SERPL-SCNC: 109 MMOL/L (ref 94–109)
CHLORIDE SERPL-SCNC: 110 MMOL/L (ref 94–109)
CO2 SERPL-SCNC: 27 MMOL/L (ref 20–32)
CO2 SERPL-SCNC: 27 MMOL/L (ref 20–32)
CO2 SERPL-SCNC: 28 MMOL/L (ref 20–32)
CREAT SERPL-MCNC: 1.28 MG/DL (ref 0.66–1.25)
CREAT SERPL-MCNC: 1.37 MG/DL (ref 0.66–1.25)
CREAT SERPL-MCNC: 1.43 MG/DL (ref 0.66–1.25)
ERYTHROCYTE [DISTWIDTH] IN BLOOD BY AUTOMATED COUNT: 17.6 % (ref 10–15)
ERYTHROCYTE [DISTWIDTH] IN BLOOD BY AUTOMATED COUNT: 19.1 % (ref 10–15)
ERYTHROCYTE [DISTWIDTH] IN BLOOD BY AUTOMATED COUNT: 19.7 % (ref 10–15)
GFR SERPL CREATININE-BSD FRML MDRD: 53 ML/MIN/1.7M2
GFR SERPL CREATININE-BSD FRML MDRD: 55 ML/MIN/1.7M2
GFR SERPL CREATININE-BSD FRML MDRD: 60 ML/MIN/1.7M2
GLUCOSE BLDC GLUCOMTR-MCNC: 107 MG/DL (ref 70–99)
GLUCOSE BLDC GLUCOMTR-MCNC: 112 MG/DL (ref 70–99)
GLUCOSE BLDC GLUCOMTR-MCNC: 114 MG/DL (ref 70–99)
GLUCOSE BLDC GLUCOMTR-MCNC: 116 MG/DL (ref 70–99)
GLUCOSE BLDC GLUCOMTR-MCNC: 133 MG/DL (ref 70–99)
GLUCOSE BLDC GLUCOMTR-MCNC: 134 MG/DL (ref 70–99)
GLUCOSE BLDC GLUCOMTR-MCNC: 146 MG/DL (ref 70–99)
GLUCOSE BLDC GLUCOMTR-MCNC: 149 MG/DL (ref 70–99)
GLUCOSE BLDC GLUCOMTR-MCNC: 97 MG/DL (ref 70–99)
GLUCOSE SERPL-MCNC: 108 MG/DL (ref 70–99)
GLUCOSE SERPL-MCNC: 123 MG/DL (ref 70–99)
GLUCOSE SERPL-MCNC: 139 MG/DL (ref 70–99)
HBA1C MFR BLD: 5.9 % (ref 4.3–6)
HCO3 BLD-SCNC: 27 MMOL/L (ref 21–28)
HCO3 BLD-SCNC: 27 MMOL/L (ref 21–28)
HCO3 BLD-SCNC: 28 MMOL/L (ref 21–28)
HCO3 BLD-SCNC: 28 MMOL/L (ref 21–28)
HCO3 BLDV-SCNC: 29 MMOL/L (ref 21–28)
HCO3 BLDV-SCNC: 30 MMOL/L (ref 21–28)
HCT VFR BLD AUTO: 24.5 % (ref 40–53)
HCT VFR BLD AUTO: 26.7 % (ref 40–53)
HCT VFR BLD AUTO: 26.8 % (ref 40–53)
HGB BLD-MCNC: 7.8 G/DL (ref 13.3–17.7)
HGB BLD-MCNC: 8.6 G/DL (ref 13.3–17.7)
HGB BLD-MCNC: 8.7 G/DL (ref 13.3–17.7)
INR PPP: 1.62 (ref 0.86–1.14)
INTERPRETATION ECG - MUSE: NORMAL
INTERPRETATION ECG - MUSE: NORMAL
LACTATE BLD-SCNC: 2.8 MMOL/L (ref 0.7–2)
LACTATE BLD-SCNC: 3.9 MMOL/L (ref 0.7–2)
LACTATE BLD-SCNC: 6.5 MMOL/L (ref 0.7–2)
Lab: NORMAL
MAGNESIUM SERPL-MCNC: 3.2 MG/DL (ref 1.6–2.3)
MAGNESIUM SERPL-MCNC: 3.6 MG/DL (ref 1.6–2.3)
MCH RBC QN AUTO: 28.4 PG (ref 26.5–33)
MCH RBC QN AUTO: 29 PG (ref 26.5–33)
MCH RBC QN AUTO: 29.1 PG (ref 26.5–33)
MCHC RBC AUTO-ENTMCNC: 31.8 G/DL (ref 31.5–36.5)
MCHC RBC AUTO-ENTMCNC: 32.2 G/DL (ref 31.5–36.5)
MCHC RBC AUTO-ENTMCNC: 32.5 G/DL (ref 31.5–36.5)
MCV RBC AUTO: 88 FL (ref 78–100)
MCV RBC AUTO: 90 FL (ref 78–100)
MCV RBC AUTO: 91 FL (ref 78–100)
NUM BPU REQUESTED: 4
O2/TOTAL GAS SETTING VFR VENT: 40 %
OXYHGB MFR BLD: 96 % (ref 92–100)
OXYHGB MFR BLD: 97 % (ref 92–100)
OXYHGB MFR BLDV: 47 %
OXYHGB MFR BLDV: 53 %
OXYHGB MFR BLDV: 60 %
OXYHGB MFR BLDV: 63 %
PCO2 BLD: 30 MM HG (ref 35–45)
PCO2 BLD: 32 MM HG (ref 35–45)
PCO2 BLD: 36 MM HG (ref 35–45)
PCO2 BLD: 37 MM HG (ref 35–45)
PCO2 BLDV: 37 MM HG (ref 40–50)
PCO2 BLDV: 39 MM HG (ref 40–50)
PCO2 BLDV: 43 MM HG (ref 40–50)
PCO2 BLDV: 46 MM HG (ref 40–50)
PH BLD: 7.47 PH (ref 7.35–7.45)
PH BLD: 7.5 PH (ref 7.35–7.45)
PH BLD: 7.55 PH (ref 7.35–7.45)
PH BLD: 7.56 PH (ref 7.35–7.45)
PH BLDV: 7.41 PH (ref 7.32–7.43)
PH BLDV: 7.45 PH (ref 7.32–7.43)
PH BLDV: 7.5 PH (ref 7.32–7.43)
PH BLDV: 7.52 PH (ref 7.32–7.43)
PHOSPHATE SERPL-MCNC: 3.3 MG/DL (ref 2.5–4.5)
PLATELET # BLD AUTO: 101 10E9/L (ref 150–450)
PLATELET # BLD AUTO: 53 10E9/L (ref 150–450)
PLATELET # BLD AUTO: 71 10E9/L (ref 150–450)
PO2 BLD: 136 MM HG (ref 80–105)
PO2 BLD: 137 MM HG (ref 80–105)
PO2 BLD: 141 MM HG (ref 80–105)
PO2 BLD: 149 MM HG (ref 80–105)
PO2 BLDV: 27 MM HG (ref 25–47)
PO2 BLDV: 28 MM HG (ref 25–47)
PO2 BLDV: 32 MM HG (ref 25–47)
PO2 BLDV: 34 MM HG (ref 25–47)
POTASSIUM SERPL-SCNC: 4.4 MMOL/L (ref 3.4–5.3)
POTASSIUM SERPL-SCNC: 4.7 MMOL/L (ref 3.4–5.3)
POTASSIUM SERPL-SCNC: 4.9 MMOL/L (ref 3.4–5.3)
RBC # BLD AUTO: 2.69 10E12/L (ref 4.4–5.9)
RBC # BLD AUTO: 2.99 10E12/L (ref 4.4–5.9)
RBC # BLD AUTO: 3.03 10E12/L (ref 4.4–5.9)
SODIUM SERPL-SCNC: 143 MMOL/L (ref 133–144)
SODIUM SERPL-SCNC: 143 MMOL/L (ref 133–144)
SODIUM SERPL-SCNC: 144 MMOL/L (ref 133–144)
SPECIMEN SOURCE: NORMAL
TRANSFUSION STATUS PATIENT QL: NORMAL
WBC # BLD AUTO: 7.4 10E9/L (ref 4–11)
WBC # BLD AUTO: 8.3 10E9/L (ref 4–11)
WBC # BLD AUTO: 8.3 10E9/L (ref 4–11)

## 2017-09-22 PROCEDURE — 93005 ELECTROCARDIOGRAM TRACING: CPT

## 2017-09-22 PROCEDURE — 85610 PROTHROMBIN TIME: CPT | Performed by: INTERNAL MEDICINE

## 2017-09-22 PROCEDURE — 87040 BLOOD CULTURE FOR BACTERIA: CPT | Performed by: ANESTHESIOLOGY

## 2017-09-22 PROCEDURE — 25000125 ZZHC RX 250: Performed by: THORACIC SURGERY (CARDIOTHORACIC VASCULAR SURGERY)

## 2017-09-22 PROCEDURE — 25000132 ZZH RX MED GY IP 250 OP 250 PS 637: Performed by: SURGERY

## 2017-09-22 PROCEDURE — 40000986 XR ABDOMEN PORT F1 VW

## 2017-09-22 PROCEDURE — 94003 VENT MGMT INPAT SUBQ DAY: CPT

## 2017-09-22 PROCEDURE — 93010 ELECTROCARDIOGRAM REPORT: CPT | Performed by: INTERNAL MEDICINE

## 2017-09-22 PROCEDURE — 36415 COLL VENOUS BLD VENIPUNCTURE: CPT | Performed by: SURGERY

## 2017-09-22 PROCEDURE — 0DHA7UZ INSERTION OF FEEDING DEVICE INTO JEJUNUM, VIA NATURAL OR ARTIFICIAL OPENING: ICD-10-PCS | Performed by: SURGERY

## 2017-09-22 PROCEDURE — 25000132 ZZH RX MED GY IP 250 OP 250 PS 637: Performed by: INTERNAL MEDICINE

## 2017-09-22 PROCEDURE — 20000004 ZZH R&B ICU UMMC

## 2017-09-22 PROCEDURE — 83735 ASSAY OF MAGNESIUM: CPT | Performed by: INTERNAL MEDICINE

## 2017-09-22 PROCEDURE — 87070 CULTURE OTHR SPECIMN AEROBIC: CPT | Performed by: SURGERY

## 2017-09-22 PROCEDURE — 40000048 ZZH STATISTIC DAILY SWAN MONITORING

## 2017-09-22 PROCEDURE — 27210437 ZZH NUTRITION PRODUCT SEMIELEM INTERMED LITER

## 2017-09-22 PROCEDURE — 82805 BLOOD GASES W/O2 SATURATION: CPT | Performed by: THORACIC SURGERY (CARDIOTHORACIC VASCULAR SURGERY)

## 2017-09-22 PROCEDURE — 84100 ASSAY OF PHOSPHORUS: CPT | Performed by: THORACIC SURGERY (CARDIOTHORACIC VASCULAR SURGERY)

## 2017-09-22 PROCEDURE — 25000128 H RX IP 250 OP 636: Performed by: THORACIC SURGERY (CARDIOTHORACIC VASCULAR SURGERY)

## 2017-09-22 PROCEDURE — 99291 CRITICAL CARE FIRST HOUR: CPT | Mod: GC | Performed by: SURGERY

## 2017-09-22 PROCEDURE — 87040 BLOOD CULTURE FOR BACTERIA: CPT | Performed by: SURGERY

## 2017-09-22 PROCEDURE — 83605 ASSAY OF LACTIC ACID: CPT | Performed by: THORACIC SURGERY (CARDIOTHORACIC VASCULAR SURGERY)

## 2017-09-22 PROCEDURE — 40000196 ZZH STATISTIC RAPCV CVP MONITORING

## 2017-09-22 PROCEDURE — 00000146 ZZHCL STATISTIC GLUCOSE BY METER IP

## 2017-09-22 PROCEDURE — 80048 BASIC METABOLIC PNL TOTAL CA: CPT | Performed by: THORACIC SURGERY (CARDIOTHORACIC VASCULAR SURGERY)

## 2017-09-22 PROCEDURE — 25000125 ZZHC RX 250: Performed by: ANESTHESIOLOGY

## 2017-09-22 PROCEDURE — 82330 ASSAY OF CALCIUM: CPT | Performed by: THORACIC SURGERY (CARDIOTHORACIC VASCULAR SURGERY)

## 2017-09-22 PROCEDURE — 83735 ASSAY OF MAGNESIUM: CPT | Performed by: THORACIC SURGERY (CARDIOTHORACIC VASCULAR SURGERY)

## 2017-09-22 PROCEDURE — 25000132 ZZH RX MED GY IP 250 OP 250 PS 637: Performed by: ANESTHESIOLOGY

## 2017-09-22 PROCEDURE — 40000986 XR CHEST PORT 1 VW

## 2017-09-22 PROCEDURE — 85027 COMPLETE CBC AUTOMATED: CPT | Performed by: INTERNAL MEDICINE

## 2017-09-22 PROCEDURE — 85027 COMPLETE CBC AUTOMATED: CPT | Performed by: THORACIC SURGERY (CARDIOTHORACIC VASCULAR SURGERY)

## 2017-09-22 PROCEDURE — 25000128 H RX IP 250 OP 636: Performed by: ANESTHESIOLOGY

## 2017-09-22 PROCEDURE — 80048 BASIC METABOLIC PNL TOTAL CA: CPT | Performed by: INTERNAL MEDICINE

## 2017-09-22 PROCEDURE — 36415 COLL VENOUS BLD VENIPUNCTURE: CPT | Performed by: ANESTHESIOLOGY

## 2017-09-22 PROCEDURE — 40000275 ZZH STATISTIC RCP TIME EA 10 MIN

## 2017-09-22 PROCEDURE — P9041 ALBUMIN (HUMAN),5%, 50ML: HCPCS | Performed by: ANESTHESIOLOGY

## 2017-09-22 PROCEDURE — 25000132 ZZH RX MED GY IP 250 OP 250 PS 637: Performed by: THORACIC SURGERY (CARDIOTHORACIC VASCULAR SURGERY)

## 2017-09-22 PROCEDURE — 83036 HEMOGLOBIN GLYCOSYLATED A1C: CPT | Performed by: INTERNAL MEDICINE

## 2017-09-22 PROCEDURE — 40000014 ZZH STATISTIC ARTERIAL MONITORING DAILY

## 2017-09-22 RX ORDER — ALBUMIN, HUMAN INJ 5% 5 %
250 SOLUTION INTRAVENOUS ONCE
Status: COMPLETED | OUTPATIENT
Start: 2017-09-22 | End: 2017-09-22

## 2017-09-22 RX ORDER — HEPARIN SODIUM 5000 [USP'U]/.5ML
5000 INJECTION, SOLUTION INTRAVENOUS; SUBCUTANEOUS EVERY 8 HOURS
Status: DISCONTINUED | OUTPATIENT
Start: 2017-09-23 | End: 2017-09-23

## 2017-09-22 RX ORDER — AMINO ACIDS/PROTEIN HYDROLYS 11G-40/45
2 LIQUID IN PACKET (ML) ORAL DAILY
Status: DISCONTINUED | OUTPATIENT
Start: 2017-09-22 | End: 2017-10-03

## 2017-09-22 RX ORDER — WARFARIN SODIUM 5 MG/1
5 TABLET ORAL
Status: DISCONTINUED | OUTPATIENT
Start: 2017-09-22 | End: 2017-09-22

## 2017-09-22 RX ORDER — WARFARIN SODIUM 1 MG/1
1 TABLET ORAL
Status: COMPLETED | OUTPATIENT
Start: 2017-09-22 | End: 2017-09-22

## 2017-09-22 RX ADMIN — DEXMEDETOMIDINE HYDROCHLORIDE 0.2 MCG/KG/HR: 4 INJECTION, SOLUTION INTRAVENOUS at 12:02

## 2017-09-22 RX ADMIN — HYDROMORPHONE HYDROCHLORIDE 0.5 MG: 1 INJECTION, SOLUTION INTRAMUSCULAR; INTRAVENOUS; SUBCUTANEOUS at 14:44

## 2017-09-22 RX ADMIN — LIDOCAINE 2 PATCH: 50 PATCH CUTANEOUS at 07:04

## 2017-09-22 RX ADMIN — SENNOSIDES AND DOCUSATE SODIUM 1 TABLET: 8.6; 5 TABLET ORAL at 20:24

## 2017-09-22 RX ADMIN — Medication 2 PACKET: at 17:32

## 2017-09-22 RX ADMIN — ACETAMINOPHEN 650 MG: 325 TABLET, FILM COATED ORAL at 00:43

## 2017-09-22 RX ADMIN — ASPIRIN 325 MG ORAL TABLET 325 MG: 325 PILL ORAL at 07:44

## 2017-09-22 RX ADMIN — LIDOCAINE HYDROCHLORIDE 5 ML: 20 SOLUTION ORAL; TOPICAL at 15:17

## 2017-09-22 RX ADMIN — MUPIROCIN 0.5 G: 20 OINTMENT TOPICAL at 20:18

## 2017-09-22 RX ADMIN — MEROPENEM 2 G: 1 INJECTION, POWDER, FOR SOLUTION INTRAVENOUS at 15:43

## 2017-09-22 RX ADMIN — VANCOMYCIN HYDROCHLORIDE 1000 MG: 1 INJECTION, SOLUTION INTRAVENOUS at 04:30

## 2017-09-22 RX ADMIN — EPINEPHRINE 0.05 MCG/KG/MIN: 1 INJECTION INTRAMUSCULAR; INTRAVENOUS; SUBCUTANEOUS at 19:23

## 2017-09-22 RX ADMIN — ALBUMIN (HUMAN) 250 ML: 12.5 SOLUTION INTRAVENOUS at 16:42

## 2017-09-22 RX ADMIN — PROPOFOL 20 MCG/KG/MIN: 10 INJECTION, EMULSION INTRAVENOUS at 02:00

## 2017-09-22 RX ADMIN — SENNOSIDES AND DOCUSATE SODIUM 2 TABLET: 8.6; 5 TABLET ORAL at 07:43

## 2017-09-22 RX ADMIN — POTASSIUM PHOSPHATE, MONOBASIC AND POTASSIUM PHOSPHATE, DIBASIC 20 MMOL: 224; 236 INJECTION, SOLUTION INTRAVENOUS at 02:02

## 2017-09-22 RX ADMIN — HYDROMORPHONE HYDROCHLORIDE 0.5 MG: 1 INJECTION, SOLUTION INTRAMUSCULAR; INTRAVENOUS; SUBCUTANEOUS at 08:44

## 2017-09-22 RX ADMIN — WARFARIN SODIUM 1 MG: 1 TABLET ORAL at 17:33

## 2017-09-22 RX ADMIN — MUPIROCIN 0.5 G: 20 OINTMENT TOPICAL at 07:44

## 2017-09-22 RX ADMIN — PANTOPRAZOLE SODIUM 40 MG: 40 INJECTION, POWDER, FOR SOLUTION INTRAVENOUS at 07:45

## 2017-09-22 RX ADMIN — OXYCODONE HYDROCHLORIDE 5 MG: 5 TABLET ORAL at 12:03

## 2017-09-22 RX ADMIN — VANCOMYCIN HYDROCHLORIDE 1000 MG: 1 INJECTION, SOLUTION INTRAVENOUS at 16:29

## 2017-09-22 RX ADMIN — OXYCODONE HYDROCHLORIDE 5 MG: 5 TABLET ORAL at 02:20

## 2017-09-22 RX ADMIN — EPINEPHRINE 0.05 MCG/KG/MIN: 1 INJECTION INTRAMUSCULAR; INTRAVENOUS; SUBCUTANEOUS at 19:24

## 2017-09-22 RX ADMIN — MULTIVITAMIN 15 ML: LIQUID ORAL at 16:42

## 2017-09-22 RX ADMIN — HYDROMORPHONE HYDROCHLORIDE 0.5 MG: 1 INJECTION, SOLUTION INTRAMUSCULAR; INTRAVENOUS; SUBCUTANEOUS at 20:58

## 2017-09-22 NOTE — PHARMACY-CONSULT NOTE
Pharmacy Tube Feeding Consult    Medication reviewed for administration by feeding tube and for potential food/drug interactions.    Recommendation: No changes are needed at this time.     Pharmacy will continue to follow as new medications are ordered.    Joan Cohen, PharmD  September 22, 2017

## 2017-09-22 NOTE — PLAN OF CARE
Problem: Goal Outcome Summary  Goal: Goal Outcome Summary  Outcome: Improving  Patient remains intubated and sedated. Current vent settings: CMV- FiO2 40%, tV 450, Rate 16, Peep 7. Lung sounds clear, diminished. Scant secretions. NSR 60s-80s. Temp pacer DDD at 60. MAPs >65. Afebrile. Patient withdraws to pain, wiggles all extremities, but does not follow commands. Pupils equal and reactive, but sluggish. Propofol, Insulin, Epi, Levo and D5 1/2NS drips infusing. Beltran intact with 50-100cc/hr output. OG to low inter suction- 0cc output. No bm. Chest tubes x3 to suction with 240cc total serosang output overnight. Hemodynamic Roseanna numbers: CVP 12-13, PAP 30s-40s/10s-20s. SVO2 53-63, CO 3.7-5.0, CI 2.2-2.9, -1370. Replaced KPhos x1 per protocol. Given prn Tylenol and Oxy IR for pain/discomfort. Dr. Greg Downs updated throughout night. Will continue to monitor and update MDs with any changes.

## 2017-09-22 NOTE — PLAN OF CARE
Problem: Individualization  Goal: Patient Preferences  Outcome: Improving  D: Pt arrives from surgery per OR team and anesthesia.  Pt VSS on arrival.  Pt has ETT in place.  Pt has left IJ swan with epi, levo infusing for BP stability.  Pt Has insulin gtt infusing and blood sugar is stable.  Pt is sedated with propofol.  A: Pt settled.  Xray, EKG and hemodynamics are done.  R: Pt tolerates all treatments.  Family at bedside and verbalize understanding of post op education.  P: monitor pt close for changes in condition.

## 2017-09-22 NOTE — OP NOTE
TODAY'S DATE:  09/21/2017      PREOPERATIVE DIAGNOSIS:  Severe homograft aortic valve insufficiency, severe mitral valve regurgitation, occluded saphenous vein graft to the right coronary artery.      POSTOPERATIVE DIAGNOSIS:  Severe homograft aortic valve insufficiency, severe mitral valve regurgitation, occluded saphenous vein graft to the right coronary artery.      SURGEON:  Nicola Gorman MD       COSURGEON:  Zeb Leal MD.  Please note that a second attending was required due to the complexity of this operation.      ASSISTANT:  Ho Mcleod MD, and Thu Castillo PA-C.      NAME OF OPERATION:  Redo sternotomy, aortic valve replacement with a 17 mm St. Jerald Masters mechanical valve, mitral valve replacement with a 27 mm St. Jerald Masters mechanical valve, coronary artery bypass grafting x1 with a reverse saphenous vein graft to the posterior descending artery, intraoperative IZA, endoscopic vein harvest from the left lower extremity, left femoral arterial cannulation.      ANESTHESIA:  General orotracheal.      INDICATIONS FOR PROCEDURE:  Mr. Harvey Almanzar is a 47-year-old gentleman with a very complicated cardiac history.  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  He was recently admitted to our hospital with heart failure.  The patient was placed on broad-spectrum antibiotics.  Over the week he clinically deteriorated and was taken to the operating room today for aortic valve replacement and mitral valve replacement.      OPERATIVE FINDINGS:  The patient had a severely calcified  homograft as expected.  The leaflets were also calcified and degenerative but there was no evidence of active infection and we did not see an abscess or an abscess cavity. Intraoperative gram stain did not show any organisms.  The mitral valve was severely regurgitant but, again, there was no evidence of infection or vegetations.  The aortic root was severely calcified including the coronary buttons and we decided that taking down the homograft and replacing it will be extremely risky.  For this reason, we decided to take the valve out and placed on mechanical valve in the aortic position.      DESCRIPTION OF PROCEDURE:  After informed consent was obtained, the patient brought down to the operating room and was placed on the OR table in a supine position.  Intravenous and intra-arterial lines were begun.  While monitoring his blood pressure and EKG tracing, he was anesthetized and intubated using a single lumen endotracheal tube.  A Bern-Marcello catheter was also placed.  His entire chest, abdomen, both groins and legs were circumferentially prepped down to the toes using multiple layers of DuraPrep.  He was draped in a sterile field.  The left groin cutdown was performed.  A 5 cm incision was made parallel to the inguinal crease 1 cm above and the femoral artery and vein was exposed.  10,000 units of heparin was given IV.  Using 5-0 pursestring sutures the femoral artery and the femoral vein were cannulated using a 15-Venezuelan catheter and a 25-Venezuelan venous catheters respectively.  Both catheters were placed using Seldinger technique using IZA guidance.  The sternal incision was then reopened and all prior sternotomy wires were removed.  Using a handheld oscillating saw the chest was safely reentered.  We also made a 5 cm limited left anterior thoracotomy in the fifth intercostal space in case we needed an LV vent emergently after going on bypass.  Using electrocautery and Metzenbaum scissors we dissected out the  heart.  The patient had significant adhesions as expected from the 2 prior sternotomies  We eventually fully heparinized the patient and went on cardiopulmonary bypass using femoral cannulae.  We had excellent decompression of the heart.  An angled metal tip cannula was placed in the SVC for better drainage.  The aorta was then cross-clamped and a liter of retrograde cardioplegia was given to fully arrest the heart.  The patient went into good diastolic arrest without any LV distention.  Following this, intermittent retrograde cardioplegia doses were given on average every 15 minutes for myocardial protection while the aorta was crossclamped.      First, the aorta, a transverse aortotomy was made immediately distal to the prior homograft to aorta suture line and the homograft aortic valve leaflets were exposed.  Findings are noted above.  All 3 leaflets were excised and the annulus was debrided. Next, the posterior descending artery was bypassed.  Conduit used was a saphenous vein.  Anastomosis was performed in an end-to-side fashion using running 7-0 Prolene. Cardioplegia was given down the graft along with each retrograde administration.    A standard left atriotomy was then made and the mitral valve was exposed.  Findings are noted above.  The anterior leaflet was excised and the posterior leaflet was preserved.  Annular sutures were placed using horizontal mattress sutures of 2-0 Ethibond with supra-annular pledgets.  The annulus was sized to a 27 mm St. Jerald Masters mechanical valve.  The valve was brought to the field and all sutures were brought through the sewing ring and the valve was seated down without difficulty.  All sutures were tied down securely.  The left atriotomy was closed in 2 layers of running 4-0 Prolene.      Attention was then turned back towards the homograft. The annulus was sized to a 17 mm St. Jerald mechanical valve.  Annular sutures were placed using horizontal mattress sutures with  either subannular or supra-annular pledgets.  This was extremely difficult due to the calcifications but were able to sew the sutures in place.  The valve was brought to the field and all sutures were brought through the sewing ring and the valve was seated down without difficulty.  All sutures were tied down securely.  The aortotomy was closed in 2 layers of running 4-0 Prolene. The proximal vein graft anastomosis was then performed to the ascending aorta using running 6-0 Prolene in an end-to-side fashion after creating a 4 mm aortotomy.      A liter of warm blood cardioplegia was given as a retrograde hot shot and the aortic crossclamp was removed.  The ascending aorta was continuously vented to deair the heart.  The patient was eventually weaned off cardiopulmonary bypass with epinephrine and norepinephrine support.  LV and RV function were good.  Once the patient remained stable off bypass, the venous cannula was removed and protamine was given.  The aortic cannula was subsequently removed as well.  Temporary ventricular pacing wires placed in the RV muscle.  32-Estonian angled and straight chest tubes x2 were placed in mediastinum as well.  These were all brought out percutaneously below the sternotomy incision, secured to skin using 2-0 Ethibond.  The mediastinum was irrigated with antibiotic saline and hemostasis was eventually achieved.  The sternum was reapproximated using multiple interrupted single and double wires.  The incision was closed in layers of running Vicryl suture.  Skin was closed using 3-0 Vicryl and was sealed using Dermabond.  The groin incision was also closed using running layers of running Vicryl suture, and the skin was closed using running Vicryl suture and was sealed using Dermabond.  The mini left thoracotomy incision was also closed in layers of running Vicryl suture.  Skin was also closed using running 3-0 Vicryl and was sealed using Dermabond.      There were no intraoperative  complications and the patient tolerated the operation well.  We had Cardiology do a formal echo and both valves were seated down appropriately with no significant paravalvular leak.  The mean gradient in the prosthetic aortic valve was 25 mmHg. Aortic crossclamp time was 184 minutes. Total cardiopulmonary bypass time was 237 minutes.     The patient was brought to the ICU intubated in hemodynamically stable condition.      Revised account 2017 melvin JOSHI MD             D: 2017 16:57   T: 2017 18:25   MT: MICHELLE      Name:     HERBERTH MENDENHALL   MRN:      [id number]        Account:        [id number]   :      1970           Procedure Date: 2017      Document: [id number]

## 2017-09-22 NOTE — PHARMACY-ANTICOAGULATION SERVICE
Pharmacy Empiric Dose Change Per Policy    Original Dose Ordered: warfarin 5 mg x 1  Dose Changed To: warfarin 1 mg x 1    Given patient's elevated INR=2.57 prior to surgery and elevated LFTs, will dose conservatively this evening as it is very difficult to predict how significant the warfarin effect will be. The patient was given KCentra prior to surgery and the patient's INR was WNL but since then is has steadily been increasing back up.    This dose change was based on the pharmacist's assessment of this patient's age, weight, concurrent drug therapy, treatment goals, whether patient's creatinine clearance adequately indicates renal function (factoring in age, muscle mass, fluid and clinical status), and, if applicable, prior pharmacokinetic data.    Will continue to follow and modify dosage according to levels, organ function and clinical condition.    Joan Cohen, PharmD  September 22, 2017

## 2017-09-22 NOTE — PROGRESS NOTES
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     1. Recent Streptococcus parasanguinis bacteremia and prosthetic aortic valve endocarditis DX 17  - Blood cx ,  , 7/15 positive for Strep parasanguinas (sensitive to Ceftriaxone LONG 0.094 ,Penicillin LONG 0.19 )   - Blood cx ,,  - No growth  - on ceftriaxone 2 gram IV daily and 2 weeks of gentamicin ( with increasing Cr )  - blood cx - , ,  - negative   - s/p  Redo Median Sternotomy, Lysis of adhesions, Left mini-thoracotomy,Coronary artery bypass graft x 1 using the left greater saphenous vein, using endovein harvest, Mitral valve replacement using [employer] Mechanical 27mm, Aortic Valve Replacement using a 17mm [employer] Mechanical, On Cardiopulmonary Bypass Pump - 17 - Mitral valve tissue - Gram Neg Rods , Aotic valve tissue - Neg so far       - TTE 17  - Severe left ventricular dilation is present. Biplane LV EF 46%.  Global right ventricular function is moderately reduced.Severe mitral insufficiency is present.Prosthetic aortic valve with significant thickening of aortic root.Possible vegetation or severe degenerative changes on aortic prosthetic valve with severe aortic regurgitation.Post operative changes in aortic root or related to infection. Right ventricular systolic pressure is 48mmHg above the right  atrial pressure. Dilation of the inferior vena cava is present with abnormal respiratory variation in diameter. No pericardial effusion is present.    IZA 8/24/17  CONCLUSION:   Low normal LV systolic function.  Mild LV dilation. Mild inferior    Hypokinesis.Normal RV size and function. Bioprosthetic aortic valve with multiple mobile vegetations.  Severe aortic insufficiency.  There appears to be trace perivalvular  regurgitation which originates from the anterior portion of the sewing ring.  Thickening of the aortic root which may be consistent with root abscess as previously noted without gross dehiscence.    Moderate to severe mitral regurgitation. There are no vegetations appreciated on the mitral valve. Patient has known endocarditis of bioprosthetic aortic valve with  possible root abscess. There are no significant changes in comparison    with the transthoracic echo from 8/24/17. Left Ventricular Ejection Fraction: 50 %     IZA 9/18/17  Interpretation Summary  Severely degenerated bioprosthetic valve in the aortic position. Leaflets are heavily calcified and prolapse into the LVOT, resulting in severe aortic insufficiency ( ms with flow reversal in the descending aorta). There is some degree of aortic stenosis that is difficult to quantify but is likely moderate (peak velocity 4.1 m/s, mean gradient 34 mmHg). There is no convincing evidence of vegetation or aortic root abscess. No vegetations seen on other valves. Severe left ventricular dilation is present. Mildly (EF 45-50%) reduced left  ventricular function is present. Global right ventricular function is moderately to severely reduced. Moderate functional tricuspid insufficiency secondary to RV dilatation. Moderate-severe functional mitral regurgitation secondary to LV dilatation.    RECOMMENDATION:    1. Blood cultures - 9/16, 17, 18 , negative, and has been on Ceftriaxone 2 gram IV daily since July 2017 for Strep parasangunis endocarditis ,  "asymptomatic - denies fever, chills, sweatings. But Mitral valev tissue - shows gram Neg Rods. Called lab and confirmed findings. ID pending and sensitivity will be done tomorrow, with results the following day. Will Start meropenem 2 gram IV q8hr and stop Ceftriaxone. Due to current renal function, will hold off on aminoglycoside for now.   - repeat blood cultures today   - remove PICC line - spoke with RN this morning     Plan discussed with Dr Steven     ID will continue to follow this patient. Dr Sweeney will be on call for Infectious disease service  this weekend     This patient will be transferred to the [facility] ID Service on Monday for ongoing ID services. Please contact the [facility] ID Service faculty for any further questions on this patient after 8AM Monday morning.    Belinda Bryant MD, M.Med.Sc.  Pager: [phone]     SUBJECTIVE: (Recommend ? 4 systems)   Intubated . Spoke with his RN this morning. Stable and plan on possible extubation later today , and plan to pull the PICC.     ROS:(Recommend ? 2systems)   A five-point review of systems was obtained and was negative with the exception of that which is described above.  No Known Allergies    No current outpatient prescriptions on file.   CURRENT ANTI-INFECTIVES:   Ceftriaxone  Vanco - roxanna-op    EXAMINATION: (Recommend ? 5 systems)   Vital Signs: BP (!) 83/60  Temp 100  F (37.8  C) (Pulmonary Artery)  Resp 16  Ht 1.626 m (5' 4\")  Wt 67.1 kg (147 lb 14.9 oz)  SpO2 99%  BMI 25.39 kg/m2   GENERAL:  intubated/sedated   HEENT: pale   ENT:  intubated  LUNGS: decreased breath sounds in bases  CARDIOVASCULAR:  reg , S1S2, extremities - trace edema   ABDOMEN:  Normal bowel sounds, soft, nontender. No appreciable hepatosplenomegaly  SKIN:  No acute rashes.  Line(s) are in place without any surrounding erythema or exudate. No stigmata of endocarditis.  NEUROLOGIC:  sedated   chest tubes   moore    NEW DATA/RESULTS:   Culture Micro   Date Value " Ref Range Status   09/22/2017 Canceled, Test credited  Final   09/22/2017 Test canceled by PCU/Clinic  Final   09/22/2017   Final    Per Jessica SAENZ from , test was ordered in error. I reordered the test to aerobic cath tip   culture.9/22/17 at 1150.TV.     09/21/2017 Culture negative monitoring continues  Preliminary   09/21/2017 Culture negative after 1 day  Preliminary   09/21/2017 Light growth  Gram negative rods   (A)  Preliminary   09/21/2017 Culture in progress  Preliminary     Recent Labs   Lab Test  09/15/17   2217   CRP  40.0*     Recent Labs   Lab Test  09/22/17   1150  09/22/17   0425  09/21/17   1728  09/21/17   1620  09/21/17   0840  09/21/17   0248   WBC  8.3  8.3  6.6  6.7  6.0  7.2     Recent Labs   Lab Test  09/22/17   1150  09/22/17   0425  09/21/17   2115 09/21/17   1728   CR  1.43*  1.28*  1.18  1.07   GFRESTIMATED  53*  60*  66  74     Hematology Studies  Recent Labs   Lab Test  09/22/17   1150  09/22/17   0425  09/21/17   1728  09/21/17   1620   09/21/17   0840  09/21/17   0248   09/15/17   2217   WBC  8.3  8.3  6.6  6.7   --   6.0  7.2   < >  8.8   ANEU   --    --    --   6.1   --    --    --    --   5.9   AEOS   --    --    --   0.0   --    --    --    --   0.0   HCT  26.8*  26.7*  27.2*  28.2*   --   31.0*  31.3*   < >  36.0*   PLT  71*  101*  121*  82*   < >  80*  82*   < >  267    < > = values in this interval not displayed.     Metabolic  Recent Labs   Lab Test  09/22/17   1150  09/22/17   0425  09/21/17   2115   NA  143  144  144   BUN  57*  58*  57*   CO2  27  28  24   CR  1.43*  1.28*  1.18   GFRESTIMATED  53*  60*  66     Hepatic Studies  Recent Labs   Lab Test  09/21/17   0248  09/15/17   2217   BILITOTAL  4.3*  2.5*   ALKPHOS  68  69   ALBUMIN  2.7*  3.1*   AST  2729*  24   ALT  2305*  21     Immunologlobulins  Recent Labs   Lab Test  09/15/17   2217   SED  42*     Imagings  TTE 9/16/17   Interpretation Summary  Severe left ventricular dilation is present.  Biplane LV EF  46%.  Global right ventricular function is moderately reduced.  Severe mitral insufficiency is present.  Prosthetic aortic valve with significant thickening of aortic root.Possible vegetation or severe degenerative changes on aortic prosthetic valve with severe aortic regurgitation.Post operative changes in aortic root or related to infection.  Right ventricular systolic pressure is 48mmHg above the right atrial pressure.  Dilation of the inferior vena cava is present with abnormal respiratory variation in diameter.  No pericardial effusion is present.     CXR 9/15/17  Impression:   1. Marked cardiomegaly.  2. Streaky and platelike opacities within both lung bases, likely corresponding with intrafissural fluid noted on the prior CT as well as mild bibasilar atelectasis.  3. Small bilateral pleural effusions which are loculated in the fissures.     IZA 8/24/17  CONCLUSION:   Low normal LV systolic function.  Mild LV dilation. Mild inferior    hypokinesis.    Normal RV size and function.    Bioprosthetic aortic valve with multiple mobile vegetations.  Severe    aortic insufficiency.  There appears to be trace perivalvular    regurgitation which originates from the anterior portion of the sewing    ring.  Thickening of the aortic root which may be consistent with root    abscess as previously noted without gross dehiscence.    Moderate to severe mitral regurgitation.     There are no vegetations appreciated on the mitral valve.      Patient has known endocarditis of bioprosthetic aortic valve with    possible root abscess. There are no significant changes in comparison    with the transthoracic echo from 8/24/17.     Left Ventricular Ejection Fraction: 50 %       TTE 8/24/17  CONCLUSION:    Limited echocardiogram.     Mild LV dilatation. Mild LVH.    Mildly reduced LV systolic function, EF 45%. Inferior wall is  slightly worse than other walls (probable no significant change on side  by side comparison with 08/22/2017  echo)    Normal RV size.     Borderline RV hypokinesis.     Moderate to severe LA dilatation.     Limited doppler/color suggests at least moderate AI, Severe MR,     mild to moderate TR , Estimated RVSP 61 mmHg + RAP (Patient     known to have bioprosthetic aortic valve endocarditis)    No significant pericardial effusion    Left Ventricular Ejection Fraction: 45 %     CT chest w contrast 9/1/17   IMPRESSION:   1. Cardiomegaly with calcification of the aortic valve and proximal ascending aorta. Ascending aortic dilation measuring 4 cm. Prominent mediastinal lymph nodes.  3. Loculated bilateral pleural effusions with associated atelectasis.   4. Predominantly upper lungs centrilobular emphysema. Superimposed  mild groundglass opacities, which could represent mild edema.  5. Enlarged main pulmonary, likely pulmonary hypertension.

## 2017-09-22 NOTE — PLAN OF CARE
Problem: Goal Outcome Summary  Goal: Goal Outcome Summary  Outcome: Declining  D: Pt weaned down to only 0.01 of epi today.  Pt was off sedation but restless and never able to follow commands.  Pt was pulling at lines and tubes and not redirectable. Pt U.O drifting down to 35 ml's/hr A: Pt was started on precedex.  Pt BP started to drift down and is now on epi at 0.06 and levo at 0.04.  Pt labs are stable.  Pt given 250 ml's of albumin.  R: Pt BP stable on pressors and with albumin.  P; Continue to monitor.

## 2017-09-22 NOTE — PHARMACY-ANTICOAGULATION SERVICE
Clinical Pharmacy - Warfarin Dosing Consult     Pharmacy has been consulted to manage this patient s warfarin therapy.  Indication: Mechanical Mitral Valve Replacement  Therapy Goal: INR 2.5-3.5  Provider/Team: CVTS  Warfarin Prior to Admission: No  Recent documented change in oral intake/nutrition: Unknown    INR   Date Value Ref Range Status   09/22/2017 1.62 (H) 0.86 - 1.14 Final   09/21/2017 1.18 (H) 0.86 - 1.14 Final       A:  Pt has elevated LFT's, including total bilirubin, ALT and AST.  Enzymes may be due to surgery 9/21.  He has not been on warfarin prior to admissiom, but his INR was elevated to 2.57 on 9/19.  Recommend warfarin 5 mg today.  Pharmacy will monitor Harvey Almanzar daily and order warfarin doses to achieve specified goal.      Please contact pharmacy as soon as possible if the warfarin needs to be held for a procedure or if the warfarin goals change.    Mansoor Gandhi, PharmD  Pager [phone]

## 2017-09-22 NOTE — PROGRESS NOTES
"CLINICAL NUTRITION SERVICES - ASSESSMENT NOTE     Nutrition Prescription    RECOMMENDATIONS FOR MDs/PROVIDERS TO ORDER:  -Closely monitor electrolytes and replace as needed  -Free water flush adjustment per MD discretion pending sodium and fluid status    Malnutrition Status:    Severe malnutrition in the context of acute on illness    Recommendations already ordered by Registered Dietitian (RD):  -Once FT verified post-pyloric, K/Mg are WNL, and Phos >1.9, start fiber-free TF via NDT: Peptamen 1.5 @ 10 mL/hr  -If lytes remain WNL, adv TF by 10 mL q8h to goal 50 ml/hr (1200 ml/day) to provide 1800 kcals (28+ kcal/kg/day), 82 g PRO (1.3+ g/kg/day), 924 ml free H2O, 67 g Fat (70% from MCTs), 226 g CHO and no Fiber daily.  -1 packet Prosource BID to provide an additional 80 kcal and 22g PRO  -TF @ goal + Prosource packets provide total provisions of: 1880 kcal (29 kcal/kg) and 104 g PRO (1.6 g/kg)  -15 mL liquid MVI for micronutrient needs  -30 mL water flush q4h for tube patency    Future/Additional Recommendations:  -FT position  -EN start, adv, lytes  -Metabolic cart study prior to next reassessment if appropriate  -Follow-up with post-op CV surg diet ed as able/approp     REASON FOR ASSESSMENT  Harvey Almanzar is a/an 47 year old male assessed by the dietitian for Provider Order - Registered Dietitian to Assess and Order TF per Medical Nutrition Therapy Protocol and received consult for Post Op Cardiovascular surgery    NUTRITION HISTORY  Unable to obtain r/t pt intubated and sedated upon visit without family at the bedside.    CURRENT NUTRITION ORDERS  Diet: NPO  Intake/Tolerance: Pt was on cardiac diet from 9/17-9/21 and per RN charting, pt was eating 50% with fair appetite    LABS  9/21: Phos = 1.3 (L)    MEDICATIONS  Epi  Propofol    ANTHROPOMETRICS  Height: 162.6 cm (5' 4\")  Most Recent Weight: 67.1 kg (147 lb 14.9 oz)    IBW: 59.1 kg  BMI: 25.4; Overweight BMI 25-29.9  Weight History:   Wt Readings " from Last 10 Encounters:   09/22/17 67.1 kg (147 lb 14.9 oz)   08/31/17 68 kg (150 lb)     Dosing Weight: 64 kg (actual based on lowest admit wt of 64 kg on 9/17, IBW = 59.1 kg)    ASSESSED NUTRITION NEEDS  Estimated Energy Needs: 5157-4157+ kcals/day (25 - 30+ kcals/kg)  Justification: Post-op - increased needs  Estimated Protein Needs:  grams protein/day (1.5 - 2 grams of pro/kg)  Justification: Post-op  Estimated Fluid Needs: (1 mL/kcal)   Justification: Per provider pending fluid status    PHYSICAL FINDINGS  See malnutrition section below.     MALNUTRITION  % Intake: </= 50% for >/= 5 days (severe)  % Weight Loss: Unable to assess  Subcutaneous Fat Loss: Lower arm and Thoracic/intercostal: Mild  Muscle Loss: Temporal, Lower arm  (forearm), Upper leg (quadricep, hamstring), Patellar region and Posterior calf:  Mild-Moderate  Fluid Accumulation/Edema: None noted  Malnutrition Diagnosis: Severe malnutrition in the context of acute on illness    NUTRITION DIAGNOSIS  Inadequate protein-energy intake related to poor appetite, now post-op as evidenced by RN documentation of 50% intake at meals with fair appetite, now intubated, NPO day 2      INTERVENTIONS  Implementation  Nutrition Education: Unable to complete due to pt intubated/sedated, will follow-up with post-op CV surg diet ed as able/appropriate  Collaboration with other providers - Discussed EN start with providers  Enteral Nutrition - Initiate  Feeding tube flush  Multivitamin/mineral supplement therapy     Goals  Total avg nutritional intake to meet a minimum of 25 kcal/kg and 1.5 g PRO/kg daily (per dosing wt 64 kg).     Monitoring/Evaluation  Progress toward goals will be monitored and evaluated per protocol.    Gretchen Roberts RDN, LD  Pgr: [phone]

## 2017-09-22 NOTE — PROGRESS NOTES
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  Non-rheumatic mitral regurgitation  Endocarditis of aortic valve    Subjective: no acute events overnight, blood pressures stabilized, sedation weaned but not awake yet    Objective:  Temp:  [98.1  F (36.7  C)-99.1  F (37.3  C)] 98.4  F (36.9  C)  Heart Rate:  [68-86] 81  Resp:  [16-22] 16  BP: (83-94)/(59-71) 83/60  MAP:  [63 mmHg-92 mmHg] 80 mmHg  Arterial Line BP: ()/(51-90) 101/66  FiO2 (%):  [40 %-80 %] 40 %  SpO2:  [99 %-100 %] 100 %    Ventilation Mode: CMV/AC  FiO2 (%): 40 %  Rate Set (breaths/minute): 20 breaths/min  Tidal Volume Set (mL): 450 mL  PEEP (cm H2O): 5 cmH2O  Oxygen Concentration (%): 40 %  Resp: 16    I/O last 3 completed shifts:  In: 7204.98 [I.V.:2924.98; Other:410; NG/GT:120]  Out: 2855 [Urine:1505; Blood:1000; Chest Tube:350]    PE:  General: Intubated, not awakening  Neck: Supple, no tracheal deviation  Cardiovascular: RRR  Pumonary: Non labored breathing on MV  Abdomen: Soft, non distended, non tender.   Ext: W/o edema, warm  Neuro: moves extremities spontaneously but not following commands    BMP  Recent Labs  Lab 09/22/17  0425 09/21/17  2115 09/21/17  1728 09/21/17  1620  09/21/17  0248 09/20/17  0409    144 142 139  < > 133 131*   POTASSIUM 4.4 4.3 4.0 4.2  < > 3.5 4.2   CHLORIDE 108 108 105  --   --  94 93*   CO2 28 24 24  --   --  26 21   BUN 58* 57* 58*  --   --  78* 91*   CR 1.28* 1.18 1.07  --   --  1.23 1.93*   * 116* 128* 161*  < > 108* 96   MAG 3.6* 3.9* 4.0*  --   --   --  3.4*   PHOS  --  1.3* 3.4  --   --   --   --    < > = values in this interval not displayed.  CBC  Recent Labs  Lab 09/22/17  0425 09/21/17  2115 09/21/17  1728 09/21/17  1620  09/21/17  1503  09/21/17  0840   WBC 8.3  --  6.6 6.7  --   --   --  6.0   HGB 8.6* 9.0* 8.9* 9.1*  9.2*  < > 6.3*  < > 9.9*   *  --  121* 82*  --  135*  < > 80*   < > = values in this interval  not displayed.  INR/PTT  Recent Labs  Lab 09/22/17  0425 09/21/17  1728 09/21/17  1620 09/21/17  1503 09/21/17  1326   INR 1.62* 1.18* 1.04 2.48* 5.83*   PTT  --  48* 48* 57* >240*     ABG  Recent Labs  Lab 09/22/17  0425 09/22/17  0016 09/21/17  2115 09/21/17  1728   PH 7.55* 7.56* 7.54* 7.34*   PCO2 32* 30* 30* 46*   PO2 137* 149* 263* 168*   HCO3 28 27 26 25     LFT  Recent Labs  Lab 09/21/17  0248 09/15/17  2217   AST 2729* 24   ALT 2305* 21   ALKPHOS 68 69   BILITOTAL 4.3* 2.5*   ALBUMIN 2.7* 3.1*       A/P   47 year old with history of prosthetic aortic valve with development of endocarditis, now s/p redo sternotomy with mechanical aortic and mitral valve replacements.   Neuro: tylenol, dilaudid/oxycodone for pain, hold sedation until able to assess neuro status for extubation   Resp: minimal vent settings, wean to extubate as mental status allows  CV: keep low dose epi today, wean other pressors   GI/FEN: obtain feeding tube later today if unable to extubate   Renal: no lasix today, monitor UOP, creatinine bump today  Endo: Insulin gtt   ID: continues on ceftriaxone and vancomycin  Heme: no ongoing bleeding concerns but diffuse intra-op oozing so hold off on heparin today per surgeon  Prophylaxis: Protonix, start warfarin tonight and heparin tomorrow  Dispo: CV ICU    Tramaine Holt MD  Surgery PGY-3

## 2017-09-22 NOTE — PROCEDURES
Bridle Placement:   Reason for bridle placement: securement of FT, pt gets agitated per RN   Medicine delivered during procedure: lubricating jelly   Procedure: Successful   Location of top of clip on FT: @ 90 cm marker   Condition of nose/skin at time of bridle placement: Unremarkable   Face to Face time with patient: <5 minutes.    Nilda Bianchi RD, LD, CNSC  CVICU Dietitian  Pager: [phone]

## 2017-09-22 NOTE — PROGRESS NOTES
SW assisting ICU SW with assessment as pt is POD 1 in ICU.  Pt is currently sedated and not able to participate in assessment process.  SW will be calling brother to introduce SW role on ICU and offer support.      SW attempted to call brother x2 and phone line is busy with no calls going through.  Will attempt to call again later today.  Will also ask weekend SW to assist with assessment and start of dc planning as able.    Update - attempted to call again and phone giving busy signal.  Will hand off to weekend SW for assessment.  Updated ICU     KINZA Mcdaniels, APSW  6C Unit  - assisting with ICU coverage.  Phone: [phone]  Pager: [phone]  Unit: [phone]

## 2017-09-22 NOTE — PLAN OF CARE
Problem: Goal Outcome Summary  Goal: Goal Outcome Summary  OT/CR 4E: Cancel.  Acknowledge orders placed for OT/CR eval and treat.  Pt currently intubated and sedated.  Other caregivers present for in-room procedure upon attempt this PM.  Will monitor pt status and initiate services as appropriate.

## 2017-09-22 NOTE — PROGRESS NOTES
CVICU PROGRESS NOTE:  September 22, 2017   Harvey Almanzar  [id number]  Admitted: 9/15/2017 10:17 PM    Assessment: Harvey Almanzar is a 47 year old male s/p redo sternotomy, AVR, MVR, and 1v CABG on 9/21 with remote history of AVR for bicuspid AV who developed endocarditis and acutely worsening heart failure thus requiring surgery.     Plan:     Neuro:     Received significant amount of versed during case, slow to awaken, will give sedation holiday and continue to closely monitor neuro status.    Oxy and dilaudid PRN for pain, Tylenol, Lidoderm   CVS:     Pre op IZA revealing severe AI, MR, dilated LV and RV, with mod red RV function. Now s/p procedure as stated above. Will continue low dose epi for right heart and inotropic support, wean off levophed as able.     ASA  Pulm:     Mechanically ventilated post op, mental status recovering. On minimal vent settings, no issues with oxy/vent.     PST when able  GI/FEN:     NPO while intubated, will reassess for FT later today if neuro status not improved and unable to extubate.     Bowel regimen    GI Prophylaxis: PPI       Renal:     Making adequate UOP, Cr trending up, will continue to monitor. Check noon labs.   ID:     ID has been following for endocarditis, will follow up on recommendations. Patient continues on ceftriaxone and periop vanco.   Endo:     Insulin gtt per protocol  Heme:     Significant intraop resuscitation, currently stable from bleeding/coag standpoint.     DVT Prophylaxis: Hold heparin today, will give coumadin tonight  Dispo: CVICU    Lines/Drains: LIJ CVC/PAC, remove femoral a line, rad a line, ETT, Ruby, CTs    Patient seen and discussed with Dr. Ashley Steven MD PGY4  ___________________________________________________________  SUBJECTIVE  No acute events overnight, remained intubated able to wean down on vent settings. Still not following commands, but spontaneously moves all extremities.     OBJECTIVE  Temp:   [98.1  F (36.7  C)-99.1  F (37.3  C)] 98.4  F (36.9  C)  Heart Rate:  [68-86] 81  Resp:  [16-22] 16  BP: (83-94)/(59-71) 83/60  MAP:  [63 mmHg-92 mmHg] 80 mmHg  Arterial Line BP: ()/(51-90) 101/66  FiO2 (%):  [40 %-80 %] 40 %  SpO2:  [99 %-100 %] 100 %    Ventilation Mode: CMV/AC  FiO2 (%): 40 %  Rate Set (breaths/minute): 20 breaths/min  Tidal Volume Set (mL): 450 mL  PEEP (cm H2O): 5 cmH2O  Oxygen Concentration (%): 40 %  Resp: 16    I/O last 3 completed shifts:  In: 7204.98 [I.V.:2924.98; Other:410; NG/GT:120]  Out: 2855 [Urine:1505; Blood:1000; Chest Tube:350]    Physical Exam  - Gen:Male lying in bed intubated and sedated  - CVS: RRR  - Pulm: clear mechanical breath sounds bilaterally  - Abd: soft, non-tender, non-distended, no guarding  - Ext: warm, well-perfused; trace edema    LABS  CMP  Recent Labs  Lab 09/22/17  0425 09/21/17  2115  09/21/17  0248    144  < > 133   POTASSIUM 4.4 4.3  < > 3.5   CHLORIDE 108 108  < > 94   CO2 28 24  < > 26   ANIONGAP 8 12  < > 12   * 116*  < > 108*   BUN 58* 57*  < > 78*   CR 1.28* 1.18  < > 1.23   GFRESTIMATED 60* 66  < > 63   GFRESTBLACK 73 80  < > 76   CHECO 9.1 9.8  < > 8.1*   MAG 3.6* 3.9*  < >  --    PHOS  --  1.3*  < >  --    PROTTOTAL  --   --   --  5.7*   ALBUMIN  --   --   --  2.7*   BILITOTAL  --   --   --  4.3*   ALKPHOS  --   --   --  68   AST  --   --   --  2729*   ALT  --   --   --  2305*   < > = values in this interval not displayed.    CBC  Recent Labs  Lab 09/22/17 0425   WBC 8.3   HGB 8.6*   *   HCT 26.7*   MCV 88   RBC 3.03*   MCH 28.4   MCHC 32.2   RDW 17.6*       INR  Recent Labs  Lab 09/22/17 0425   INR 1.62*       Arterial Blood Gas    Recent Labs  Lab 09/22/17  0426 09/22/17  0425 09/22/17  0016 09/21/17  2115 09/21/17  1728   PH  --  7.55* 7.56* 7.54* 7.34*   PCO2  --  32* 30* 30* 46*   PO2  --  137* 149* 263* 168*   HCO3  --  28 27 26 25   O2PER 40 40 40  40 70%  70% 100.0  100.0          RADIOLOGY  Recent Results (from  the past 24 hour(s))   XR Chest Port 1 View    Narrative    XR CHEST PORT 1 VW  9/21/2017 5:40 PM      HISTORY: endotracheal tube positioning    COMPARISON: 9/19/2017    FINDINGS: Sternotomy wires. Prosthetic aortic valve. Endotracheal tube  tip projects over the distal trachea, approximately 3.1 cm from the  georgie. Left IJ approach Fairbanks-Marcello catheter tip projects over the main  pulmonary artery. Right IJ approach central venous catheter tip  projects over the mid SVC. Right arm PICC tip projects over the low  SVC. Enteric tube sidehole projects over the body the stomach.  Significant enlargement of the cardiac silhouette, stable bilateral  pleural effusions. Diffuse interstitial opacities. Bibasilar  opacities.      Impression    IMPRESSION:   1. ET tube tip projects over the distal trachea, approximately 3.1 cm  from the georgie.  2. No significant change in diffuse interstitial opacities, likely  representing pulmonary edema.  3. Bibasilar opacities may represent atelectasis or pneumonia.  4. Bilateral small pleural effusions.  5. Enlarged cardiac silhouette, stable.    I have personally reviewed the examination and initial interpretation  and I agree with the findings.    DANIELA OLEARY MD   XR Abdomen Port 1 View    Narrative    XR ABDOMEN PORT F1 VW  9/21/2017 5:42 PM      HISTORY: OG tube placement    COMPARISON: 9/19/2017    FINDINGS: Sternotomy wires. Prosthetic aortic valve. Epicardial pacer  wires. Fairbanks-Marcello catheter tip projects over the main pulmonary artery.  Mediastinal drains. Enteric tube sidehole projects over the body the  stomach. Stool projects over the left colon. Nonobstructive bowel gas  pattern. No portal venous gas or pneumatosis is now loss. Bilateral  pleural effusions. Enlarged cardiac silhouette. Bibasilar opacities.      Impression    IMPRESSION:   1. Enteric tube sidehole projects over the body the stomach.  Nonobstructive bowel gas pattern.  2. Enlarged cardiac silhouette.  3.  Bilateral pleural effusions.  4. Bibasilar opacities.    I have personally reviewed the examination and initial interpretation  and I agree with the findings.    DANIELA OLEARY MD   XR Chest Port 1 View    Narrative    Exam: XR CHEST PORT 1 VW, 9/22/2017 2:35 AM    Indication: Postop    Comparison: 9/21/2017    Findings:   Single AP view of the chest. Endotracheal tube tip projects 4.5 cm  above the georgie. Gastric tube courses below the diaphragm. Prosthetic  valves. Median sternotomy wires. Right IJ central venous catheter is  been removed. Right upper extremity PICC projects over the mid SVC.  Stable mediastinal chest tubes. Epicardial leads. Left IJ Brownell-Marcello  catheter tip projects over the main pulmonary artery.     Large cardiac silhouette. Moderate loculated appearing right pleural  effusion, mildly decreased and stable small left pleural effusions,  with patchy basilar opacities, not significantly changed. Diffuse  interstitial opacities.      Impression    Impression:   1. Mildly decreased loculated right pleural effusions and stable small  left pleural effusion. Unchanged associated basilar opacities.  2. Stable cardiomegaly and interstitial pulmonary edema.  3. Right IJ central venous catheter tip has been removed. Otherwise  stable lines and tubes.    I have personally reviewed the examination and initial interpretation  and I agree with the findings.    SATISH CRAIN MD         SCHEDULED MEDICATIONS    sodium chloride (PF)  3 mL Intracatheter Q8H     insulin aspart   Subcutaneous TID w/meals     pantoprazole (PROTONIX) IV PEDS/NICU  40 mg Intravenous Daily     mupirocin  0.5 g Both Nostrils BID     vancomycin (VANCOCIN) IV  1,000 mg Intravenous Q12H     aspirin  325 mg Oral Daily     lidocaine  2 patch Transdermal Q24H     lidocaine   Transdermal Q24h     lidocaine   Transdermal Q8H     senna-docusate  1-2 tablet Oral BID     pneumococcal vaccine  0.5 mL Intramuscular Prior to discharge      cefTRIAXone  2 g Intravenous Q24H

## 2017-09-23 ENCOUNTER — APPOINTMENT (OUTPATIENT)
Dept: CT IMAGING | Facility: CLINIC | Age: 47
DRG: 216 | End: 2017-09-23
Attending: PHYSICIAN ASSISTANT
Payer: COMMERCIAL

## 2017-09-23 ENCOUNTER — APPOINTMENT (OUTPATIENT)
Dept: ULTRASOUND IMAGING | Facility: CLINIC | Age: 47
DRG: 216 | End: 2017-09-23
Attending: PHYSICIAN ASSISTANT
Payer: COMMERCIAL

## 2017-09-23 LAB
ABO + RH BLD: NORMAL
ABO + RH BLD: NORMAL
ALBUMIN SERPL-MCNC: 2.3 G/DL (ref 3.4–5)
ALP SERPL-CCNC: 52 U/L (ref 40–150)
ALT SERPL W P-5'-P-CCNC: 307 U/L (ref 0–70)
ANION GAP SERPL CALCULATED.3IONS-SCNC: 4 MMOL/L (ref 3–14)
ANION GAP SERPL CALCULATED.3IONS-SCNC: 9 MMOL/L (ref 3–14)
AST SERPL W P-5'-P-CCNC: 226 U/L (ref 0–45)
BACTERIA SPEC CULT: NO GROWTH
BACTERIA SPEC CULT: NO GROWTH
BASE EXCESS BLDA CALC-SCNC: 4.2 MMOL/L
BASE EXCESS BLDA CALC-SCNC: 4.8 MMOL/L
BASE EXCESS BLDV CALC-SCNC: 5.5 MMOL/L
BASE EXCESS BLDV CALC-SCNC: 6.3 MMOL/L
BASE EXCESS BLDV CALC-SCNC: 7 MMOL/L
BILIRUB DIRECT SERPL-MCNC: 8.1 MG/DL (ref 0–0.2)
BILIRUB SERPL-MCNC: 12.7 MG/DL (ref 0.2–1.3)
BLD GP AB SCN SERPL QL: NORMAL
BLD PROD TYP BPU: NORMAL
BLD PROD TYP BPU: NORMAL
BLD UNIT ID BPU: 0
BLOOD BANK CMNT PATIENT-IMP: NORMAL
BLOOD PRODUCT CODE: NORMAL
BPU ID: NORMAL
BUN SERPL-MCNC: 46 MG/DL (ref 7–30)
BUN SERPL-MCNC: 58 MG/DL (ref 7–30)
CALCIUM SERPL-MCNC: 8 MG/DL (ref 8.5–10.1)
CALCIUM SERPL-MCNC: 8.2 MG/DL (ref 8.5–10.1)
CHLORIDE SERPL-SCNC: 111 MMOL/L (ref 94–109)
CHLORIDE SERPL-SCNC: 114 MMOL/L (ref 94–109)
CO2 SERPL-SCNC: 26 MMOL/L (ref 20–32)
CO2 SERPL-SCNC: 29 MMOL/L (ref 20–32)
CREAT SERPL-MCNC: 0.91 MG/DL (ref 0.66–1.25)
CREAT SERPL-MCNC: 1.25 MG/DL (ref 0.66–1.25)
ERYTHROCYTE [DISTWIDTH] IN BLOOD BY AUTOMATED COUNT: 19.9 % (ref 10–15)
ERYTHROCYTE [DISTWIDTH] IN BLOOD BY AUTOMATED COUNT: 20.1 % (ref 10–15)
ERYTHROCYTE [DISTWIDTH] IN BLOOD BY AUTOMATED COUNT: 20.2 % (ref 10–15)
ERYTHROCYTE [DISTWIDTH] IN BLOOD BY AUTOMATED COUNT: NORMAL % (ref 10–15)
GFR SERPL CREATININE-BSD FRML MDRD: 62 ML/MIN/1.7M2
GFR SERPL CREATININE-BSD FRML MDRD: 89 ML/MIN/1.7M2
GLUCOSE BLDC GLUCOMTR-MCNC: 126 MG/DL (ref 70–99)
GLUCOSE BLDC GLUCOMTR-MCNC: 134 MG/DL (ref 70–99)
GLUCOSE BLDC GLUCOMTR-MCNC: 144 MG/DL (ref 70–99)
GLUCOSE BLDC GLUCOMTR-MCNC: 157 MG/DL (ref 70–99)
GLUCOSE SERPL-MCNC: 141 MG/DL (ref 70–99)
GLUCOSE SERPL-MCNC: 143 MG/DL (ref 70–99)
HCO3 BLD-SCNC: 28 MMOL/L (ref 21–28)
HCO3 BLD-SCNC: 28 MMOL/L (ref 21–28)
HCO3 BLDV-SCNC: 30 MMOL/L (ref 21–28)
HCO3 BLDV-SCNC: 31 MMOL/L (ref 21–28)
HCO3 BLDV-SCNC: 31 MMOL/L (ref 21–28)
HCT VFR BLD AUTO: 24 % (ref 40–53)
HCT VFR BLD AUTO: 24.4 % (ref 40–53)
HCT VFR BLD AUTO: 27.8 % (ref 40–53)
HCT VFR BLD AUTO: NORMAL % (ref 40–53)
HGB BLD-MCNC: 7.7 G/DL (ref 13.3–17.7)
HGB BLD-MCNC: 7.9 G/DL (ref 13.3–17.7)
HGB BLD-MCNC: 8.9 G/DL (ref 13.3–17.7)
HGB BLD-MCNC: NORMAL G/DL (ref 13.3–17.7)
INR PPP: 2.01 (ref 0.86–1.14)
MAGNESIUM SERPL-MCNC: 2.8 MG/DL (ref 1.6–2.3)
MAGNESIUM SERPL-MCNC: 3.3 MG/DL (ref 1.6–2.3)
MCH RBC QN AUTO: 29 PG (ref 26.5–33)
MCH RBC QN AUTO: 29.6 PG (ref 26.5–33)
MCH RBC QN AUTO: 29.8 PG (ref 26.5–33)
MCH RBC QN AUTO: NORMAL PG (ref 26.5–33)
MCHC RBC AUTO-ENTMCNC: 32 G/DL (ref 31.5–36.5)
MCHC RBC AUTO-ENTMCNC: 32.1 G/DL (ref 31.5–36.5)
MCHC RBC AUTO-ENTMCNC: 32.4 G/DL (ref 31.5–36.5)
MCHC RBC AUTO-ENTMCNC: NORMAL G/DL (ref 31.5–36.5)
MCV RBC AUTO: 91 FL (ref 78–100)
MCV RBC AUTO: 92 FL (ref 78–100)
MCV RBC AUTO: 92 FL (ref 78–100)
MCV RBC AUTO: NORMAL FL (ref 78–100)
NUM BPU REQUESTED: 1
O2/TOTAL GAS SETTING VFR VENT: 40 %
OXYHGB MFR BLD: 97 % (ref 92–100)
OXYHGB MFR BLD: 97 % (ref 92–100)
OXYHGB MFR BLDV: 45 %
OXYHGB MFR BLDV: 45 %
OXYHGB MFR BLDV: 59 %
PCO2 BLD: 34 MM HG (ref 35–45)
PCO2 BLD: 37 MM HG (ref 35–45)
PCO2 BLDV: 43 MM HG (ref 40–50)
PCO2 BLDV: 43 MM HG (ref 40–50)
PCO2 BLDV: 44 MM HG (ref 40–50)
PH BLD: 7.48 PH (ref 7.35–7.45)
PH BLD: 7.52 PH (ref 7.35–7.45)
PH BLDV: 7.44 PH (ref 7.32–7.43)
PH BLDV: 7.46 PH (ref 7.32–7.43)
PH BLDV: 7.47 PH (ref 7.32–7.43)
PHOSPHATE SERPL-MCNC: 1.7 MG/DL (ref 2.5–4.5)
PHOSPHATE SERPL-MCNC: 3.4 MG/DL (ref 2.5–4.5)
PLATELET # BLD AUTO: 45 10E9/L (ref 150–450)
PLATELET # BLD AUTO: 45 10E9/L (ref 150–450)
PLATELET # BLD AUTO: 51 10E9/L (ref 150–450)
PLATELET # BLD AUTO: NORMAL 10E9/L (ref 150–450)
PO2 BLD: 125 MM HG (ref 80–105)
PO2 BLD: 133 MM HG (ref 80–105)
PO2 BLDV: 25 MM HG (ref 25–47)
PO2 BLDV: 26 MM HG (ref 25–47)
PO2 BLDV: 31 MM HG (ref 25–47)
POTASSIUM SERPL-SCNC: 4.1 MMOL/L (ref 3.4–5.3)
POTASSIUM SERPL-SCNC: 5 MMOL/L (ref 3.4–5.3)
PROT SERPL-MCNC: 4.6 G/DL (ref 6.8–8.8)
RADIOLOGIST FLAGS: ABNORMAL
RBC # BLD AUTO: 2.6 10E12/L (ref 4.4–5.9)
RBC # BLD AUTO: 2.65 10E12/L (ref 4.4–5.9)
RBC # BLD AUTO: 3.07 10E12/L (ref 4.4–5.9)
RBC # BLD AUTO: NORMAL 10E12/L (ref 4.4–5.9)
SODIUM SERPL-SCNC: 145 MMOL/L (ref 133–144)
SODIUM SERPL-SCNC: 148 MMOL/L (ref 133–144)
SPECIMEN EXP DATE BLD: NORMAL
SPECIMEN SOURCE: NORMAL
SPECIMEN SOURCE: NORMAL
TRANSFUSION STATUS PATIENT QL: NORMAL
TRANSFUSION STATUS PATIENT QL: NORMAL
WBC # BLD AUTO: 8.2 10E9/L (ref 4–11)
WBC # BLD AUTO: 8.3 10E9/L (ref 4–11)
WBC # BLD AUTO: 9.4 10E9/L (ref 4–11)
WBC # BLD AUTO: NORMAL 10E9/L (ref 4–11)

## 2017-09-23 PROCEDURE — 25000132 ZZH RX MED GY IP 250 OP 250 PS 637: Performed by: ANESTHESIOLOGY

## 2017-09-23 PROCEDURE — 25000128 H RX IP 250 OP 636: Performed by: RADIOLOGY

## 2017-09-23 PROCEDURE — 25000132 ZZH RX MED GY IP 250 OP 250 PS 637: Performed by: SURGERY

## 2017-09-23 PROCEDURE — 86900 BLOOD TYPING SEROLOGIC ABO: CPT | Performed by: THORACIC SURGERY (CARDIOTHORACIC VASCULAR SURGERY)

## 2017-09-23 PROCEDURE — 86850 RBC ANTIBODY SCREEN: CPT | Performed by: THORACIC SURGERY (CARDIOTHORACIC VASCULAR SURGERY)

## 2017-09-23 PROCEDURE — 20000004 ZZH R&B ICU UMMC

## 2017-09-23 PROCEDURE — 82805 BLOOD GASES W/O2 SATURATION: CPT | Performed by: THORACIC SURGERY (CARDIOTHORACIC VASCULAR SURGERY)

## 2017-09-23 PROCEDURE — 80048 BASIC METABOLIC PNL TOTAL CA: CPT | Performed by: INTERNAL MEDICINE

## 2017-09-23 PROCEDURE — 70450 CT HEAD/BRAIN W/O DYE: CPT | Mod: XS

## 2017-09-23 PROCEDURE — 00000146 ZZHCL STATISTIC GLUCOSE BY METER IP

## 2017-09-23 PROCEDURE — 86022 PLATELET ANTIBODIES: CPT | Performed by: SURGERY

## 2017-09-23 PROCEDURE — 40000048 ZZH STATISTIC DAILY SWAN MONITORING

## 2017-09-23 PROCEDURE — 80076 HEPATIC FUNCTION PANEL: CPT | Performed by: SURGERY

## 2017-09-23 PROCEDURE — 40000276 ZZH STATISTIC RCP TIME ED VENT EA 10 MIN

## 2017-09-23 PROCEDURE — 86901 BLOOD TYPING SEROLOGIC RH(D): CPT | Performed by: THORACIC SURGERY (CARDIOTHORACIC VASCULAR SURGERY)

## 2017-09-23 PROCEDURE — 40000141 ZZH STATISTIC PERIPHERAL IV START W/O US GUIDANCE

## 2017-09-23 PROCEDURE — 84100 ASSAY OF PHOSPHORUS: CPT | Performed by: INTERNAL MEDICINE

## 2017-09-23 PROCEDURE — 85027 COMPLETE CBC AUTOMATED: CPT | Performed by: INTERNAL MEDICINE

## 2017-09-23 PROCEDURE — 99291 CRITICAL CARE FIRST HOUR: CPT | Mod: GC | Performed by: SURGERY

## 2017-09-23 PROCEDURE — 25000132 ZZH RX MED GY IP 250 OP 250 PS 637: Performed by: THORACIC SURGERY (CARDIOTHORACIC VASCULAR SURGERY)

## 2017-09-23 PROCEDURE — 25000125 ZZHC RX 250: Performed by: SURGERY

## 2017-09-23 PROCEDURE — 94003 VENT MGMT INPAT SUBQ DAY: CPT

## 2017-09-23 PROCEDURE — 70498 CT ANGIOGRAPHY NECK: CPT

## 2017-09-23 PROCEDURE — 85610 PROTHROMBIN TIME: CPT | Performed by: SURGERY

## 2017-09-23 PROCEDURE — 83735 ASSAY OF MAGNESIUM: CPT | Performed by: SURGERY

## 2017-09-23 PROCEDURE — 27210437 ZZH NUTRITION PRODUCT SEMIELEM INTERMED LITER

## 2017-09-23 PROCEDURE — 40000275 ZZH STATISTIC RCP TIME EA 10 MIN

## 2017-09-23 PROCEDURE — 27210913 ZZH NUTRITION PRODUCT INTERMEDIATE PACKET

## 2017-09-23 PROCEDURE — 85027 COMPLETE CBC AUTOMATED: CPT | Performed by: SURGERY

## 2017-09-23 PROCEDURE — 25000131 ZZH RX MED GY IP 250 OP 636 PS 637: Performed by: SURGERY

## 2017-09-23 PROCEDURE — 40000281 ZZH STATISTIC TRANSPORT TIME EA 15 MIN

## 2017-09-23 PROCEDURE — P9016 RBC LEUKOCYTES REDUCED: HCPCS | Performed by: THORACIC SURGERY (CARDIOTHORACIC VASCULAR SURGERY)

## 2017-09-23 PROCEDURE — 25000128 H RX IP 250 OP 636: Performed by: SURGERY

## 2017-09-23 PROCEDURE — 76705 ECHO EXAM OF ABDOMEN: CPT

## 2017-09-23 PROCEDURE — 83735 ASSAY OF MAGNESIUM: CPT | Performed by: INTERNAL MEDICINE

## 2017-09-23 PROCEDURE — 40000014 ZZH STATISTIC ARTERIAL MONITORING DAILY

## 2017-09-23 PROCEDURE — 84100 ASSAY OF PHOSPHORUS: CPT | Performed by: SURGERY

## 2017-09-23 PROCEDURE — 25000128 H RX IP 250 OP 636: Performed by: ANESTHESIOLOGY

## 2017-09-23 PROCEDURE — 80048 BASIC METABOLIC PNL TOTAL CA: CPT | Performed by: SURGERY

## 2017-09-23 PROCEDURE — 40000196 ZZH STATISTIC RAPCV CVP MONITORING

## 2017-09-23 PROCEDURE — 25000125 ZZHC RX 250: Performed by: THORACIC SURGERY (CARDIOTHORACIC VASCULAR SURGERY)

## 2017-09-23 PROCEDURE — 86923 COMPATIBILITY TEST ELECTRIC: CPT | Performed by: THORACIC SURGERY (CARDIOTHORACIC VASCULAR SURGERY)

## 2017-09-23 RX ORDER — DEXTROSE MONOHYDRATE 25 G/50ML
25-50 INJECTION, SOLUTION INTRAVENOUS
Status: DISCONTINUED | OUTPATIENT
Start: 2017-09-23 | End: 2017-09-23

## 2017-09-23 RX ORDER — WARFARIN SODIUM 1 MG/1
1 TABLET ORAL
Status: COMPLETED | OUTPATIENT
Start: 2017-09-23 | End: 2017-09-23

## 2017-09-23 RX ORDER — NICOTINE POLACRILEX 4 MG
15-30 LOZENGE BUCCAL
Status: DISCONTINUED | OUTPATIENT
Start: 2017-09-23 | End: 2017-09-23

## 2017-09-23 RX ORDER — HEPARIN SODIUM 10000 [USP'U]/100ML
500 INJECTION, SOLUTION INTRAVENOUS CONTINUOUS
Status: DISCONTINUED | OUTPATIENT
Start: 2017-09-23 | End: 2017-09-23

## 2017-09-23 RX ORDER — IOPAMIDOL 755 MG/ML
116 INJECTION, SOLUTION INTRAVASCULAR ONCE
Status: COMPLETED | OUTPATIENT
Start: 2017-09-23 | End: 2017-09-23

## 2017-09-23 RX ADMIN — SENNOSIDES AND DOCUSATE SODIUM 2 TABLET: 8.6; 5 TABLET ORAL at 11:07

## 2017-09-23 RX ADMIN — MEROPENEM 2 G: 1 INJECTION, POWDER, FOR SOLUTION INTRAVENOUS at 00:14

## 2017-09-23 RX ADMIN — OXYCODONE HYDROCHLORIDE 5 MG: 5 TABLET ORAL at 08:00

## 2017-09-23 RX ADMIN — MUPIROCIN 0.5 G: 20 OINTMENT TOPICAL at 19:49

## 2017-09-23 RX ADMIN — DEXMEDETOMIDINE HYDROCHLORIDE 0.3 MCG/KG/HR: 4 INJECTION, SOLUTION INTRAVENOUS at 03:16

## 2017-09-23 RX ADMIN — OXYCODONE HYDROCHLORIDE 5 MG: 5 TABLET ORAL at 19:05

## 2017-09-23 RX ADMIN — ASPIRIN 325 MG ORAL TABLET 325 MG: 325 PILL ORAL at 08:00

## 2017-09-23 RX ADMIN — MEROPENEM 2 G: 1 INJECTION, POWDER, FOR SOLUTION INTRAVENOUS at 07:59

## 2017-09-23 RX ADMIN — MUPIROCIN 0.5 G: 20 OINTMENT TOPICAL at 07:59

## 2017-09-23 RX ADMIN — EPINEPHRINE 0.05 MCG/KG/MIN: 1 INJECTION INTRAMUSCULAR; INTRAVENOUS; SUBCUTANEOUS at 11:10

## 2017-09-23 RX ADMIN — IOPAMIDOL 115 ML: 755 INJECTION, SOLUTION INTRAVENOUS at 16:55

## 2017-09-23 RX ADMIN — HEPARIN SODIUM 500 UNITS/HR: 10000 INJECTION, SOLUTION INTRAVENOUS at 08:28

## 2017-09-23 RX ADMIN — Medication 2 PACKET: at 08:12

## 2017-09-23 RX ADMIN — MEROPENEM 2 G: 1 INJECTION, POWDER, FOR SOLUTION INTRAVENOUS at 23:59

## 2017-09-23 RX ADMIN — MEROPENEM 2 G: 1 INJECTION, POWDER, FOR SOLUTION INTRAVENOUS at 15:35

## 2017-09-23 RX ADMIN — LIDOCAINE 2 PATCH: 50 PATCH CUTANEOUS at 08:01

## 2017-09-23 RX ADMIN — HEPARIN SODIUM 5000 UNITS: 5000 INJECTION, SOLUTION INTRAVENOUS; SUBCUTANEOUS at 00:10

## 2017-09-23 RX ADMIN — WARFARIN SODIUM 1 MG: 1 TABLET ORAL at 18:18

## 2017-09-23 RX ADMIN — MULTIVITAMIN 15 ML: LIQUID ORAL at 08:08

## 2017-09-23 RX ADMIN — INSULIN ASPART 1 UNITS: 100 INJECTION, SOLUTION INTRAVENOUS; SUBCUTANEOUS at 12:43

## 2017-09-23 RX ADMIN — PANTOPRAZOLE SODIUM 40 MG: 40 TABLET, DELAYED RELEASE ORAL at 07:59

## 2017-09-23 RX ADMIN — SENNOSIDES AND DOCUSATE SODIUM 2 TABLET: 8.6; 5 TABLET ORAL at 20:07

## 2017-09-23 RX ADMIN — OXYCODONE HYDROCHLORIDE 5 MG: 5 TABLET ORAL at 21:47

## 2017-09-23 NOTE — PLAN OF CARE
Problem: Goal Outcome Summary  Goal: Goal Outcome Summary  Outcome: Improving  D/I: Right femoral A-line D/C'd early in shift now that Pt has a right radial A-line. Levo Gtt weaned to off. Epi Gtt weaned from 0.5 mcg//kg/min to 0.01 mcg/kg/min to keep MAP > 65.  Precedex titrated up to 0.5 mcg/kg/min by end to shift to keep Pt from thrashing left leg up and down on the bed. Pt moving left extremities frequently but not right extremities. Not following commands and not opening eyes enough to see out of. Tube feeding started @ beginning of shift to increase 10 ml/hr Q 8 hr. Tube feeds currently @ 20 ml/hr. Urine output 60 ml/hr. CT output 10 ml/hr. CVP = 10, 12 & 12. PA= 35/26, 32/22 & 34/15. SOMMER CI= 2.3 & 2.1 SVR= 1136 & 1145.      A/P: Next tube feeding increase @ 12 noon to 30 ml/hr. Keep MAP > 65. Continue to assess for improvement in neuro status. Look for any spontaneous movement of right extremities and for pt to begin to follow commands and open eyes enough to see out of.

## 2017-09-23 NOTE — PLAN OF CARE
Problem: Goal Outcome Summary  Goal: Goal Outcome Summary  Outcome: No Change  Neuro: Pt not moving right arm in AM, and minimal movement in right leg, sent down for head CT. Ischemic stroke found. Neurology consulted and saw pt at bedside. CT angio ordered and completed. Family updated by neurology. Precedex turned off at 1000. 5mg oxycodone x 1 for pain. Precedex back on during CT angio for agitation, now gtt 0.2 mcg/kg/h.  Resp: Lungs coarse, dim in bases. Small amount thick tan secretions, red streaked.   Cardiac: Paced at 60 in AM, pacing turned up to 80bpm per CVTS. 100% paced. Epi titrated to keep MAPs > 65, BP's labile, gtt 0.05 mcg/kg/min. Hgb: 7.7, 1 unit RBC's given. Platelets: 45, INR: 2. Heparin stopped, HIT panel drawn. Holding heparin infusion for now. 3 CT to sxn, minimal output.   : Beltran in place and patent. Adequate UOP. Large mass around meatus. CVTS aware.   GI: Bowel sounds hyperactive, meds given. Passing gas.   Skin: Dressings c/d/i. Bruises, incisions stable. Skin otherwise intact. Prevalon boots in place for heel pressure off loading.

## 2017-09-23 NOTE — PROGRESS NOTES
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 Still poor neurologic exam in terms of following commands.    Objective:  Temp:  [98.4  F (36.9  C)-101.3  F (38.5  C)] 98.4  F (36.9  C)  Heart Rate:  [59-81] 80  Resp:  [16] 16  BP: ()/(48-73) 71/48  MAP:  [56 mmHg-104 mmHg] 96 mmHg  Arterial Line BP: ()/(47-83) 122/83  FiO2 (%):  [40 %] 40 %  SpO2:  [97 %-100 %] 100 %    Ventilation Mode: CMV/AC  FiO2 (%): 40 %  Rate Set (breaths/minute): 16 breaths/min  Tidal Volume Set (mL): 450 mL  PEEP (cm H2O): 5 cmH2O  Oxygen Concentration (%): 40 %  Resp: 16    I/O last 3 completed shifts:  In: 2012.78 [I.V.:1342.78; NG/GT:280]  Out: 1595 [Urine:1280; Emesis/NG output:75; Chest Tube:240]    PE:  General: Intubated, appears awake, NAD  Neck: Supple, no tracheal deviation  Cardiovascular: RRR  Pumonary: Non labored breathing on MV  Abdomen: Soft, non distended, non tender.   Ext: W/o edema, warm  Neuro: moves L side extremities but not R, not following specific commands    BMP    Recent Labs  Lab 09/23/17  0344 09/22/17  2144 09/22/17  1150 09/22/17  0425 09/21/17  2115 09/21/17  1728   * 143 143 144 144 142   POTASSIUM 5.0 4.9 4.7 4.4 4.3 4.0   CHLORIDE 111* 110* 109 108 108 105   CO2 26 27 27 28 24 24   BUN 58* 63* 57* 58* 57* 58*   CR 1.25 1.37* 1.43* 1.28* 1.18 1.07   * 123* 108* 139* 116* 128*   MAG 3.3* 3.2*  --  3.6* 3.9* 4.0*   PHOS 3.4 3.3  --   --  1.3* 3.4     CBC    Recent Labs  Lab 09/23/17  0810 09/23/17  0732 09/23/17  0344 09/22/17  2144   WBC 8.2 Unsatisfactory specimen - clotted 8.3 7.4   HGB 7.7* Unsatisfactory specimen - clotted 7.9* 7.8*   PLT 45* Unsatisfactory specimen - clotted 51* 53*     INR/PTT    Recent Labs  Lab 09/23/17  0344 09/22/17  0425 09/21/17  1728 09/21/17  1620  09/21/17  1503 09/21/17  1326   INR 2.01* 1.62* 1.18* 1.04 2.48* 5.83*   PTT  --   --  48* 48* 57* >240*     ABG    Recent Labs  Lab 09/23/17  0344 09/22/17  2144 09/22/17  1150 09/22/17  0425   PH 7.48* 7.50* 7.47* 7.55*   PCO2 37 36 37 32*   PO2 133* 141* 136* 137*   HCO3 28 28 27 28     LFT    Recent Labs  Lab 09/21/17  0248   AST 2729*   ALT 2305*   ALKPHOS 68   BILITOTAL 4.3*   ALBUMIN 2.7*       A/P   47 year old with history of prosthetic aortic valve with development of endocarditis, now s/p redo sternotomy with mechanical aortic and mitral valve replacements.   Neuro: focal neuro findings of R sided motor deficit, head CT demonstrates likely acute ischemic stroke-- STAT neurology consult for evaluation, they do not recommend tPA or further imaging at this time and do not feel it is necessary to reverse INR. Await further formal recommendations  Resp: continue minimal vent settings, await better neuro status to extubate  CV: wean epi as tolerated  GI/FEN: LFTs and bilirubin elevated on day of surgery, now liver enzymes improved but bilirubin continues to be elevated; trend for now no other signs or symptoms of cholangitis, likely cholestasis of critical illness. Continue tube feeds today, tolerating well.   Renal: creatinine continues to improve, weight up from baseline but still on pressors; hold diuresis for now   Endo: Insulin gtt   ID: on merropenem per ID recommendations for GNRs on mitral valve  Heme: no bleeding concerns, platelets low at 45, check HIT panel and hold heparin gtt for now. Liver dysfunction pre-op, INR elevated after single small dose warfarin last night, continue warfarin tonight; per neurology recs from stroke perspective it is okay to continue with anticoagulation  Prophylaxis: Protonix, hold heparin for HIT concerns  Lines: R radial art line, RIJ CVC, PA catheter  Dispo: CV ICU    Tramaine Holt MD  Surgery PGY-3

## 2017-09-23 NOTE — PLAN OF CARE
Problem: Restraint for Non-Violent/Non-Self-Destructive Behavior  Goal: Prevent/Manage Potential Problems  Maintain safety of patient and others during period of restraint.  Promote psychological and physical wellbeing.  Prevent injury to skin and involved body parts.  Promote nutrition, hydration, and elimination.   Outcome: No Change  D/I/A/P: Continue Bilateral soft wrist restraints until following commands or extubated to prevent accidental self extubation.

## 2017-09-23 NOTE — PROGRESS NOTES
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 Still poor neurologic exam in terms of following commands.    Objective:  Temp:  [98.4  F (36.9  C)-101.3  F (38.5  C)] 98.4  F (36.9  C)  Heart Rate:  [59-81] 80  Resp:  [16] 16  BP: ()/(48-73) 71/48  MAP:  [56 mmHg-104 mmHg] 81 mmHg  Arterial Line BP: ()/(47-83) 103/69  FiO2 (%):  [40 %] 40 %  SpO2:  [97 %-100 %] 100 %    Ventilation Mode: CMV/AC  FiO2 (%): 40 %  Rate Set (breaths/minute): 16 breaths/min  Tidal Volume Set (mL): 450 mL  PEEP (cm H2O): 5 cmH2O  Oxygen Concentration (%): 40 %  Resp: 16    I/O last 3 completed shifts:  In: 2012.78 [I.V.:1342.78; NG/GT:280]  Out: 1595 [Urine:1280; Emesis/NG output:75; Chest Tube:240]    PE:  General: Intubated, appears awake, NAD  Neck: Supple, no tracheal deviation  Cardiovascular: RRR  Pumonary: Non labored breathing on MV  Abdomen: Soft, non distended, non tender.   Ext: W/o edema, warm  Neuro: moves L side extremities but not R, not following specific commands    BMP    Recent Labs  Lab 09/23/17  0344 09/22/17  2144 09/22/17  1150 09/22/17  0425 09/21/17  2115 09/21/17  1728   * 143 143 144 144 142   POTASSIUM 5.0 4.9 4.7 4.4 4.3 4.0   CHLORIDE 111* 110* 109 108 108 105   CO2 26 27 27 28 24 24   BUN 58* 63* 57* 58* 57* 58*   CR 1.25 1.37* 1.43* 1.28* 1.18 1.07   * 123* 108* 139* 116* 128*   MAG 3.3* 3.2*  --  3.6* 3.9* 4.0*   PHOS 3.4 3.3  --   --  1.3* 3.4     CBC    Recent Labs  Lab 09/23/17  0810 09/23/17  0732 09/23/17  0344 09/22/17  2144   WBC 8.2 Unsatisfactory specimen - clotted 8.3 7.4   HGB 7.7* Unsatisfactory specimen - clotted 7.9* 7.8*   PLT 45* Unsatisfactory specimen - clotted 51* 53*     INR/PTT    Recent Labs  Lab 09/23/17  0344 09/22/17  0425 09/21/17  1728 09/21/17  1620  09/21/17  1503 09/21/17  1326   INR 2.01* 1.62* 1.18* 1.04 2.48* 5.83*   PTT  --   --  48* 48* 57* >240*     ABG    Recent Labs  Lab 09/23/17  0344 09/22/17  2144 09/22/17  1150 09/22/17  0425   PH 7.48* 7.50* 7.47* 7.55*   PCO2 37 36 37 32*   PO2 133* 141* 136* 137*   HCO3 28 28 27 28     LFT    Recent Labs  Lab 09/23/17  0344 09/21/17  0248   * 2729*   * 2305*   ALKPHOS 52 68   BILITOTAL 12.7* 4.3*   ALBUMIN 2.3* 2.7*       A/P   47 year old with history of prosthetic aortic valve with development of endocarditis, now s/p redo sternotomy with mechanical aortic and mitral valve replacements.   Neuro: focal neuro findings of R sided motor deficit, head CT demonstrates likely acute ischemic stroke-- STAT neurology consult for evaluation, they do not recommend tPA or further imaging at this time and do not feel it is necessary to reverse INR. Await further formal recommendations  Resp: continue minimal vent settings, await better neuro status to extubate  CV: wean epi as tolerated  GI/FEN: LFTs and bilirubin elevated on day of surgery, now liver enzymes improved but bilirubin continues to be elevated; trend for now no other signs or symptoms of cholangitis, likely cholestasis of critical illness but obtain RUQ ultrasound to evaluate for acalculous cholecystitis. Continue tube feeds today, tolerating well.   Renal: creatinine continues to improve, weight up from baseline but still on pressors; hold diuresis for now   Endo: Insulin gtt   ID: on merropenem per ID recommendations for GNRs on mitral valve  Heme: hemoglobin somewhat low but no active bleeding concerns, transfuse 1 U PRBCs. Platelets low at 45, check HIT panel and hold heparin gtt for now. Liver dysfunction pre-op, INR elevated after single small dose warfarin last night, continue warfarin tonight; per neurology recs from stroke perspective it is okay to continue with anticoagulation  Prophylaxis: Protonix, hold heparin for HIT concerns  Lines:  R radial art line, RIJ CVC, PA catheter  Dispo: CV ICU    Tramaine Holt MD  Surgery PGY-3

## 2017-09-23 NOTE — CONSULTS
[facility], [place]      Neurology Stroke Consult    Patient Name: Harvey Almanzar  : 1970 MRN#: [id number]    STROKE DATA    Stroke Code:  Stroke code not activated.  Time patient seen:  2017 1035  Last known normal (pt's baseline):  17 preoperatively      TPA treatment:  Not given due to stroke noted on CT scan, outside the time window.     National Institutes of Health Stroke Scale (at presentation)  NIHSS done at:  time patient seen      Score    Level of consciousness:  (2)   Not alert; repeat stim to attend strong stim/pain to move     LOC questions:  (2)   Answers neither question correctly    LOC commands:  (0)   Performs both tasks correctly    Best gaze:  (0)   Normal    Visual:  (0)   No visual loss    Facial palsy:  (0)   Normal symmetrical movements    Motor arm (left):  (0)   No drift    Motor arm (right):  (3)   No effort against gravity    Motor leg (left):  (0)   No drift    Motor leg (right):  (2)   Some effort against gravity    Limb ataxia:  (0)   Absent    Sensory:  (2)   Severe to total sensory loss    Best language:  (0)   Normal- no aphasia    Dysarthria:  UN Intubated or other physical barrier: Intubated    Extinction and inattention:  (0)   No abnormality        NIHSS Total Score:  11        Dysphagia Screen  Time of screenin2017 1100  Screening results: Failed screening - strict NPO pending SLP evaluation     ASSESSMENT & RECOMMENDATIONS     The patient was seen for stroke identified on CT scan.     Impression: mechanism likely cardioembolic perioperatively however intracranial stenosis    Recommendations:  Acute Ischemic Stroke (without tPA) Plan  - Neurochecks q4 hours  - goal normotension  - Euthermia, Euglycemia  - okay to restart heparin gtt for valves  - CTA/CTP  - TTE with Bubble Study  - Telemetry, EKG  - Bedside Glucose Monitoring  - A1c, Lipid Panel  - PT/OT/SLP  - PM&R  - Stroke Education  - Depression Screen  -  Apnea Screen    Prophylaxis          For VTE Prevention:  - heparin gtt     The patient will be managed by the CVTS team and  followed by the Stroke Consult service.    MALENA Almanzar is a 47 year old male with history of strep endocarditis and multiple cardiac surgeries including aortic and mitral valve replacement, CABG, and most recently repeat aortic and mitral valve replacement on 9/21/17. He was reportedly at his neurologic baseline which is without abnormalities pre-op and was slow to recover from anesthesia, when decreased movement on the right was noted on 9/23/17. He was sent for stat non-con head CT which noted hypodensity in the left MCA distribution.    Pertinent Past Medical/Surgical HistoryPast Medical History:   Diagnosis Date     Acute diastolic congestive heart failure (H) 8/24/2017     Aortic valve replaced 1996    Overview:  Cadaver - tissue valve in [facility] at [facility]  Amoxicillin 2000 mg prior to dental work please.  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     Past Surgical History:   Procedure Laterality Date     REDO STERNOTOMY BYPASS CORONARY ARTERY N/A 9/21/2017    Procedure: REDO STERNOTOMY BYPASS CORONARY ARTERY;;  Surgeon: Nicola Gorman MD;  Location: [facility] OR     REDO STERNOTOMY REPLACE VALVE AORTIC N/A 9/21/2017    Procedure: REDO STERNOTOMY REPLACE VALVE AORTIC;;  Surgeon: Nicola Gorman MD;  Location: [facility] OR     REDO STERNOTOMY REPLACE VALVE MITRAL N/A 9/21/2017    Procedure: REDO STERNOTOMY REPLACE VALVE MITRAL;  Left groin cutdown, Redo Median Sternotomy, Lysis of adhesions, Left mini-thoracotomy,Coronary artery bypass graft x 1 using the left greater saphenous vein, using endovein harvest, Mitral valve replacement using [employer]  Mechanical 27mm, Aortic Valve Replacement using a 17mm [employer] Mechanical, On Cardiopulmonary Bypass Pump;  Surgeon: Cindy Gorman     Medications:   Current Facility-Administered Medications   Medication     EPINEPHrine (ADRENALIN) 5 mg in NaCl 0.9 % 250 mL infusion     Warfarin Therapy Reminder (Check START DATE - warfarin may be starting in the FUTURE)     meropenem (MERREM) 2 g in NaCl 0.9 % 100 mL intermittent infusion     dextrose 10 % 1,000 mL infusion     multivitamins with minerals (CERTAVITE/CEROVITE) liquid 15 mL     protein modular (PROSource TF) 2 packet     pantoprazole (PROTONIX) suspension 40 mg     naloxone (NARCAN) injection 0.1-0.4 mg     dextrose 10 % 1,000 mL infusion     insulin 1 unit/mL in saline (NovoLIN, HumuLIN Regular) drip - ADULT IV Infusion     glucose 40 % gel 15-30 g    Or     dextrose 50 % injection 25-50 mL    Or     glucagon injection 1 mg     lidocaine 1 % 1 mL     lidocaine (LMX4) kit     sodium chloride (PF) 0.9% PF flush 3 mL     sodium chloride (PF) 0.9% PF flush 3 mL     Reason beta blocker order not selected     albumin human 5 % injection 500-1,000 mL     hydrALAZINE (APRESOLINE) injection 10 mg     insulin aspart (NovoLOG) inj (RAPID ACTING)     insulin aspart (NovoLOG) inj (RAPID ACTING)     meperidine (DEMEROL) injection 12.5-25 mg     albuterol (PROAIR HFA/PROVENTIL HFA/VENTOLIN HFA) Inhaler 6 puff     albuterol neb solution 2.5 mg     mupirocin (BACTROBAN) 2 % ointment 0.5 g     dexmedetomidine (PRECEDEX) 400 mcg in 0.9% sodium chloride 100 mL     aspirin tablet 325 mg     potassium chloride SA (K-DUR/KLOR-CON M) CR tablet 20-40 mEq     potassium chloride (KLOR-CON) Packet 20-40 mEq     potassium chloride 10 mEq in 100 mL intermittent infusion     potassium chloride 10 mEq in 100 mL intermittent infusion with 10 mg lidocaine     potassium chloride 20 mEq in 100 mL NON-STANDARD CONC intermittent infusion     magnesium sulfate 2 g in NS intermittent infusion (PharMEDium  or FV Cmpd)     magnesium sulfate 4 g in 100 mL sterile water (premade)     potassium phosphate 10 mmol in D5W 250 mL intermittent infusion     potassium phosphate 15 mmol in D5W 250 mL intermittent infusion     potassium phosphate 20 mmol in D5W 500 mL intermittent infusion     potassium phosphate 20 mmol in D5W 250 mL intermittent infusion     potassium phosphate 25 mmol in D5W 500 mL intermittent infusion     HYDROmorphone (PF) (DILAUDID) injection 0.3-0.5 mg     oxyCODONE (ROXICODONE) IR tablet 5-10 mg     cyclobenzaprine (FLEXERIL) tablet 10 mg     lidocaine (LIDODERM) 5 % Patch 2 patch     lidocaine (LIDODERM) patch REMOVAL     lidocaine (LIDODERM) patch in PLACE     diphenhydrAMINE (BENADRYL) capsule 25 mg    Or     diphenhydrAMINE (BENADRYL) injection 25 mg     ondansetron (ZOFRAN-ODT) ODT tab 4 mg    Or     ondansetron (ZOFRAN) injection 4 mg     prochlorperazine (COMPAZINE) injection 5-10 mg    Or     prochlorperazine (COMPAZINE) tablet 5-10 mg     senna-docusate (SENOKOT-S;PERICOLACE) 8.6-50 MG per tablet 1-2 tablet     HOLD:  Metformin and metformin containing medications if patient received IV contrast     melatonin tablet 3 mg     pneumococcal vaccine (PNEUMOVAX 23-rita) injection 0.5 mL     acetaminophen (TYLENOL) tablet 650 mg     polyethylene glycol (MIRALAX/GLYCOLAX) Packet 17 g     senna-docusate (SENOKOT-S;PERICOLACE) 8.6-50 MG per tablet 1-2 tablet     Allergies: No Known Allergies.    Family History: I have reviewed this patient's family history.    Social History:   Social History   Substance Use Topics     Smoking status: Former Smoker     Packs/day: 0.50     Years: 31.00     Smokeless tobacco: Never Used     Alcohol use No     Tobacco use: Former smoker, last smoked unknown    ROS:  Review of systems not obtained due to patient factors - intubation    PHYSICAL EXAMINATION  Vital Signs:  B/P: 71/48,  T: 98.4,  P: Data Unavailable,  R: 16    General:  patient lying in bed without any acute  distress    HEENT:  normocephalic/atraumatic, intubated  Cardio:  RRR  Pulmonary:  on mechanical ventilation  Abdomen:  soft, non-tender, non-distended, bowel sounds present  Extremities:  no edema, peripheral pulses palpable  Skin:  intact, warm/dry     Neurologic  Mental Status:  eyes closed, opens to voice, falls back asleep briskly, orientation not assessed due to intubation, receptive language intact as blinks eyes, shows 2 fingers, but unable to assess expressive language or memory  Cranial Nerves:  Does not reliably blink eyes to threat, PERRL, facial movements symmetric  Motor:  5/5 LUE prox and distal, 3/5 RUE elbow flex, 5/5 LLE prox and distal, 4/5 RLE prox and distal  Reflexes:  brisk throughout, sustained clonus bilaterally, more prominent in RLE, toes mute  Sensory:  does not withdraw or grimace to pain on R, grimace to pain on L  Coordination:  not assessed  Station/Gait:  unable to test    Labs  Labs and Imaging reviewed and used in developing the plan; pertinent results included.     Lab Results   Component Value Date     (H) 09/23/2017     The patient was discussed with the fellow, Dr. Hardy and the staff, Dr. Olivares.    Loretta Mcfarlane MD   Pager: [phone]

## 2017-09-24 ENCOUNTER — APPOINTMENT (OUTPATIENT)
Dept: CT IMAGING | Facility: CLINIC | Age: 47
DRG: 216 | End: 2017-09-24
Attending: PHYSICIAN ASSISTANT
Payer: COMMERCIAL

## 2017-09-24 ENCOUNTER — APPOINTMENT (OUTPATIENT)
Dept: OCCUPATIONAL THERAPY | Facility: CLINIC | Age: 47
DRG: 216 | End: 2017-09-24
Attending: THORACIC SURGERY (CARDIOTHORACIC VASCULAR SURGERY)
Payer: COMMERCIAL

## 2017-09-24 LAB
ALBUMIN SERPL-MCNC: 2.2 G/DL (ref 3.4–5)
ALP SERPL-CCNC: 59 U/L (ref 40–150)
ALT SERPL W P-5'-P-CCNC: 233 U/L (ref 0–70)
ANION GAP SERPL CALCULATED.3IONS-SCNC: 5 MMOL/L (ref 3–14)
ANION GAP SERPL CALCULATED.3IONS-SCNC: 6 MMOL/L (ref 3–14)
AST SERPL W P-5'-P-CCNC: 95 U/L (ref 0–45)
BACTERIA SPEC CULT: NO GROWTH
BASE EXCESS BLDA CALC-SCNC: 4.2 MMOL/L
BASE EXCESS BLDA CALC-SCNC: 4.3 MMOL/L
BASE EXCESS BLDA CALC-SCNC: 4.9 MMOL/L
BASE EXCESS BLDA CALC-SCNC: 6.1 MMOL/L
BASE EXCESS BLDV CALC-SCNC: 3.8 MMOL/L
BASE EXCESS BLDV CALC-SCNC: 7 MMOL/L
BILIRUB SERPL-MCNC: 12.1 MG/DL (ref 0.2–1.3)
BUN SERPL-MCNC: 34 MG/DL (ref 7–30)
BUN SERPL-MCNC: 35 MG/DL (ref 7–30)
C DIFF TOX B STL QL: NEGATIVE
CALCIUM SERPL-MCNC: 7.9 MG/DL (ref 8.5–10.1)
CALCIUM SERPL-MCNC: 7.9 MG/DL (ref 8.5–10.1)
CHLORIDE SERPL-SCNC: 117 MMOL/L (ref 94–109)
CHLORIDE SERPL-SCNC: 118 MMOL/L (ref 94–109)
CHOLEST SERPL-MCNC: 66 MG/DL
CO2 SERPL-SCNC: 28 MMOL/L (ref 20–32)
CO2 SERPL-SCNC: 28 MMOL/L (ref 20–32)
CREAT SERPL-MCNC: 0.75 MG/DL (ref 0.66–1.25)
CREAT SERPL-MCNC: 0.82 MG/DL (ref 0.66–1.25)
ERYTHROCYTE [DISTWIDTH] IN BLOOD BY AUTOMATED COUNT: 20.5 % (ref 10–15)
GFR SERPL CREATININE-BSD FRML MDRD: >90 ML/MIN/1.7M2
GFR SERPL CREATININE-BSD FRML MDRD: >90 ML/MIN/1.7M2
GLUCOSE BLDC GLUCOMTR-MCNC: 126 MG/DL (ref 70–99)
GLUCOSE BLDC GLUCOMTR-MCNC: 127 MG/DL (ref 70–99)
GLUCOSE BLDC GLUCOMTR-MCNC: 130 MG/DL (ref 70–99)
GLUCOSE BLDC GLUCOMTR-MCNC: 146 MG/DL (ref 70–99)
GLUCOSE BLDC GLUCOMTR-MCNC: 147 MG/DL (ref 70–99)
GLUCOSE BLDC GLUCOMTR-MCNC: 151 MG/DL (ref 70–99)
GLUCOSE BLDC GLUCOMTR-MCNC: 152 MG/DL (ref 70–99)
GLUCOSE SERPL-MCNC: 152 MG/DL (ref 70–99)
GLUCOSE SERPL-MCNC: 157 MG/DL (ref 70–99)
HCO3 BLD-SCNC: 28 MMOL/L (ref 21–28)
HCO3 BLD-SCNC: 29 MMOL/L (ref 21–28)
HCO3 BLDV-SCNC: 29 MMOL/L (ref 21–28)
HCO3 BLDV-SCNC: 32 MMOL/L (ref 21–28)
HCT VFR BLD AUTO: 29 % (ref 40–53)
HDLC SERPL-MCNC: 9 MG/DL
HGB BLD-MCNC: 9.2 G/DL (ref 13.3–17.7)
INR PPP: 2.45 (ref 0.86–1.14)
LDLC SERPL CALC-MCNC: 29 MG/DL
MAGNESIUM SERPL-MCNC: 2.6 MG/DL (ref 1.6–2.3)
MAGNESIUM SERPL-MCNC: 2.7 MG/DL (ref 1.6–2.3)
MCH RBC QN AUTO: 28.9 PG (ref 26.5–33)
MCHC RBC AUTO-ENTMCNC: 31.7 G/DL (ref 31.5–36.5)
MCV RBC AUTO: 91 FL (ref 78–100)
NONHDLC SERPL-MCNC: 56 MG/DL
O2/TOTAL GAS SETTING VFR VENT: 40 %
O2/TOTAL GAS SETTING VFR VENT: ABNORMAL %
OXYHGB MFR BLD: 96 % (ref 92–100)
OXYHGB MFR BLD: 97 % (ref 92–100)
OXYHGB MFR BLD: 97 % (ref 92–100)
OXYHGB MFR BLD: 98 % (ref 92–100)
OXYHGB MFR BLDV: 56 %
OXYHGB MFR BLDV: 64 %
PCO2 BLD: 36 MM HG (ref 35–45)
PCO2 BLD: 39 MM HG (ref 35–45)
PCO2 BLD: 41 MM HG (ref 35–45)
PCO2 BLD: 42 MM HG (ref 35–45)
PCO2 BLDV: 45 MM HG (ref 40–50)
PCO2 BLDV: 49 MM HG (ref 40–50)
PH BLD: 7.45 PH (ref 7.35–7.45)
PH BLD: 7.46 PH (ref 7.35–7.45)
PH BLD: 7.47 PH (ref 7.35–7.45)
PH BLD: 7.52 PH (ref 7.35–7.45)
PH BLDV: 7.39 PH (ref 7.32–7.43)
PH BLDV: 7.46 PH (ref 7.32–7.43)
PHOSPHATE SERPL-MCNC: 1.6 MG/DL (ref 2.5–4.5)
PHOSPHATE SERPL-MCNC: 3.2 MG/DL (ref 2.5–4.5)
PLATELET # BLD AUTO: 54 10E9/L (ref 150–450)
PO2 BLD: 108 MM HG (ref 80–105)
PO2 BLD: 122 MM HG (ref 80–105)
PO2 BLD: 148 MM HG (ref 80–105)
PO2 BLD: 172 MM HG (ref 80–105)
PO2 BLDV: 33 MM HG (ref 25–47)
PO2 BLDV: 34 MM HG (ref 25–47)
POTASSIUM SERPL-SCNC: 4.2 MMOL/L (ref 3.4–5.3)
POTASSIUM SERPL-SCNC: 4.5 MMOL/L (ref 3.4–5.3)
PROT SERPL-MCNC: 4.7 G/DL (ref 6.8–8.8)
RBC # BLD AUTO: 3.18 10E12/L (ref 4.4–5.9)
SODIUM SERPL-SCNC: 150 MMOL/L (ref 133–144)
SODIUM SERPL-SCNC: 151 MMOL/L (ref 133–144)
SPECIMEN SOURCE: NORMAL
TRIGL SERPL-MCNC: 136 MG/DL
WBC # BLD AUTO: 10 10E9/L (ref 4–11)

## 2017-09-24 PROCEDURE — 82805 BLOOD GASES W/O2 SATURATION: CPT | Performed by: THORACIC SURGERY (CARDIOTHORACIC VASCULAR SURGERY)

## 2017-09-24 PROCEDURE — 97165 OT EVAL LOW COMPLEX 30 MIN: CPT | Mod: GO

## 2017-09-24 PROCEDURE — 40000014 ZZH STATISTIC ARTERIAL MONITORING DAILY

## 2017-09-24 PROCEDURE — 80061 LIPID PANEL: CPT | Performed by: SURGERY

## 2017-09-24 PROCEDURE — 25000132 ZZH RX MED GY IP 250 OP 250 PS 637: Performed by: INTERNAL MEDICINE

## 2017-09-24 PROCEDURE — 80053 COMPREHEN METABOLIC PANEL: CPT | Performed by: SURGERY

## 2017-09-24 PROCEDURE — 87493 C DIFF AMPLIFIED PROBE: CPT | Performed by: SURGERY

## 2017-09-24 PROCEDURE — 25000132 ZZH RX MED GY IP 250 OP 250 PS 637: Performed by: THORACIC SURGERY (CARDIOTHORACIC VASCULAR SURGERY)

## 2017-09-24 PROCEDURE — 27210913 ZZH NUTRITION PRODUCT INTERMEDIATE PACKET

## 2017-09-24 PROCEDURE — 83735 ASSAY OF MAGNESIUM: CPT | Performed by: SURGERY

## 2017-09-24 PROCEDURE — 97530 THERAPEUTIC ACTIVITIES: CPT | Mod: GO

## 2017-09-24 PROCEDURE — 40000133 ZZH STATISTIC OT WARD VISIT

## 2017-09-24 PROCEDURE — 25000128 H RX IP 250 OP 636: Performed by: ANESTHESIOLOGY

## 2017-09-24 PROCEDURE — 20000004 ZZH R&B ICU UMMC

## 2017-09-24 PROCEDURE — 25000132 ZZH RX MED GY IP 250 OP 250 PS 637: Performed by: SURGERY

## 2017-09-24 PROCEDURE — 25000125 ZZHC RX 250: Performed by: UROLOGY

## 2017-09-24 PROCEDURE — 40000275 ZZH STATISTIC RCP TIME EA 10 MIN

## 2017-09-24 PROCEDURE — 85610 PROTHROMBIN TIME: CPT | Performed by: SURGERY

## 2017-09-24 PROCEDURE — 85260 CLOT FACTOR X STUART-POWER: CPT | Performed by: SURGERY

## 2017-09-24 PROCEDURE — 40000196 ZZH STATISTIC RAPCV CVP MONITORING

## 2017-09-24 PROCEDURE — 27210437 ZZH NUTRITION PRODUCT SEMIELEM INTERMED LITER

## 2017-09-24 PROCEDURE — 25000128 H RX IP 250 OP 636: Performed by: THORACIC SURGERY (CARDIOTHORACIC VASCULAR SURGERY)

## 2017-09-24 PROCEDURE — 94003 VENT MGMT INPAT SUBQ DAY: CPT

## 2017-09-24 PROCEDURE — 70450 CT HEAD/BRAIN W/O DYE: CPT

## 2017-09-24 PROCEDURE — 25000125 ZZHC RX 250: Performed by: THORACIC SURGERY (CARDIOTHORACIC VASCULAR SURGERY)

## 2017-09-24 PROCEDURE — 85027 COMPLETE CBC AUTOMATED: CPT | Performed by: SURGERY

## 2017-09-24 PROCEDURE — 84100 ASSAY OF PHOSPHORUS: CPT | Performed by: SURGERY

## 2017-09-24 PROCEDURE — 80048 BASIC METABOLIC PNL TOTAL CA: CPT | Performed by: THORACIC SURGERY (CARDIOTHORACIC VASCULAR SURGERY)

## 2017-09-24 PROCEDURE — 83735 ASSAY OF MAGNESIUM: CPT | Performed by: THORACIC SURGERY (CARDIOTHORACIC VASCULAR SURGERY)

## 2017-09-24 PROCEDURE — 97110 THERAPEUTIC EXERCISES: CPT | Mod: GO

## 2017-09-24 PROCEDURE — 25000132 ZZH RX MED GY IP 250 OP 250 PS 637: Performed by: ANESTHESIOLOGY

## 2017-09-24 PROCEDURE — 00000146 ZZHCL STATISTIC GLUCOSE BY METER IP

## 2017-09-24 PROCEDURE — 99291 CRITICAL CARE FIRST HOUR: CPT | Mod: GC | Performed by: SURGERY

## 2017-09-24 PROCEDURE — 84100 ASSAY OF PHOSPHORUS: CPT | Performed by: THORACIC SURGERY (CARDIOTHORACIC VASCULAR SURGERY)

## 2017-09-24 PROCEDURE — 40000048 ZZH STATISTIC DAILY SWAN MONITORING

## 2017-09-24 RX ORDER — CHLORHEXIDINE GLUCONATE ORAL RINSE 1.2 MG/ML
15 SOLUTION DENTAL 2 TIMES DAILY
Status: DISCONTINUED | OUTPATIENT
Start: 2017-09-24 | End: 2017-10-05 | Stop reason: HOSPADM

## 2017-09-24 RX ADMIN — OXYCODONE HYDROCHLORIDE 5 MG: 5 TABLET ORAL at 20:12

## 2017-09-24 RX ADMIN — LIDOCAINE 2 PATCH: 50 PATCH CUTANEOUS at 08:00

## 2017-09-24 RX ADMIN — ACETAMINOPHEN 650 MG: 325 TABLET, FILM COATED ORAL at 10:29

## 2017-09-24 RX ADMIN — DEXMEDETOMIDINE HYDROCHLORIDE 0.3 MCG/KG/HR: 4 INJECTION, SOLUTION INTRAVENOUS at 04:22

## 2017-09-24 RX ADMIN — MUPIROCIN 0.5 G: 20 OINTMENT TOPICAL at 20:22

## 2017-09-24 RX ADMIN — ACETAMINOPHEN 650 MG: 325 TABLET, FILM COATED ORAL at 20:12

## 2017-09-24 RX ADMIN — MEROPENEM 2 G: 1 INJECTION, POWDER, FOR SOLUTION INTRAVENOUS at 08:12

## 2017-09-24 RX ADMIN — OXYCODONE HYDROCHLORIDE 5 MG: 5 TABLET ORAL at 23:43

## 2017-09-24 RX ADMIN — OXYCODONE HYDROCHLORIDE 5 MG: 5 TABLET ORAL at 16:21

## 2017-09-24 RX ADMIN — ASPIRIN 325 MG ORAL TABLET 325 MG: 325 PILL ORAL at 07:59

## 2017-09-24 RX ADMIN — MULTIVITAMIN 15 ML: LIQUID ORAL at 07:59

## 2017-09-24 RX ADMIN — Medication 2 PACKET: at 08:00

## 2017-09-24 RX ADMIN — POTASSIUM & SODIUM PHOSPHATES POWDER PACK 280-160-250 MG 1 PACKET: 280-160-250 PACK at 20:12

## 2017-09-24 RX ADMIN — INSULIN ASPART 1 UNITS: 100 INJECTION, SOLUTION INTRAVENOUS; SUBCUTANEOUS at 00:02

## 2017-09-24 RX ADMIN — POTASSIUM PHOSPHATE, MONOBASIC AND POTASSIUM PHOSPHATE, DIBASIC 20 MMOL: 224; 236 INJECTION, SOLUTION INTRAVENOUS at 07:58

## 2017-09-24 RX ADMIN — PANTOPRAZOLE SODIUM 40 MG: 40 TABLET, DELAYED RELEASE ORAL at 07:59

## 2017-09-24 RX ADMIN — CHLORHEXIDINE GLUCONATE 15 ML: 1.2 RINSE ORAL at 20:14

## 2017-09-24 RX ADMIN — POTASSIUM & SODIUM PHOSPHATES POWDER PACK 280-160-250 MG 1 PACKET: 280-160-250 PACK at 15:44

## 2017-09-24 RX ADMIN — LIDOCAINE HYDROCHLORIDE 10 ML: 10 INJECTION, SOLUTION INFILTRATION; PERINEURAL at 19:47

## 2017-09-24 RX ADMIN — DIPHENHYDRAMINE HYDROCHLORIDE 25 MG: 50 INJECTION, SOLUTION INTRAMUSCULAR; INTRAVENOUS at 16:21

## 2017-09-24 RX ADMIN — POTASSIUM & SODIUM PHOSPHATES POWDER PACK 280-160-250 MG 1 PACKET: 280-160-250 PACK at 12:39

## 2017-09-24 RX ADMIN — INSULIN ASPART 1 UNITS: 100 INJECTION, SOLUTION INTRAVENOUS; SUBCUTANEOUS at 15:57

## 2017-09-24 RX ADMIN — MEROPENEM 2 G: 1 INJECTION, POWDER, FOR SOLUTION INTRAVENOUS at 15:43

## 2017-09-24 RX ADMIN — OXYCODONE HYDROCHLORIDE 5 MG: 5 TABLET ORAL at 08:47

## 2017-09-24 RX ADMIN — OXYCODONE HYDROCHLORIDE 5 MG: 5 TABLET ORAL at 04:33

## 2017-09-24 RX ADMIN — INSULIN ASPART 1 UNITS: 100 INJECTION, SOLUTION INTRAVENOUS; SUBCUTANEOUS at 04:12

## 2017-09-24 RX ADMIN — INSULIN ASPART 1 UNITS: 100 INJECTION, SOLUTION INTRAVENOUS; SUBCUTANEOUS at 08:37

## 2017-09-24 RX ADMIN — MEROPENEM 2 G: 1 INJECTION, POWDER, FOR SOLUTION INTRAVENOUS at 23:39

## 2017-09-24 RX ADMIN — MUPIROCIN 0.5 G: 20 OINTMENT TOPICAL at 08:10

## 2017-09-24 RX ADMIN — Medication 0.5 MG: at 17:41

## 2017-09-24 RX ADMIN — SENNOSIDES AND DOCUSATE SODIUM 2 TABLET: 8.6; 5 TABLET ORAL at 07:59

## 2017-09-24 ASSESSMENT — ACTIVITIES OF DAILY LIVING (ADL): PREVIOUS_RESPONSIBILITIES: MEAL PREP;HOUSEKEEPING;LAUNDRY;SHOPPING;YARDWORK;MEDICATION MANAGEMENT;FINANCES;DRIVING;WORK

## 2017-09-24 NOTE — PLAN OF CARE
Problem: Goal Outcome Summary  Goal: Goal Outcome Summary  Outcome: Improving  D/I: Pt moving left arm and leg somewhat better than previous night shift but no where near as strong as left side is this shift. Pt opens eyes but does not track. Pt not following commands this shift but previous RN report stated he intermittently was on that shift.  Xray called this shift with CT results and stated evolving Left Ischemic CVA and narrowing of Left Cerebral artery. CVTS MD notified. No new orders this shift. Epi 0.02 - 0.04 mcg/kg/min to keep MAP > 65. A-line is positional and dampened @ times so BP is being verified with cuff pressures also.  Oxycodone 5 mg given twice this shift for restless thrashing of left leg and pain with good effect but does slightly lower his BP for a while. Precedex @ 0.2 - 0.3 mcg/kg/hr which also drops his BP if increased. CVP= 13, 12, & 12. PA= 38/20, 30/18 & 32/18. Oxyhgb = 59% & 64%. SOMMER CI = 2.5  & 2.8 with SVR= 988 & 1016. Urine output 100 - 150 ml/hr. CT output 5 - 10 ml/hr.      A/P: Continue to try to wean Epi. Monitor neuro status for any improvement. Nursing anticipates possible pressure support trials if able to wean down Epi gtt further.

## 2017-09-24 NOTE — PLAN OF CARE
Problem: Restraint for Non-Violent/Non-Self-Destructive Behavior  Goal: Prevent/Manage Potential Problems  Maintain safety of patient and others during period of restraint.  Promote psychological and physical wellbeing.  Prevent injury to skin and involved body parts.  Promote nutrition, hydration, and elimination.   Outcome: No Change  D/I/A/P: Continue bilateral soft wrist restraints to prevent accidental self extubation. Plan to remove restraints when Pt following commands or is extubated.

## 2017-09-24 NOTE — PLAN OF CARE
Problem: Goal Outcome Summary  Goal: Goal Outcome Summary  SLP: Orders received for bedside swallow evaluation.  Pt extubated this date.  Per best practice protocol, pt to be seen 24 hours post extubation.  ST to f/u with evaluation tomorrow.

## 2017-09-24 NOTE — PROGRESS NOTES
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    Objective:  Temp:  [99.1  F (37.3  C)-100.2  F (37.9  C)] 99.9  F (37.7  C)  Heart Rate:  [79-83] 80  Resp:  [16-23] 22  BP: ()/(56-83) 109/58  MAP:  [56 mmHg-95 mmHg] 70 mmHg  Arterial Line BP: ()/(48-86) 86/59  FiO2 (%):  [40 %] 40 %  SpO2:  [100 %] 100 %    Ventilation Mode: CPAP/PS  FiO2 (%): 40 %  Rate Set (breaths/minute): 16 breaths/min  Tidal Volume Set (mL): 450 mL  PEEP (cm H2O): 5 cmH2O  Pressure Support (cm H2O): 10 cmH2O  Oxygen Concentration (%): 40 %  Resp: 22    I/O last 3 completed shifts:  In: 2361.1 [I.V.:1051.1; NG/GT:500]  Out: 2395 [Urine:2115; Emesis/NG output:100; Chest Tube:180]    PE:  General: Intubated, appears awake, alert  Neck: Supple, no tracheal deviation  Cardiovascular: RRR  Pumonary: Non labored breathing on MV  Abdomen: Soft, non distended, non tender   Ext: W/o edema, warm  Neuro: follows commands, R sided weakness     BMP    Recent Labs  Lab 09/24/17 0410 09/23/17 2056 09/23/17  0344 09/22/17  2144   * 148* 145* 143   POTASSIUM 4.2 4.1 5.0 4.9   CHLORIDE 117* 114* 111* 110*   CO2 28 29 26 27   BUN 35* 46* 58* 63*   CR 0.82 0.91 1.25 1.37*   * 141* 143* 123*   MAG 2.7* 2.8* 3.3* 3.2*   PHOS 1.6* 1.7* 3.4 3.3     CBC    Recent Labs  Lab 09/24/17  0410 09/23/17 2056 09/23/17  0810 09/23/17  0732   WBC 10.0 9.4 8.2 Unsatisfactory specimen - clotted   HGB 9.2* 8.9* 7.7* Unsatisfactory specimen - clotted   PLT 54* 45* 45* Unsatisfactory specimen - clotted     INR/PTT    Recent Labs  Lab 09/24/17  0410 09/23/17  0344 09/22/17  0425 09/21/17  1728 09/21/17  1620 09/21/17  1503 09/21/17  1326   INR 2.45* 2.01* 1.62* 1.18* 1.04 2.48* 5.83*   PTT  --   --   --  48* 48* 57* >240*      ABG    Recent Labs  Lab 09/24/17  1110 09/24/17  0940 09/24/17  0402 09/23/17 2056   PH 7.47* 7.46* 7.52* 7.52*   PCO2 39 42 36 34*   PO2 148* 122* 172* 125*   HCO3 28 29* 29* 28     LFT    Recent Labs  Lab 09/24/17  0410 09/23/17  0344 09/21/17  0248   AST 95* 226* 2729*   * 307* 2305*   ALKPHOS 59 52 68   BILITOTAL 12.1* 12.7* 4.3*   ALBUMIN 2.2* 2.3* 2.7*       A/P   47 year old with history of prosthetic aortic valve with development of endocarditis, now s/p redo sternotomy with mechanical aortic and mitral valve replacements.   Neuro: pain control prn, neurology recommends repeat HCT for surveillance today, no specific stroke interventions at this time except for ASA  Resp: extubate, pulmonary toilet, continue chest tubes today  CV: no pressor needs, cap pacer wires   GI/FEN: LFTs and bilirubin stable / down-trending, continue to monitor, assess for swallow with SLP when extubated given stroke  Renal: creatinine normal, euvolemic, no lasix today  Endo: Insulin sliding scale  ID: on merropenem per ID recommendations for GNRs on mitral valve  Heme: HIT labs still pending, holding heparin, INR close to therapeutic on warfarin (for mechanical valves)   Prophylaxis: Protonix, hold heparin for HIT concerns  Lines: d/c PA catheter this morning, possible d/c art line and central line later today  Dispo: CV ICU, transfer to the floor as early as tomorrow    Tramaine Holt MD  Surgery PGY-3

## 2017-09-24 NOTE — PLAN OF CARE
Problem: Goal Outcome Summary  Goal: Goal Outcome Summary  Outcome: Improving  Patient off Epi and Precedex. Extubated by RT at 1200. IS. Clear, diminished lungs. Oxygen at 4L via Oxymask. Saturations 95 - 100 percent. Leif dc'd. Hemodynamics consistent with trends prior to discontinuation. Electrolytes replaced as needed. Patient follows commands, shakes head yes/no appropriately to questions. Moves all extremities. Wiggles toes and moves foot slightly on right leg. Unable to move right arm. BM x5 today, loose, watery. Negative for C-Diff. SR 60s - 80s. Pacer wires capped. Blood pressures 90s - 130s / 50s - 60s. MAPs 65 - 90. Q1H Neuro checks starting at 1700. Goal MAP > 85. Plan to trend neuro status, and if increased MAPs show improvement in neuro status, then RN to notify neuro and IR, to consider possible intervention for stroke symptoms.

## 2017-09-24 NOTE — PROGRESS NOTES
Patient suctioned and electively extubated per physician order. Placed on LFNC @ 4 LPM, breath sounds were clear, diminished, coarse, labs pending. Patient tolerated procedure well without any immediate complications.    Nasreen Nguyen, RT  9/24/2017 1:19 PM

## 2017-09-24 NOTE — PROGRESS NOTES
"[facility], [place]   Neurology Daily Note  Harvey Delongvdavid  [id number]  09/24/2017    Subjective:  Patient is unable to communicate verbally due to AMS and intubation. He does respond with hand-squeeze when asked and is able to obey commands by wiggling his left toes. Per nursing, he has experienced some agitation overnight, presumably from epinephrine for pressure. Trial of extubation planned for later today.    Objective:   /58  Temp 99.9  F (37.7  C) (Oral)  Resp 22  Ht 1.626 m (5' 4\")  Wt 67.7 kg (149 lb 4 oz)  SpO2 98%  BMI 25.62 kg/m2  General: Awake, restraints in place, not in any acute distress, cooperative as able  HEENT: Scleral icterus; atraumatic, normocephalic  Cardiac: Sternotomy incision site C/D/I  Chest: On ventilator, equal air entry bilaterally, no wheezes, crackles or crepitations  Extremities: No LE swelling.  Skin: No rash or lesion.   Neuro:  Mental status: Awake, opens eyes spontaneously to voice and to command, responds to examiner's face, gives 2 thumbs up to command. Unable to evaluate speech due to intubation.   Cranial nerves: CN2-12 tested as possible given patient condition. Eyes conjugate, PERRL, does not respond to visual field stimulation. Decreased facial movement on R with grimace to noxious stimulus.  Motor: Decreased tone, normal bulk. 5/5 hand grasp on L, wiggles toes on L, 1/5 strength in RLE,  0/5 strength RUE (does not hold extremities when raised and provides no resistance).    Reflexes: 1+ reflexes at L biceps, triceps and brachioradialis, otherwise unable to elicit reflexes at R biceps, triceps, and brachioradialis or bilateral patellae (Achilles not accessible due to restraints). Toes mute. Sustained clonus bilaterally.   Sensory: Responds with grimace to noxious stimulus to LLE, does not respond to noxious stimulus to RLE or bilateral UE.   Coordination: Unable to evaluate due to patient condition.  Gait: Unable to " evaluate due to patient condition.    Lab Investigations:   Hemoglobin A1C   Date Value Ref Range Status   09/22/2017 5.9 4.3 - 6.0 % Final      No results found for: LDL    Remaining new laboratory values have been reviewed.    Imaging:  Recent Results (from the past 24 hour(s))   CT Head Neck Angio w/o & w Contrast    Narrative    Head CT without contrast  CT angiogram of the neck   CT angiogram of the base of the brain with contrast  Reconstruction by the Radiologist on the 3D workstation  CT Perfusion    Provided History: CT perfusion    Provided history obtained from EMR:  Right extremity weakness.      Comparison:  9/23/2017 head CT, earlier.      Technique:     HEAD CT: Using multidetector thin collimation helical acquisition  technique, axial, coronal and sagittal CT images from the skull base  to the vertex were obtained without intravenous contrast.     CT BRAIN PERFUSION: Dynamic perfusion CT of the brain was performed at  multiple levels with 10 mm slice thickness; perfusion was measured and  imaged during 2 separate rapid bolus intravenous injections of  nonionic iodinated contrast medium, 1) centered at the level of  the  basal ganglia and 2) centered at the level of the top of the lateral  ventricles. Each injection required approximately 40 cc of nonionic  contrast.  Images were reconstructed and reviewed on the [employer] 3D  workstation.     HEAD and NECK CTA: During rapid bolus intravenous Injection of  nonionic contrast material, axial images were obtained using thin  collimation multidetector helical technique from the base of the skull  through the [place] of [place]. This CT angiogram data was reconstructed  at thin intervals with mild overlap. Images were sent to the [employer]  workstation, and 3D reconstructions were obtained. The axial source  images, multiplanar reformations, 3D reconstructions in both maximum  intensity projection display and volume rendered models were reviewed,  with  reconstructions performed by the technologist and the  radiologist.    Contrast: 115cc of Isovue 370    Findings:   Head CT: Redemonstration of an area of gray-white matter  differentiation loss and linear hypodensity in the left superior  lateral frontal lobe (series 7, image 22). In addition on this CT  there are additional hypodensities in the left centrum semiovale  (series 7, image 19), left caudate head (series 7 image 15) and the  along the anterior aspect of the internal capsule anterior limb  (series 7, image 16). These areas are suggestive of frontal lobe  superolaterally, evolving ischemia in the left Middle cerebral artery  territory.   No hemorrhage is noted.   There is no hydrocephalus.   There is no extra-axial collection.   There is a linear 6.1 mm tubular hyperdensity in the CSF space at the  expected level of the M1/proximal M2 segment of the left middle  cerebral artery. This might represent the thrombus/ embolus.     CT Perfusion: CBV, MTT and CBF maps were interpreted. CBV volumes maps  did not show definite asymmetry. On MTT map there is slight asymmetric  delay in the mean transit time to the left superolateral frontal lobe,  left corona radiata and left basal ganglia. There is very subtle  asymmetric diminished cerebral blood flow to these areas. However on  the other hand there is increased there is shortened mean transit time  to the rest of the left cerebral hemisphere including the left  anterior parasagittal frontal lobe and left posterior temporal lobe  which is possibly related to refractory vasodilatation with patent  remaining of the left MCA branches.      CT angiogram images of the head demonstrates a small fenestration in  the very proximal aspect of the basilar artery and discontinuation of  the hypoplastic left vertebral artery following posterior inferior  cerebellar artery branching which is likely developmental. Hypoplastic  A1 segment of the right anterior cerebral artery.  Patent anterior  communicating artery. There is a nonocclusive high grade stenosis of  the superior M2 segment of the left middle cerebral artery (series 13,  image 45).  The remainder of the superior M2 segment following  stenosis is open and normal. The other M2 branch is patent. The M1  segments of both MCAs and the remainder branches of the right MCA are  patent. Bilateral posterior cerebral arteries are patent. There is a  left posterior corticated artery which is patent.    Neck CTA demonstrates patent carotid and basilar vertebral arteries.  Their origins are patent.      Impression    Impression:    1) Head CT demonstrates evolving acute infarction involving the left  superior frontal gyrus posteriorly, left caudate head, left centrum  semiovale/corona radiata and anterolateral aspect of the anterior limb  of the left internal capsule. No hemorrhage is identified.    2) CT perfusion maps does not demonstrate ischemic penumbra. There is  slight asymmetric delayed MTT and slight asymmetric decreased CBF to  the areas of ischemia including left corona radiata, centrum semiovale  and anterior basal ganglia area.    3) CT angiogram demonstrates a nonocclusive narrowing of the superior  M2 segment of the left medial middle cerebral artery immediately  following the bifurcation. The remainder intracranial arterial  structures and the cervical arterial structures are patent. There is a  6 mm long high density tubular structure in the expected region of the  left MCA, at the level of known stenosis, this density might represent  the thrombus/ embolus (thought this is much higher density than a  regular thrombus).     Results were discussed with Dr. Cooper by Dr. Rivera on 9/23/2017 at  2013.    I have personally reviewed the examination and initial interpretation  and I agree with the findings.    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 Yesterday, (9/23/17) he was found to have R sided weakness with last known normal being pre-op before his AVR, MVR, and CABG on 9/21/17.  Imaging and exam were consistent with L MCA ischemic stroke, presumed to be from perioperative cardiac embolus. Trial off ventilator planned today, on anticoagulation with warfarin, INR today of 2.45. Also on ASA 325mg. .    #Acute ischemic L MCA stroke  - Non-contrast head CT was repeated to assess for viable tissue/stroke burden  - CT shows stable appearance of hypodensity. Appearance on CT is disproportionate to findings on clinical exam indicating possibility of ischemic penumbra. However, incongruity between CT perfusion maps vs today's Head CT as CTP did not demonstrate ischemic penumbra.  - Repeat CT findings were discussed with IR  - Target to increase MAP >85  - Q1 hour neuro-checks, if improvement in exam (withdrawal to noxious stimuli in RUE/RLE) with MAP >85, to contact IR for possible stenting  - Continue euthermia, euglycemia  - Continue warfarin and ASA  - Continue bedside glucose monitoring  - PT/OT/SLP  - PM&R  - Stroke Education   - Depression Screen and apnea screen prior to discharge  - Apnea Screen    FEN: continue tube feeds, NPO except for swallow evaluation after extubation  PPx: on warfarin   Code status: full  Dispo status: CV ICU, transfer to floor per primary team    Patient was seen and discussed with attending neurologist, Dr. Olivares.    Note created in collaboration with Oral Machuca, MS4. I have personally reviewed the note and made any necessary changes; I have made necessary changes to the physical exam and assessment/plan.    ZORAIDA Shafer [facility] NEGRITO, MSc(Res)  Neurology Resident, PGY-1  Pager: [phone]

## 2017-09-24 NOTE — PLAN OF CARE
Problem: Goal Outcome Summary  Goal: Goal Outcome Summary  OT 4E: OT evaluation completed. Per discussion with family, pt independent with mobility and ADLs/IADLs prior to admission however some mild weakness due to infection/cardiac function. Pt s/p AVR and intraoperative CVA. Pt presents with R hemiparesis and R sided neglect, post surgical precautions, and decreased activity tolerance for ADLs. Pt with trace contractions of R quad and toes however no movement noted on RUE. Pt dependently lifted to EOB. Pt dangled EOB with max A progressing to min A, pt with mild R trunk lean. Pt on CPAP 40% FiO2 PEEP 5, RR increased to 30s O2 sats >93% with activity.      Recommend ARU given pt's age, PLOF, and potential for neuro recovery with increased intensity of therapies.

## 2017-09-24 NOTE — PROGRESS NOTES
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    Objective:  Temp:  [99.1  F (37.3  C)-100.2  F (37.9  C)] 99.9  F (37.7  C)  Heart Rate:  [79-83] 80  Resp:  [16-23] 22  BP: ()/(56-83) 109/58  MAP:  [56 mmHg-95 mmHg] 70 mmHg  Arterial Line BP: ()/(48-86) 86/59  FiO2 (%):  [40 %] 40 %  SpO2:  [100 %] 100 %    Ventilation Mode: CPAP/PS  FiO2 (%): 40 %  Rate Set (breaths/minute): 16 breaths/min  Tidal Volume Set (mL): 450 mL  PEEP (cm H2O): 5 cmH2O  Pressure Support (cm H2O): 10 cmH2O  Oxygen Concentration (%): 40 %  Resp: 22    I/O last 3 completed shifts:  In: 2361.1 [I.V.:1051.1; NG/GT:500]  Out: 2395 [Urine:2115; Emesis/NG output:100; Chest Tube:180]    PE:  General: Intubated, appears awake, alert  Neck: Supple, no tracheal deviation  Cardiovascular: RRR  Pumonary: Non labored breathing on MV  Abdomen: Soft, non distended, non tender   Ext: W/o edema, warm  Neuro: follows commands, R sided weakness     BMP    Recent Labs  Lab 09/24/17 0410 09/23/17 2056 09/23/17  0344 09/22/17  2144   * 148* 145* 143   POTASSIUM 4.2 4.1 5.0 4.9   CHLORIDE 117* 114* 111* 110*   CO2 28 29 26 27   BUN 35* 46* 58* 63*   CR 0.82 0.91 1.25 1.37*   * 141* 143* 123*   MAG 2.7* 2.8* 3.3* 3.2*   PHOS 1.6* 1.7* 3.4 3.3     CBC    Recent Labs  Lab 09/24/17  0410 09/23/17 2056 09/23/17  0810 09/23/17  0732   WBC 10.0 9.4 8.2 Unsatisfactory specimen - clotted   HGB 9.2* 8.9* 7.7* Unsatisfactory specimen - clotted   PLT 54* 45* 45* Unsatisfactory specimen - clotted     INR/PTT    Recent Labs  Lab 09/24/17  0410 09/23/17  0344 09/22/17  0425 09/21/17  1728 09/21/17  1620 09/21/17  1503 09/21/17  1326   INR 2.45* 2.01* 1.62* 1.18* 1.04 2.48* 5.83*   PTT  --   --   --  48* 48* 57* >240*      ABG    Recent Labs  Lab 09/24/17  1110 09/24/17  0940 09/24/17  0402 09/23/17 2056   PH 7.47* 7.46* 7.52* 7.52*   PCO2 39 42 36 34*   PO2 148* 122* 172* 125*   HCO3 28 29* 29* 28     LFT    Recent Labs  Lab 09/24/17  0410 09/23/17  0344 09/21/17  0248   AST 95* 226* 2729*   * 307* 2305*   ALKPHOS 59 52 68   BILITOTAL 12.1* 12.7* 4.3*   ALBUMIN 2.2* 2.3* 2.7*       A/P   47 year old with history of prosthetic aortic valve with development of endocarditis, now s/p redo sternotomy with mechanical aortic and mitral valve replacements.   Neuro: pain control prn, neurology recommends repeat HCT for surveillance today, no specific stroke interventions at this time  Resp: extubate, pulmonary toilet, continue chest tubes today  CV: no pressor needs, cap pacer wires   GI/FEN: LFTs and bilirubin stable / down-trending, continue to monitor, assess for swallow with SLP when extubated given stroke  Renal: creatinine normal, euvolemic, no lasix today  Endo: Insulin sliding scale  ID: on merropenem per ID recommendations for GNRs on mitral valve  Heme: HIT labs still pending, holding heparin, INR close to therapeutic on warfarin (for mechanical valves)   Prophylaxis: Protonix, hold heparin for HIT concerns  Lines: d/c PA catheter this morning, possible d/c art line and central line later today  Dispo: CV ICU, transfer to the floor as early as tomorrow    Tramaine Holt MD  Surgery PGY-3

## 2017-09-25 ENCOUNTER — APPOINTMENT (OUTPATIENT)
Dept: OCCUPATIONAL THERAPY | Facility: CLINIC | Age: 47
DRG: 216 | End: 2017-09-25
Attending: PHYSICIAN ASSISTANT
Payer: COMMERCIAL

## 2017-09-25 ENCOUNTER — APPOINTMENT (OUTPATIENT)
Dept: PHYSICAL THERAPY | Facility: CLINIC | Age: 47
DRG: 216 | End: 2017-09-25
Attending: THORACIC SURGERY (CARDIOTHORACIC VASCULAR SURGERY)
Payer: COMMERCIAL

## 2017-09-25 ENCOUNTER — APPOINTMENT (OUTPATIENT)
Dept: SPEECH THERAPY | Facility: CLINIC | Age: 47
DRG: 216 | End: 2017-09-25
Attending: PHYSICIAN ASSISTANT
Payer: COMMERCIAL

## 2017-09-25 LAB
ALBUMIN SERPL-MCNC: 2.2 G/DL (ref 3.4–5)
ALBUMIN UR-MCNC: NEGATIVE MG/DL
ALP SERPL-CCNC: 69 U/L (ref 40–150)
ALT SERPL W P-5'-P-CCNC: 196 U/L (ref 0–70)
ANION GAP SERPL CALCULATED.3IONS-SCNC: 3 MMOL/L (ref 3–14)
ANION GAP SERPL CALCULATED.3IONS-SCNC: 6 MMOL/L (ref 3–14)
APPEARANCE UR: CLEAR
AST SERPL W P-5'-P-CCNC: 83 U/L (ref 0–45)
BACTERIA #/AREA URNS HPF: ABNORMAL /HPF
BILIRUB SERPL-MCNC: 12.6 MG/DL (ref 0.2–1.3)
BILIRUB UR QL STRIP: ABNORMAL
BUN SERPL-MCNC: 25 MG/DL (ref 7–30)
BUN SERPL-MCNC: 26 MG/DL (ref 7–30)
CALCIUM SERPL-MCNC: 8 MG/DL (ref 8.5–10.1)
CALCIUM SERPL-MCNC: 8 MG/DL (ref 8.5–10.1)
CHLORIDE SERPL-SCNC: 114 MMOL/L (ref 94–109)
CHLORIDE SERPL-SCNC: 120 MMOL/L (ref 94–109)
CO2 SERPL-SCNC: 29 MMOL/L (ref 20–32)
CO2 SERPL-SCNC: 30 MMOL/L (ref 20–32)
COLOR UR AUTO: YELLOW
CREAT SERPL-MCNC: 0.66 MG/DL (ref 0.66–1.25)
CREAT SERPL-MCNC: 0.68 MG/DL (ref 0.66–1.25)
ERYTHROCYTE [DISTWIDTH] IN BLOOD BY AUTOMATED COUNT: 21 % (ref 10–15)
FACT X ACT/NOR PPP CHRO: 35 % (ref 70–130)
GFR SERPL CREATININE-BSD FRML MDRD: >90 ML/MIN/1.7M2
GFR SERPL CREATININE-BSD FRML MDRD: >90 ML/MIN/1.7M2
GLUCOSE BLDC GLUCOMTR-MCNC: 111 MG/DL (ref 70–99)
GLUCOSE BLDC GLUCOMTR-MCNC: 116 MG/DL (ref 70–99)
GLUCOSE BLDC GLUCOMTR-MCNC: 121 MG/DL (ref 70–99)
GLUCOSE BLDC GLUCOMTR-MCNC: 125 MG/DL (ref 70–99)
GLUCOSE BLDC GLUCOMTR-MCNC: 143 MG/DL (ref 70–99)
GLUCOSE SERPL-MCNC: 127 MG/DL (ref 70–99)
GLUCOSE SERPL-MCNC: 133 MG/DL (ref 70–99)
GLUCOSE UR STRIP-MCNC: NEGATIVE MG/DL
HCT VFR BLD AUTO: 31.6 % (ref 40–53)
HGB BLD-MCNC: 9.8 G/DL (ref 13.3–17.7)
HGB UR QL STRIP: ABNORMAL
INR PPP: 3.29 (ref 0.86–1.14)
KETONES UR STRIP-MCNC: NEGATIVE MG/DL
LEUKOCYTE ESTERASE UR QL STRIP: NEGATIVE
MAGNESIUM SERPL-MCNC: 2.3 MG/DL (ref 1.6–2.3)
MAGNESIUM SERPL-MCNC: 2.5 MG/DL (ref 1.6–2.3)
MCH RBC QN AUTO: 29.1 PG (ref 26.5–33)
MCHC RBC AUTO-ENTMCNC: 31 G/DL (ref 31.5–36.5)
MCV RBC AUTO: 94 FL (ref 78–100)
MUCOUS THREADS #/AREA URNS LPF: PRESENT /LPF
NITRATE UR QL: NEGATIVE
PF4 HEPARIN CMPLX AB SER QL: NEGATIVE
PH UR STRIP: 5.5 PH (ref 5–7)
PHOSPHATE SERPL-MCNC: 1.8 MG/DL (ref 2.5–4.5)
PHOSPHATE SERPL-MCNC: 2.1 MG/DL (ref 2.5–4.5)
PLATELET # BLD AUTO: 53 10E9/L (ref 150–450)
POTASSIUM SERPL-SCNC: 4.2 MMOL/L (ref 3.4–5.3)
POTASSIUM SERPL-SCNC: 4.3 MMOL/L (ref 3.4–5.3)
PROT SERPL-MCNC: 4.8 G/DL (ref 6.8–8.8)
RBC # BLD AUTO: 3.37 10E12/L (ref 4.4–5.9)
RBC #/AREA URNS AUTO: 3 /HPF (ref 0–2)
SODIUM SERPL-SCNC: 147 MMOL/L (ref 133–144)
SODIUM SERPL-SCNC: 154 MMOL/L (ref 133–144)
SOURCE: ABNORMAL
SP GR UR STRIP: 1.01 (ref 1–1.03)
TRANS CELLS #/AREA URNS HPF: <1 /HPF (ref 0–1)
UROBILINOGEN UR STRIP-MCNC: NORMAL MG/DL (ref 0–2)
WBC # BLD AUTO: 9.1 10E9/L (ref 4–11)
WBC #/AREA URNS AUTO: 2 /HPF (ref 0–2)

## 2017-09-25 PROCEDURE — 25000132 ZZH RX MED GY IP 250 OP 250 PS 637: Performed by: THORACIC SURGERY (CARDIOTHORACIC VASCULAR SURGERY)

## 2017-09-25 PROCEDURE — 25000132 ZZH RX MED GY IP 250 OP 250 PS 637: Performed by: SURGERY

## 2017-09-25 PROCEDURE — 83735 ASSAY OF MAGNESIUM: CPT | Performed by: SURGERY

## 2017-09-25 PROCEDURE — 40000193 ZZH STATISTIC PT WARD VISIT

## 2017-09-25 PROCEDURE — 25000132 ZZH RX MED GY IP 250 OP 250 PS 637: Performed by: INTERNAL MEDICINE

## 2017-09-25 PROCEDURE — 40000225 ZZH STATISTIC SLP WARD VISIT

## 2017-09-25 PROCEDURE — 21400006 ZZH R&B CCU INTERMEDIATE UMMC

## 2017-09-25 PROCEDURE — 85027 COMPLETE CBC AUTOMATED: CPT | Performed by: SURGERY

## 2017-09-25 PROCEDURE — 92610 EVALUATE SWALLOWING FUNCTION: CPT | Mod: GN

## 2017-09-25 PROCEDURE — 84100 ASSAY OF PHOSPHORUS: CPT | Performed by: SURGERY

## 2017-09-25 PROCEDURE — 25000128 H RX IP 250 OP 636: Performed by: SURGERY

## 2017-09-25 PROCEDURE — 80053 COMPREHEN METABOLIC PANEL: CPT | Performed by: SURGERY

## 2017-09-25 PROCEDURE — 40000133 ZZH STATISTIC OT WARD VISIT

## 2017-09-25 PROCEDURE — 25000128 H RX IP 250 OP 636: Performed by: STUDENT IN AN ORGANIZED HEALTH CARE EDUCATION/TRAINING PROGRAM

## 2017-09-25 PROCEDURE — 80048 BASIC METABOLIC PNL TOTAL CA: CPT | Performed by: SURGERY

## 2017-09-25 PROCEDURE — 25000132 ZZH RX MED GY IP 250 OP 250 PS 637: Performed by: ANESTHESIOLOGY

## 2017-09-25 PROCEDURE — 36415 COLL VENOUS BLD VENIPUNCTURE: CPT | Performed by: THORACIC SURGERY (CARDIOTHORACIC VASCULAR SURGERY)

## 2017-09-25 PROCEDURE — 27210437 ZZH NUTRITION PRODUCT SEMIELEM INTERMED LITER

## 2017-09-25 PROCEDURE — 27210913 ZZH NUTRITION PRODUCT INTERMEDIATE PACKET

## 2017-09-25 PROCEDURE — 81001 URINALYSIS AUTO W/SCOPE: CPT | Performed by: STUDENT IN AN ORGANIZED HEALTH CARE EDUCATION/TRAINING PROGRAM

## 2017-09-25 PROCEDURE — 25000128 H RX IP 250 OP 636: Performed by: THORACIC SURGERY (CARDIOTHORACIC VASCULAR SURGERY)

## 2017-09-25 PROCEDURE — 87040 BLOOD CULTURE FOR BACTERIA: CPT | Performed by: THORACIC SURGERY (CARDIOTHORACIC VASCULAR SURGERY)

## 2017-09-25 PROCEDURE — 25000125 ZZHC RX 250: Performed by: THORACIC SURGERY (CARDIOTHORACIC VASCULAR SURGERY)

## 2017-09-25 PROCEDURE — 25000128 H RX IP 250 OP 636: Performed by: ANESTHESIOLOGY

## 2017-09-25 PROCEDURE — 40000014 ZZH STATISTIC ARTERIAL MONITORING DAILY

## 2017-09-25 PROCEDURE — 40000275 ZZH STATISTIC RCP TIME EA 10 MIN

## 2017-09-25 PROCEDURE — 97162 PT EVAL MOD COMPLEX 30 MIN: CPT | Mod: GP

## 2017-09-25 PROCEDURE — 85610 PROTHROMBIN TIME: CPT | Performed by: SURGERY

## 2017-09-25 PROCEDURE — 97110 THERAPEUTIC EXERCISES: CPT | Mod: GO

## 2017-09-25 PROCEDURE — 97112 NEUROMUSCULAR REEDUCATION: CPT | Mod: GP

## 2017-09-25 PROCEDURE — 97530 THERAPEUTIC ACTIVITIES: CPT | Mod: GP

## 2017-09-25 PROCEDURE — 00000146 ZZHCL STATISTIC GLUCOSE BY METER IP

## 2017-09-25 PROCEDURE — 97530 THERAPEUTIC ACTIVITIES: CPT | Mod: GO

## 2017-09-25 RX ORDER — CEFTRIAXONE 2 G/1
2 INJECTION, POWDER, FOR SOLUTION INTRAMUSCULAR; INTRAVENOUS EVERY 24 HOURS
Status: DISCONTINUED | OUTPATIENT
Start: 2017-09-25 | End: 2017-10-05 | Stop reason: HOSPADM

## 2017-09-25 RX ORDER — FUROSEMIDE 10 MG/ML
20 INJECTION INTRAMUSCULAR; INTRAVENOUS ONCE
Status: COMPLETED | OUTPATIENT
Start: 2017-09-25 | End: 2017-09-25

## 2017-09-25 RX ADMIN — POTASSIUM & SODIUM PHOSPHATES POWDER PACK 280-160-250 MG 1 PACKET: 280-160-250 PACK at 08:19

## 2017-09-25 RX ADMIN — ACETAMINOPHEN 650 MG: 325 TABLET, FILM COATED ORAL at 12:45

## 2017-09-25 RX ADMIN — ACETAMINOPHEN 650 MG: 325 TABLET, FILM COATED ORAL at 08:19

## 2017-09-25 RX ADMIN — POTASSIUM & SODIUM PHOSPHATES POWDER PACK 280-160-250 MG 1 PACKET: 280-160-250 PACK at 11:52

## 2017-09-25 RX ADMIN — MULTIVITAMIN 15 ML: LIQUID ORAL at 08:19

## 2017-09-25 RX ADMIN — POTASSIUM PHOSPHATE, MONOBASIC AND POTASSIUM PHOSPHATE, DIBASIC 20 MMOL: 224; 236 INJECTION, SOLUTION INTRAVENOUS at 05:39

## 2017-09-25 RX ADMIN — ACETAMINOPHEN 650 MG: 325 TABLET, FILM COATED ORAL at 04:03

## 2017-09-25 RX ADMIN — OXYCODONE HYDROCHLORIDE 5 MG: 5 TABLET ORAL at 04:03

## 2017-09-25 RX ADMIN — MUPIROCIN 0.5 G: 20 OINTMENT TOPICAL at 08:19

## 2017-09-25 RX ADMIN — ACETAMINOPHEN 650 MG: 325 TABLET, FILM COATED ORAL at 17:07

## 2017-09-25 RX ADMIN — FUROSEMIDE 20 MG: 10 INJECTION, SOLUTION INTRAVENOUS at 14:38

## 2017-09-25 RX ADMIN — CHLORHEXIDINE GLUCONATE 15 ML: 1.2 RINSE ORAL at 08:19

## 2017-09-25 RX ADMIN — POTASSIUM & SODIUM PHOSPHATES POWDER PACK 280-160-250 MG 1 PACKET: 280-160-250 PACK at 20:47

## 2017-09-25 RX ADMIN — PANTOPRAZOLE SODIUM 40 MG: 40 TABLET, DELAYED RELEASE ORAL at 08:19

## 2017-09-25 RX ADMIN — INSULIN ASPART 1 UNITS: 100 INJECTION, SOLUTION INTRAVENOUS; SUBCUTANEOUS at 08:19

## 2017-09-25 RX ADMIN — ASPIRIN 325 MG ORAL TABLET 325 MG: 325 PILL ORAL at 08:19

## 2017-09-25 RX ADMIN — CEFTRIAXONE SODIUM 2 G: 2 INJECTION, POWDER, FOR SOLUTION INTRAMUSCULAR; INTRAVENOUS at 16:41

## 2017-09-25 RX ADMIN — OXYCODONE HYDROCHLORIDE 5 MG: 5 TABLET ORAL at 17:07

## 2017-09-25 RX ADMIN — DIPHENHYDRAMINE HYDROCHLORIDE 25 MG: 50 INJECTION, SOLUTION INTRAMUSCULAR; INTRAVENOUS at 01:31

## 2017-09-25 RX ADMIN — MEROPENEM 2 G: 1 INJECTION, POWDER, FOR SOLUTION INTRAVENOUS at 08:33

## 2017-09-25 RX ADMIN — Medication 2 PACKET: at 08:34

## 2017-09-25 RX ADMIN — OXYCODONE HYDROCHLORIDE 5 MG: 5 TABLET ORAL at 12:45

## 2017-09-25 RX ADMIN — OXYCODONE HYDROCHLORIDE 5 MG: 5 TABLET ORAL at 08:35

## 2017-09-25 RX ADMIN — LIDOCAINE 2 PATCH: 50 PATCH CUTANEOUS at 08:20

## 2017-09-25 RX ADMIN — POTASSIUM & SODIUM PHOSPHATES POWDER PACK 280-160-250 MG 1 PACKET: 280-160-250 PACK at 16:51

## 2017-09-25 RX ADMIN — POTASSIUM PHOSPHATE, MONOBASIC AND POTASSIUM PHOSPHATE, DIBASIC 15 MMOL: 224; 236 INJECTION, SOLUTION INTRAVENOUS at 20:45

## 2017-09-25 ASSESSMENT — VISUAL ACUITY
OU: BASELINE

## 2017-09-25 NOTE — CONSULTS
Urology Consult History and Physical    Name: Harvey Almanzar    MRN: [id number]   YOB: 1970       We were asked to see Harvey Almanzar at the request of Dr. Holt for evaluation and treatment of the following chief complaint.        Chief Complaint:   Penile swelling and pain   History is obtained from the patient          History of Present Illness:   Harvey Almanzar is a 47 year old male with  PMH of prostetic arotic valve admitted for endocarditis now POD #4 sternotomy with mechanical aortic and mitral valve replacement. On post op day one he was noted to have abnormal penile swelling but no further work up was taken at that time. Patient extubated today and reports penile pain and abnormal appearance of his penis. Patient states that he is not circumsized. Of note, patient is currently being worked up for ischemic stroke at this time.           Past Medical History:     Past Medical History:   Diagnosis Date     Acute diastolic congestive heart failure (H) 8/24/2017     Aortic valve replaced 1996    Overview:  Cadaver - tissue valve in 1996 at [facility]  Amoxicillin 2000 mg prior to dental work please.  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            Past Surgical History:     Past Surgical History:   Procedure Laterality Date     REDO STERNOTOMY BYPASS CORONARY ARTERY N/A 9/21/2017    Procedure: REDO STERNOTOMY BYPASS CORONARY ARTERY;;  Surgeon: Nicola Gorman MD;  Location: [facility] OR     REDO STERNOTOMY REPLACE VALVE AORTIC N/A 9/21/2017    Procedure: REDO STERNOTOMY REPLACE VALVE AORTIC;;  Surgeon: Nicola Gorman MD;  Location: [facility] OR     REDO STERNOTOMY REPLACE VALVE MITRAL N/A 9/21/2017    Procedure: REDO STERNOTOMY REPLACE  VALVE MITRAL;  Left groin cutdown, Redo Median Sternotomy, Lysis of adhesions, Left mini-thoracotomy,Coronary artery bypass graft x 1 using the left greater saphenous vein, using endovein harvest, Mitral valve replacement using [employer] Mechanical 27mm, Aortic Valve Replacement using a 17mm [employer] Mechanical, On Cardiopulmonary Bypass Pump;  Surgeon: Cindy Gorman            Social History:     Social History   Substance Use Topics     Smoking status: Former Smoker     Packs/day: 0.50     Years: 31.00     Smokeless tobacco: Never Used     Alcohol use No            Family History:   No family history on file.         Allergies:   No Known Allergies         Medications:     Current Facility-Administered Medications   Medication     chlorhexidine (PERIDEX) 0.12 % solution 15 mL     potassium & sodium phosphates (NEUTRA-PHOS) Packet 1 packet     insulin aspart (NovoLOG) inj (RAPID ACTING)     Warfarin Therapy Reminder (Check START DATE - warfarin may be starting in the FUTURE)     meropenem (MERREM) 2 g in NaCl 0.9 % 100 mL intermittent infusion     dextrose 10 % 1,000 mL infusion     multivitamins with minerals (CERTAVITE/CEROVITE) liquid 15 mL     protein modular (PROSource TF) 2 packet     pantoprazole (PROTONIX) suspension 40 mg     naloxone (NARCAN) injection 0.1-0.4 mg     dextrose 10 % 1,000 mL infusion     glucose 40 % gel 15-30 g    Or     dextrose 50 % injection 25-50 mL    Or     glucagon injection 1 mg     lidocaine 1 % 1 mL     lidocaine (LMX4) kit     sodium chloride (PF) 0.9% PF flush 3 mL     sodium chloride (PF) 0.9% PF flush 3 mL     Reason beta blocker order not selected     albumin human 5 % injection 500-1,000 mL     hydrALAZINE (APRESOLINE) injection 10 mg     insulin aspart (NovoLOG) inj (RAPID ACTING)     albuterol (PROAIR HFA/PROVENTIL HFA/VENTOLIN HFA) Inhaler 6 puff     albuterol neb solution 2.5 mg     mupirocin (BACTROBAN) 2 % ointment 0.5 g     aspirin tablet 325 mg     potassium chloride  SA (K-DUR/KLOR-CON M) CR tablet 20-40 mEq     potassium chloride (KLOR-CON) Packet 20-40 mEq     potassium chloride 10 mEq in 100 mL intermittent infusion     potassium chloride 10 mEq in 100 mL intermittent infusion with 10 mg lidocaine     magnesium sulfate 2 g in NS intermittent infusion ([employer]ar[employer] or FV Cmpd)     magnesium sulfate 4 g in 100 mL sterile water (premade)     potassium phosphate 10 mmol in D5W 250 mL intermittent infusion     potassium phosphate 15 mmol in D5W 250 mL intermittent infusion     potassium phosphate 20 mmol in D5W 500 mL intermittent infusion     potassium phosphate 20 mmol in D5W 250 mL intermittent infusion     potassium phosphate 25 mmol in D5W 500 mL intermittent infusion     HYDROmorphone (PF) (DILAUDID) injection 0.3-0.5 mg     oxyCODONE (ROXICODONE) IR tablet 5-10 mg     cyclobenzaprine (FLEXERIL) tablet 10 mg     lidocaine (LIDODERM) 5 % Patch 2 patch     lidocaine (LIDODERM) patch REMOVAL     lidocaine (LIDODERM) patch in PLACE     diphenhydrAMINE (BENADRYL) capsule 25 mg    Or     diphenhydrAMINE (BENADRYL) injection 25 mg     ondansetron (ZOFRAN-ODT) ODT tab 4 mg    Or     ondansetron (ZOFRAN) injection 4 mg     prochlorperazine (COMPAZINE) injection 5-10 mg    Or     prochlorperazine (COMPAZINE) tablet 5-10 mg     senna-docusate (SENOKOT-S;PERICOLACE) 8.6-50 MG per tablet 1-2 tablet     HOLD:  Metformin and metformin containing medications if patient received IV contrast     melatonin tablet 3 mg     pneumococcal vaccine (PNEUMOVAX 23-rita) injection 0.5 mL     acetaminophen (TYLENOL) tablet 650 mg     polyethylene glycol (MIRALAX/GLYCOLAX) Packet 17 g     senna-docusate (SENOKOT-S;PERICOLACE) 8.6-50 MG per tablet 1-2 tablet             Review of Systems:    ROS: 10 point ROS neg other than the symptoms noted above in the HPI           Physical Exam:   VS:  T: 98    HR: Data Unavailable    BP: 105/70    RR: 16   GEN: Alert, slow to respond to questions   CV:  RRR  LUNGS:  Non-labored breathing.   ABD:  Soft.  NT.  ND.  No rebound or guarding.  No masses.  : Significant 2-3 cm distal penile swelling with areas of abnormal calification.   Glans appears normal in color and without evidence of swelling. Able to reduce foreskin. No phimotic ring easily palpated. And a finger was esily swept around the entire corona with no evidence of constriction. The foreskin was able to retract fully over the glans.   Testicles descended bilaterally, no nodules or tenderness.  No inguinal hernias.  SKIN:  Warm.  Dry.  No rashes.  NEURO:  CN grossly intact.            Data:   All laboratory data reviewed:      Recent Labs  Lab 09/25/17  0344 09/24/17  0410 09/23/17 2056 09/23/17  0810   WBC 9.1 10.0 9.4 8.2   HGB 9.8* 9.2* 8.9* 7.7*   PLT 53* 54* 45* 45*       Recent Labs  Lab 09/25/17  0344 09/24/17  1550 09/24/17 0410 09/23/17 2056   * 151* 150* 148*   POTASSIUM 4.3 4.5 4.2 4.1   CHLORIDE 120* 118* 117* 114*   CO2 29 28 28 29   BUN 26 34* 35* 46*   CR 0.66 0.75 0.82 0.91   * 152* 157* 141*   CHECO 8.0* 7.9* 7.9* 8.0*   MAG 2.5* 2.6* 2.7* 2.8*   PHOS 1.8* 3.2 1.6* 1.7*       Recent Labs  Lab 09/19/17  1027   COLOR Yellow   APPEARANCE Slightly Cloudy   URINEGLC Negative   URINEBILI Negative   URINEKETONE 5*   SG 1.013   URINEPH 5.0   PROTEIN 30*   NITRITE Negative   LEUKEST Negative   RBCU 1   WBCU 2                  Impression and Plan:   Impression:   Harvey Almanzar is a 47 year old male with  PMH of prostetic arotic valve admitted for endocarditis now POD #4 sternotomy with mechanical aortic and mitral valve replacement now with penile pain and swelling concerning for paraphimosis. Patient examined after penile block performed and no phimotic band noted.       Plan:    -10cc penile block performed with 1% lidocaine without epi  - Recommend supportive cares at this time.   - Scrotal support at all times   - Please keep catheter off traction   - Will plan to reexamine in the AM          This patient's exam findings, labs, and imaging discussed with urology chief resident  and urology staff surgeon Dr. Harry, who developed the treatment plan.    Ania Ford MD  Urology Resident PGY-3

## 2017-09-25 NOTE — PROGRESS NOTES
09/25/17 1200   General Information   Onset Date 09/15/17   Start of Care Date 09/25/17   Referring Physician Tramaine Holt MD   Patient Profile Review/OT: Additional Occupational Profile Info See Profile for full history and prior level of function   Patient/Family Goals Statement the patient nodded yes to wanting to try H20   Swallowing Evaluation Bedside swallow evaluation   Behaviorial Observations Lethargic;Other (Comment)  (mostly nonverbal during exam)   Mode of current nutrition NPO;NG   Respiratory Status Other (see comments);O2 Supply  (extubated 9/24/17)   Type of O2 supply Nasal cannula   Comments Harvey Almanzar is a 47 year old year old male h/o bicuspid aortic valve s/p remote AVR and 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 On 9/23/17 he was found to have R sided weakness with last known normal being pre-op before his AVR, MVR, and CABG on 9/21/17.  Imaging and exam were consistent with L MCA ischemic stroke, presumed to be from perioperative cardiac embolus.  The patient was extubated on 9/24/17 and referred to SLP for bedside swallowing evaluation.   Clinical Swallow Evaluation   Oral Musculature other (see comments)  (significant overall weakness)   Structural Abnormalities none present   Dentition present and adequate   Mucosal Quality dry   Oral Labial Strength and Mobility other (see comments)  (overall weakness)   Lingual Strength and Mobility other (see comments)  (overall weakness)   Laryngeal Function Cough;Throat clear;Swallow;Voicing initiated  (dry swallow noted to be weak and incomplete, voicing weak)   Oral Musculature Comments generalized weakness   Clinical Swallow Eval: Thin Liquid Texture Trial   Mode of Presentation, Thin Liquids other (see comments)  (H20 via swab for oral cares)   Oral Phase of  Swallow Premature pharyngeal entry   Pharyngeal Phase of Swallow impaired;coughing/choking;reduction in laryngeal movement   Diagnostic Statement high aspiration risk, not appropriate for formal PO trials   Esophageal Phase of Swallow   Patient reports or presents with symptoms of esophageal dysphagia Other (Comments)  (not known at this time)   General Therapy Interventions   Planned Therapy Interventions Dysphagia Treatment   Dysphagia treatment Oropharyngeal exercise training;Modified diet education;Instruction of safe swallow strategies   Swallow Eval: Clinical Impressions   Skilled Criteria for Therapy Intervention Skilled criteria met.  Treatment indicated.   Functional Assessment Scale (FAS) 1   Treatment Diagnosis severe oropharyngeal dysphagia   Diet texture recommendations NPO   Demonstrates Need for Referral to Another Service dietitian;occupational therapy;physical therapy;respiratory therapy;social work   Therapy Frequency daily   Predicted Duration of Therapy Intervention (days/wks) 4 weeks   Anticipated Discharge Disposition inpatient rehabilitation facility   Risks and Benefits of Treatment have been explained. Yes   Patient, family and/or staff in agreement with Plan of Care Yes   Clinical Impression Comments Clinical dysphagia examination completed per MD order. The patient presents with severe oropharyngeal dysphagia and is at high risk for aspiration. Pharyngeal swallowing response is incomplete and the patient had significant coughing episodes with excess H20 from the swab. Not appropriate for formal PO trials due to aspiration risk. Recommend NPO with TF. Encourage oral cares with suction or with excess H20 rung out from the swab. SLP will follow closely for dysphagia tx with the goal of PO readiness.   Total Evaluation Time   Total Evaluation Time (Minutes) 10

## 2017-09-25 NOTE — PROGRESS NOTES
[facility] SERVICE: ONGOING CONSULTATION      Patient:  Harvey Almanzar, Date of birth 1970, Medical record number [id number]  Date of Visit:  September 25, 2017         Assessment and Recommendations:   Problem List:  Strep parasanguinis prosthetic valve endocarditis  s/p  Redo Median Sternotomy, Lysis of adhesions, Left mini-thoracotomy,Coronary artery bypass graft x 1 using the left greater saphenous vein, using endovein harvest, Mitral valve replacement using [employer] 27mm, Aortic Valve Replacement using a 17mm [employer], On Cardiopulmonary Bypass Pump - 9/21/17   E coli growing on mitral valve tissue  L MCA stroke on 9/23    Impression: Harvey Almanzar is a 47 year old male with a history of bacterial endocarditis involving the native aortic valve s/p AVR in 1996 with revision of the valve in 1998, ureteral stone presented to ER on 7/14/17 for 2 weeks of fever, chills, sweatings . He was hospitalized at [facility] from 7/14-7/20/17 , diagnosed with  Streptococcus parasanguinis bacteremia and prosthetic aortic valve endocarditis with severe aortic regurgitation. Blood cultures were positive on 7/13, 7/14, 7/15 for strep parasanguinas (sensitive to Ceftriaxone LONG 0.094, and PCN LONG 0.19). Blood cultures 7/16, 7/17, 8/25 were negative. He was started on Ceftriaxone + 2 weeks of gentamicin (shortened duration due to increasing Creatinine). On 9/21 he underwent redo median sternotomy, CABG, MVR, and AVR on 9/21. Mitral valve tissue now growing Ecoli, sensitive to ceftriaxone. This is unusual, given that he has been on Ceftriaxone for several weeks now. He has a history of multiple blood cultures positive for strep, and no Ecoli grew previously. Ecoli usually comes from GI or  source. Would repeat Urine culture -though it would be unusual for a UTI to cause bacteremia and endocarditis. Abdominal US didn't reveal intraabdominal abscess, or renal  abscess.    Recommendations:  1. Discontinue Meropenem  2. Start Ceftriaxone 2g IV q24h for Ecoli growing on mitral valve  3. Please obtain 1 more set of blood cultures and urine culture    ID will continue to follow.     Attestation:  I have reviewed today's vital signs, medications, labs and imaging.  Yelena Peralta MD  Pager [phone]          Interval History:       Patient found to have a L MCA stroke on 9/23. Currently denies pain or discomfort.          Review of Systems:   CONSTITUTIONAL:  No fevers or chills  EYES: negative for icterus  ENT:  negative for oral lesions, hearing loss, tinnitus and sore throat  RESPIRATORY:  negative for cough and dyspnea  CARDIOVASCULAR:  negative for chest pain, palpitations  GASTROINTESTINAL:  negative for nausea, vomiting, diarrhea and constipation  GENITOURINARY:  negative for dysuria  HEME:  No easy bruising/bleeding  INTEGUMENT:  negative for rash and pruritus  NEURO:  Negative for headache         Current Antimicrobials   Meropenem d1=9/22         Physical Exam:   Ranges for vital signs:  Temp:  [97.6  F (36.4  C)-99.9  F (37.7  C)] 98.3  F (36.8  C)  Heart Rate:  [64-85] 85  Resp:  [16-22] 20  BP: ()/(59-96) 115/81  MAP:  [79 mmHg-132 mmHg] 94 mmHg  Arterial Line BP: ()/() 133/73  FiO2 (%):  [40 %] 40 %  SpO2:  [90 %-100 %] 98 %      Exam:  GENERAL: Awake, responsive, in no acute distress.   ENT:  Head is normocephalic, atraumatic. Oropharynx is moist without exudates or ulcers.  EYES:  Eyes have anicteric sclerae. PERRL.   NECK:  Supple. No cervical lymphadenopathy  LUNGS:  Clear to auscultation bilaterally. No wheezes or crackles.  CARDIOVASCULAR:  Sternotomy scar healing nicely -no erythema or drainage. Regular rate and rhythm with no murmurs, gallops or rubs.  ABDOMEN:  Normal bowel sounds, soft, nontender. No hepatosplenomegaly.   EXT: Extremities warm and without edema.  SKIN:  No acute rashes.   NEUROLOGIC:  Grossly nonfocal.              Laboratory Data:     Creatinine   Date Value Ref Range Status   09/25/2017 0.66 0.66 - 1.25 mg/dL Final   09/24/2017 0.75 0.66 - 1.25 mg/dL Final   09/24/2017 0.82 0.66 - 1.25 mg/dL Final   09/23/2017 0.91 0.66 - 1.25 mg/dL Final   09/23/2017 1.25 0.66 - 1.25 mg/dL Final     WBC   Date Value Ref Range Status   09/25/2017 9.1 4.0 - 11.0 10e9/L Final   09/24/2017 10.0 4.0 - 11.0 10e9/L Final   09/23/2017 9.4 4.0 - 11.0 10e9/L Final   09/23/2017 8.2 4.0 - 11.0 10e9/L Final   09/23/2017 Unsatisfactory specimen - clotted 4.0 - 11.0 10e9/L Final     Comment:     CONNOR TILLEY,4E,[id number] 09/23/17 BY [facility]     Hemoglobin   Date Value Ref Range Status   09/25/2017 9.8 (L) 13.3 - 17.7 g/dL Final     Platelet Count   Date Value Ref Range Status   09/25/2017 53 (L) 150 - 450 10e9/L Final     Sed Rate   Date Value Ref Range Status   09/15/2017 42 (H) 0 - 15 mm/h Final     CRP Inflammation   Date Value Ref Range Status   09/15/2017 40.0 (H) 0.0 - 8.0 mg/L Final     AST   Date Value Ref Range Status   09/25/2017 83 (H) 0 - 45 U/L Final   09/24/2017 95 (H) 0 - 45 U/L Final   09/23/2017 226 (H) 0 - 45 U/L Final   09/21/2017 2729 (HH) 0 - 45 U/L Final     Comment:     Critical Value called to and read back by  Pinky Benites at 0408 9/21/17.  mlb     09/15/2017 24 0 - 45 U/L Final     ALT   Date Value Ref Range Status   09/25/2017 196 (H) 0 - 70 U/L Final   09/24/2017 233 (H) 0 - 70 U/L Final   09/23/2017 307 (H) 0 - 70 U/L Final   09/21/2017 2305 (HH) 0 - 70 U/L Final     Comment:     Critical Value called to and read back by  Pinky Benites at 0408 9/21/17.  mlb     09/15/2017 21 0 - 70 U/L Final     Bilirubin Total   Date Value Ref Range Status   09/25/2017 12.6 (H) 0.2 - 1.3 mg/dL Final   09/24/2017 12.1 (H) 0.2 - 1.3 mg/dL Final   09/23/2017 12.7 (H) 0.2 - 1.3 mg/dL Final   09/21/2017 4.3 (H) 0.2 - 1.3 mg/dL Final   09/15/2017 2.5 (H) 0.2 - 1.3 mg/dL Final     Lab Results   Component Value Date     (H) 09/25/2017    BUN 26  09/25/2017    CO2 29 09/25/2017       Culture data:  9/21 Mitral valve leaflet tissue:    Light growth   Escherichia coli      Culture Micro 09/21/2017 11:33 AM 75   Culture in progress   Culture & Susceptibility   ESCHERICHIA COLI   Antibiotic Interpretation Sensitivity Unit Method Status   AMIKACIN Sensitive <=2 ug/mL LONG Preliminary   AMPICILLIN Resistant >=32 ug/mL LONG Preliminary   AMPICILLIN/SULBACTAM Intermediate 16 ug/mL LONG Preliminary   CEFEPIME Sensitive <=1 ug/mL LONG Preliminary   CEFTAZIDIME Sensitive <=1 ug/mL LONG Preliminary   CEFTRIAXONE Sensitive <=1 ug/mL LONG Preliminary   CIPROFLOXACIN Sensitive <=0.25 ug/mL LONG Preliminary   GENTAMICIN Resistant >=16 ug/mL LONG Preliminary   LEVOFLOXACIN Sensitive <=0.12 ug/mL LONG Preliminary   MEROPENEM Sensitive <=0.25 ug/mL LONG Preliminary   Piperacillin/Tazo Sensitive <=4 ug/mL LONG Preliminary   TOBRAMYCIN Sensitive 4 ug/mL LONG Preliminary   Trimethoprim/Sulfa Resistant >=16/304 ug/mL LONG Preliminary            All cultures:    Recent Labs  Lab 09/22/17  1452 09/22/17  1450 09/22/17  1024 09/21/17  1133 09/21/17  1122   CULT No growth after 3 days No growth after 3 days No growth  Canceled, Test credited  Test canceled by PCU/Clinic  Per Jessica SAENZ from 4E, test was ordered in error. I reordered the test to aerobic cath tip culture.9/22/17 at 1150.TV. Light growthEscherichia coli*  Culture in progress  Culture negative after 1 day  Culture negative monitoring continues Culture negative after 1 day  Culture negative monitoring continues  Culture negative monitoring continues

## 2017-09-25 NOTE — PLAN OF CARE
Problem: Goal Outcome Summary  Goal: Goal Outcome Summary  OT 4E: Pt making steady progress in neuro recovery of R side. Pt with superior visual field cut and endorsed seeing black dot. Pt continues to present with RUE weakness however against gravity strength progressing. Recommend RN continue to use lift for safety with functional transfers.      Discharge Planner OT   Patient plan for discharge: rehab  Current status: Pt continues to present with RUE weakness however progressing in AG strength. Pt mod A for rolling in supine and dependent for supine > EOB > chair. Pt dangled EOB with CGA-min A for dynamic reaching. Pt completed sit > stand with max Ax2 and blocking at R knee. Pt max A for g/h tasks seated in chair.   Barriers to return to prior living situation: R sided weakness, visual deficits, balance deficits, decreased activity tolerance for ADLs  Recommendations for discharge: ARU  Rationale for recommendations: Pt would benefit from ARU to maximize independence with ADLs and mobility. Pt demonstrates skilled needs for OT/PT/SLP and making steady progress in neuro recovery.        Entered by: Ijeoma Love 09/25/2017 3:39 PM

## 2017-09-25 NOTE — PLAN OF CARE
Problem: Goal Outcome Summary  Goal: Goal Outcome Summary  PT 4E: PT Eval completed and treatment initiated. Pt s/p AVR, MVR, and CABGx1 on 9/21 with intraoperative CVA, presents with R UE/LE hemiparesis, R sided neglect, dysmetria, balance impairments, deconditioning and post surgical precautions resulting in decreased safety and independence with functional mobility. VSS throughout, SpO2 97% on 1L NC, HR 80s, MAP 90s.     Discharge Planner PT    Patient plan for discharge: Rehab  Current status: Patient completed sit<>stand with mod/max Ax2 blocking knees, R lateral lean during standing due to R sided weakness, tolerated standing ~15-20 sec x2 with Ax2. Dependent lift transfer chair>bed, engaged in BLE exercises to promote neuro-motor recovery and increase strength.   Barriers to return to prior living situation: Patient previously IND with mobility, was working full time at dental clinic, lives with 2 brothers who both work full-time, bedroom is on 2nd level.  Recommendations for discharge: ARU  Rationale for recommendations: Patient has skilled need for PT/OT/SLP, motivated to return to PLOF, demonstrating neuro recovery, was previously independent and working full time, family is supportive and involved in cares.       Entered by: Nicholas Huber 09/25/2017 6:30 PM

## 2017-09-25 NOTE — PROGRESS NOTES
"Urology Daily Progress Note    24 hour events/Subjective:     - No acute events overnight   - Pain well controlled on current regimen   - No significant increase in degree of penile swelling noted per RN     O:  Vitals: /75  Temp 98  F (36.7  C) (Axillary)  Resp 16  Ht 1.626 m (5' 4\")  Wt 70.7 kg (155 lb 13.8 oz)  SpO2 98%  BMI 26.75 kg/m2  General: Alert, interactive, in NAD  Resp: Non-labored breathing on NC  : Significant 2-3 cm distal penile swelling with areas of abnormal calification.   Glans appears normal in color and without evidence of swelling. Able to reduce foreskin. No phimotic ring easily palpated. And a finger was esily swept around the entire corona with no evidence of constriction. The foreskin was able to retract fully over the glans.   Testicles descended bilaterally, no nodules or tenderness.  No inguinal hernias.    I/O last 3 completed shifts:  In: 2579.63 [I.V.:884.63; NG/GT:545]  Out: 1715 [Urine:1525; Chest Tube:190] - Last 24 hours    Labs/Imaging  Heme:  Recent Labs  Lab 09/25/17  0344 09/24/17  0410 09/23/17 2056 09/23/17  0810   WBC 9.1 10.0 9.4 8.2   HGB 9.8* 9.2* 8.9* 7.7*   PLT 53* 54* 45* 45*     Chem:  Recent Labs  Lab 09/25/17  0344 09/24/17  1550 09/24/17  0410 09/23/17 2056   POTASSIUM 4.3 4.5 4.2 4.1   CR 0.66 0.75 0.82 0.91       Assessment/Plan   Harvey Almanzar is a 47 year old male with  PMH of prosthetic arotic valve admitted for endocarditis now POD #4 sternotomy with mechanical aortic and mitral valve replacement now with penile pain and swelling concerning for paraphimosis. Patient examined after penile block performed and no phimotic band noted. Penile pain has improved somewhat per patient. Observed changes appear chronic in nature, could possibly represent foreign body injection prior to admission. In discussion with surgical team appearance of penis was similar intra-operatively.      - Given that the changes noted in the penis are likely " chronic (possibly related to foreign body injection) there is no indication fr acute urological intervention at this time   - Urology will arrange outpatient follow-up.   - Will sign off, please call with questions.       Seen and examined with chief resident, to be discussed with Dr. Harry    --    Frantz Vee MD  Urology Resident           Contacting the Urology Team     Please use the following job codes to reach the Urology Team. Note that you must use an in house phone and that job codes cannot receive text pages.     On weekdays, dial [phone] (or star-star-star [phone] on the new [place] telephones) then 0816 to reach the Adult Urology resident or PA on call    On weekdays, dial [phone] (or star-star-star 777 on the new [place] telephones) then [phone] to reach the Pediatric Urology resident    On weeknights and weekends, dial [phone] (or star-star-star 777 on the new [place] telephones) then [phone] to reach the Urology resident on call (for both Adult and Pediatrics)

## 2017-09-25 NOTE — PROGRESS NOTES
Social Work: Assessment with Discharge Plan    Patient Name:  Harvey Almanzar  :  1970  Age:  47 year old  MRN:  [id number]  Risk/Complexity Score:  Filed Complexity Screen Score: 8  Completed assessment with:  Patient, pt's brother, and pt's sister-in-law, team rounds, chart review    Presenting Information   Reason for Referral:  Discharge plan and length of stay  Date of Intake:  2017  Referral Source:  Chart Review  Decision Maker:  self  Alternate Decision Maker:  Per [facility] Policy, pt's three brothers.  Health Care Directive:  Not on file.  Living Situation:  House with his two brothers, one of whom can be home 24/7.  Previous Functional Status:  Independent  Patient and family understanding of hospitalization:  restorative  Cultural/Language/Spiritual Considerations:  Pt is Hmong  Adjustment to Illness:  Difficult to assess pt as he did not participate much in conversation d/t fatigue; pt's family appears to be adjusting appropriately.    Physical Health  Reason for Admission:    1. Acute systolic congestive heart failure (H)    2. Aortic valve insufficiency due to infection    3. Non-rheumatic mitral regurgitation    4. Endocarditis of aortic valve      Services Needed/Recommended:  TBD pending medical course.  Await PT/OT recommendations.    Mental Health/Chemical Dependency  Diagnosis:  None  Support/Services in Place:  N/a  Services Needed/Recommended:  N/a    Support System  Significant relationship at present time:  Three brothers  Family of origin is available for support:  Yes, pt lives with two of his brothers, one of whom can be home 24/7 to provide needed care.  Per pt's sister-in-law, who is a CNA in a pharmacy and appears to be well-versed in medical issues and care needs, pt's brother will need teaching for any mobility or nursing care that he will be providing to pt, and a ong  will be helpful for complex medical terms.  Other support available:   "Sister-in-law and large family network.  Gaps in support system:  None.  Patient is caregiver to:  None     Provider Information   Primary Care Physician:  No Ref-Primary, Physician   None   Clinic:        :  unknown    Financial   Income Source:  Did not discuss during visit as it was not pt's family's main focus.  Pt has an employer listed on his face sheet.  Financial Concerns:  None mentioned  Insurance:    Payor/Plan Subscriber Name Rel Member # Group #   PREFERREDONE - PREFER* JOSE MENDENHALL*  [id number]      [facility],  [address] [id number]       Discharge Plan   Patient and family discharge goal:  TBD pending medical course.  Provided education on discharge plan:  YES, please see above \"support system\" section.  Barriers to discharge:  Pt is not medically stable.    Additional comments   SW met with pt, his brother, and sister-in-law to introduce SW role and complete assessment.  Please review \"support system\" section above.  SW continues to follow for support to pt and family, resource referral, and d/c planning.    MENDY Alvarado, [facility]  Adult ICU Clinical   Pager [phone]    "

## 2017-09-25 NOTE — PROGRESS NOTES
CV ICU   Progress Note:    S/Interval History: Overnight no events.      O:  Temp: 98.4  F (36.9  C) Temp  Min: 97.6  F (36.4  C)  Max: 98.4  F (36.9  C)  Resp: 20 Resp  Min: 16  Max: 20  SpO2: 95 % SpO2  Min: 90 %  Max: 100 %    No Data Recorded  Heart Rate: 76 Heart Rate  Min: 64  Max: 85  BP: 119/83 Systolic (24hrs), Av , Min:95 , Max:135   Diastolic (24hrs), Av, Min:59, Max:96    Ventilation Mode: CPAP/PS  FiO2 (%): 40 %  Rate Set (breaths/minute): 16 breaths/min  Tidal Volume Set (mL): 450 mL  PEEP (cm H2O): 5 cmH2O  Pressure Support (cm H2O): 10 cmH2O  Oxygen Concentration (%): 40 %  Resp: 20     Date 17 0700 - 17 0659   Shift 9175-9701 2131-8370 9731-3530 24 Hour Total   I  N  T  A  K  E   I.V. 553   553    NG/   230    Enteral 300   300    Shift Total  (mL/kg) 1083  (15.32)   1083  (15.32)   O  U  T  P  U  T   Urine 485   485    Chest Tube  (mL/kg) 90  (1.27)   90  (1.27)    Shift Total  (mL/kg) 575  (8.13)   575  (8.13)   Weight (kg) 70.7 70.7 70.7 70.7       Physical Exam    General: Intubated, lightly sedated, in no distress, following commands.    Neck: Supple, no tracheal deviation  Cardiovascular: RRR  Pumonary: Non labored breathing.  CTAB   Abdomen: Soft, non distended, non tender.   Ext: No edema, appropriate distal perfusion  Neuro: Non focal     Lab Results   Component Value Date    WBC 9.1 2017    HGB 9.8 (L) 2017    HCT 31.6 (L) 2017    PLT 53 (L) 2017     (H) 2017    POTASSIUM 4.3 2017    CHLORIDE 120 (H) 2017    CO2 29 2017    BUN 26 2017    CR 0.66 2017     (H) 2017    SED 42 (H) 09/15/2017    NTBNPI 80443 (H) 09/15/2017    TROPI 0.586 (HH) 09/15/2017    AST 83 (H) 2017     (H) 2017    ALKPHOS 69 2017    BILITOTAL 12.6 (H) 2017    INR 3.29 (H) 2017         Recent Labs  Lab 17  1550 17  1110 17  0940 17  0748 17  0402   PH  7.45 7.47* 7.46*  --  7.52*   PCO2 41 39 42  --  36   PO2 108* 148* 122*  --  172*   HCO3 29* 28 29*  --  29*   O2PER 4L 40 40 40 40.0  40.0       A/P:  47 year old with history of prosthetic aortic valve with development of endocarditis, now s/p redo sternotomy with mechanical aortic and mitral valve replacements on 9/21/2017.   Neuro: pain control prn, no intervention from neurology  Resp: extubate, pulmonary toilet, continue chest tubes today  CV: no pressor needs, cap pacer wires   GI/FEN: LFTs and bilirubin stable / down-trending, continue to monitor, assess for swallow with SLP when extubated given stroke  Renal: creatinine normal, give lasix today  Endo: Insulin sliding scale  ID: Discontinue Meropenem and start Ceftriaxone 2g IV for E. Coli growing on the mitral valve. Urine and blood cultures as well per ID recommendations.   Heme: HIT labs still pending, holding heparin, INR to dose based on chromogenic factor X  : No Paraphimosis per urology, outpatient follow up   Prophylaxis: Protonix, hold heparin for HIT concerns  Lines: d/c art line and central line  Dispo: transfer to  if approved as approved by neurology     Patient seen with Dr. Winston staff Intensivist      Major Velásquez MD  Anesthesiology Resident   [phone]

## 2017-09-25 NOTE — PLAN OF CARE
Problem: Goal Outcome Summary  Goal: Goal Outcome Summary  Outcome: Improving  Physician updates-Neurology resident updated at 2300, and bedside at 0300. Per Neurology bdside IR Neurology team to determine intervention necessary and to be contacted by primary team CVTS.   CVTS  updated and discussed patient with Neurology and  IR Neurology, no interventions overnight, continue q1hr neuro checks and maintain MAP>85, both teams to reassess patient later this morning. Urology bedside numbed, checked moore, and  assessed penis at 1930, resident Liliana paged and updated r/t increased pain and slight change in penis at 0445, will come and assess bedside.      Family bedside in evening, in waiting room overnight. Supportive and cooperative.      Continued improved motor, movement, and strength on right side throughout shift. Denies numbness. Ongoing RUE drift, weaker than left, slow deliberate movements. 1900 started shift unable to touch right finger to nose, gradual improvement though shift, now able. Shrug still very weak, turns head side to side, decreased coordination and strength on right in general.  MAP , no ectopy, arterial line for monitoring. Nasal canula oxygen continued 4L weaned 1L, room air SPO2 drops to 88-92, diminished lungs and productive cough, thick víctor secretions.Moore not to be removed without physcian order r/t to penis, urine output 50-100cc/hr, clear orange/bjorn. Tolerating tube feeds, no further loose stools overnight, cdiff sample 9/24 negative. Platelets remain low, 9/23 HIT pending, INR >3,  Phosphorus replacement per protocol. Oxycodone and tylenol for incisional pain, benadryl and skin care for ongoing itching.      Patient will continue to be monitored and assessed. Please see MAR, flow sheets, and remainder of chart for further details.

## 2017-09-25 NOTE — PLAN OF CARE
Problem: Goal Outcome Summary  Goal: Goal Outcome Summary     Clinical dysphagia examination completed per MD order. The patient presents with severe oropharyngeal dysphagia and is at high risk for aspiration. Pharyngeal swallowing response is incomplete and the patient had significant coughing episodes with excess H20 from the swab. Not appropriate for formal PO trials due to aspiration risk. Recommend NPO with TF. Encourage oral cares with suction or with excess H20 rung out from the swab. SLP will follow closely for dysphagia tx with the goal of PO readiness.

## 2017-09-25 NOTE — PROGRESS NOTES
"[facility], [place]   Neurology Daily Note  Harvey Almanzar  [id number]  09/25/2017    Subjective:  Patient was extubated yesterday. Is able to state his name, age and month of the year. Improved neurologically in terms of motor symptoms yesterday night.    Objective:   /83 (BP Location: Left arm)  Temp 98.4  F (36.9  C) (Oral)  Resp 20  Ht 1.626 m (5' 4\")  Wt 70.7 kg (155 lb 13.8 oz)  SpO2 95%  BMI 26.75 kg/m2  General: Awake and alert, on nasal cannula, cooperative  HEENT: Atraumatic, normocephalic, scleral icterus, no conjunctival pallor   Cardiac: Sternotomy incision site   Chest: Clear to auscultation bilaterally, no wheezes, rubs or crepitations  Abdomen: Soft, non-tender, non-distended  Extremities: No LE swelling.  Skin: No rash or lesion.   Neuro:  Mental status: Awake, alert, attentive, oriented to self, place, month. Obeying commands. Reading intact.  Cranial nerves: Eyes conjugate, PERRL, EOMI, visual fields full, mild right sided facial droop, facial sensation intact, shoulder shrug weak bilaterally palate rise symmetric, uvula midline, tongue slightly deviated to the left, hearing intact to conversation.  Motor: Decreased tone R>L. RUE: 3/5, RLE: 3/5, LUE: 5/5 LLE: 5/5. No atrophy or twitches.   Reflexes: Normoreflexic and symmetric biceps, brachioradialis, patellar, and achilles. No crossed adductors or spread. Toes down-going on L, mute on the R.  Sensory: Intact to LT x 4 extremities  Coordination: FNF intact on L, able to do FNF on R but weak  Gait: Not tested.    Lab Investigations:   Hemoglobin A1C   Date Value Ref Range Status   09/22/2017 5.9 4.3 - 6.0 % Final     Lab Results   Component Value Date    LDL 29 09/24/2017       Imaging:  Recent Results (from the past 24 hour(s))   CT Head w/o Contrast    Narrative    CT HEAD W/O CONTRAST 9/24/2017 1:54 PM    Provided History: L MCA stroke, assess evolution of infarct    Comparison: Head CT from " 9/23/2017.    Technique: Using multidetector thin collimation helical acquisition  technique, axial, coronal and sagittal CT images from the skull base  to the vertex were obtained without intravenous contrast.     Findings:    Subtle loss of gray-white differentiation and hypodensity in the left  frontal lobe, not significantly changed compared to 9/23/2017.  Redemonstration of punctate hyperdensity along the cortex of this  area. Two focal hypodensities in the left posterior frontal centrum  semiovale and the left subinsular white matter are better defined on  today's examination reflecting involving small ischemic foci. No new  areas of gray-white matter differentiation loss is noted. The basal  ganglia otherwise is normal in density. Previously seen subtle  hypodensity in the left caudate head is not visualized on this scan.  Redemonstration of linear hyperdensity along the left MCA. Gray-white  differentiation is preserved throughout the remainder of the cerebral  hemispheres.    No intracranial hemorrhage, mass effect, or midline shift. The  ventricles are proportionate to the cerebral sulci. The basal cisterns  are patent.    The visualized paranasal sinuses are clear. Nonopacification of the  mastoid air cells is unchanged.       Impression    Impression:   Evolving ischemic areas in the left superior posterior frontal cortex  and 2 smaller foci in the left corona radiata and the left subinsular  white matter. No new areas of ischemic infarction.    I have personally reviewed the examination and initial interpretation  and I agree with the findings.    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 On 9/23/17 he  was found to have R sided weakness with last known normal being pre-op before his AVR, MVR, and CABG on 9/21/17.  Imaging and exam were consistent with L MCA ischemic stroke, presumed to be from perioperative cardiac embolus. Currently on anticoagulation with warfarin, INR today of 3.29. Also on ASA 325mg.     #Acute ischemic L MCA stroke  - Repeated CT on 9/24 showed stable appearance of hypodensity. Appearance on CT was disproportionate to findings on clinical exam indicating possibility of ischemic penumbra. However, incongruity between CT perfusion maps vs Head CT as CTP did not demonstrate ischemic penumbra.  - Kept on MAPs >85 with Q1 hour neuro checks, patient's motor symptoms seem to have improved with increased MAP overnight, indicating possibility of perfusion dependence  - No longer need to keep MAP target >85 with pressors, patient is currently maintaining MAP above target  - Q4 hour neuro checks  - Continue euthermia, euglycemia  - Continue warfarin and ASA   - Continue bedside glucose monitoring  - PT/OT/SLP  - PM&R  - Stroke Education   - Depression Screen and apnea screen prior to discharge  - Apnea Screen  - For MRI to assess stroke burden if no contraindication from mechanical valve (checked with radiology, ordered for you)     FEN: continue tube feeds, NPO until swallow evaluation  PPx: on warfarin   Code status: full  Dispo status: CV ICU, transfer to floor per primary team     Patient was seen and discussed with attending neurologist, Dr. Horton.     ZORAIDA Shafer [facility] NEGRITO, MSc(Res)  Neurology Resident, PGY-1  Pager: [phone]

## 2017-09-26 ENCOUNTER — APPOINTMENT (OUTPATIENT)
Dept: MRI IMAGING | Facility: CLINIC | Age: 47
DRG: 216 | End: 2017-09-26
Attending: PHYSICIAN ASSISTANT
Payer: COMMERCIAL

## 2017-09-26 ENCOUNTER — TELEPHONE (OUTPATIENT)
Dept: UROLOGY | Facility: CLINIC | Age: 47
End: 2017-09-26

## 2017-09-26 ENCOUNTER — APPOINTMENT (OUTPATIENT)
Dept: SPEECH THERAPY | Facility: CLINIC | Age: 47
DRG: 216 | End: 2017-09-26
Attending: PHYSICIAN ASSISTANT
Payer: COMMERCIAL

## 2017-09-26 ENCOUNTER — APPOINTMENT (OUTPATIENT)
Dept: GENERAL RADIOLOGY | Facility: CLINIC | Age: 47
DRG: 216 | End: 2017-09-26
Attending: PHYSICIAN ASSISTANT
Payer: COMMERCIAL

## 2017-09-26 ENCOUNTER — APPOINTMENT (OUTPATIENT)
Dept: OCCUPATIONAL THERAPY | Facility: CLINIC | Age: 47
DRG: 216 | End: 2017-09-26
Attending: PHYSICIAN ASSISTANT
Payer: COMMERCIAL

## 2017-09-26 LAB
ALBUMIN SERPL-MCNC: 2 G/DL (ref 3.4–5)
ALP SERPL-CCNC: 101 U/L (ref 40–150)
ALT SERPL W P-5'-P-CCNC: 200 U/L (ref 0–70)
ANION GAP SERPL CALCULATED.3IONS-SCNC: 8 MMOL/L (ref 3–14)
AST SERPL W P-5'-P-CCNC: 198 U/L (ref 0–45)
BACTERIA SPEC CULT: ABNORMAL
BACTERIA SPEC CULT: NO GROWTH
BILIRUB SERPL-MCNC: 10.7 MG/DL (ref 0.2–1.3)
BUN SERPL-MCNC: 20 MG/DL (ref 7–30)
CALCIUM SERPL-MCNC: 8 MG/DL (ref 8.5–10.1)
CHLORIDE SERPL-SCNC: 113 MMOL/L (ref 94–109)
CO2 SERPL-SCNC: 24 MMOL/L (ref 20–32)
CREAT SERPL-MCNC: 0.56 MG/DL (ref 0.66–1.25)
ERYTHROCYTE [DISTWIDTH] IN BLOOD BY AUTOMATED COUNT: 20.9 % (ref 10–15)
GFR SERPL CREATININE-BSD FRML MDRD: >90 ML/MIN/1.7M2
GLUCOSE BLDC GLUCOMTR-MCNC: 100 MG/DL (ref 70–99)
GLUCOSE BLDC GLUCOMTR-MCNC: 107 MG/DL (ref 70–99)
GLUCOSE BLDC GLUCOMTR-MCNC: 128 MG/DL (ref 70–99)
GLUCOSE BLDC GLUCOMTR-MCNC: 129 MG/DL (ref 70–99)
GLUCOSE BLDC GLUCOMTR-MCNC: 135 MG/DL (ref 70–99)
GLUCOSE BLDC GLUCOMTR-MCNC: 140 MG/DL (ref 70–99)
GLUCOSE BLDC GLUCOMTR-MCNC: 98 MG/DL (ref 70–99)
GLUCOSE SERPL-MCNC: 139 MG/DL (ref 70–99)
HCT VFR BLD AUTO: 35.5 % (ref 40–53)
HGB BLD-MCNC: 11 G/DL (ref 13.3–17.7)
INR PPP: 2.72 (ref 0.86–1.14)
MCH RBC QN AUTO: 29.2 PG (ref 26.5–33)
MCHC RBC AUTO-ENTMCNC: 31 G/DL (ref 31.5–36.5)
MCV RBC AUTO: 94 FL (ref 78–100)
PHOSPHATE SERPL-MCNC: 1.4 MG/DL (ref 2.5–4.5)
PLATELET # BLD AUTO: 62 10E9/L (ref 150–450)
POTASSIUM SERPL-SCNC: 4.3 MMOL/L (ref 3.4–5.3)
PROT SERPL-MCNC: 5.1 G/DL (ref 6.8–8.8)
RBC # BLD AUTO: 3.77 10E12/L (ref 4.4–5.9)
SODIUM SERPL-SCNC: 145 MMOL/L (ref 133–144)
SPECIMEN SOURCE: ABNORMAL
SPECIMEN SOURCE: NORMAL
WBC # BLD AUTO: 10 10E9/L (ref 4–11)

## 2017-09-26 PROCEDURE — 21400006 ZZH R&B CCU INTERMEDIATE UMMC

## 2017-09-26 PROCEDURE — 00000146 ZZHCL STATISTIC GLUCOSE BY METER IP

## 2017-09-26 PROCEDURE — 40000986 XR CHEST 1 VW

## 2017-09-26 PROCEDURE — 25000132 ZZH RX MED GY IP 250 OP 250 PS 637: Performed by: SURGERY

## 2017-09-26 PROCEDURE — 71010 XR CHEST PORT 1 VW: CPT

## 2017-09-26 PROCEDURE — 40000133 ZZH STATISTIC OT WARD VISIT

## 2017-09-26 PROCEDURE — 25000132 ZZH RX MED GY IP 250 OP 250 PS 637: Performed by: ANESTHESIOLOGY

## 2017-09-26 PROCEDURE — 25000128 H RX IP 250 OP 636: Performed by: PHYSICIAN ASSISTANT

## 2017-09-26 PROCEDURE — 80053 COMPREHEN METABOLIC PANEL: CPT | Performed by: PHYSICIAN ASSISTANT

## 2017-09-26 PROCEDURE — 40000225 ZZH STATISTIC SLP WARD VISIT

## 2017-09-26 PROCEDURE — 84100 ASSAY OF PHOSPHORUS: CPT | Performed by: PHYSICIAN ASSISTANT

## 2017-09-26 PROCEDURE — 92526 ORAL FUNCTION THERAPY: CPT | Mod: GN

## 2017-09-26 PROCEDURE — 36415 COLL VENOUS BLD VENIPUNCTURE: CPT | Performed by: SURGERY

## 2017-09-26 PROCEDURE — 85610 PROTHROMBIN TIME: CPT | Performed by: SURGERY

## 2017-09-26 PROCEDURE — 97535 SELF CARE MNGMENT TRAINING: CPT | Mod: GO

## 2017-09-26 PROCEDURE — 25000125 ZZHC RX 250: Performed by: THORACIC SURGERY (CARDIOTHORACIC VASCULAR SURGERY)

## 2017-09-26 PROCEDURE — 27210995 ZZH RX 272

## 2017-09-26 PROCEDURE — 25000132 ZZH RX MED GY IP 250 OP 250 PS 637: Performed by: THORACIC SURGERY (CARDIOTHORACIC VASCULAR SURGERY)

## 2017-09-26 PROCEDURE — 25000128 H RX IP 250 OP 636: Performed by: STUDENT IN AN ORGANIZED HEALTH CARE EDUCATION/TRAINING PROGRAM

## 2017-09-26 PROCEDURE — 25000128 H RX IP 250 OP 636: Performed by: THORACIC SURGERY (CARDIOTHORACIC VASCULAR SURGERY)

## 2017-09-26 PROCEDURE — 27210913 ZZH NUTRITION PRODUCT INTERMEDIATE PACKET

## 2017-09-26 PROCEDURE — 27210437 ZZH NUTRITION PRODUCT SEMIELEM INTERMED LITER

## 2017-09-26 PROCEDURE — 85027 COMPLETE CBC AUTOMATED: CPT | Performed by: PHYSICIAN ASSISTANT

## 2017-09-26 PROCEDURE — 70551 MRI BRAIN STEM W/O DYE: CPT

## 2017-09-26 PROCEDURE — 25000132 ZZH RX MED GY IP 250 OP 250 PS 637: Performed by: PHYSICIAN ASSISTANT

## 2017-09-26 PROCEDURE — 97530 THERAPEUTIC ACTIVITIES: CPT | Mod: GO

## 2017-09-26 RX ORDER — POLYETHYLENE GLYCOL 3350 17 G/17G
17 POWDER, FOR SOLUTION ORAL DAILY
Status: DISCONTINUED | OUTPATIENT
Start: 2017-09-26 | End: 2017-10-04

## 2017-09-26 RX ORDER — FUROSEMIDE 10 MG/ML
20 INJECTION INTRAMUSCULAR; INTRAVENOUS
Status: DISCONTINUED | OUTPATIENT
Start: 2017-09-26 | End: 2017-09-27

## 2017-09-26 RX ORDER — WARFARIN SODIUM 1 MG/1
1 TABLET ORAL
Status: DISCONTINUED | OUTPATIENT
Start: 2017-09-26 | End: 2017-09-26

## 2017-09-26 RX ORDER — FUROSEMIDE 10 MG/ML
20 INJECTION INTRAMUSCULAR; INTRAVENOUS ONCE
Status: COMPLETED | OUTPATIENT
Start: 2017-09-26 | End: 2017-09-26

## 2017-09-26 RX ORDER — POTASSIUM CHLORIDE 750 MG/1
20 TABLET, EXTENDED RELEASE ORAL 2 TIMES DAILY
Status: DISCONTINUED | OUTPATIENT
Start: 2017-09-26 | End: 2017-09-27

## 2017-09-26 RX ORDER — MAGNESIUM HYDROXIDE 1200 MG/15ML
LIQUID ORAL
Status: COMPLETED
Start: 2017-09-26 | End: 2017-09-26

## 2017-09-26 RX ADMIN — CEFTRIAXONE SODIUM 2 G: 2 INJECTION, POWDER, FOR SOLUTION INTRAMUSCULAR; INTRAVENOUS at 16:33

## 2017-09-26 RX ADMIN — SENNOSIDES AND DOCUSATE SODIUM 2 TABLET: 8.6; 5 TABLET ORAL at 09:10

## 2017-09-26 RX ADMIN — CHLORHEXIDINE GLUCONATE 15 ML: 1.2 RINSE ORAL at 20:59

## 2017-09-26 RX ADMIN — SODIUM CHLORIDE: 900 IRRIGANT IRRIGATION at 13:07

## 2017-09-26 RX ADMIN — POTASSIUM & SODIUM PHOSPHATES POWDER PACK 280-160-250 MG 1 PACKET: 280-160-250 PACK at 13:09

## 2017-09-26 RX ADMIN — POTASSIUM PHOSPHATE, MONOBASIC AND POTASSIUM PHOSPHATE, DIBASIC 20 MMOL: 224; 236 INJECTION, SOLUTION INTRAVENOUS at 16:33

## 2017-09-26 RX ADMIN — OXYCODONE HYDROCHLORIDE 5 MG: 5 TABLET ORAL at 20:54

## 2017-09-26 RX ADMIN — POTASSIUM CHLORIDE 20 MEQ: 750 TABLET, EXTENDED RELEASE ORAL at 16:48

## 2017-09-26 RX ADMIN — MUPIROCIN 0.5 G: 20 OINTMENT TOPICAL at 09:23

## 2017-09-26 RX ADMIN — POTASSIUM & SODIUM PHOSPHATES POWDER PACK 280-160-250 MG 1 PACKET: 280-160-250 PACK at 16:48

## 2017-09-26 RX ADMIN — POTASSIUM & SODIUM PHOSPHATES POWDER PACK 280-160-250 MG 1 PACKET: 280-160-250 PACK at 09:18

## 2017-09-26 RX ADMIN — INSULIN ASPART 1 UNITS: 100 INJECTION, SOLUTION INTRAVENOUS; SUBCUTANEOUS at 17:01

## 2017-09-26 RX ADMIN — POLYETHYLENE GLYCOL 3350 17 G: 17 POWDER, FOR SOLUTION ORAL at 09:17

## 2017-09-26 RX ADMIN — PANTOPRAZOLE SODIUM 40 MG: 40 TABLET, DELAYED RELEASE ORAL at 09:13

## 2017-09-26 RX ADMIN — CHLORHEXIDINE GLUCONATE 15 ML: 1.2 RINSE ORAL at 09:36

## 2017-09-26 RX ADMIN — FUROSEMIDE 20 MG: 10 INJECTION, SOLUTION INTRAVENOUS at 16:48

## 2017-09-26 RX ADMIN — MULTIVITAMIN 15 ML: LIQUID ORAL at 09:14

## 2017-09-26 RX ADMIN — Medication 2 PACKET: at 09:20

## 2017-09-26 RX ADMIN — ASPIRIN 325 MG ORAL TABLET 325 MG: 325 PILL ORAL at 09:10

## 2017-09-26 RX ADMIN — FUROSEMIDE 20 MG: 10 INJECTION, SOLUTION INTRAVENOUS at 13:07

## 2017-09-26 RX ADMIN — SENNOSIDES AND DOCUSATE SODIUM 1 TABLET: 8.6; 5 TABLET ORAL at 20:54

## 2017-09-26 RX ADMIN — POTASSIUM & SODIUM PHOSPHATES POWDER PACK 280-160-250 MG 1 PACKET: 280-160-250 PACK at 20:54

## 2017-09-26 NOTE — PLAN OF CARE
Problem: Goal Outcome Summary  Goal: Goal Outcome Summary  Discharge Planner OT   Patient plan for discharge: Rehab   Current status: Max AX2 for log rolling in bed, total A for donning LB clothing, max AX1 for supine to seated EOB.  Facilitated ~25 minutes of seated ADLs at EOB with max A due to decreased strength and R UE coordination/proprioception.  Min AX2 for STS from EOB, tolerates ~15 seconds of standing before endorsing dizziness requiring repositioning to supine with max AX2.  VSS throughout.    Barriers to return to prior living situation: Sternal precautions, decreased strength, endurance, vision deficits, R UE coordination.   Recommendations for discharge: ARU  Rationale for recommendations: To improve safety and IND with I/ADLs and functional mobility.  Pt with excellent motivation and participation in therapy sessions, has multi-disciplinary goals, and good family support making him an ideal ARU candidate.     Recommend RN staff use overhead sling lift for all transfers.  Also recommend pt sit up in chair 3x daily to improve strength and tolerance for OOB activity.        Entered by: Francisco Pritchard 09/26/2017 1:02 PM

## 2017-09-26 NOTE — PLAN OF CARE
Problem: Goal Outcome Summary  Goal: Goal Outcome Summary  Outcome: Therapy, progress toward functional goals as expected     SLP NOTE: Patient was seen for dysphagia tx this afternoon. He continues to present with moderate oropharyngeal dysphagia but is showing improvement. Recommend the patient initiate a nectar thick consistency full liquid diet. He should take 1 small sip at a time, sit fully upright, and use a chin tuck strategy for safer swallowing. SLP will follow closely for dysphagia tx. Expect the patient will be a good candidate for ARU.

## 2017-09-26 NOTE — PROGRESS NOTES
Transferred to:6c from  at: report given to LETTY Samaniego, awaiting call that room is clean.   Via: when ready wheelchair via ceiling lift would work well  Reason for transfer:stable for transfer to floor  Family present for transfer  Chart sent with patient.   Report given to :Danette SAENZ 6c  Patient agreeable to this plan. All questions answered.

## 2017-09-26 NOTE — TELEPHONE ENCOUNTER
----- Message from Edith Ac LPN sent at 9/26/2017  7:52 AM CDT -----  Regarding: FW: Follow-up appt      ----- Message -----     From: Tami Armstrong MD     Sent: 9/25/2017   5:21 PM       To: Radha Baum RN  Subject: Follow-up appt                                   Hi Radha,    Could you please make a follow-up for this patient in 3-4 weeks for foreskin swelling/foreing body with Alvin.     Thanks,     Tami

## 2017-09-26 NOTE — PROGRESS NOTES
09/25/17 1030   Quick Adds   Type of Visit Initial PT Evaluation   Living Environment   Lives With sibling(s)   Living Arrangements house   Home Accessibility stairs within home   Number of Stairs Within Home 12   Living Environment Comment Patient lives with 2 brothers in 2 story home, patient was working full-time as receptionisht at dental office, both brothers work full time. Patient's bedroom is on 2nd floor.   Self-Care   Usual Activity Tolerance good   Current Activity Tolerance fair   Equipment Currently Used at Home none   Activity/Exercise/Self-Care Comment Pt independent with ADls/IADLs and mobility at baseline. Pt working full time at dental office taking phone calls. Pt recently had fever and went to hospital and was diagnosed with an infection. Pt took antibiotics however progressively got weaker. Pt able to complete all functional mobility and ADLs/IADLs independently but with increased time due to fatigue.   Functional Level Prior   Ambulation 0-->independent   Transferring 0-->independent   Toileting 0-->independent   Bathing 0-->independent   Dressing 0-->independent   Eating 0-->independent   Communication 0-->understands/communicates without difficulty   Swallowing 0-->swallows foods/liquids without difficulty   Cognition 0 - no cognition issues reported   Fall history within last six months no   Which of the above functional risks had a recent onset or change? 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 Yesterday, (9/23/17) he was found to have R sided weakness with last known normal being pre-op before his AVR, MVR, and CABG on 9/21/17.  Imaging and exam were consistent with L MCA ischemic stroke, presumed to be from perioperative cardiac embolus.    Precautions/Limitations fall precautions;sternal precautions   Heart Disease Risk Factors Medical history   General Observations Patient greeted up in chair, agreeable to PT, VSS on 1L O2, RN ok'd session.   General Info Comments Activity: Ambulate with Assist   Cognitive Status Examination   Orientation orientation to person, place and time   Level of Consciousness lethargic/somnolent   Personal Safety and Judgment at risk behaviors demonstrated   Pain Assessment   Patient Currently in Pain No   Integumentary/Edema   Integumentary/Edema no deficits were identifed   Posture    Posture Comments R lateral lean during unsupported sitting and standing   Range of Motion (ROM)   ROM Comment BLE WFL   Strength   Strength Comments RLE 3/5 to 3+/5, LLE 4/5 to 4+/5   Bed Mobility   Bed Mobility Comments Not assessed   Transfer Skills   Transfer Comments Sit<>stand with mod/max Ax2 blocking bilateral knees   Gait   Gait Comments Not assessed   Balance   Balance Comments Decreased sitting balance while unsupported, impaired standing balance requiring Ax2 to maintain stability   Sensory Examination   Sensory Perception Comments Light tough intact in BLE   Coordination   Coordination Comments Finger to Nose & Heel to Shin slow and dysmetric on R, intact on L   General Therapy Interventions   Planned Therapy Interventions balance training;bed mobility training;gait training;strengthening;transfer training;risk factor education;home program guidelines;progressive activity/exercise;neuromuscular re-education   Clinical Impression   Criteria for Skilled Therapeutic Intervention yes, treatment indicated   PT Diagnosis impaired functional  "mobility   Influenced by the following impairments R UE/LE hemiparesis, R sided neglect, impaired coordination & dysmetria, balance impairments, deconditioning and post surgical precautions   Functional limitations due to impairments safe and independent bed mobility, transfers, gait, and stair navigation   Clinical Presentation Evolving/Changing   Clinical Presentation Rationale Pt s/p AVR, MVR, and CABGx1 on 9/21 with intraoperative CVA, presents with R UE/LE hemiparesis, R sided neglect, dysmetria, balance impairments, deconditioning and post surgical precautions resulting in decreased safety and independence with functional mobility.   Clinical Decision Making (Complexity) Moderate complexity   Therapy Frequency` daily   Predicted Duration of Therapy Intervention (days/wks) 2 weeks   Anticipated Discharge Disposition Acute Rehabilitation Facility   Risk & Benefits of therapy have been explained Yes   Patient, Family & other staff in agreement with plan of care Yes   [facility] TM \"6 Clicks\"   2016, Trustees of [facility], under license to [employer].  All rights reserved.   6 Clicks Short Forms Basic Mobility Inpatient Short Form   [facility]  \"6 Clicks\" V.2 Basic Mobility Inpatient Short Form   1. Turning from your back to your side while in a flat bed without using bedrails? 2 - A Lot   2. Moving from lying on your back to sitting on the side of a flat bed without using bedrails? 2 - A Lot   3. Moving to and from a bed to a chair (including a wheelchair)? 1 - Total   4. Standing up from a chair using your arms (e.g., wheelchair, or bedside chair)? 2 - A Lot   5. To walk in hospital room? 1 - Total   6. Climbing 3-5 steps with a railing? 1 - Total   Basic Mobility Raw Score (Score out of 24.Lower scores equate to lower levels of function) 9   Total Evaluation Time   Total Evaluation Time (Minutes) 10     "

## 2017-09-26 NOTE — PROGRESS NOTES
"[facility], [place]   Neurology Daily Note  Harvey Almanzar  [id number]  09/26/2017    Subjective: Still reporting right sided weakness, more in the upper extremity than the lower extremity but feels as if it is improving.     Objective:   /76 (BP Location: Right arm)  Temp 98.4  F (36.9  C) (Oral)  Resp 18  Ht 1.626 m (5' 4\")  Wt 68.3 kg (150 lb 8 oz)  SpO2 96%  BMI 25.83 kg/m2  General: Awake and alert, cooperative  HEENT: Atraumatic, normocephalic, scleral icterus, no conjunctival pallor   Cardiac: Sternotomy incision site   Chest: Not in respiratory distress, off oxygen  Abdomen: Soft, non-tender, non-distended  Extremities: No LE swelling.  Skin: No rash or lesion.   Neuro:  Mental status: Awake, alert, attentive, oriented to self, place, month. Obeying commands.   Cranial nerves: Eyes conjugate, PERRL, EOMI, visual fields full, mild right sided facial droop, facial sensation intact, shoulder shrug weak bilaterally palate rise symmetric, uvula midline, tongue slightly deviated to the left, hearing intact to conversation.  Motor: Decreased tone R>L. RUE: 4/5, RLE: 4/5, LUE: 5/5 LLE: 5/5. No atrophy or twitches.   Sensory: Intact to LT x 4 extremities  Coordination: Not tested.  Gait: Not tested.     Lab Investigations:   Hemoglobin A1C   Date Value Ref Range Status   09/22/2017 5.9 4.3 - 6.0 % Final     Lab Results   Component Value Date    LDL 29 09/24/2017       Imaging:  No results found for this or any previous visit (from the past 24 hour(s)).    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 On 9/23/17 he was found to have R sided weakness with last known normal being pre-op before his " AVR, MVR, and CABG on 9/21/17.  Imaging and exam were consistent with L MCA ischemic stroke, presumed to be from perioperative cardiac embolus. Currently on anticoagulation with warfarin, INR today of 3.29. Also on ASA 325mg.      #Acute ischemic L MCA stroke  - Unclear if there is component of perfusion dependence  - Patient is currently auto-mapping without any intervention, no need to target MAP > 85  - Continue warfarin and ASA   - PT/OT/SLP  - PM&R  - Stroke Education   - Depression Screen and apnea screen prior to discharge  - For MRI today, if no contraindications w/ mechanical valve, to assess stroke burden, stroke team will follow   - Follow up with stroke clinic at discharge within 1-3 months    FEN: continue tube feeds, NPO until swallow evaluation  PPx: on warfarin   Code status: full  Dispo status: Likely ARU, per primary team    Patient was seen and discussed with Dr. Horton. Stroke team will sign out at this time. Please contact us should any further questions arise.    ZORAIDA Shafer [facility] NEGRITO, MSc(Res)  Neurology Resident, PGY-1  Pager: [phone]

## 2017-09-26 NOTE — PROGRESS NOTES
CVTS Progress Note  Attending provider: Zeb Leal MD      SUBJECTIVE:    No acute issues over night.   Complains of being hungry, Otherwise no acute complaints  Patient denies SOB, CP, fever, chills, sweats. Reports right sided weakness has slightly improved    Patient has been tolerating diet TF. No BM for two days, did not receive senna yesterday. + flatus.  Has been ambulating in halls. NSR.       OBJECTIVE:   Temp:  [97.7  F (36.5  C)-98.4  F (36.9  C)] 98.2  F (36.8  C)  Heart Rate:  [76-85] 80  Resp:  [16-21] 18  BP: (104-141)/(73-91) 114/82  MAP:  [85 mmHg-97 mmHg] 94 mmHg  Arterial Line BP: (116-139)/(64-78) 132/73  SpO2:  [92 %-96 %] 95 %    Gen: A&Ox3, NAD  Neck: + JVD   CV: RRR, normal S1, S2, no murmurs, rubs or gallops   Pulm: Lungs with crackles in bases, No wheezing or rhonchi  Abd: Soft, NT, ND, +BS  Ext: trace to 1+ bilateral LE edema  Neuro: Mild right sided facial droop, 4/5 strength right UE, 5/5 strength left UE. CN II-XII intact  Incision: c/d/i, no erythema, sternum stable  Tubes/drains: Dressing clean and dry, 140cc/90cc serosanguinous output, no air leak.     I&O's:  I/O last 3 completed shifts:  In: 2501 [I.V.:696; NG/GT:1255]  Out: 2485 [Urine:2305; Chest Tube:180]    Labs:   BMP    Recent Labs  Lab 09/26/17  0930 09/25/17  1530 09/25/17  0344 09/24/17  1550   * 147* 154* 151*   POTASSIUM 4.3 4.2 4.3 4.5   CHLORIDE 113* 114* 120* 118*   CHECO 8.0* 8.0* 8.0* 7.9*   CO2 24 30 29 28   BUN 20 25 26 34*   CR 0.56* 0.68 0.66 0.75   * 133* 127* 152*     CBC    Recent Labs  Lab 09/26/17  0930 09/25/17  0344 09/24/17 0410 09/23/17 2056   WBC 10.0 9.1 10.0 9.4   RBC 3.77* 3.37* 3.18* 3.07*   HGB 11.0* 9.8* 9.2* 8.9*   HCT 35.5* 31.6* 29.0* 27.8*   MCV 94 94 91 91   MCH 29.2 29.1 28.9 29.0   MCHC 31.0* 31.0* 31.7 32.0   RDW 20.9* 21.0* 20.5* 20.1*   PLT 62* 53* 54* 45*     INR    Recent Labs  Lab 09/26/17  0747 09/25/17  0344 09/24/17 0410 09/23/17 0344   INR 2.72* 3.29* 2.45*  2.01*      Hepatic Panel     Recent Labs  Lab 09/26/17  0930 09/25/17  0344 09/24/17  0410 09/23/17  0344   * 83* 95* 226*   * 196* 233* 307*   ALKPHOS 101 69 59 52   BILITOTAL 10.7* 12.6* 12.1* 12.7*   ALBUMIN 2.0* 2.2* 2.2* 2.3*     Glucose  Recent Labs  Lab 09/26/17  0933 09/26/17  0930 09/26/17  0456 09/25/17  2343 09/25/17  1646 09/25/17  1530 09/25/17  1153 09/25/17  0802  09/25/17  0344  09/24/17  1550  09/24/17  0410  09/23/17  2056   GLC  --  139*  --   --   --  133*  --   --   --  127*  --  152*  --  157*  --  141*   *  --  128* 121* 125*  --  111* 143*  < >  --   < >  --   < >  --   < >  --    < > = values in this interval not displayed.    Imaging:   CXR 9/26:  Reviewed, Stable loculated small right pleural effusion. Increased pulmonary congestion, otherwise stable, official read pending      ASSESSMENT/PLAN: Patient is a 47 year old male with a history of prosethic aortic valve with endocarditis, now s/p redo sternotomy with mechanical aortic and mitral valve replacement on 9/21 with Dr. Gorman and Dr. Leal.    Neuro:  - Acute post-op pain: IV dilaudid, tylenol and oxycodone prn  - Acute ischemic L MCA stroke: perioperative, goal MAPs >85. Neuro consult, appreciate recs, recommending MRI to assess stroke burden    CV:  -  mg, holding metoprolol for now given goal MAPs >85.  - Blood pressure: MAP goals >85 given CVA  - TPW capped, will cut given thrombocytopenia and therapeutic INR. Okay for MRI after cut.     Resp: extubated POD 3  - IS, encourage activity  - Mediastinal CT removed today, CXR stable, Right pleural chest tube to suction      FEN/GI:   - NPO given CVA, SLP following, appreciate recommendations, TF at goal, No BM for 2 days, had been declining senna, restart this today and schedule miralax  - LFTs elevated, trending down, except for AST, trending up slightly, will discontinue tylenol.     Renal/:   - Creatinine stable, at baseline, adequate UOP with IV  lasix  - Volume status: hypervolemic, +JVD, crackles and LE edema, CXR with pulmonary edema. Will give lasix 20 mg IV BID for 3 doses, reassess in AM, dry weight ~ 141 lbs. Weight 150 lbs.  - Urology saw patient for penile swelling - no intervention - follow-up on outpatient basis      ID: Completed roxanna-op abx,   - WBC normal, afebrile, no signs or symptoms of infection  - ID following for endocarditis, started on ceftriaxone 2g IV q24h for ecoli on mitral valve, obtaining one more set of blood and urine cultures 9/25    Heme:   - Hgb stable, 9.8->11, no signs or symptoms of bleeding      Endo:   - BG well controlled, on SSI      PPx:   - PPI      Anticoagulation:   - INR goal 2.5-3.5 for combined mechanical AVR and MVR, INR 2.72       Dispo:   - 6C since 9/25  - Therapy recommending d/c to ARU    Staff surgeons have been informed of changes through both verbal and written communication.         Callum Lema PA-C  Cardiothoracic Surgery  September 26, 2017 7:31 AM   p: [phone]

## 2017-09-27 ENCOUNTER — APPOINTMENT (OUTPATIENT)
Dept: GENERAL RADIOLOGY | Facility: CLINIC | Age: 47
DRG: 216 | End: 2017-09-27
Attending: PHYSICIAN ASSISTANT
Payer: COMMERCIAL

## 2017-09-27 ENCOUNTER — APPOINTMENT (OUTPATIENT)
Dept: PHYSICAL THERAPY | Facility: CLINIC | Age: 47
DRG: 216 | End: 2017-09-27
Attending: PHYSICIAN ASSISTANT
Payer: COMMERCIAL

## 2017-09-27 ENCOUNTER — APPOINTMENT (OUTPATIENT)
Dept: OCCUPATIONAL THERAPY | Facility: CLINIC | Age: 47
DRG: 216 | End: 2017-09-27
Attending: PHYSICIAN ASSISTANT
Payer: COMMERCIAL

## 2017-09-27 ENCOUNTER — APPOINTMENT (OUTPATIENT)
Dept: SPEECH THERAPY | Facility: CLINIC | Age: 47
DRG: 216 | End: 2017-09-27
Attending: PHYSICIAN ASSISTANT
Payer: COMMERCIAL

## 2017-09-27 LAB
ALBUMIN SERPL-MCNC: 1.7 G/DL (ref 3.4–5)
ALP SERPL-CCNC: 148 U/L (ref 40–150)
ALT SERPL W P-5'-P-CCNC: 202 U/L (ref 0–70)
ANION GAP SERPL CALCULATED.3IONS-SCNC: 7 MMOL/L (ref 3–14)
AST SERPL W P-5'-P-CCNC: 203 U/L (ref 0–45)
BILIRUB SERPL-MCNC: 10.2 MG/DL (ref 0.2–1.3)
BUN SERPL-MCNC: 18 MG/DL (ref 7–30)
CALCIUM SERPL-MCNC: 7.8 MG/DL (ref 8.5–10.1)
CHLORIDE SERPL-SCNC: 107 MMOL/L (ref 94–109)
CO2 SERPL-SCNC: 26 MMOL/L (ref 20–32)
CREAT SERPL-MCNC: 0.6 MG/DL (ref 0.66–1.25)
ERYTHROCYTE [DISTWIDTH] IN BLOOD BY AUTOMATED COUNT: 20.4 % (ref 10–15)
ERYTHROCYTE [DISTWIDTH] IN BLOOD BY AUTOMATED COUNT: 20.8 % (ref 10–15)
GFR SERPL CREATININE-BSD FRML MDRD: >90 ML/MIN/1.7M2
GLUCOSE BLDC GLUCOMTR-MCNC: 100 MG/DL (ref 70–99)
GLUCOSE BLDC GLUCOMTR-MCNC: 107 MG/DL (ref 70–99)
GLUCOSE BLDC GLUCOMTR-MCNC: 114 MG/DL (ref 70–99)
GLUCOSE BLDC GLUCOMTR-MCNC: 117 MG/DL (ref 70–99)
GLUCOSE BLDC GLUCOMTR-MCNC: 123 MG/DL (ref 70–99)
GLUCOSE BLDC GLUCOMTR-MCNC: 130 MG/DL (ref 70–99)
GLUCOSE SERPL-MCNC: 121 MG/DL (ref 70–99)
HCT VFR BLD AUTO: 31.2 % (ref 40–53)
HCT VFR BLD AUTO: 31.4 % (ref 40–53)
HGB BLD-MCNC: 9.9 G/DL (ref 13.3–17.7)
HGB BLD-MCNC: 9.9 G/DL (ref 13.3–17.7)
INR PPP: 1.72 (ref 0.86–1.14)
LMWH PPP CHRO-ACNC: 0.1 IU/ML
LMWH PPP CHRO-ACNC: 0.18 IU/ML
MCH RBC QN AUTO: 28.8 PG (ref 26.5–33)
MCH RBC QN AUTO: 29 PG (ref 26.5–33)
MCHC RBC AUTO-ENTMCNC: 31.5 G/DL (ref 31.5–36.5)
MCHC RBC AUTO-ENTMCNC: 31.7 G/DL (ref 31.5–36.5)
MCV RBC AUTO: 91 FL (ref 78–100)
MCV RBC AUTO: 92 FL (ref 78–100)
PLATELET # BLD AUTO: 72 10E9/L (ref 150–450)
PLATELET # BLD AUTO: 75 10E9/L (ref 150–450)
POTASSIUM SERPL-SCNC: 4.2 MMOL/L (ref 3.4–5.3)
PROT SERPL-MCNC: 4.6 G/DL (ref 6.8–8.8)
RBC # BLD AUTO: 3.41 10E12/L (ref 4.4–5.9)
RBC # BLD AUTO: 3.44 10E12/L (ref 4.4–5.9)
SODIUM SERPL-SCNC: 140 MMOL/L (ref 133–144)
WBC # BLD AUTO: 8 10E9/L (ref 4–11)
WBC # BLD AUTO: 8.2 10E9/L (ref 4–11)

## 2017-09-27 PROCEDURE — 36415 COLL VENOUS BLD VENIPUNCTURE: CPT | Performed by: PHYSICIAN ASSISTANT

## 2017-09-27 PROCEDURE — 85520 HEPARIN ASSAY: CPT | Performed by: THORACIC SURGERY (CARDIOTHORACIC VASCULAR SURGERY)

## 2017-09-27 PROCEDURE — 25000128 H RX IP 250 OP 636: Performed by: PHYSICIAN ASSISTANT

## 2017-09-27 PROCEDURE — 40000193 ZZH STATISTIC PT WARD VISIT: Performed by: PHYSICAL THERAPIST

## 2017-09-27 PROCEDURE — 25000132 ZZH RX MED GY IP 250 OP 250 PS 637: Performed by: SURGERY

## 2017-09-27 PROCEDURE — 25000132 ZZH RX MED GY IP 250 OP 250 PS 637: Performed by: PHYSICIAN ASSISTANT

## 2017-09-27 PROCEDURE — 97530 THERAPEUTIC ACTIVITIES: CPT | Mod: GO

## 2017-09-27 PROCEDURE — 97530 THERAPEUTIC ACTIVITIES: CPT | Mod: GP | Performed by: PHYSICAL THERAPIST

## 2017-09-27 PROCEDURE — 80053 COMPREHEN METABOLIC PANEL: CPT | Performed by: PHYSICIAN ASSISTANT

## 2017-09-27 PROCEDURE — 40000257 ZZH STATISTIC CONSULT NO CHARGE VASC ACCESS

## 2017-09-27 PROCEDURE — 97116 GAIT TRAINING THERAPY: CPT | Mod: GP | Performed by: PHYSICAL THERAPIST

## 2017-09-27 PROCEDURE — 00000146 ZZHCL STATISTIC GLUCOSE BY METER IP

## 2017-09-27 PROCEDURE — 97535 SELF CARE MNGMENT TRAINING: CPT | Mod: GO

## 2017-09-27 PROCEDURE — 71010 XR CHEST PORT 1 VW: CPT

## 2017-09-27 PROCEDURE — 25000132 ZZH RX MED GY IP 250 OP 250 PS 637: Performed by: THORACIC SURGERY (CARDIOTHORACIC VASCULAR SURGERY)

## 2017-09-27 PROCEDURE — 85610 PROTHROMBIN TIME: CPT | Performed by: SURGERY

## 2017-09-27 PROCEDURE — 40000556 ZZH STATISTIC PERIPHERAL IV START W US GUIDANCE

## 2017-09-27 PROCEDURE — 40000133 ZZH STATISTIC OT WARD VISIT

## 2017-09-27 PROCEDURE — 21400006 ZZH R&B CCU INTERMEDIATE UMMC

## 2017-09-27 PROCEDURE — 92526 ORAL FUNCTION THERAPY: CPT | Mod: GN | Performed by: SPEECH-LANGUAGE PATHOLOGIST

## 2017-09-27 PROCEDURE — 40000225 ZZH STATISTIC SLP WARD VISIT: Performed by: SPEECH-LANGUAGE PATHOLOGIST

## 2017-09-27 PROCEDURE — 85027 COMPLETE CBC AUTOMATED: CPT | Performed by: PHYSICIAN ASSISTANT

## 2017-09-27 PROCEDURE — 25000132 ZZH RX MED GY IP 250 OP 250 PS 637: Performed by: ANESTHESIOLOGY

## 2017-09-27 PROCEDURE — 36415 COLL VENOUS BLD VENIPUNCTURE: CPT | Performed by: SURGERY

## 2017-09-27 PROCEDURE — 36415 COLL VENOUS BLD VENIPUNCTURE: CPT | Performed by: THORACIC SURGERY (CARDIOTHORACIC VASCULAR SURGERY)

## 2017-09-27 RX ORDER — B COMPLEX C NO.10/FOLIC ACID 900MCG/5ML
5 LIQUID (ML) ORAL DAILY
Status: DISCONTINUED | OUTPATIENT
Start: 2017-09-28 | End: 2017-09-27

## 2017-09-27 RX ORDER — FUROSEMIDE 20 MG
20 TABLET ORAL DAILY
Status: DISCONTINUED | OUTPATIENT
Start: 2017-09-27 | End: 2017-09-29

## 2017-09-27 RX ORDER — WARFARIN SODIUM 1 MG/1
1 TABLET ORAL
Status: COMPLETED | OUTPATIENT
Start: 2017-09-27 | End: 2017-09-27

## 2017-09-27 RX ORDER — LEVOFLOXACIN 500 MG/1
500 TABLET, FILM COATED ORAL DAILY
Status: DISCONTINUED | OUTPATIENT
Start: 2017-09-27 | End: 2017-09-27

## 2017-09-27 RX ORDER — LEVOFLOXACIN 5 MG/ML
500 INJECTION, SOLUTION INTRAVENOUS DAILY
Status: DISCONTINUED | OUTPATIENT
Start: 2017-09-27 | End: 2017-10-02

## 2017-09-27 RX ORDER — MULTIVITAMIN,THERAPEUTIC
1 TABLET ORAL DAILY
Status: DISCONTINUED | OUTPATIENT
Start: 2017-09-28 | End: 2017-10-05 | Stop reason: HOSPADM

## 2017-09-27 RX ORDER — HEPARIN SODIUM 10000 [USP'U]/100ML
0-3500 INJECTION, SOLUTION INTRAVENOUS CONTINUOUS
Status: DISCONTINUED | OUTPATIENT
Start: 2017-09-27 | End: 2017-09-28

## 2017-09-27 RX ORDER — SIMETHICONE 80 MG
80 TABLET,CHEWABLE ORAL EVERY 6 HOURS PRN
Status: DISCONTINUED | OUTPATIENT
Start: 2017-09-27 | End: 2017-10-05 | Stop reason: HOSPADM

## 2017-09-27 RX ADMIN — SENNOSIDES AND DOCUSATE SODIUM 2 TABLET: 8.6; 5 TABLET ORAL at 19:45

## 2017-09-27 RX ADMIN — PANTOPRAZOLE SODIUM 40 MG: 40 TABLET, DELAYED RELEASE ORAL at 09:24

## 2017-09-27 RX ADMIN — CHLORHEXIDINE GLUCONATE 15 ML: 1.2 RINSE ORAL at 19:45

## 2017-09-27 RX ADMIN — Medication 2 PACKET: at 09:25

## 2017-09-27 RX ADMIN — OXYCODONE HYDROCHLORIDE 10 MG: 5 TABLET ORAL at 04:36

## 2017-09-27 RX ADMIN — FUROSEMIDE 20 MG: 20 TABLET ORAL at 14:48

## 2017-09-27 RX ADMIN — POTASSIUM & SODIUM PHOSPHATES POWDER PACK 280-160-250 MG 1 PACKET: 280-160-250 PACK at 19:45

## 2017-09-27 RX ADMIN — SENNOSIDES AND DOCUSATE SODIUM 2 TABLET: 8.6; 5 TABLET ORAL at 09:24

## 2017-09-27 RX ADMIN — POTASSIUM & SODIUM PHOSPHATES POWDER PACK 280-160-250 MG 1 PACKET: 280-160-250 PACK at 13:07

## 2017-09-27 RX ADMIN — POTASSIUM & SODIUM PHOSPHATES POWDER PACK 280-160-250 MG 1 PACKET: 280-160-250 PACK at 09:24

## 2017-09-27 RX ADMIN — HEPARIN SODIUM 750 UNITS/HR: 10000 INJECTION, SOLUTION INTRAVENOUS at 08:32

## 2017-09-27 RX ADMIN — LEVOFLOXACIN 500 MG: 5 INJECTION, SOLUTION INTRAVENOUS at 18:06

## 2017-09-27 RX ADMIN — OXYCODONE HYDROCHLORIDE 10 MG: 5 TABLET ORAL at 22:11

## 2017-09-27 RX ADMIN — MULTIVITAMIN 15 ML: LIQUID ORAL at 09:23

## 2017-09-27 RX ADMIN — WARFARIN SODIUM 1 MG: 1 TABLET ORAL at 18:13

## 2017-09-27 RX ADMIN — ASPIRIN 325 MG ORAL TABLET 325 MG: 325 PILL ORAL at 09:26

## 2017-09-27 RX ADMIN — POLYETHYLENE GLYCOL 3350 17 G: 17 POWDER, FOR SOLUTION ORAL at 09:24

## 2017-09-27 RX ADMIN — CEFTRIAXONE SODIUM 2 G: 2 INJECTION, POWDER, FOR SOLUTION INTRAMUSCULAR; INTRAVENOUS at 17:01

## 2017-09-27 RX ADMIN — POTASSIUM & SODIUM PHOSPHATES POWDER PACK 280-160-250 MG 1 PACKET: 280-160-250 PACK at 17:01

## 2017-09-27 RX ADMIN — CHLORHEXIDINE GLUCONATE 15 ML: 1.2 RINSE ORAL at 09:23

## 2017-09-27 ASSESSMENT — PAIN DESCRIPTION - DESCRIPTORS
DESCRIPTORS: DISCOMFORT
DESCRIPTORS: DISCOMFORT

## 2017-09-27 ASSESSMENT — VISUAL ACUITY
OU: BASELINE

## 2017-09-27 NOTE — PLAN OF CARE
Problem: Patient Care Overview  Goal: Plan of Care/Patient Progress Review  Discharge Planner OT   Patient plan for discharge: Rehab   Current status: Pt progressing extraordinarily well with therapies.  Requiring CGA + VCs for transfer supine to seated EOB.  CGA X2 to transfer STS.  Pt performs stand pivot transfer from bed to chair with FWW and Min A to clear R LE from ground during stepping.  Pt tolerates ~11 minutes of standing at FWW while participating in therapeutic activities focusing on R UE coordination, ROM, and strength as well as standing ADLs.  R UE making gradual improvements in strength - requires set up/min A for performance of ADLs this date demonstrating increased independence from previous session.  VSS throughout session on RA, max HR 97.           Barriers to return to prior living situation: Sternal precautions, strength, vision, coordination, endurance.  Recommendations for discharge: ARU  Rationale for recommendations: To improve safety and IND with I/ADLs and functional mobility.  Pt with excellent motivation and participation in therapy sessions, has multi-disciplinary goals, and good family support making him an ideal ARU candidate.     Recommend AX2 + FWW for stand pivot transfers with nursing staff.  FWW ordered to pt's room as well as BSC.       Entered by: Francisco Pritchard 09/27/2017 9:32 AM

## 2017-09-27 NOTE — PROGRESS NOTES
CLINICAL NUTRITION SERVICES     Nutrition Prescription    RECOMMENDATIONS FOR MDs/PROVIDERS TO ORDER:  Continue aggressive phos replacement via IV replacement and enterally (neutra-phos).    Recommendations already ordered by Registered Dietitian (RD):  Modified multivitamin   Ordered oral supplements  Kcal counts 9/27-9/29    Future/Additional Recommendations:  For all recommendations, see prior nutrition notes.  Could consider checking vitamin D status and possibly ordering thiamine (100 mg/day for the next three to seven days) if phos is not improving.  Once pt is consuming 25% of meal trays, rec transition to cycled nocturnal TFs, Peptamen 1.5 at 65 mL/hr x 12 hrs. Conintue ProSource TF modular BID for additional kcals/protein. Regimen provides ~75% of kcal and protein needs. TFs may be adjusted pending oral intake. Once kcal counts reveal that pt is consuming at least 1200 kcals and 64 g protein daily on average, discontinue TFs.     No meals ordered yet this admission of of 9/27 am. SLP is following a rec a full liquid diet, nectar-thick on 9/26.     INTERVENTIONS:  Implementation:  Collaboration with other providers: Discussed pt with team and PharmD.   Multivitamin/mineral supplement therapy: Modified certavite multivitamin to nephronex multivitamin. Nephronex not available per PharmD. Modified multivitamin to a thera-vit and this will be crushed and administered.   Medical food supplement therapy: Ordered Magic Cup at HS and Gelatein at 10:00.  Ordered kcal counts 9/27-9/29     Follow up/Monitoring:  Will continue to follow pt.    Monica Castillo, MS, RD, LD, [facility]   6C Pgr:  [phone]

## 2017-09-27 NOTE — PROGRESS NOTES
CVTS Daily Note  9/27/2017  Attending: Zeb Leal MD    S:   No overnight events.  Tolerating Tube feeds. Diet advanced to DD3 with thins.   Pt seen at bedside resting comfortably.    Complains of right eye superior hemianopsia.     Denies F/C/Sweats.  No CP, SOB, or calf pain.    + BM.  + Flatus.    Ambulated well with assistance.  Still complaining of balance issues.   Pain controlled well.    O:   Vitals:    09/26/17 2159 09/27/17 0000 09/27/17 0400 09/27/17 1142   BP: 93/71 95/72  103/77   BP Location:  Left arm     Resp: 20 18  16   Temp:  98  F (36.7  C)  98.2  F (36.8  C)   TempSrc:  Axillary  Oral   SpO2: 97%   96%   Weight:   65.3 kg (144 lb)    Height:         Vitals:    09/25/17 0400 09/26/17 0527 09/27/17 0400   Weight: 70.7 kg (155 lb 13.8 oz) 68.3 kg (150 lb 8 oz) 65.3 kg (144 lb)     Intake/Output Summary (Last 24 hours) at 09/27/17 1158  Last data filed at 09/27/17 1000   Gross per 24 hour   Intake             1380 ml   Output             3185 ml   Net            -1805 ml       MAPs: 79 - 91  Gen: AAO x 3, pleasant, NAD  Neuro: sensation and motor intact bilaterally. Complains of loss of superior visual field of right eye only.  CV: RRR, S1S2 normal, no murmurs, rubs, or gallops.    * TPW are capped.   Pulm: CTA, no rhonchi or wheezes  Abd: soft, non-tender, no guarding  Ext: trace peripheral edema  Incision: clean, dry, intact, no erythema  Chest Tube sites: dressings clean and dry, serosanguinous, no air leak, output 150 cc       Labs:  BMP    Recent Labs  Lab 09/27/17  0639 09/26/17  0930 09/25/17  1530 09/25/17  0344    145* 147* 154*   POTASSIUM 4.2 4.3 4.2 4.3   CHLORIDE 107 113* 114* 120*   CHECO 7.8* 8.0* 8.0* 8.0*   CO2 26 24 30 29   BUN 18 20 25 26   CR 0.60* 0.56* 0.68 0.66   * 139* 133* 127*     CBC    Recent Labs  Lab 09/27/17  0831 09/27/17  0639 09/26/17  0930 09/25/17  0344   WBC 8.0 8.2 10.0 9.1   RBC 3.41* 3.44* 3.77* 3.37*   HGB 9.9* 9.9* 11.0* 9.8*   HCT 31.4* 31.2*  35.5* 31.6*   MCV 92 91 94 94   MCH 29.0 28.8 29.2 29.1   MCHC 31.5 31.7 31.0* 31.0*   RDW 20.4* 20.8* 20.9* 21.0*   PLT 72* 75* 62* 53*     INR    Recent Labs  Lab 09/27/17  0639 09/26/17  0747 09/25/17  0344 09/24/17  0410   INR 1.72* 2.72* 3.29* 2.45*      Hepatic Panel   Lab Results   Component Value Date     09/27/2017     Lab Results   Component Value Date     09/27/2017     Lab Results   Component Value Date    ALBUMIN 1.7 09/27/2017     GLUCOSE:     Recent Labs  Lab 09/27/17  0829 09/27/17  0639 09/27/17  0411 09/27/17  0035 09/26/17  2121 09/26/17  2106 09/26/17  1758  09/26/17  0930  09/25/17  1530  09/25/17  0344  09/24/17  1550  09/24/17  0410   GLC  --  121*  --   --   --   --   --   --  139*  --  133*  --  127*  --  152*  --  157*   *  --  107* 123* 100* 98 129*  < >  --   < >  --   < >  --   < >  --   < >  --    < > = values in this interval not displayed.      Imaging:    CXR 9/27-   Aortic valve, mitral valve replacement and sternotomy wires again noted. Mediastinal drain  in place. Cardiac silhouette is large. Pulmonary vascular is mildly increased. Bibasilar atelectasis.    IMPRESSION: Mild pulmonary vascular congestion.    Brain MRI 9/26-   Multiple scattered acute to subacute left MCA distribution infarcts corresponding to  regions of hypoattenuation on the prior head CT. No acute hemorrhage.      ASSESSMENT/PLAN: Patient is a 47 year old male with a history of prosethic aortic valve with endocarditis, now s/p redo sternotomy with mechanical aortic and mitral valve replacement on 9/21 with Dr. Gorman and Dr. Leal.     Neuro:  - Acute post-op pain: IV dilaudid, tylenol and oxycodone prn  - Acute ischemic L MCA stroke: perioperative, goal MAPs >85. Neuro consult, appreciate recs, recommending MRI to assess stroke burden.   - Superior hemianopsia, consult ophthalmology      CV:  -  mg, holding metoprolol for now given goal MAPs >85.  - Blood pressure: MAP goals >85  given CVA  - TPW capped, will cut given thrombocytopenia and therapeutic INR.       Resp: extubated POD 3  - IS, encourage activity  - Mediastinal CT removed, CXR stable, Right pleural chest tube to suction.       FEN/GI:   - NPO given CVA, SLP following, appreciate recommendations, TF at goal, No BM for 2 days, had been declining senna, restart this and schedule miralax  - LFTs elevated, trending down, except for AST, trending up slightly, will discontinue tylenol.   - Now Dysphagia 3 diet with thin liquids. Don't cycle tube feeds yet, await better PO intake.    Renal/:   - Creatinine stable, at baseline, adequate UOP with IV lasix  - Volume status: hypervolemic, +JVD, crackles and LE edema, CXR with pulmonary edema. Will give lasix 20 mg IV BID for 3 doses, reassess in AM, dry weight ~ 141 lbs. Weight 144 lbs.    - Urology saw patient for penile swelling - no intervention - follow-up on outpatient basis      ID: Completed roxanna-op abx,   - WBC normal, afebrile, no signs or symptoms of infection  - ID following for endocarditis, started on ceftriaxone 2g IV q24h for ecoli on mitral valve until 11/3/17, obtaining one more set of blood and urine cultures 9/25. Add levofloxacin 500 mg IV (PO will bind with tube feeds) daily 9/27 to 11/8/17. Weekly CBC, CMP, CRP.      Heme:   - Hgb stable, 9.9, no signs or symptoms of bleeding      Endo:   - BG well controlled, on SSI      PPx:   - PPI      Anticoagulation:   - INR goal 2.5-3.5 for combined mechanical AVR and MVR, INR 1.72       Dispo:   - 6C since 9/25  - Therapy recommending d/c to ARU  - Has TPW and chest tube.       Discussed with CVTS Fellow   Staff surgeons have been informed of changes through both  verbal and written communication.      Diogenes Kraft PA-C  Cardiothoracic Surgery  Pager [phone]    11:58 AM   September 27, 2017

## 2017-09-27 NOTE — CONSULTS
PGY-2 Ophthalmology - brief note    46 yo M with history of MCA stroke post-surgery with superior hemianopsia. Neuro following inpatient.    Patient will be scheduled for formal visual field testing upon discharge.     Carey Dumont MD  PGY-2 Ophthalmology  [phone]

## 2017-09-27 NOTE — PLAN OF CARE
Problem: Patient Care Overview  Goal: Plan of Care/Patient Progress Review  SLP: Pt seen to f/u for swallowing. Pt tolerated trials of thin liquids and semisolids today w/o outward s/sx of aspiration.  Recommend advancing pt's diet to dysphagia diet level 3 with thin liquids, pt to continue to take small sips/bites, no straws and be fully alert and upright for all PO.   ST to f/u for diet tolerance and ability to adv diet further.

## 2017-09-27 NOTE — PROGRESS NOTES
[facility] SERVICE: ONGOING CONSULTATION      Patient:  Harvey Almanzar, Date of birth 1970, Medical record number [id number]  Date of Visit:  September 27, 2017         Assessment and Recommendations:   Problem List:  Strep parasanguinis prosthetic valve endocarditis  s/p  Redo Median Sternotomy, Lysis of adhesions, Left mini-thoracotomy,Coronary artery bypass graft x 1 using the left greater saphenous vein, using endovein harvest, Mitral valve replacement using [employer] Mechanical 27mm, Aortic Valve Replacement using a 17mm [employer] Mechanical, On Cardiopulmonary Bypass Pump - 9/21/17   E coli growing on mitral valve tissue  L MCA stroke on 9/23    Impression: Harvey Almanzar is a 47 year old male with a history of bacterial endocarditis involving the native aortic valve s/p AVR in 1996 with revision of the valve in 1998, ureteral stone presented to ER on 7/14/17 for 2 weeks of fever, chills, sweatings . He was hospitalized at [facility] from 7/14-7/20/17 , diagnosed with  Streptococcus parasanguinis bacteremia and prosthetic aortic valve endocarditis with severe aortic regurgitation. Blood cultures were positive on 7/13, 7/14, 7/15 for strep parasanguinas (sensitive to Ceftriaxone LONG 0.094, and PCN LONG 0.19). Blood cultures 7/16, 7/17, 8/25 were negative. He was started on Ceftriaxone + 2 weeks of gentamicin (shortened duration due to increasing Creatinine). On 9/21 he underwent redo median sternotomy, CABG, MVR, and AVR on 9/21. Mitral valve tissue now growing Ecoli, sensitive to ceftriaxone. This is unusual, given that he has been on Ceftriaxone for several weeks now. He has a history of multiple blood cultures positive for strep in July 2017, and no Ecoli grew previously. Ecoli usually comes from GI or  source. However UA negative. No femoral lines. Abdominal US didn't reveal intraabdominal abscess, or renal abscess.    Given that Ecoli grew from the valve itself, would treat  this as Ecoli prosthetic valve endocarditis, with a plan for 6 weeks of IV therapy. Per IDSA guidelines, we will treat with 2 active agents. Continue Ceftriaxone, and will add Levofloxacin for the remaining duration of treatment.       Recommendations:  1. Continue Ceftriaxone 2g IV q24h -plan for a 6 week course d1= 9/22  2. Start Levofloxacin 500mg po q24h -continue for a 6 week course d1=9/27  3. While patient is on IV antibiotics, he will need weekly CBC, CMP, and CRP -please fax to ID clinic  4. Please arrange ID follow up with Dr. Bryant or myself in 3-4 weeks      ID will sign off. Please page if questions arise.     Attestation:  I have reviewed today's vital signs, medications, labs and imaging.  Yelena Peralta MD  Pager [phone]          Interval History:       Patient denies pain or discomfort.          Review of Systems:   CONSTITUTIONAL:  No fevers or chills  EYES: negative for icterus  ENT:  negative for oral lesions, hearing loss, tinnitus and sore throat  RESPIRATORY:  negative for cough and dyspnea  CARDIOVASCULAR:  negative for chest pain, palpitations  GASTROINTESTINAL:  negative for nausea, vomiting, diarrhea and constipation  GENITOURINARY:  negative for dysuria  HEME:  No easy bruising/bleeding  INTEGUMENT:  negative for rash and pruritus  NEURO:  Negative for headache         Current Antimicrobials   Meropenem d1=9/22-9/25  Ceftriaxone d1=9/25         Physical Exam:   Ranges for vital signs:  Temp:  [97.6  F (36.4  C)-98.6  F (37  C)] 98  F (36.7  C)  Heart Rate:  [80-88] 80  Resp:  [16-20] 18  BP: ()/(70-79) 95/72  SpO2:  [96 %-97 %] 97 %      Exam:  GENERAL: Awake, responsive, in no acute distress.   ENT:  Head is normocephalic, atraumatic. Oropharynx is moist without exudates or ulcers.  EYES:  Eyes have anicteric sclerae. PERRL.   NECK:  Supple. No cervical lymphadenopathy  EXT: Extremities warm and without edema.  SKIN:  No acute rashes.   NEUROLOGIC:  Grossly  nonfocal.             Laboratory Data:     Creatinine   Date Value Ref Range Status   09/27/2017 0.60 (L) 0.66 - 1.25 mg/dL Final   09/26/2017 0.56 (L) 0.66 - 1.25 mg/dL Final   09/25/2017 0.68 0.66 - 1.25 mg/dL Final   09/25/2017 0.66 0.66 - 1.25 mg/dL Final   09/24/2017 0.75 0.66 - 1.25 mg/dL Final     WBC   Date Value Ref Range Status   09/27/2017 8.0 4.0 - 11.0 10e9/L Final   09/27/2017 8.2 4.0 - 11.0 10e9/L Final   09/26/2017 10.0 4.0 - 11.0 10e9/L Final   09/25/2017 9.1 4.0 - 11.0 10e9/L Final   09/24/2017 10.0 4.0 - 11.0 10e9/L Final     Hemoglobin   Date Value Ref Range Status   09/27/2017 9.9 (L) 13.3 - 17.7 g/dL Final     Platelet Count   Date Value Ref Range Status   09/27/2017 72 (L) 150 - 450 10e9/L Final     Sed Rate   Date Value Ref Range Status   09/15/2017 42 (H) 0 - 15 mm/h Final     CRP Inflammation   Date Value Ref Range Status   09/15/2017 40.0 (H) 0.0 - 8.0 mg/L Final     AST   Date Value Ref Range Status   09/27/2017 203 (H) 0 - 45 U/L Final   09/26/2017 198 (H) 0 - 45 U/L Final   09/25/2017 83 (H) 0 - 45 U/L Final   09/24/2017 95 (H) 0 - 45 U/L Final   09/23/2017 226 (H) 0 - 45 U/L Final     ALT   Date Value Ref Range Status   09/27/2017 202 (H) 0 - 70 U/L Final   09/26/2017 200 (H) 0 - 70 U/L Final   09/25/2017 196 (H) 0 - 70 U/L Final   09/24/2017 233 (H) 0 - 70 U/L Final   09/23/2017 307 (H) 0 - 70 U/L Final     Bilirubin Total   Date Value Ref Range Status   09/27/2017 10.2 (H) 0.2 - 1.3 mg/dL Final   09/26/2017 10.7 (H) 0.2 - 1.3 mg/dL Final   09/25/2017 12.6 (H) 0.2 - 1.3 mg/dL Final   09/24/2017 12.1 (H) 0.2 - 1.3 mg/dL Final   09/23/2017 12.7 (H) 0.2 - 1.3 mg/dL Final     Lab Results   Component Value Date     09/27/2017    BUN 18 09/27/2017    CO2 26 09/27/2017       Culture data:  9/21 Mitral valve leaflet tissue:    Light growth   Escherichia coli      Culture Micro 09/21/2017 11:33 AM 75   Culture in progress   Culture & Susceptibility   ESCHERICHIA COLI   Antibiotic  Interpretation Sensitivity Unit Method Status   AMIKACIN Sensitive <=2 ug/mL LONG Preliminary   AMPICILLIN Resistant >=32 ug/mL LONG Preliminary   AMPICILLIN/SULBACTAM Intermediate 16 ug/mL LONG Preliminary   CEFEPIME Sensitive <=1 ug/mL LONG Preliminary   CEFTAZIDIME Sensitive <=1 ug/mL LONG Preliminary   CEFTRIAXONE Sensitive <=1 ug/mL LONG Preliminary   CIPROFLOXACIN Sensitive <=0.25 ug/mL LONG Preliminary   GENTAMICIN Resistant >=16 ug/mL LONG Preliminary   LEVOFLOXACIN Sensitive <=0.12 ug/mL LONG Preliminary   MEROPENEM Sensitive <=0.25 ug/mL LONG Preliminary   Piperacillin/Tazo Sensitive <=4 ug/mL LONG Preliminary   TOBRAMYCIN Sensitive 4 ug/mL LONG Preliminary   Trimethoprim/Sulfa Resistant >=16/304 ug/mL LONG Preliminary            All cultures:    Recent Labs  Lab 09/25/17  1456 09/25/17  1447 09/22/17  1452 09/22/17  1450 09/22/17  1024 09/21/17  1133 09/21/17  1122   CULT No growth after 2 days No growth after 2 days No growth after 5 days No growth after 5 days No growth  Canceled, Test credited  Test canceled by PCU/Clinic  Per Jessica SAENZ from 4E, test was ordered in error. I reordered the test to aerobic cath tip culture.9/22/17 at 1150.TV. Light growthEscherichia coli*  Culture negative after 4 days  Culture negative monitoring continues No growth  Culture negative after 4 days  Culture negative monitoring continues

## 2017-09-27 NOTE — PLAN OF CARE
Problem: Patient Care Overview  Goal: Plan of Care/Patient Progress Review  PT/6c: Discharge Planner PT   Patient plan for discharge: rehab  Current status: pt currently requiring CGA-min A for basic bed mobility, transfers, gait w/ use of WW  Barriers to return to prior living situation: pt lives in two story home w/ bedroom on 2nd story; pt's siblings work during the day and are unavailable to assist pt during the day; pt now w/ sternal precautions; R sided weakness and coordination deficits (UE more pronounced than RLE) following CVA; impaired vision  Recommendations for discharge: ARU  Rationale for recommendations: Pt is highly motivated, making daily gains, previously IND; now requiring Ax1 for all transfers and gait; multiple impairments and functional limitations s/p heart surgery and recent CVA. However, pending length of stay, pt may progress beyond needing ARU as he is making large gains in his mobility each day.        Entered by: Radha Collado 09/27/2017 5:51 PM

## 2017-09-27 NOTE — PLAN OF CARE
Problem: Goal Outcome Summary  Goal: Goal Outcome Summary  Outcome: No Change  Patient is alert and oriented X4, c/o sternal pain and PRN Oxycodone PO given with relief.  R-side weakness from CVA.  [place] Thickened diet, water with swab and tuck-chin maneuver to swallow with head of bed at 30-45 degrees.  TF peptamen 1.5 via Nasoduodenal at 50cc/hr and tolerating well.  Chest tube to sx with moderate amt see flow sheet for output.  Patient sternal wound is c/d/i and chest tube dressing is c/d/i.  Pacer wire is capped.  Beltran is intact the bjorn color urine.  BS checks w/o coverage under 200.  Neuro no change, will continue with cares and notify MD of status change.

## 2017-09-27 NOTE — DISCHARGE INSTRUCTIONS
[facility]      AFTER YOU GO HOME FROM YOUR HEART SURGERY    Avoid lifting anything greater than ten pounds for 6 weeks after surgery and then less than 20 pounds for an additional 6 weeks.    No driving for 4 weeks after surgery or while on pain medication.    Avoid strenuous activities such as bowling, vacuuming, raking, shoveling, golf or tennis for 12 weeks after your surgery. It is okay to resume sex if you feel comfortable in doing so. You may have to try different positions with your partner.     Splint your chest incision by hugging a pillow or bringing your arms across your chest when coughing or sneezing. Avoid pushing off with your arms when getting up for the first month if you have had your sternum opened.    Shower or wash your incisions daily with soap and water (or as instructed), pat dry. Keep wound clean and dry, showers are okay after discharge, but don't let spray hit directly on incision. No baths or swimming for 1 month.  Clean wounds twice a day for 2 weeks with microklenz spray if available. Cover chest tube sites with gauze until they stop draining, then leave open. It is not abnormal for chest tube sites to drain yellowish/clear fluid for up to 2-3 weeks after surgery.   Watch for signs of infection: increased redness, tenderness, warmth or any drainage that appears infected (pus like) or is persistent.  Also a temperature > 100.5 F or chills. Call your surgeon or primary care provider's office immediately. Remove any skin glue left on incisions after 10 days. This will not affect your incision and can speed up healing.    Exercise is very important in your recovery. Please follow the guidelines set up for you in your cardiac rehab classes at the hospital. If outpatient cardiac rehab was ordered for you, we highly recommend you participate. If you have problems arranging your cardiac rehab, please call [phone].     Avoid sitting for prolonged periods of  time, try to walk every hour during the day. If you have a leg incision, elevate your leg often when you are not walking.    Check your weight when you get home from the hospital and continue to check it daily through your recovery for at least a month. If you notice a weight gain of 2-3 pounds in a week, notify your primary care physician, cardiologist or surgeon.    Bowel activity may be slow after surgery. If necessary, you may take an over the counter laxative such as Milk of Magnesia or Miralax. You may have stool softeners prescribed (docusate sodium, Senokot). We recommend using stool softeners while using narcotics for pain (oxycodone/percocet, hydrocodone/vicodin).      DENTAL VISITS AFTER SURGERY  If you have had your heart valve repaired or replaced, we do not recommend having any dental work done for 6 months and you will need to take an antibiotic prior to dental visits from now on.  Please notify your dentist before any procedure for the proper treatment needed. The antibiotic is taken by mouth one hour prior to visit. This includes routine cleanings.    DO NOT SMOKE.  IF YOU NEED HELP QUITTING, PLEASE TALK WITH YOUR CARDIOLOGIST OR PRIMARY DOCTOR.    You are on a blood thinner, follow the instructions you were given in the hospital and DO NOT SKIP this medication. Try and take it the same time everyday. Your primary care physician or coumadin clinic will manage the dosing.     REGARDING PRESCRIPTION REFILLS.  If you need a refill on your pain medication contact us.  All other medications will be adjusted, discontinued and re-filled by your primary care physician and/or your cardiologist as they were prior to your surgery. We have given you enough for one to three month with possibly one refill.    POST-OPERATIVE CLINIC VISITS  You have a follow up visit with CVTS Surgery Advance Care Practitioners on 10/17/17 at 3 pm (arrive 2:45pm) at the [facility].  You will then return to  the care of your primary physician and your cardiologist.   It is important to call for an appointment to see your cardiologist in 4-6 weeks after surgery and your primary care physician in 2-4 weeks after surgery. If there is a need to return to see CT Surgery please call our  at [phone].    SURGICAL QUESTIONS  Please call Renée Pena with any surgical questions, her phone number is listed below.  She can assist you with your needs and contact other surgery care team members as indicated.    For general questions or concerns, please call the Cardiothoracic Surgery Department at [phone] 8-4:30 M-F.   On weekends or after hours, please call [phone] and ask the  to   page the Cardiothoracic Surgery fellow on call.      Thank you,    Your Cardiothoracic Surgery Team  Renée Pena RN Care Coordinator-  [phone]   Thu Lema PA-C

## 2017-09-28 ENCOUNTER — APPOINTMENT (OUTPATIENT)
Dept: PHYSICAL THERAPY | Facility: CLINIC | Age: 47
DRG: 216 | End: 2017-09-28
Attending: PHYSICIAN ASSISTANT
Payer: COMMERCIAL

## 2017-09-28 ENCOUNTER — TELEPHONE (OUTPATIENT)
Dept: UROLOGY | Facility: CLINIC | Age: 47
End: 2017-09-28

## 2017-09-28 ENCOUNTER — APPOINTMENT (OUTPATIENT)
Dept: OCCUPATIONAL THERAPY | Facility: CLINIC | Age: 47
DRG: 216 | End: 2017-09-28
Attending: PHYSICIAN ASSISTANT
Payer: COMMERCIAL

## 2017-09-28 ENCOUNTER — APPOINTMENT (OUTPATIENT)
Dept: GENERAL RADIOLOGY | Facility: CLINIC | Age: 47
DRG: 216 | End: 2017-09-28
Attending: PHYSICIAN ASSISTANT
Payer: COMMERCIAL

## 2017-09-28 LAB
ALBUMIN SERPL-MCNC: 1.6 G/DL (ref 3.4–5)
ALP SERPL-CCNC: 176 U/L (ref 40–150)
ALT SERPL W P-5'-P-CCNC: 174 U/L (ref 0–70)
ANION GAP SERPL CALCULATED.3IONS-SCNC: 8 MMOL/L (ref 3–14)
AST SERPL W P-5'-P-CCNC: 145 U/L (ref 0–45)
BACTERIA SPEC CULT: NO GROWTH
BACTERIA SPEC CULT: NO GROWTH
BACTERIA SPEC CULT: NORMAL
BACTERIA SPEC CULT: NORMAL
BILIRUB SERPL-MCNC: 7.7 MG/DL (ref 0.2–1.3)
BUN SERPL-MCNC: 16 MG/DL (ref 7–30)
CALCIUM SERPL-MCNC: 7.4 MG/DL (ref 8.5–10.1)
CHLORIDE SERPL-SCNC: 104 MMOL/L (ref 94–109)
CO2 SERPL-SCNC: 24 MMOL/L (ref 20–32)
CREAT SERPL-MCNC: 0.57 MG/DL (ref 0.66–1.25)
ERYTHROCYTE [DISTWIDTH] IN BLOOD BY AUTOMATED COUNT: 20.7 % (ref 10–15)
GFR SERPL CREATININE-BSD FRML MDRD: >90 ML/MIN/1.7M2
GLUCOSE BLDC GLUCOMTR-MCNC: 111 MG/DL (ref 70–99)
GLUCOSE BLDC GLUCOMTR-MCNC: 115 MG/DL (ref 70–99)
GLUCOSE BLDC GLUCOMTR-MCNC: 119 MG/DL (ref 70–99)
GLUCOSE BLDC GLUCOMTR-MCNC: 122 MG/DL (ref 70–99)
GLUCOSE BLDC GLUCOMTR-MCNC: 124 MG/DL (ref 70–99)
GLUCOSE BLDC GLUCOMTR-MCNC: 90 MG/DL (ref 70–99)
GLUCOSE SERPL-MCNC: 104 MG/DL (ref 70–99)
HCT VFR BLD AUTO: 27.8 % (ref 40–53)
HGB BLD-MCNC: 9.1 G/DL (ref 13.3–17.7)
INR PPP: 1.51 (ref 0.86–1.14)
LMWH PPP CHRO-ACNC: 0.14 IU/ML
LMWH PPP CHRO-ACNC: 0.37 IU/ML
LMWH PPP CHRO-ACNC: <0.1 IU/ML
Lab: NORMAL
Lab: NORMAL
MCH RBC QN AUTO: 29.4 PG (ref 26.5–33)
MCHC RBC AUTO-ENTMCNC: 32.7 G/DL (ref 31.5–36.5)
MCV RBC AUTO: 90 FL (ref 78–100)
PLATELET # BLD AUTO: 114 10E9/L (ref 150–450)
POTASSIUM SERPL-SCNC: 3.6 MMOL/L (ref 3.4–5.3)
PROT SERPL-MCNC: 4.4 G/DL (ref 6.8–8.8)
RBC # BLD AUTO: 3.09 10E12/L (ref 4.4–5.9)
SODIUM SERPL-SCNC: 136 MMOL/L (ref 133–144)
SPECIMEN SOURCE: NORMAL
WBC # BLD AUTO: 7.1 10E9/L (ref 4–11)

## 2017-09-28 PROCEDURE — 97110 THERAPEUTIC EXERCISES: CPT | Mod: GO

## 2017-09-28 PROCEDURE — 85027 COMPLETE CBC AUTOMATED: CPT | Performed by: PHYSICIAN ASSISTANT

## 2017-09-28 PROCEDURE — 85520 HEPARIN ASSAY: CPT | Performed by: INTERNAL MEDICINE

## 2017-09-28 PROCEDURE — 71010 XR CHEST PORT 1 VW: CPT

## 2017-09-28 PROCEDURE — 25000128 H RX IP 250 OP 636: Performed by: PHYSICIAN ASSISTANT

## 2017-09-28 PROCEDURE — 71010 XR CHEST PORT 1 VW: CPT | Mod: 76

## 2017-09-28 PROCEDURE — 85610 PROTHROMBIN TIME: CPT | Performed by: PHYSICIAN ASSISTANT

## 2017-09-28 PROCEDURE — 25000132 ZZH RX MED GY IP 250 OP 250 PS 637: Performed by: THORACIC SURGERY (CARDIOTHORACIC VASCULAR SURGERY)

## 2017-09-28 PROCEDURE — 25000132 ZZH RX MED GY IP 250 OP 250 PS 637: Performed by: SURGERY

## 2017-09-28 PROCEDURE — 25000128 H RX IP 250 OP 636: Performed by: SURGERY

## 2017-09-28 PROCEDURE — 25000132 ZZH RX MED GY IP 250 OP 250 PS 637: Performed by: PHYSICIAN ASSISTANT

## 2017-09-28 PROCEDURE — 36415 COLL VENOUS BLD VENIPUNCTURE: CPT | Performed by: PHYSICIAN ASSISTANT

## 2017-09-28 PROCEDURE — 27210913 ZZH NUTRITION PRODUCT INTERMEDIATE PACKET

## 2017-09-28 PROCEDURE — 25000132 ZZH RX MED GY IP 250 OP 250 PS 637: Performed by: ANESTHESIOLOGY

## 2017-09-28 PROCEDURE — 85520 HEPARIN ASSAY: CPT | Performed by: PHYSICIAN ASSISTANT

## 2017-09-28 PROCEDURE — 80053 COMPREHEN METABOLIC PANEL: CPT | Performed by: PHYSICIAN ASSISTANT

## 2017-09-28 PROCEDURE — 40000133 ZZH STATISTIC OT WARD VISIT

## 2017-09-28 PROCEDURE — 97530 THERAPEUTIC ACTIVITIES: CPT | Mod: GO

## 2017-09-28 PROCEDURE — 00000146 ZZHCL STATISTIC GLUCOSE BY METER IP

## 2017-09-28 PROCEDURE — 36415 COLL VENOUS BLD VENIPUNCTURE: CPT | Performed by: INTERNAL MEDICINE

## 2017-09-28 PROCEDURE — 21400006 ZZH R&B CCU INTERMEDIATE UMMC

## 2017-09-28 RX ORDER — ASPIRIN 81 MG/1
81 TABLET, CHEWABLE ORAL DAILY
Status: DISCONTINUED | OUTPATIENT
Start: 2017-09-29 | End: 2017-10-05 | Stop reason: HOSPADM

## 2017-09-28 RX ORDER — HEPARIN SODIUM 10000 [USP'U]/100ML
0-3500 INJECTION, SOLUTION INTRAVENOUS CONTINUOUS
Status: DISCONTINUED | OUTPATIENT
Start: 2017-09-28 | End: 2017-10-02

## 2017-09-28 RX ORDER — WARFARIN SODIUM 1 MG/1
1 TABLET ORAL
Status: COMPLETED | OUTPATIENT
Start: 2017-09-28 | End: 2017-09-28

## 2017-09-28 RX ADMIN — SENNOSIDES AND DOCUSATE SODIUM 2 TABLET: 8.6; 5 TABLET ORAL at 20:39

## 2017-09-28 RX ADMIN — LIDOCAINE 2 PATCH: 50 PATCH CUTANEOUS at 08:21

## 2017-09-28 RX ADMIN — CHLORHEXIDINE GLUCONATE 15 ML: 1.2 RINSE ORAL at 08:21

## 2017-09-28 RX ADMIN — OXYCODONE HYDROCHLORIDE 10 MG: 5 TABLET ORAL at 13:20

## 2017-09-28 RX ADMIN — POTASSIUM & SODIUM PHOSPHATES POWDER PACK 280-160-250 MG 1 PACKET: 280-160-250 PACK at 08:21

## 2017-09-28 RX ADMIN — POLYETHYLENE GLYCOL 3350 17 G: 17 POWDER, FOR SOLUTION ORAL at 08:21

## 2017-09-28 RX ADMIN — CEFTRIAXONE SODIUM 2 G: 2 INJECTION, POWDER, FOR SOLUTION INTRAMUSCULAR; INTRAVENOUS at 15:26

## 2017-09-28 RX ADMIN — SENNOSIDES AND DOCUSATE SODIUM 2 TABLET: 8.6; 5 TABLET ORAL at 08:22

## 2017-09-28 RX ADMIN — Medication 2 PACKET: at 08:23

## 2017-09-28 RX ADMIN — LEVOFLOXACIN 500 MG: 5 INJECTION, SOLUTION INTRAVENOUS at 08:13

## 2017-09-28 RX ADMIN — THERA TABS 1 TABLET: TAB at 08:22

## 2017-09-28 RX ADMIN — WARFARIN SODIUM 1 MG: 1 TABLET ORAL at 18:01

## 2017-09-28 RX ADMIN — ASPIRIN 325 MG ORAL TABLET 325 MG: 325 PILL ORAL at 08:21

## 2017-09-28 RX ADMIN — POTASSIUM & SODIUM PHOSPHATES POWDER PACK 280-160-250 MG 1 PACKET: 280-160-250 PACK at 15:25

## 2017-09-28 RX ADMIN — POTASSIUM CHLORIDE 20 MEQ: 750 TABLET, EXTENDED RELEASE ORAL at 12:15

## 2017-09-28 RX ADMIN — PANTOPRAZOLE SODIUM 40 MG: 40 TABLET, DELAYED RELEASE ORAL at 08:21

## 2017-09-28 RX ADMIN — OXYCODONE HYDROCHLORIDE 10 MG: 5 TABLET ORAL at 20:49

## 2017-09-28 RX ADMIN — HEPARIN SODIUM 1200 UNITS/HR: 10000 INJECTION, SOLUTION INTRAVENOUS at 15:26

## 2017-09-28 RX ADMIN — POTASSIUM & SODIUM PHOSPHATES POWDER PACK 280-160-250 MG 1 PACKET: 280-160-250 PACK at 12:15

## 2017-09-28 RX ADMIN — FUROSEMIDE 20 MG: 20 TABLET ORAL at 08:22

## 2017-09-28 RX ADMIN — POTASSIUM & SODIUM PHOSPHATES POWDER PACK 280-160-250 MG 1 PACKET: 280-160-250 PACK at 20:39

## 2017-09-28 RX ADMIN — OXYCODONE HYDROCHLORIDE 10 MG: 5 TABLET ORAL at 10:05

## 2017-09-28 ASSESSMENT — PAIN DESCRIPTION - DESCRIPTORS: DESCRIPTORS: ACHING

## 2017-09-28 NOTE — PROGRESS NOTES
Calorie Counts    Intake Recorded For: 9/27 Kcals 0 Protein: 0g    # Meals Recorded: No Food Intake Recorded    # Supplements Recorded: 0    Notes: 1 meal ordered, no intake recorded

## 2017-09-28 NOTE — PLAN OF CARE
"Problem: Patient Care Overview  Goal: Plan of Care/Patient Progress Review    09/27/17 8691   OTHER   Plan Of Care Reviewed With patient   Plan of Care Review   Progress no change      [id number]: VSS, A&O x4. Tele SR. Continues with slight right sided weakness, per pt imporving. Right eye has \"black spot\", opthalmalgy to see patient tomrrow. Beltran with adequate output. CHest tube with SS output, to suction. Chest site dressings CDI. Pacers capped. Heparin gtt 900units/hr, awaiting result from hep10A draw. TJ via NJ at goal of 50cc/hr. BG stable, no SCC needed. Received prn oxy at bedtime per requst for incisional pain overnight. smithecon added for cramping if wanting overnight.       "

## 2017-09-28 NOTE — PROGRESS NOTES
CLINICAL NUTRITION SERVICES - REASSESSMENT NOTE     Nutrition Prescription    RECOMMENDATIONS FOR MDs/PROVIDERS TO ORDER:  Could consider possibly ordering thiamine (100 mg/day for the next three to seven days) if phos is not improving     Malnutrition Status:    Non-severe malnutrition in the context of acute illness     Recommendations already ordered by Registered Dietitian (RD):  Ordered phos lab add-on  Modified supplements (d/c'd magic cup, increased gelatein to BID)    Future/Additional Recommendations:  Once pt is consuming 25% of meal trays, rec transition to cycled nocturnal TFs, Peptamen 1.5 at 65 mL/hr x 12 hrs. Conintue ProSource TF modular BID for additional kcals/protein. Regimen provides ~75% of kcal and protein needs. TFs may be adjusted pending oral intake.     Once kcal counts reveal that pt is consuming at least 1200 kcals and 64 g protein daily on average, discontinue TFs.     EVALUATION OF THE PROGRESS TOWARD GOALS   Diet: DD3 Thin Liquids + Supp (magic cup and gelatein)  Intake: Pt with poor appetite. No intakes recorded per yesterday's calorie count.  He declined breakfast this AM, but later reports eating mashed potatoes, applesauce and gelatein suppl.    Nutrition Support: TF via NDT w/ Peptamen 1.5 @ 50 ml/hr + 2 pkts prosource (40 kcal, 11 gm each) to provide 1880 kcal (29 kcal/kg) and 104 gm protein (1.6 gm/kg).     Intake: - TF started 9/22             - Adv to goal rate on 9/24              - 5-day avg = 778 ml/day, 1167 kcal (18 kcal/kg, ~70% min needs), 53 gm pro (0.8 gm/kg, ~50% min needs)     Suspect this avg is inaccurate (an under estimation of intakes) given no interruptions to TF documented in chart and pt tolerating goal rate x4 days.        NEW FINDINGS   Labs: Phos 1.4 (L) on 9/26, no re-check since   Meds: NeutraPhos, 1 pkt QID started on 9/24    MALNUTRITION  % Intake: < 75% for > 7 days (non-severe)  % Weight Loss: None noted since admit   Subcutaneous Fat Loss: Facial  region and Thoracic/intercostal: Mild  Muscle Loss: Temporal and Lower arm  (forearm):  Mild/moderate   Fluid Accumulation/Edema: Mild pedal edema   Malnutrition Diagnosis: Non-severe malnutrition in the context of acute illness     Previous Goals   Total avg nutritional intake to meet a minimum of 25 kcal/kg and 1.5 g PRO/kg daily (per dosing wt 64 kg).  Evaluation: Not met    Previous Nutrition Diagnosis  Inadequate protein-energy intake related to poor appetite, now post-op as evidenced by RN documentation of 50% intake at meals with fair appetite, now intubated, NPO day 2    Evaluation: No change    CURRENT NUTRITION DIAGNOSIS  Inadequate protein-energy intake related to poor appetite and continued reliance of TF to meet nutritional needs as evidenced by minimal PO intakes, per I/Os 5-day avg TF intakes of 18 kcal/kg and 0.8 gm pro/kg (though suspect may be inaccurate).     INTERVENTIONS  Implementation  Nutrition education - Encouraged pt to eat small frequent meals in addition to oral nutrition supplements given lack of appetite. Discussed potential ability to cycle TF once PO intakes improve. Pt verbalized understanding.   Enteral Nutrition - Continue as ordered   Medical food supplement therapy     Goals  Total avg nutritional intake to meet a minimum of 25 kcal/kg and 1.5 g PRO/kg daily (per dosing wt 64 kg).    Monitoring/Evaluation  Progress toward goals will be monitored and evaluated per protocol.    Diann Souza RD, JEB  6C Pager: [phone]

## 2017-09-28 NOTE — PLAN OF CARE
Problem: Individualization  Goal: Patient Preferences  Outcome: No Change  Assumed cares [id number]. Pt A/O, VSS on RA, neuros unchanged w/some R sided weakness. Pain managed w/Oxycodone 10mg q3h. Lidocaine patches to back of neck. Denies nausea, tolerating small amts dysphagia 3 diet. NG in place, TF at 50ml/hr. Up assistx1-2 w/walker. , 111, and 119, no insulin given. Beltran patent w/adequate UOP of bjorn/tea colored urine. Pacer wires and chest tube removed at 1230 today. Heparin gtt increased today w/two boluses, currently at 1200 units/hr. Last hepXa was 0.10, next lab scheduled for 2000.  Continue to monitor and w/POC.

## 2017-09-28 NOTE — PROGRESS NOTES
Social Work Services Progress Note    Hospital Day: 14  Date of Initial Social Work Evaluation:  9/25/17  Collaborated with:  Pt    Data:  Pt is a 47 year old male being followed by SW for placement to  ARU.    Intervention:  SW met with pt and family at bedside to discuss placement at ARU.  Pt states that he is agreeable to ARU placement and reports that he will have extended family support to help with his cares post discharge from ARU.  Pt continues to have chest tube to suction and a naso feeding tube.  Per CVTS team the chest tube is not being removed today.  Will discuss with team tomorrow pt's progress for chest tube removal.    Assessment:  Pt adjusting appropriately and continues to be motivated in therapies.  Pt reports having family visiting daily who will also provide care when pt returns home.    Plan:    Anticipated Disposition:  Facility:   ARU following for medical readiness.    Barriers to d/c plan:  Medical stabilty, chest tubes    Follow Up:  SW to follow for discharge to ARU.  liaison following.    KINZA Mcdaniels, SHAIW  6C Unit   Phone: [phone]  Pager: [phone]  Unit: [phone]

## 2017-09-28 NOTE — PLAN OF CARE
Problem: Goal Outcome Summary  Goal: Goal Outcome Summary  Outcome: Improving  [id number]. B/P's soft. All other VSS. A&O. Mild right sided weakness. Visual deficit to R eye- addressed with provider today. Heparin drip initiated. Heparin at 900units/hour. Next 10a at 2230. Tube feedings at 50cc/hour. Good UOP from moore. Chest tube dressing changed. Diet advaced bt ST and pt tolerating well. No c/o pain. Up with 2A walker/gaitbelt. Up to chair x2 and ambulated in paul. Calorie counts starting at midnight on 9/28. Using call light appropriately. Continue with POC.

## 2017-09-28 NOTE — TELEPHONE ENCOUNTER
Patient given appointment for 5pm on 10/23 with dr awad  Message sent clinic coordinators to schedule Edith Ac LPN Staff Nurse

## 2017-09-28 NOTE — TELEPHONE ENCOUNTER
----- Message from Tami Armstrong MD sent at 9/28/2017  2:45 PM CDT -----  Regarding: Follow-up appt  Ghsasan Garcia,    Could you please make a follow-up for this patient in 4-6 weeks  for foreign body injection into penis with Dr. Moise or Varsha Domingo,     Tami

## 2017-09-28 NOTE — PLAN OF CARE
"Met with the patient at the bedside to discuss rehabilitation options. Patient states that he needs rehab and \"can't just stay in bed all day.\" Patient was agreeable to rehabilitation on the [facility] and states that his doctor has been telling him a lot about rehabilitation. Writer closed the conversation with all questions answered.    Thank you for the referral, we will continue to follow this patient for post acute placement.     Determination of admission is based upon the patient's need for an intensive, interdisciplinary approach to rehabilitation, their ability to progress, their ability to tolerate intensive therapies, their need for daily physician supervision, their need for twenty four hour nursing assistance, and their ability and willingness to participate in such a program.    Frantz Cooper RN  Rehab Liaison/  [facility]  9/28/2017    9:37 AM    "

## 2017-09-28 NOTE — PLAN OF CARE
Problem: Goal Outcome Summary  Goal: Goal Outcome Summary     SLP 6C: Cancel. Pt refusing PO trials and session at initial attempt. Upon re-attempt, pt with other therapy discipline. Will follow per POC.

## 2017-09-28 NOTE — PLAN OF CARE
Problem: Goal Outcome Summary  Goal: Goal Outcome Summary  Outcome: Improving    A/o x 4. VSSA. Denies pain. Beltran catheter draining. BG normal.  Dysphagia 3 diet with thin liquids.  Neuro exam improving. Right sided weakness continues but definite improvement. Patient states that he is feeling improvement.  TF @50 cc hour. Heparin 900 u/hr. Patient slept in between cares.

## 2017-09-28 NOTE — PLAN OF CARE
Problem: Goal Outcome Summary  Goal: Goal Outcome Summary  Discharge Planner OT   Patient plan for discharge: ARU  Current status: Pt Sandra sit<>stand with TH blocking R LE and VCs to maintain sternal precautions. Pt completed oral cares standing at sink SBA with VCs to encorporate RUE into activities to facilitate neuromuscular education. Pt completed toileting activity, SBA, mod I to complete g/h cares. VSS on RA throughout session.   Barriers to return to prior living situation: Decreased activity tolerance, strength, endurance, right sided weakness.   Recommendations for discharge: ARU   Rationale for recommendations: Pt would benefit from continued therapy to increase activity tolerance, strength, endurance, and facilitate neuromuscular re-education.        Entered by: Varsha Gray 09/28/2017 10:46 AM

## 2017-09-28 NOTE — PLAN OF CARE
Problem: Goal Outcome Summary  Goal: Goal Outcome Summary  Discharge Planner PT   PT 6C:  Patient plan for discharge: ARU  Current status: Pt completes rolling to R Min A2. Pt transfers sidelying>sitting EOB Min A 2. Pt amb 75 and 115 feet with FWW CGA. Pt having difficulty adhering to sternal precautions.  Barriers to return to prior living situation: R hemiplegia, sternal precautions, decreased activity tolerance, impaired balance.  Recommendations for discharge: ARU  Rationale for recommendations: Pt has PT/OT/SLP needs and will require continued physical therapy to increase strength, balance, activity tolerance, and increase independence with functional mobility. Pt also has complex medical history in addition to functional impairments.       Entered by: Felipe Reyes 09/28/2017 4:09 PM

## 2017-09-28 NOTE — PROGRESS NOTES
CVTS Daily Note  9/28/2017  Attending: Zeb Leal MD    S:   No overnight events.  Tolerating Tube feeds. Diet advanced to DD3 with thins.   Pt seen at bedside resting comfortably.    Complains of right eye superior hemianopsia, patient reports that this was present prior to surgery.     Denies F/C/Sweats.  No CP, SOB, or calf pain.    + BM.  + Flatus.    Ambulated well with assistance.  Still complaining of balance issues.   Pain controlled well.    O:   Vitals:    09/27/17 1553 09/27/17 1954 09/28/17 0241 09/28/17 0740   BP: 90/63 91/69 97/70 (!) 87/68   BP Location: Left arm Left arm  Right arm   Resp: 18 16  16   Temp: 98.4  F (36.9  C)   98.7  F (37.1  C)   TempSrc: Oral      SpO2: 97% 98% 98% 94%   Weight:       Height:         Vitals:    09/25/17 0400 09/26/17 0527 09/27/17 0400   Weight: 70.7 kg (155 lb 13.8 oz) 68.3 kg (150 lb 8 oz) 65.3 kg (144 lb)     Intake/Output Summary (Last 24 hours) at 09/27/17 1158  Last data filed at 09/27/17 1000   Gross per 24 hour   Intake             1380 ml   Output             3185 ml   Net            -1805 ml       MAPs: 79 - 91  Gen: AAO x 3, pleasant, NAD  Neuro: sensation and motor intact bilaterally.  CV: RRR, S1S2 normal, no murmurs, rubs, or gallops.   Pulm: CTA, no rhonchi or wheezes  Abd: soft, non-tender, no guarding  Ext: trace peripheral edema  Incision: clean, dry, intact, no erythema  Chest Tube sites: dressings clean and dry, serosanguinous, no air leak, output 130 cc       Labs:  BMP    Recent Labs  Lab 09/28/17  0615 09/27/17  0639 09/26/17  0930 09/25/17  1530    140 145* 147*   POTASSIUM 3.6 4.2 4.3 4.2   CHLORIDE 104 107 113* 114*   CHECO 7.4* 7.8* 8.0* 8.0*   CO2 24 26 24 30   BUN 16 18 20 25   CR 0.57* 0.60* 0.56* 0.68   * 121* 139* 133*     CBC    Recent Labs  Lab 09/28/17  0615 09/27/17  0831 09/27/17  0639 09/26/17  0930   WBC 7.1 8.0 8.2 10.0   RBC 3.09* 3.41* 3.44* 3.77*   HGB 9.1* 9.9* 9.9* 11.0*   HCT 27.8* 31.4* 31.2* 35.5*   MCV  90 92 91 94   MCH 29.4 29.0 28.8 29.2   MCHC 32.7 31.5 31.7 31.0*   RDW 20.7* 20.4* 20.8* 20.9*   * 72* 75* 62*     INR    Recent Labs  Lab 09/28/17  0615 09/27/17  0639 09/26/17  0747 09/25/17  0344   INR 1.51* 1.72* 2.72* 3.29*      Hepatic Panel   Lab Results   Component Value Date     09/27/2017     Lab Results   Component Value Date     09/27/2017     Lab Results   Component Value Date    ALBUMIN 1.7 09/27/2017     GLUCOSE:     Recent Labs  Lab 09/28/17  0836 09/28/17  0615 09/28/17  0532 09/28/17  0236 09/27/17  2101 09/27/17  1711 09/27/17  1224  09/27/17  0639  09/26/17  0930  09/25/17  1530  09/25/17  0344  09/24/17  1550   GLC  --  104*  --   --   --   --   --   --  121*  --  139*  --  133*  --  127*  --  152*   *  --  122* 115* 100* 117* 114*  < >  --   < >  --   < >  --   < >  --   < >  --    < > = values in this interval not displayed.      Imaging:    CXR 9/28:  Reviewed, appears overall stable, no appreciable pneumo, official read pending.      ASSESSMENT/PLAN: Patient is a 47 year old male with a history of prosethic aortic valve with endocarditis, now s/p redo sternotomy with mechanical aortic and mitral valve replacement on 9/21 with Dr. Gorman and Dr. Leal.     Neuro:  - Acute post-op pain: IV dilaudid, tylenol and oxycodone prn  - Acute ischemic L MCA stroke: perioperative, goal MAPs >85. Neuro consult, appreciate recs, recommending MRI to assess stroke burden.   - Superior hemianopsia, consult ophthalmology, recommended outpatient follow-up     CV:  -  mg, holding metoprolol for now given goal MAPs >85.  - Blood pressure: MAP goals >85 given CVA  - TPW capped, removed 9/28 without complication (INR 1.5, plts >100 and heparin gtt held).       Resp: extubated POD 3  - IS, encourage activity  - Mediastinal CT removed 9/27, remaining right pleural removed 9/28, f/u CXR this PM       FEN/GI:   - SLP, advanced to DD3 diet, tolerating, TF at goal, No BM for 2 days,  had been declining senna, restart this and schedule miralax  - LFTs elevated, trending down, except for ALP, trending up slightly, all others trending down, will continue to follow and discuss with hep-alejandra team   - Now Dysphagia 3 diet with thin liquids. Don't cycle tube feeds yet, await better PO intake.    Renal/:   - Creatinine stable, at baseline, adequate UOP with IV lasix  - Volume status: approaching euvolemia, responds well to lasix 20 mg PO daily, will continue reassess in AM, dry weight ~ 141 lbs. Weight 144 lbs.    - Urology saw patient for penile swelling - no intervention - follow-up on outpatient basis      ID: Completed roxanna-op abx,   - WBC normal, afebrile, no signs or symptoms of infection  - ID following for endocarditis, started on ceftriaxone 2g IV q24h for ecoli on mitral valve until 11/3/17, obtaining one more set of blood and urine cultures 9/25. Add levofloxacin 500 mg IV (PO will bind with tube feeds) daily 9/27 to 11/8/17. Weekly CBC, CMP, CRP.      Heme:   - Hgb stable, 9.9->9.1, no signs or symptoms of bleeding      Endo:   - BG well controlled, on SSI      PPx:   - PPI      Anticoagulation:   - INR goal 2.5-3.5 for combined mechanical AVR and MVR, INR 1.72->1.5, now on heparin gtt for bridge, okayed per neurology with hx of ischemic stroke.      Dispo:   - 6C since 9/25  - Therapy recommending d/c to ARU, waiting therapeutic INR      Discussed with CVTS Fellow   Staff surgeons have been informed of changes through both  verbal and written communication.      Callum Lema PA-C  Cardiothoracic Surgery  September 28, 2017 9:09 AM   p: [phone]

## 2017-09-29 ENCOUNTER — APPOINTMENT (OUTPATIENT)
Dept: GENERAL RADIOLOGY | Facility: CLINIC | Age: 47
DRG: 216 | End: 2017-09-29
Attending: PHYSICIAN ASSISTANT
Payer: COMMERCIAL

## 2017-09-29 ENCOUNTER — APPOINTMENT (OUTPATIENT)
Dept: PHYSICAL THERAPY | Facility: CLINIC | Age: 47
DRG: 216 | End: 2017-09-29
Attending: PHYSICIAN ASSISTANT
Payer: COMMERCIAL

## 2017-09-29 ENCOUNTER — PRE VISIT (OUTPATIENT)
Dept: UROLOGY | Facility: CLINIC | Age: 47
End: 2017-09-29

## 2017-09-29 ENCOUNTER — APPOINTMENT (OUTPATIENT)
Dept: OCCUPATIONAL THERAPY | Facility: CLINIC | Age: 47
DRG: 216 | End: 2017-09-29
Attending: PHYSICIAN ASSISTANT
Payer: COMMERCIAL

## 2017-09-29 ENCOUNTER — APPOINTMENT (OUTPATIENT)
Dept: SPEECH THERAPY | Facility: CLINIC | Age: 47
DRG: 216 | End: 2017-09-29
Attending: PHYSICIAN ASSISTANT
Payer: COMMERCIAL

## 2017-09-29 LAB
ALBUMIN SERPL-MCNC: 1.7 G/DL (ref 3.4–5)
ALP SERPL-CCNC: 190 U/L (ref 40–150)
ALT SERPL W P-5'-P-CCNC: 176 U/L (ref 0–70)
ANION GAP SERPL CALCULATED.3IONS-SCNC: 8 MMOL/L (ref 3–14)
AST SERPL W P-5'-P-CCNC: 145 U/L (ref 0–45)
BILIRUB SERPL-MCNC: 6.1 MG/DL (ref 0.2–1.3)
BUN SERPL-MCNC: 15 MG/DL (ref 7–30)
CALCIUM SERPL-MCNC: 7.3 MG/DL (ref 8.5–10.1)
CHLORIDE SERPL-SCNC: 104 MMOL/L (ref 94–109)
CO2 SERPL-SCNC: 23 MMOL/L (ref 20–32)
CREAT SERPL-MCNC: 0.5 MG/DL (ref 0.66–1.25)
ERYTHROCYTE [DISTWIDTH] IN BLOOD BY AUTOMATED COUNT: 21.4 % (ref 10–15)
GFR SERPL CREATININE-BSD FRML MDRD: >90 ML/MIN/1.7M2
GLUCOSE BLDC GLUCOMTR-MCNC: 105 MG/DL (ref 70–99)
GLUCOSE BLDC GLUCOMTR-MCNC: 113 MG/DL (ref 70–99)
GLUCOSE SERPL-MCNC: 101 MG/DL (ref 70–99)
HCT VFR BLD AUTO: 28.6 % (ref 40–53)
HGB BLD-MCNC: 9.4 G/DL (ref 13.3–17.7)
INR PPP: 1.52 (ref 0.86–1.14)
LMWH PPP CHRO-ACNC: 0.2 IU/ML
MCH RBC QN AUTO: 29.7 PG (ref 26.5–33)
MCHC RBC AUTO-ENTMCNC: 32.9 G/DL (ref 31.5–36.5)
MCV RBC AUTO: 91 FL (ref 78–100)
PHOSPHATE SERPL-MCNC: 2.1 MG/DL (ref 2.5–4.5)
PLATELET # BLD AUTO: 141 10E9/L (ref 150–450)
PLATELET # BLD AUTO: 88 10E9/L (ref 150–450)
POTASSIUM SERPL-SCNC: 4 MMOL/L (ref 3.4–5.3)
PROT SERPL-MCNC: 4.9 G/DL (ref 6.8–8.8)
RBC # BLD AUTO: 3.16 10E12/L (ref 4.4–5.9)
SODIUM SERPL-SCNC: 136 MMOL/L (ref 133–144)
WBC # BLD AUTO: 9.3 10E9/L (ref 4–11)

## 2017-09-29 PROCEDURE — 71010 XR CHEST PORT 1 VW: CPT

## 2017-09-29 PROCEDURE — 25000132 ZZH RX MED GY IP 250 OP 250 PS 637: Performed by: PHYSICIAN ASSISTANT

## 2017-09-29 PROCEDURE — 97110 THERAPEUTIC EXERCISES: CPT | Mod: GO

## 2017-09-29 PROCEDURE — 85520 HEPARIN ASSAY: CPT | Performed by: INTERNAL MEDICINE

## 2017-09-29 PROCEDURE — 27210437 ZZH NUTRITION PRODUCT SEMIELEM INTERMED LITER

## 2017-09-29 PROCEDURE — 92526 ORAL FUNCTION THERAPY: CPT | Mod: GN | Performed by: SPEECH-LANGUAGE PATHOLOGIST

## 2017-09-29 PROCEDURE — 40000133 ZZH STATISTIC OT WARD VISIT

## 2017-09-29 PROCEDURE — 85027 COMPLETE CBC AUTOMATED: CPT | Performed by: PHYSICIAN ASSISTANT

## 2017-09-29 PROCEDURE — 85610 PROTHROMBIN TIME: CPT | Performed by: SURGERY

## 2017-09-29 PROCEDURE — 97116 GAIT TRAINING THERAPY: CPT | Mod: GP | Performed by: PHYSICAL THERAPIST

## 2017-09-29 PROCEDURE — 80053 COMPREHEN METABOLIC PANEL: CPT | Performed by: PHYSICIAN ASSISTANT

## 2017-09-29 PROCEDURE — 21400006 ZZH R&B CCU INTERMEDIATE UMMC

## 2017-09-29 PROCEDURE — 25000132 ZZH RX MED GY IP 250 OP 250 PS 637: Performed by: ANESTHESIOLOGY

## 2017-09-29 PROCEDURE — 27210913 ZZH NUTRITION PRODUCT INTERMEDIATE PACKET

## 2017-09-29 PROCEDURE — 25000128 H RX IP 250 OP 636: Performed by: SURGERY

## 2017-09-29 PROCEDURE — 97530 THERAPEUTIC ACTIVITIES: CPT | Mod: GP

## 2017-09-29 PROCEDURE — 97116 GAIT TRAINING THERAPY: CPT | Mod: GP

## 2017-09-29 PROCEDURE — 25000132 ZZH RX MED GY IP 250 OP 250 PS 637: Performed by: THORACIC SURGERY (CARDIOTHORACIC VASCULAR SURGERY)

## 2017-09-29 PROCEDURE — 25000128 H RX IP 250 OP 636: Performed by: THORACIC SURGERY (CARDIOTHORACIC VASCULAR SURGERY)

## 2017-09-29 PROCEDURE — 00000146 ZZHCL STATISTIC GLUCOSE BY METER IP

## 2017-09-29 PROCEDURE — 25000128 H RX IP 250 OP 636: Performed by: PHYSICIAN ASSISTANT

## 2017-09-29 PROCEDURE — 85049 AUTOMATED PLATELET COUNT: CPT | Performed by: PHYSICIAN ASSISTANT

## 2017-09-29 PROCEDURE — 84100 ASSAY OF PHOSPHORUS: CPT | Performed by: PHYSICIAN ASSISTANT

## 2017-09-29 PROCEDURE — 25000132 ZZH RX MED GY IP 250 OP 250 PS 637: Performed by: SURGERY

## 2017-09-29 PROCEDURE — 25000125 ZZHC RX 250: Performed by: THORACIC SURGERY (CARDIOTHORACIC VASCULAR SURGERY)

## 2017-09-29 PROCEDURE — 97140 MANUAL THERAPY 1/> REGIONS: CPT | Mod: GO | Performed by: OCCUPATIONAL THERAPIST

## 2017-09-29 PROCEDURE — 40000225 ZZH STATISTIC SLP WARD VISIT: Performed by: SPEECH-LANGUAGE PATHOLOGIST

## 2017-09-29 PROCEDURE — 97530 THERAPEUTIC ACTIVITIES: CPT | Mod: GP | Performed by: PHYSICAL THERAPIST

## 2017-09-29 PROCEDURE — 40000193 ZZH STATISTIC PT WARD VISIT

## 2017-09-29 PROCEDURE — 40000556 ZZH STATISTIC PERIPHERAL IV START W US GUIDANCE

## 2017-09-29 PROCEDURE — 85610 PROTHROMBIN TIME: CPT | Performed by: INTERNAL MEDICINE

## 2017-09-29 PROCEDURE — 36415 COLL VENOUS BLD VENIPUNCTURE: CPT | Performed by: INTERNAL MEDICINE

## 2017-09-29 PROCEDURE — 97168 OT RE-EVAL EST PLAN CARE: CPT | Mod: GO | Performed by: OCCUPATIONAL THERAPIST

## 2017-09-29 PROCEDURE — 40000133 ZZH STATISTIC OT WARD VISIT: Performed by: OCCUPATIONAL THERAPIST

## 2017-09-29 PROCEDURE — 40000193 ZZH STATISTIC PT WARD VISIT: Performed by: PHYSICAL THERAPIST

## 2017-09-29 RX ORDER — WARFARIN SODIUM 1 MG/1
2 TABLET ORAL
Status: COMPLETED | OUTPATIENT
Start: 2017-09-29 | End: 2017-09-29

## 2017-09-29 RX ORDER — FUROSEMIDE 20 MG
20 TABLET ORAL
Status: DISCONTINUED | OUTPATIENT
Start: 2017-09-29 | End: 2017-10-04

## 2017-09-29 RX ORDER — POTASSIUM CHLORIDE 750 MG/1
20 TABLET, EXTENDED RELEASE ORAL DAILY
Status: DISCONTINUED | OUTPATIENT
Start: 2017-09-30 | End: 2017-10-05 | Stop reason: HOSPADM

## 2017-09-29 RX ADMIN — POTASSIUM PHOSPHATE, MONOBASIC AND POTASSIUM PHOSPHATE, DIBASIC 15 MMOL: 224; 236 INJECTION, SOLUTION INTRAVENOUS at 14:01

## 2017-09-29 RX ADMIN — THERA TABS 1 TABLET: TAB at 09:46

## 2017-09-29 RX ADMIN — OXYCODONE HYDROCHLORIDE 10 MG: 5 TABLET ORAL at 23:48

## 2017-09-29 RX ADMIN — FUROSEMIDE 20 MG: 20 TABLET ORAL at 17:31

## 2017-09-29 RX ADMIN — POTASSIUM & SODIUM PHOSPHATES POWDER PACK 280-160-250 MG 1 PACKET: 280-160-250 PACK at 17:31

## 2017-09-29 RX ADMIN — POTASSIUM & SODIUM PHOSPHATES POWDER PACK 280-160-250 MG 1 PACKET: 280-160-250 PACK at 14:38

## 2017-09-29 RX ADMIN — Medication 2 PACKET: at 09:47

## 2017-09-29 RX ADMIN — HEPARIN SODIUM 1050 UNITS/HR: 10000 INJECTION, SOLUTION INTRAVENOUS at 09:48

## 2017-09-29 RX ADMIN — POLYETHYLENE GLYCOL 3350 17 G: 17 POWDER, FOR SOLUTION ORAL at 09:45

## 2017-09-29 RX ADMIN — CEFTRIAXONE SODIUM 2 G: 2 INJECTION, POWDER, FOR SOLUTION INTRAMUSCULAR; INTRAVENOUS at 17:30

## 2017-09-29 RX ADMIN — ASPIRIN 81 MG CHEWABLE TABLET 81 MG: 81 TABLET CHEWABLE at 09:46

## 2017-09-29 RX ADMIN — CHLORHEXIDINE GLUCONATE 15 ML: 1.2 RINSE ORAL at 09:45

## 2017-09-29 RX ADMIN — FUROSEMIDE 20 MG: 20 TABLET ORAL at 09:47

## 2017-09-29 RX ADMIN — POTASSIUM CHLORIDE 20 MEQ: 750 TABLET, EXTENDED RELEASE ORAL at 09:46

## 2017-09-29 RX ADMIN — POTASSIUM & SODIUM PHOSPHATES POWDER PACK 280-160-250 MG 1 PACKET: 280-160-250 PACK at 09:46

## 2017-09-29 RX ADMIN — PANTOPRAZOLE SODIUM 40 MG: 40 TABLET, DELAYED RELEASE ORAL at 09:46

## 2017-09-29 RX ADMIN — CHLORHEXIDINE GLUCONATE 15 ML: 1.2 RINSE ORAL at 21:36

## 2017-09-29 RX ADMIN — LEVOFLOXACIN 500 MG: 5 INJECTION, SOLUTION INTRAVENOUS at 09:46

## 2017-09-29 RX ADMIN — LIDOCAINE 1 PATCH: 50 PATCH CUTANEOUS at 21:37

## 2017-09-29 RX ADMIN — WARFARIN SODIUM 2 MG: 1 TABLET ORAL at 17:31

## 2017-09-29 RX ADMIN — POTASSIUM & SODIUM PHOSPHATES POWDER PACK 280-160-250 MG 1 PACKET: 280-160-250 PACK at 21:37

## 2017-09-29 ASSESSMENT — PAIN DESCRIPTION - DESCRIPTORS: DESCRIPTORS: ACHING

## 2017-09-29 ASSESSMENT — VISUAL ACUITY
OU: NORMAL ACUITY
OU: BASELINE

## 2017-09-29 NOTE — PLAN OF CARE
Problem: Patient Care Overview  Goal: Plan of Care/Patient Progress Review  Pt seen for swallow tx- had previously been advanced to DD3 with thin liquids. Pt tolerating DD3 solids without overt s/s fo aspiration or sensation of pharygneal retention- adequate oral clearance. No aspiration s/s on thin liquids by cup rim with using chin tuck; trialed on use of straw with chin tuck as previously pt had been restricted from straw us- no overt s/s of aspiration with use of straw with chin tuck- restriction to be d/c'd. With trials of regular solids- pt is noted to have more prolonged oral prep ; oral clearance is adequate but is noted to need to swallow 2x. Recommending that pt continue with current DD3 with thin liquids. Pt is OK to have straw now but should continue to use chin tuck strategy. Upright for all po, small bites/sips, alternate solids and liquids, double swallow as needed, pace self- eat slowly. SLP to f/u for diet tolerance and readiness for further diet advancement

## 2017-09-29 NOTE — PROGRESS NOTES
Calorie Counts    Intake Recorded for: 9/28 Kcals: 300 Protein: 23g    # Meals Recorded: 1 meal (75% cream of potato soup, 25% brownie, 20% mashed potatoes with gravy, 5% squash)    # Supplements Recorded: 100% of 1 GelateinPlus

## 2017-09-29 NOTE — PLAN OF CARE
Problem: Goal Outcome Summary  Goal: Goal Outcome Summary  1. Pt s pain will be well controlled  Outcome: Improving  VSS, A&Ox4, SR 80s-90s. Heparin gtt at 1050 units/hr, 10a therapeutic at 0.20. Continuous TF at 50ml/hr via NJ, Q4h 100ml water flush. K+ 4.0, replaced. P 2.1, replacement currently infusing at 30ml/hr d/t burning at the site, flushes well, no issues at lower rate. Q4h neuro checks, R sided weakness improving. Beltran pulled at 1200 for voiding trial, 150ml out at 1400, bladder scan at 1600. Penile inflammation/edema, urology consulting, f/u in outpt. Midsternal, groin, leg graft incisions cleaned, TALIA. Small skin tear below L groin site, cleaned and covered primapore. Old CT sites cleaned, slight serous drainage, covered w/new dressing. Up w/SBA, needs sternal precaution reminders. +BM x1. Poor-fair appetite, on franchesca counts through 10/2, on DD3 diet, swallows pills w/water. Plan for possible TCU d/c this weekend pending therapeutic INR and bed availability.      Gordon Parmar RN  Hours cared for: [phone]

## 2017-09-29 NOTE — PLAN OF CARE
Problem: Goal Outcome Summary  Goal: Goal Outcome Summary  Edema 6C: Initial edema evaluation completed. Patient presents with progressing acute onset LE edema. Skin intact with 1+ pitting in legs and 2+ in feet. Application of GCB from MTPs to knee creases for management of LE edema and increased ease with functional mobility. Remove wraps if causing pain/numbness or become soiled.

## 2017-09-29 NOTE — PROGRESS NOTES
CVTS Daily Note  9/29/2017  Attending: Zeb Leal MD    S:   No overnight events.  Tolerating Tube feeds and DD3 with thins.   Pt seen at bedside resting comfortably. Reports right sided weakness is improving, no new deficits   Complains of right eye superior hemianopsia, patient reports that this was present prior to surgery, slightly improving    Denies F/C/Sweats.  No CP, SOB, or calf pain.    + BM.  + Flatus.    Ambulated well with assistance.  Still complaining of balance issues.   Pain controlled well.    O:   Vitals:    09/29/17 0400 09/29/17 0759 09/29/17 1216 09/29/17 1218   BP:  94/63 (!) 82/74 91/66   BP Location:  Left arm Left arm Left arm   Pulse:       Resp: 16 16 16    Temp:  98.9  F (37.2  C) 97.7  F (36.5  C)    TempSrc:  Oral Oral    SpO2: 97% 98% 100%    Weight:       Height:         Vitals:    09/27/17 0400 09/28/17 1544 09/29/17 0300   Weight: 65.3 kg (144 lb) 70.2 kg (154 lb 11.2 oz) 68.9 kg (152 lb)     Intake/Output Summary (Last 24 hours) at 09/27/17 1158  Last data filed at 09/27/17 1000   Gross per 24 hour   Intake             1380 ml   Output             3185 ml   Net            -1805 ml       MAPs: 79 - 91  Gen: AAO x 3, pleasant, NAD  Neuro: sensation and motor intact bilaterally.  CV: RRR, S1S2 normal, no murmurs, rubs, or gallops.   Pulm: Lungs with bilateral basilar crackles, otherwise CTA, no rhonchi or wheezes  Abd: soft, non-tender, no guarding  Ext: 1+ bilateral pitting peripheral edema  Neuro: CN II-XII intact, no new deficits  Incision: clean, dry, intact, no erythema  Chest Tube sites: dressings clean and dry      Labs:  BMP    Recent Labs  Lab 09/29/17  0321 09/28/17  0615 09/27/17  0639 09/26/17  0930    136 140 145*   POTASSIUM 4.0 3.6 4.2 4.3   CHLORIDE 104 104 107 113*   CHECO 7.3* 7.4* 7.8* 8.0*   CO2 23 24 26 24   BUN 15 16 18 20   CR 0.50* 0.57* 0.60* 0.56*   * 104* 121* 139*     CBC    Recent Labs  Lab 09/29/17  0321 09/28/17  0615 09/27/17  0831  09/27/17  0639   WBC 9.3 7.1 8.0 8.2   RBC 3.16* 3.09* 3.41* 3.44*   HGB 9.4* 9.1* 9.9* 9.9*   HCT 28.6* 27.8* 31.4* 31.2*   MCV 91 90 92 91   MCH 29.7 29.4 29.0 28.8   MCHC 32.9 32.7 31.5 31.7   RDW 21.4* 20.7* 20.4* 20.8*   PLT 88* 114* 72* 75*     INR    Recent Labs  Lab 09/29/17  0321 09/28/17  0615 09/27/17  0639 09/26/17  0747   INR 1.52* 1.51* 1.72* 2.72*      Hepatic Panel   Lab Results   Component Value Date     09/27/2017     Lab Results   Component Value Date     09/27/2017     Lab Results   Component Value Date    ALBUMIN 1.7 09/27/2017     GLUCOSE:     Recent Labs  Lab 09/29/17  0321 09/28/17  2139 09/28/17  1539 09/28/17  1218 09/28/17  0836 09/28/17  0615 09/28/17  0532 09/28/17  0236  09/27/17  0639  09/26/17  0930  09/25/17  1530  09/25/17  0344   *  --   --   --   --  104*  --   --   --  121*  --  139*  --  133*  --  127*   BGM  --  90 119* 111* 124*  --  122* 115*  < >  --   < >  --   < >  --   < >  --    < > = values in this interval not displayed.      Imaging:    CXR 9/28:  Reviewed, appears overall stable, no appreciable pneumo, official read pending.      ASSESSMENT/PLAN: Patient is a 47 year old male with a history of prosethic aortic valve with endocarditis, now s/p redo sternotomy with mechanical aortic and mitral valve replacement on 9/21 with Dr. Gorman and Dr. Leal.     Neuro:  - Acute post-op pain: IV dilaudid, tylenol and oxycodone prn  - Acute ischemic L MCA stroke: perioperative, goal MAPs >85. Neuro consult, appreciate recs, MRI corresponded to head CT findings, no acute hemorrhage. Reports right weakness is improving   - Superior hemianopsia, consult ophthalmology, recommended outpatient follow-up     CV:  -  mg, holding metoprolol for now given goal MAPs >85.  - Blood pressure: MAP goals >85 given CVA  - TPW removed 9/28 without complication      Resp: extubated POD 3  - IS, encourage activity  - Mediastinal CT removed 9/27, remaining right pleural  removed 9/28, f/u CXR this PM       FEN/GI:   - SLP, advanced to DD3 diet, tolerating, TF at goal, No BM for 2 days, had been declining senna, restart this and schedule miralax  - LFTs elevated, trending down, except for ALP, trending up slightly, all others trending down/stable, will continue to follow and discuss with hep-alejandra team   - Now Dysphagia 3 diet with thin liquids. Don't cycle tube feeds yet, await better PO intake.    Renal/:   - Creatinine stable, at baseline, adequate UOP with IV lasix  - Volume status: hypervolemic, will increase diuersis gently with soft blood pressures. Start lasix 20 mg PO BID.    - Urology saw patient for penile swelling - no intervention - follow-up on outpatient basis, will attempt voiding trial, moore removed, bladder scan at 4 pm      ID: Completed roxanna-op abx,   - WBC normal, afebrile, no signs or symptoms of infection  - ID following for endocarditis, started on ceftriaxone 2g IV q24h for ecoli on mitral valve until 11/3/17, obtaining one more set of blood and urine cultures 9/25. Add levofloxacin 500 mg IV (PO will bind with tube feeds) daily 9/27 to 11/8/17. Weekly CBC, CMP, CRP.     Heme:   - Hgb stable, 9.1->9.4, no signs or symptoms of bleeding      Endo:   - BG well controlled, on SSI      PPx:   - PPI      Anticoagulation:   - INR goal 2.5-3.5 for combined mechanical AVR and MVR, INR 1.51->1.52, now on heparin gtt for bridge, okayed per neurology with hx of ischemic stroke.      Dispo:   - 6C since 9/25  - Therapy recommending d/c to ARU, waiting therapeutic INR and discontinuation of heparin gtt      Discussed with CVTS Fellow   Staff surgeons have been informed of changes through both  verbal and written communication.      Callum Lema PA-C  Cardiothoracic Surgery  September 29, 2017 9:12 AM   p: [phone]

## 2017-09-29 NOTE — PROGRESS NOTES
Social Work Services Progress Note    Hospital Day: 15  Date of Initial Social Work Evaluation:  9/29/17  Collaborated with:  CVJOSE FRANCISCO, ARU    Data:  Pt has been referred to [facility] and is accepted, with pending insurance authorization. Per CVTS, Pt does not have therapeutic INR and is not cleared for discharge.    Intervention:  Writer spoke to [facility] liaison, Bret, who will continue to work on the insurance authorization. There will be weekend liaisons available at [facility] ([id number]) if Pt is medically cleared for transfer. Writer will endorse to weekend SW for follow-up. Writer spoke to Pt to inform him of the above and he expressed his understanding.    Assessment:  Pt accepted to [facility], awaiting ins auth and medical clearance.     Plan:    Anticipated Disposition:  Facility:  [facility]    Barriers to d/c plan:  Insurance auth, medical clearance    Follow Up:  SW to follow for transfer when Pt is medically cleared.      Nitza Rehman [facility]  Emergency Department   Pager: [phone]      Addendum, 15:02:    Writer received a call from Bret at [facility] stating that he has received insurance authorization for Pt. Writer will endorse to weekend SW for follow-up to transfer Pt when medically cleared.

## 2017-09-29 NOTE — PROGRESS NOTES
09/29/17 1100   General Information   Discipline OT   Onset of Edema (progressing during hospital admission. )   Affected Body Part(s) Right LE;Left LE   Etiology Comments Decreased mobility, cardiac disease.    Pain   Pain comments patient reports no pain in LEs at this time.    Edema Examination / Assessment   Skin Condition Pitting;Dryness;Intact   Skin Condition Comments Skin intact with 1+ pitting in distal LEs and 2+ in feet.    ROM   Range of Motion (WFL) no deficits were identified   Strength   Strength (WFL) (generalized weakness throughout )   Sensation   Sensation (WFL) no deficits were identified  (light touch intact. )   Assessment/Plan   Patient presents with Edema   Assessment Recommend use of GCB at this time for acute onset LE edema in order to increase ease with functional mobility.    Assessment of Occupational Performance 1-3 Performance Deficits   Identified Performance Deficits Decreased functional mobility    Clinical Decision Making (Complexity) Low complexity   Planned Edema Interventions Gradient compression bandaging;Fit for compression garment;Exercises;Precautions to prevent infection/exacerbation;Education   Treatment Frequency 5 times/wk   Treatment Duration 1 week   Patient, Family and/or Staff in agreement with plan of care. Yes   Risks and benefits of treatment have been explained. Yes   Total Evaluation Time   Total Evaluation Time (Minutes) 5

## 2017-09-29 NOTE — PLAN OF CARE
Problem: Goal Outcome Summary  Goal: Goal Outcome Summary  1. Pt s pain will be well controlled   Discharge Planner OT   Patient plan for discharge: ARU   Current status: Pt completed UE exercises to facilitate activity tolerance and neuromuscular re-education. Pt still presents with right sided weakness. VSS on RA throughout session.   Barriers to return to prior living situation: Decreased activity tolerance, strength, endurance, and right sided weakness.   Recommendations for discharge: ARU  Rationale for recommendations: Continued therapy to increase activity tolerance, strength and endurance. Pt continues to have right sided weakness. Motivated in therapy sessions.        Entered by: Varsha Gray 09/29/2017 3:25 PM

## 2017-09-30 ENCOUNTER — APPOINTMENT (OUTPATIENT)
Dept: SPEECH THERAPY | Facility: CLINIC | Age: 47
DRG: 216 | End: 2017-09-30
Attending: PHYSICIAN ASSISTANT
Payer: COMMERCIAL

## 2017-09-30 ENCOUNTER — APPOINTMENT (OUTPATIENT)
Dept: PHYSICAL THERAPY | Facility: CLINIC | Age: 47
DRG: 216 | End: 2017-09-30
Attending: PHYSICIAN ASSISTANT
Payer: COMMERCIAL

## 2017-09-30 ENCOUNTER — APPOINTMENT (OUTPATIENT)
Dept: OCCUPATIONAL THERAPY | Facility: CLINIC | Age: 47
DRG: 216 | End: 2017-09-30
Attending: PHYSICIAN ASSISTANT
Payer: COMMERCIAL

## 2017-09-30 LAB
ALBUMIN SERPL-MCNC: 1.8 G/DL (ref 3.4–5)
ALP SERPL-CCNC: 235 U/L (ref 40–150)
ALT SERPL W P-5'-P-CCNC: 186 U/L (ref 0–70)
ANION GAP SERPL CALCULATED.3IONS-SCNC: 8 MMOL/L (ref 3–14)
AST SERPL W P-5'-P-CCNC: 179 U/L (ref 0–45)
BILIRUB SERPL-MCNC: 5.1 MG/DL (ref 0.2–1.3)
BUN SERPL-MCNC: 14 MG/DL (ref 7–30)
CALCIUM SERPL-MCNC: 7.9 MG/DL (ref 8.5–10.1)
CHLORIDE SERPL-SCNC: 104 MMOL/L (ref 94–109)
CO2 SERPL-SCNC: 24 MMOL/L (ref 20–32)
CREAT SERPL-MCNC: 0.49 MG/DL (ref 0.66–1.25)
ERYTHROCYTE [DISTWIDTH] IN BLOOD BY AUTOMATED COUNT: 23 % (ref 10–15)
GFR SERPL CREATININE-BSD FRML MDRD: >90 ML/MIN/1.7M2
GLUCOSE BLDC GLUCOMTR-MCNC: 117 MG/DL (ref 70–99)
GLUCOSE BLDC GLUCOMTR-MCNC: 118 MG/DL (ref 70–99)
GLUCOSE BLDC GLUCOMTR-MCNC: 120 MG/DL (ref 70–99)
GLUCOSE BLDC GLUCOMTR-MCNC: 134 MG/DL (ref 70–99)
GLUCOSE SERPL-MCNC: 113 MG/DL (ref 70–99)
HCT VFR BLD AUTO: 27.2 % (ref 40–53)
HGB BLD-MCNC: 8.7 G/DL (ref 13.3–17.7)
INR PPP: 1.67 (ref 0.86–1.14)
LMWH PPP CHRO-ACNC: 0.29 IU/ML
MCH RBC QN AUTO: 30.1 PG (ref 26.5–33)
MCHC RBC AUTO-ENTMCNC: 32 G/DL (ref 31.5–36.5)
MCV RBC AUTO: 94 FL (ref 78–100)
PLATELET # BLD AUTO: 170 10E9/L (ref 150–450)
POTASSIUM SERPL-SCNC: 4 MMOL/L (ref 3.4–5.3)
PROT SERPL-MCNC: 5.2 G/DL (ref 6.8–8.8)
RBC # BLD AUTO: 2.89 10E12/L (ref 4.4–5.9)
SODIUM SERPL-SCNC: 136 MMOL/L (ref 133–144)
WBC # BLD AUTO: 8.9 10E9/L (ref 4–11)

## 2017-09-30 PROCEDURE — 21400006 ZZH R&B CCU INTERMEDIATE UMMC

## 2017-09-30 PROCEDURE — 25000132 ZZH RX MED GY IP 250 OP 250 PS 637: Performed by: THORACIC SURGERY (CARDIOTHORACIC VASCULAR SURGERY)

## 2017-09-30 PROCEDURE — 97116 GAIT TRAINING THERAPY: CPT | Mod: GP

## 2017-09-30 PROCEDURE — 25000132 ZZH RX MED GY IP 250 OP 250 PS 637: Performed by: PHYSICIAN ASSISTANT

## 2017-09-30 PROCEDURE — 97535 SELF CARE MNGMENT TRAINING: CPT | Mod: GO

## 2017-09-30 PROCEDURE — 36415 COLL VENOUS BLD VENIPUNCTURE: CPT | Performed by: SURGERY

## 2017-09-30 PROCEDURE — 40000193 ZZH STATISTIC PT WARD VISIT

## 2017-09-30 PROCEDURE — 85027 COMPLETE CBC AUTOMATED: CPT | Performed by: PHYSICIAN ASSISTANT

## 2017-09-30 PROCEDURE — 25000128 H RX IP 250 OP 636: Performed by: PHYSICIAN ASSISTANT

## 2017-09-30 PROCEDURE — 27210437 ZZH NUTRITION PRODUCT SEMIELEM INTERMED LITER

## 2017-09-30 PROCEDURE — 27210913 ZZH NUTRITION PRODUCT INTERMEDIATE PACKET

## 2017-09-30 PROCEDURE — 25000132 ZZH RX MED GY IP 250 OP 250 PS 637: Performed by: ANESTHESIOLOGY

## 2017-09-30 PROCEDURE — 92526 ORAL FUNCTION THERAPY: CPT | Mod: GN

## 2017-09-30 PROCEDURE — 40000225 ZZH STATISTIC SLP WARD VISIT

## 2017-09-30 PROCEDURE — 97110 THERAPEUTIC EXERCISES: CPT | Mod: GP

## 2017-09-30 PROCEDURE — 97140 MANUAL THERAPY 1/> REGIONS: CPT | Mod: GO

## 2017-09-30 PROCEDURE — 25000132 ZZH RX MED GY IP 250 OP 250 PS 637: Performed by: SURGERY

## 2017-09-30 PROCEDURE — 85610 PROTHROMBIN TIME: CPT | Performed by: SURGERY

## 2017-09-30 PROCEDURE — 85520 HEPARIN ASSAY: CPT | Performed by: SURGERY

## 2017-09-30 PROCEDURE — 00000146 ZZHCL STATISTIC GLUCOSE BY METER IP

## 2017-09-30 PROCEDURE — 97530 THERAPEUTIC ACTIVITIES: CPT | Mod: GP

## 2017-09-30 PROCEDURE — 40000556 ZZH STATISTIC PERIPHERAL IV START W US GUIDANCE

## 2017-09-30 PROCEDURE — 80053 COMPREHEN METABOLIC PANEL: CPT | Performed by: PHYSICIAN ASSISTANT

## 2017-09-30 PROCEDURE — 40000133 ZZH STATISTIC OT WARD VISIT

## 2017-09-30 PROCEDURE — 25000128 H RX IP 250 OP 636: Performed by: SURGERY

## 2017-09-30 RX ORDER — WARFARIN SODIUM 1 MG/1
2 TABLET ORAL
Status: COMPLETED | OUTPATIENT
Start: 2017-09-30 | End: 2017-09-30

## 2017-09-30 RX ADMIN — CHLORHEXIDINE GLUCONATE 15 ML: 1.2 RINSE ORAL at 20:58

## 2017-09-30 RX ADMIN — POTASSIUM CHLORIDE 20 MEQ: 750 TABLET, EXTENDED RELEASE ORAL at 09:05

## 2017-09-30 RX ADMIN — POTASSIUM CHLORIDE 10 MEQ: 750 TABLET, EXTENDED RELEASE ORAL at 12:01

## 2017-09-30 RX ADMIN — ASPIRIN 81 MG CHEWABLE TABLET 81 MG: 81 TABLET CHEWABLE at 09:04

## 2017-09-30 RX ADMIN — PANTOPRAZOLE SODIUM 40 MG: 40 TABLET, DELAYED RELEASE ORAL at 09:04

## 2017-09-30 RX ADMIN — LIDOCAINE 1 PATCH: 50 PATCH CUTANEOUS at 20:58

## 2017-09-30 RX ADMIN — CHLORHEXIDINE GLUCONATE 15 ML: 1.2 RINSE ORAL at 09:05

## 2017-09-30 RX ADMIN — Medication 2 PACKET: at 12:35

## 2017-09-30 RX ADMIN — POTASSIUM & SODIUM PHOSPHATES POWDER PACK 280-160-250 MG 1 PACKET: 280-160-250 PACK at 20:58

## 2017-09-30 RX ADMIN — SENNOSIDES AND DOCUSATE SODIUM 1 TABLET: 8.6; 5 TABLET ORAL at 09:05

## 2017-09-30 RX ADMIN — POTASSIUM & SODIUM PHOSPHATES POWDER PACK 280-160-250 MG 1 PACKET: 280-160-250 PACK at 09:05

## 2017-09-30 RX ADMIN — HEPARIN SODIUM 1050 UNITS/HR: 10000 INJECTION, SOLUTION INTRAVENOUS at 06:23

## 2017-09-30 RX ADMIN — CEFTRIAXONE SODIUM 2 G: 2 INJECTION, POWDER, FOR SOLUTION INTRAMUSCULAR; INTRAVENOUS at 16:23

## 2017-09-30 RX ADMIN — POTASSIUM & SODIUM PHOSPHATES POWDER PACK 280-160-250 MG 1 PACKET: 280-160-250 PACK at 16:23

## 2017-09-30 RX ADMIN — POTASSIUM & SODIUM PHOSPHATES POWDER PACK 280-160-250 MG 1 PACKET: 280-160-250 PACK at 12:35

## 2017-09-30 RX ADMIN — FUROSEMIDE 20 MG: 20 TABLET ORAL at 16:23

## 2017-09-30 RX ADMIN — WARFARIN SODIUM 2 MG: 1 TABLET ORAL at 18:17

## 2017-09-30 RX ADMIN — FUROSEMIDE 20 MG: 20 TABLET ORAL at 09:05

## 2017-09-30 RX ADMIN — OXYCODONE HYDROCHLORIDE 10 MG: 5 TABLET ORAL at 23:56

## 2017-09-30 RX ADMIN — LEVOFLOXACIN 500 MG: 5 INJECTION, SOLUTION INTRAVENOUS at 08:58

## 2017-09-30 RX ADMIN — THERA TABS 1 TABLET: TAB at 09:05

## 2017-09-30 ASSESSMENT — VISUAL ACUITY
OU: NORMAL ACUITY

## 2017-09-30 NOTE — PLAN OF CARE
Problem: Goal Outcome Summary  Goal: Goal Outcome Summary  1. Pt s pain will be well controlled   Outcome: Improving  Pt admitted 9/15 with dyspnea s/p CABG and mechanical AVR/MVR.  SR, VS'S on RA and denied pain.  Lido patch on left neck.  Hep gtt continues at 1050 units/hr.  1.5 Peptamen tube feed continues at 50 ml/hr (goal:  ml/q4h).  Mild right sided weakness.  2-3 + LE edema and wanted lymph's removed.  Otherwise, pt resting well and up SBA.  Continue to monitor and with POC.

## 2017-09-30 NOTE — PLAN OF CARE
"Problem: Cardiac Surgery (Adult)  Goal: Signs and Symptoms of Listed Potential Problems Will be Absent, Minimized or Managed (Cardiac Surgery)  Signs and symptoms of listed potential problems will be absent, minimized or managed by discharge/transition of care (reference Cardiac Surgery (Adult) CPG).   Outcome: Improving  BP 90/70 (BP Location: Right arm)  Pulse 90  Temp 98.5  F (36.9  C) (Oral)  Resp 16  Ht 1.626 m (5' 4\")  Wt 69.1 kg (152 lb 4.8 oz)  SpO2 97%  BMI 26.14 kg/m2     Neuro: A&Ox4. Neuros intact besides mild R sided weakness which is improving per pt.  Cardiac: SR. HR 80-90's.   Respiratory: Satting >95% on room air. No respiratory distress noted.   GI/: Voiding adequately. 1 BM overnight.   Diet/Appetite: DD3. TF at goal rate of 50 mL/hr. No c/o nausea.   Skin: Incisions TALIA. BLE and penile edema. No new deficits noted.   LDA: Heparin gtt infusing at 1050 units/hr through PIV. Hep10a recheck with AM labs. NG with TF infusing.  Activity: Up with SBA and walker.  Pain: Lido patch on L neck. Oxycodone given x1 for incisional pain with relief.   Plan: Pt appears to be hesitant to use his call light and ask for help. Benefits from being checked on frequently. Encourage pt to ask for help when needed.Will continue to monitor and follow POC.           "

## 2017-09-30 NOTE — PLAN OF CARE
Problem: Goal Outcome Summary  Goal: Goal Outcome Summary  1. Pt s pain will be well controlled   PT / 6C: Pt completes sit<>stand transfers with CGA. Pt ambulates 150ft x 2 with FWW + CGA. Pt performs step-ups on single step with single handrail + CGA. Requires cues to maintain sternal precautions with activity. Limited d/t R sided weakness and fatigue. VSS on RA - SpO2 96-98% and HR  bpm with activity.  REC - Discharge to ARU once medically appropriate.

## 2017-09-30 NOTE — PLAN OF CARE
Problem: Goal Outcome Summary  Goal: Goal Outcome Summary  1. Pt s pain will be well controlled   Outcome: No Change  D: Admit 9/15 dyspnea; s/p CABG and mechanical AVR/MVR (9/21).  I/A: VSS. BP soft (82/67, map 71): MD notified; update if continuing to stay 80s/60s. Denies pain; Lidocaine patch not in place. Neuro checks q4h; intact.  SR. Up with walker and SBA; ambulating in hallways 4-5 times. Dysphagia diet advanced to regular diet; takes meds one by one orally. PIV x2; infusing abx and Hep gtt running at 10.5 ml/hr. Hep 10a therapeutic. Cont feeding running at 50ml/hr. Hernán counts continued; appetite fair. Edema in lower extremities; voiding good amounts with lasix. Penis edema; patient reports no pain and will be following up outpatient with urology. BS WNL.   P: Update CVTS if questions/concerns.   Mora Benitez RN

## 2017-09-30 NOTE — PLAN OF CARE
Problem: Patient Care Overview  Goal: Plan of Care/Patient Progress Review  Discharge Planner OT   Patient plan for discharge: Rehab   Current status: Total A for recall of sternal precautions.  Mod A for supine to sit transfer.  Pt transfers STS with SBA and cues for technique, CGA/SBA for ambulation ~10' X2 + FWW.  Pt tolerates ~12 minutes of standing ADLs at sink with set up demonstrating good incorporation of R UE during tasks.  Min A for LB dressing.  VSS throughout on RA with max .  Barriers to return to prior living situation: Sternal precautions, strength, R UE coordination, endurance  Recommendations for discharge: ARU  Rationale for recommendations: Pt making continuous progress in therapy sessions, continues to be motivated and demonstrates excellent participation.  ARU to improve safety and IND with I/ADLs, transfers, and mobility prior to return home.        Entered by: Francisco Pritchard 09/30/2017 8:36 AM

## 2017-09-30 NOTE — PLAN OF CARE
Problem: Goal Outcome Summary  Goal: Goal Outcome Summary  1. Pt s pain will be well controlled      Discharge Planner SLP   Patient plan for discharge: ARU  Current status: Pt seen for dysphagia tx. Recommending regular diet and thin liquids. Pt should continue to sit upright, consume slow pace while alternating consistency to promote pharyngeal clearance. Pt denies any change in communication skills and/or cognition when discussed this date.  Barriers to return to prior living situation: none from SLP  Recommendations for discharge: ARU per OT/PT  Rationale for recommendations: near baseline diet       Entered by: Whitney Orellana 09/30/2017 3:49 PM

## 2017-09-30 NOTE — PROGRESS NOTES
CVTS Daily Note  9/30/2017  Attending: Zeb Leal MD    S:   No overnight events.  Tolerating Tube feeds and DD3 with thins.   Pt seen at bedside resting comfortably. Reports right sided weakness continues to improve, no new deficits     Denies F/C/Sweats.  No CP, SOB, or calf pain.    + BM.  + Flatus.    Ambulated well with assistance.  Still complaining of balance issues.   Pain controlled well.    O:   Vitals:    09/30/17 0400 09/30/17 0848 09/30/17 1206 09/30/17 1537   BP:  98/78 (!) 82/67 91/71   BP Location:  Right arm Left arm Left arm   Pulse:       Resp:  16 16 16   Temp:  97.5  F (36.4  C) 97.8  F (36.6  C) 98.2  F (36.8  C)   TempSrc:  Oral Oral Oral   SpO2:  98% 99% 100%   Weight: 69.1 kg (152 lb 4.8 oz)      Height:         Vitals:    09/28/17 1544 09/29/17 0300 09/30/17 0400   Weight: 70.2 kg (154 lb 11.2 oz) 68.9 kg (152 lb) 69.1 kg (152 lb 4.8 oz)     Intake/Output Summary (Last 24 hours) at 09/27/17 1158  Last data filed at 09/27/17 1000   Gross per 24 hour   Intake             1380 ml   Output             3185 ml   Net            -1805 ml       MAPs: 79 - 91  Gen: AAO x 3, pleasant, NAD  Neuro: sensation and motor intact bilaterally.  CV: RRR, S1S2 normal, no murmurs, rubs, or gallops.   Pulm: Lungs with bilateral basilar crackles, otherwise CTA, no rhonchi or wheezes  Abd: soft, non-tender, no guarding  Ext: 1+ bilateral pitting peripheral edema, lymphedema wraps in place  Neuro: CN II-XII intact, no new deficits  Incision: clean, dry, intact, no erythema  Chest Tube sites: dressings clean and dry      Labs:  [facility]    Recent Labs  Lab 09/30/17  0800 09/29/17  0321 09/28/17  0615 09/27/17  0639    136 136 140   POTASSIUM 4.0 4.0 3.6 4.2   CHLORIDE 104 104 104 107   CHECO 7.9* 7.3* 7.4* 7.8*   CO2 24 23 24 26   BUN 14 15 16 18   CR 0.49* 0.50* 0.57* 0.60*   * 101* 104* 121*     CBC    Recent Labs  Lab 09/30/17  0800 09/29/17  1830 09/29/17  0321 09/28/17  0615 09/27/17  0831   WBC 8.9   --  9.3 7.1 8.0   RBC 2.89*  --  3.16* 3.09* 3.41*   HGB 8.7*  --  9.4* 9.1* 9.9*   HCT 27.2*  --  28.6* 27.8* 31.4*   MCV 94  --  91 90 92   MCH 30.1  --  29.7 29.4 29.0   MCHC 32.0  --  32.9 32.7 31.5   RDW 23.0*  --  21.4* 20.7* 20.4*    141* 88* 114* 72*     INR    Recent Labs  Lab 09/30/17  0800 09/29/17  0321 09/28/17  0615 09/27/17  0639   INR 1.67* 1.52* 1.51* 1.72*      Hepatic Panel   Lab Results   Component Value Date     09/27/2017     Lab Results   Component Value Date     09/27/2017     Lab Results   Component Value Date    ALBUMIN 1.7 09/27/2017     GLUCOSE:     Recent Labs  Lab 09/30/17  1212 09/30/17  0800 09/30/17  0759 09/29/17  2120 09/29/17  1806 09/29/17  0321 09/28/17  2139 09/28/17  1539  09/28/17  0615  09/27/17  0639  09/26/17  0930  09/25/17  1530   GLC  --  113*  --   --   --  101*  --   --   --  104*  --  121*  --  139*  --  133*   *  --  118* 105* 113*  --  90 119*  < >  --   < >  --   < >  --   < >  --    < > = values in this interval not displayed.      Imaging:    CXR 9/28:  Reviewed, appears overall stable, no appreciable pneumo, official read pending.      ASSESSMENT/PLAN: Patient is a 47 year old male with a history of prosethic aortic valve with endocarditis, now s/p redo sternotomy with mechanical aortic and mitral valve replacement on 9/21 with Dr. Gorman and Dr. Leal.     Neuro:  - Acute post-op pain: IV dilaudid, tylenol and oxycodone prn  - Acute ischemic L MCA stroke: perioperative, goal MAPs >85. Neuro consult, appreciate recs, MRI corresponded to head CT findings, no acute hemorrhage. Reports right weakness is improving   - Superior hemianopsia, consult ophthalmology, recommended outpatient follow-up     CV:  -  mg, holding metoprolol for now given goal MAPs >85.  - Blood pressure: MAP goals >85 given CVA, less strict now patient stable and improving  - TPW removed 9/28 without complication      Resp: extubated POD 3  - IS, encourage  activity  - Mediastinal CT removed 9/27, remaining right pleural removed 9/28, f/u CXR this PM       FEN/GI:   - SLP, advanced to DD3 diet, tolerating, TF at goal, No BM for 2 days, had been declining senna, restart this and schedule miralax  - LFTs elevated, trending down, except for ALP, trending up slightly, all others trending down/stable, will continue to follow and discuss with hep-alejandra team if necessary   - Now regular diet with thin liquids. Don't cycle tube feeds yet, await better PO intake.    Renal/:   - Creatinine stable, at baseline, adequate UOP with IV lasix  - Volume status: hypervolemic, will increase diuersis gently with soft blood pressures. Start lasix 20 mg PO BID. Would like to increase diuresis given LE edema and crackles, however BPs softer  - Urology saw patient for penile swelling - no intervention - follow-up on outpatient basis, moore out 9/30, adequate UOP with moore out      ID: Completed roxanna-op abx,   - WBC normal, afebrile, no signs or symptoms of infection  - ID following for endocarditis, started on ceftriaxone 2g IV q24h for ecoli on mitral valve until 11/3/17, obtaining one more set of blood and urine cultures 9/25. Add levofloxacin 500 mg IV (PO will bind with tube feeds) daily 9/27 to 11/8/17. Weekly CBC, CMP, CRP.     Heme:   - Hgb stable, no signs or symptoms of bleeding      Endo:   - BG well controlled, on SSI      PPx:   - PPI      Anticoagulation:   - INR goal 2.5-3.5 for combined mechanical AVR and MVR, INR 1.52->1.67, now on heparin gtt for bridge, okayed per neurology with hx of ischemic stroke.      Dispo:   - 6C since 9/25  - Therapy recommending d/c to ARU, waiting therapeutic INR and discontinuation of heparin gtt      Discussed with CVTS Fellow   Staff surgeons have been informed of changes through both  verbal and written communication.      Callum Lema PA-C  Cardiothoracic Surgery  September 30, 2017 7:31 AM   p: [phone]

## 2017-09-30 NOTE — PLAN OF CARE
"Problem: Goal Outcome Summary  Goal: Goal Outcome Summary  1. Pt s pain will be well controlled   OT/EDEMA 6C: Patient unable to tolerate wear of gradient compression bandaging overnight due to itching and 2+ pitting edema still present today from toe line to knee crease bilaterally. Appropriate to trial wraps again to promote fluid movement with emphasis on 24 hour wear schedule. Wraps donned using \"Quick Wrap Technique\" and may be worn x24 hours, however, to remove if increased pain, numbness/tingling or soiling occurs. Patient also encouraged to combine compression with elevation above heart level and increased muscle pump activation for optimal results in fluid loss with understanding verbalized.      "

## 2017-09-30 NOTE — PLAN OF CARE
-Pt had two voids within four hours of 200 cc each.  -Pt reports adequate pain control.  -Pt is reluctant to call for help and waits until staff is incidentally in the room to ask for help. Pt is encouraged to call for help the moment he needs it.

## 2017-10-01 ENCOUNTER — APPOINTMENT (OUTPATIENT)
Dept: OCCUPATIONAL THERAPY | Facility: CLINIC | Age: 47
DRG: 216 | End: 2017-10-01
Attending: PHYSICIAN ASSISTANT
Payer: COMMERCIAL

## 2017-10-01 ENCOUNTER — APPOINTMENT (OUTPATIENT)
Dept: PHYSICAL THERAPY | Facility: CLINIC | Age: 47
DRG: 216 | End: 2017-10-01
Attending: PHYSICIAN ASSISTANT
Payer: COMMERCIAL

## 2017-10-01 LAB
ALBUMIN SERPL-MCNC: 1.8 G/DL (ref 3.4–5)
ALP SERPL-CCNC: 248 U/L (ref 40–150)
ALT SERPL W P-5'-P-CCNC: 174 U/L (ref 0–70)
ANION GAP SERPL CALCULATED.3IONS-SCNC: 7 MMOL/L (ref 3–14)
AST SERPL W P-5'-P-CCNC: 170 U/L (ref 0–45)
BACTERIA SPEC CULT: NO GROWTH
BACTERIA SPEC CULT: NO GROWTH
BILIRUB SERPL-MCNC: 3.9 MG/DL (ref 0.2–1.3)
BUN SERPL-MCNC: 14 MG/DL (ref 7–30)
CALCIUM SERPL-MCNC: 7.6 MG/DL (ref 8.5–10.1)
CHLORIDE SERPL-SCNC: 106 MMOL/L (ref 94–109)
CO2 SERPL-SCNC: 25 MMOL/L (ref 20–32)
CREAT SERPL-MCNC: 0.48 MG/DL (ref 0.66–1.25)
ERYTHROCYTE [DISTWIDTH] IN BLOOD BY AUTOMATED COUNT: 24.3 % (ref 10–15)
GFR SERPL CREATININE-BSD FRML MDRD: >90 ML/MIN/1.7M2
GLUCOSE BLDC GLUCOMTR-MCNC: 100 MG/DL (ref 70–99)
GLUCOSE BLDC GLUCOMTR-MCNC: 122 MG/DL (ref 70–99)
GLUCOSE BLDC GLUCOMTR-MCNC: 150 MG/DL (ref 70–99)
GLUCOSE SERPL-MCNC: 110 MG/DL (ref 70–99)
HCT VFR BLD AUTO: 24 % (ref 40–53)
HGB BLD-MCNC: 7.7 G/DL (ref 13.3–17.7)
HGB BLD-MCNC: 8.1 G/DL (ref 13.3–17.7)
INR PPP: 2.01 (ref 0.86–1.14)
LACTATE BLD-SCNC: 1 MMOL/L (ref 0.7–2)
LMWH PPP CHRO-ACNC: 0.18 IU/ML
MCH RBC QN AUTO: 30.7 PG (ref 26.5–33)
MCHC RBC AUTO-ENTMCNC: 32.1 G/DL (ref 31.5–36.5)
MCV RBC AUTO: 96 FL (ref 78–100)
PLATELET # BLD AUTO: 184 10E9/L (ref 150–450)
POTASSIUM SERPL-SCNC: 3.9 MMOL/L (ref 3.4–5.3)
PROT SERPL-MCNC: 5 G/DL (ref 6.8–8.8)
RBC # BLD AUTO: 2.51 10E12/L (ref 4.4–5.9)
SODIUM SERPL-SCNC: 137 MMOL/L (ref 133–144)
SPECIMEN SOURCE: NORMAL
SPECIMEN SOURCE: NORMAL
WBC # BLD AUTO: 9.2 10E9/L (ref 4–11)

## 2017-10-01 PROCEDURE — 27210913 ZZH NUTRITION PRODUCT INTERMEDIATE PACKET

## 2017-10-01 PROCEDURE — 80053 COMPREHEN METABOLIC PANEL: CPT | Performed by: PHYSICIAN ASSISTANT

## 2017-10-01 PROCEDURE — 85610 PROTHROMBIN TIME: CPT | Performed by: SURGERY

## 2017-10-01 PROCEDURE — 25000132 ZZH RX MED GY IP 250 OP 250 PS 637: Performed by: THORACIC SURGERY (CARDIOTHORACIC VASCULAR SURGERY)

## 2017-10-01 PROCEDURE — 85027 COMPLETE CBC AUTOMATED: CPT | Performed by: PHYSICIAN ASSISTANT

## 2017-10-01 PROCEDURE — 36415 COLL VENOUS BLD VENIPUNCTURE: CPT | Performed by: PHYSICIAN ASSISTANT

## 2017-10-01 PROCEDURE — 25000132 ZZH RX MED GY IP 250 OP 250 PS 637: Performed by: SURGERY

## 2017-10-01 PROCEDURE — 97535 SELF CARE MNGMENT TRAINING: CPT | Mod: GO

## 2017-10-01 PROCEDURE — 25000132 ZZH RX MED GY IP 250 OP 250 PS 637: Performed by: PHYSICIAN ASSISTANT

## 2017-10-01 PROCEDURE — 36415 COLL VENOUS BLD VENIPUNCTURE: CPT | Performed by: THORACIC SURGERY (CARDIOTHORACIC VASCULAR SURGERY)

## 2017-10-01 PROCEDURE — 25000128 H RX IP 250 OP 636: Performed by: SURGERY

## 2017-10-01 PROCEDURE — 85520 HEPARIN ASSAY: CPT | Performed by: PHYSICIAN ASSISTANT

## 2017-10-01 PROCEDURE — 25000128 H RX IP 250 OP 636: Performed by: PHYSICIAN ASSISTANT

## 2017-10-01 PROCEDURE — 97116 GAIT TRAINING THERAPY: CPT | Mod: GP

## 2017-10-01 PROCEDURE — 40000133 ZZH STATISTIC OT WARD VISIT

## 2017-10-01 PROCEDURE — 85610 PROTHROMBIN TIME: CPT | Performed by: PHYSICIAN ASSISTANT

## 2017-10-01 PROCEDURE — 85018 HEMOGLOBIN: CPT | Performed by: PHYSICIAN ASSISTANT

## 2017-10-01 PROCEDURE — 00000146 ZZHCL STATISTIC GLUCOSE BY METER IP

## 2017-10-01 PROCEDURE — 25000132 ZZH RX MED GY IP 250 OP 250 PS 637: Performed by: ANESTHESIOLOGY

## 2017-10-01 PROCEDURE — 21400006 ZZH R&B CCU INTERMEDIATE UMMC

## 2017-10-01 PROCEDURE — 27210437 ZZH NUTRITION PRODUCT SEMIELEM INTERMED LITER

## 2017-10-01 PROCEDURE — 40000193 ZZH STATISTIC PT WARD VISIT

## 2017-10-01 PROCEDURE — 97530 THERAPEUTIC ACTIVITIES: CPT | Mod: GP

## 2017-10-01 PROCEDURE — 97110 THERAPEUTIC EXERCISES: CPT | Mod: GP

## 2017-10-01 PROCEDURE — 83605 ASSAY OF LACTIC ACID: CPT | Performed by: THORACIC SURGERY (CARDIOTHORACIC VASCULAR SURGERY)

## 2017-10-01 RX ORDER — AMINO ACIDS/PROTEIN HYDROLYS 11G-40/45
2 LIQUID IN PACKET (ML) ORAL DAILY
Status: DISCONTINUED | OUTPATIENT
Start: 2017-10-01 | End: 2017-10-02

## 2017-10-01 RX ORDER — WARFARIN SODIUM 1 MG/1
2 TABLET ORAL
Status: COMPLETED | OUTPATIENT
Start: 2017-10-01 | End: 2017-10-01

## 2017-10-01 RX ADMIN — POLYETHYLENE GLYCOL 3350 17 G: 17 POWDER, FOR SOLUTION ORAL at 08:57

## 2017-10-01 RX ADMIN — THERA TABS 1 TABLET: TAB at 08:58

## 2017-10-01 RX ADMIN — CHLORHEXIDINE GLUCONATE 15 ML: 1.2 RINSE ORAL at 11:38

## 2017-10-01 RX ADMIN — HEPARIN SODIUM 1050 UNITS/HR: 10000 INJECTION, SOLUTION INTRAVENOUS at 05:58

## 2017-10-01 RX ADMIN — ASPIRIN 81 MG CHEWABLE TABLET 81 MG: 81 TABLET CHEWABLE at 08:58

## 2017-10-01 RX ADMIN — POTASSIUM & SODIUM PHOSPHATES POWDER PACK 280-160-250 MG 1 PACKET: 280-160-250 PACK at 08:59

## 2017-10-01 RX ADMIN — CEFTRIAXONE SODIUM 2 G: 2 INJECTION, POWDER, FOR SOLUTION INTRAMUSCULAR; INTRAVENOUS at 16:26

## 2017-10-01 RX ADMIN — WARFARIN SODIUM 2 MG: 1 TABLET ORAL at 17:31

## 2017-10-01 RX ADMIN — Medication 2 PACKET: at 08:59

## 2017-10-01 RX ADMIN — POTASSIUM & SODIUM PHOSPHATES POWDER PACK 280-160-250 MG 1 PACKET: 280-160-250 PACK at 20:40

## 2017-10-01 RX ADMIN — CHLORHEXIDINE GLUCONATE 15 ML: 1.2 RINSE ORAL at 20:40

## 2017-10-01 RX ADMIN — POTASSIUM & SODIUM PHOSPHATES POWDER PACK 280-160-250 MG 1 PACKET: 280-160-250 PACK at 16:26

## 2017-10-01 RX ADMIN — FUROSEMIDE 20 MG: 20 TABLET ORAL at 08:58

## 2017-10-01 RX ADMIN — LIDOCAINE 2 PATCH: 50 PATCH CUTANEOUS at 20:41

## 2017-10-01 RX ADMIN — PANTOPRAZOLE SODIUM 40 MG: 40 TABLET, DELAYED RELEASE ORAL at 08:58

## 2017-10-01 RX ADMIN — POTASSIUM CHLORIDE 20 MEQ: 750 TABLET, EXTENDED RELEASE ORAL at 08:58

## 2017-10-01 RX ADMIN — SENNOSIDES AND DOCUSATE SODIUM 2 TABLET: 8.6; 5 TABLET ORAL at 08:58

## 2017-10-01 RX ADMIN — POTASSIUM & SODIUM PHOSPHATES POWDER PACK 280-160-250 MG 1 PACKET: 280-160-250 PACK at 12:01

## 2017-10-01 RX ADMIN — POTASSIUM CHLORIDE 20 MEQ: 750 TABLET, EXTENDED RELEASE ORAL at 12:01

## 2017-10-01 RX ADMIN — LEVOFLOXACIN 500 MG: 5 INJECTION, SOLUTION INTRAVENOUS at 08:52

## 2017-10-01 RX ADMIN — FUROSEMIDE 20 MG: 20 TABLET ORAL at 16:26

## 2017-10-01 ASSESSMENT — VISUAL ACUITY
OU: NORMAL ACUITY

## 2017-10-01 NOTE — PHARMACY-CONSULT NOTE
Pharmacy Tube Feeding Consult    Medication reviewed for administration by feeding tube and for potential food/drug interactions.    Recommendation: No changes are needed at this time.     Pharmacy will continue to follow as new medications are ordered.    Lenora Nelson, PharmD

## 2017-10-01 NOTE — PROGRESS NOTES
CVTS Daily Note  10/1/2017  Attending: Zeb Leal MD    S:   No overnight events.  Tolerating Tube feeds and regular diet, appetite is great. 900 kcal yesterday, eating most of breakfast and lunch today.  Pt seen at bedside resting comfortably. Reports right sided weakness continues to improve, no new deficits     Denies F/C/Sweats.  No CP, SOB, or calf pain.    + BM.  + Flatus.    Ambulated well with assistance.   Pain controlled well.    O:   Vitals:    10/01/17 0041 10/01/17 0425 10/01/17 0803 10/01/17 1140   BP:   92/72 (!) 85/61   BP Location:   Left arm    Pulse:       Resp:  16 16 16   Temp:   99.2  F (37.3  C) 98  F (36.7  C)   TempSrc:   Oral Oral   SpO2:   97% 100%   Weight: 67.8 kg (149 lb 6.4 oz)      Height:         Vitals:    09/29/17 0300 09/30/17 0400 10/01/17 0041   Weight: 68.9 kg (152 lb) 69.1 kg (152 lb 4.8 oz) 67.8 kg (149 lb 6.4 oz)     Intake/Output Summary (Last 24 hours) at 09/27/17 1158  Last data filed at 09/27/17 1000   Gross per 24 hour   Intake             1380 ml   Output             3185 ml   Net            -1805 ml       MAPs: 79 - 91  Gen: AAO x 3, pleasant, NAD  Neuro: sensation and motor intact bilaterally.  CV: RRR, S1S2 normal, no murmurs, rubs, or gallops.   Pulm: Lungs with bilateral basilar crackles, otherwise CTA, no rhonchi or wheezes  Abd: soft, non-tender, no guarding  Ext: Trace bilateral pitting peripheral edema, lymphedema wraps in place  Neuro: CN II-XII intact, no new deficits  Incision: clean, dry, intact, no erythema  Chest Tube sites: dressings clean and dry      Labs:  [facility]    Recent Labs  Lab 10/01/17  0816 09/30/17  0800 09/29/17  0321 09/28/17  0615    136 136 136   POTASSIUM 3.9 4.0 4.0 3.6   CHLORIDE 106 104 104 104   CHECO 7.6* 7.9* 7.3* 7.4*   CO2 25 24 23 24   BUN 14 14 15 16   CR 0.48* 0.49* 0.50* 0.57*   * 113* 101* 104*     CBC    Recent Labs  Lab 10/01/17  0816 09/30/17  0800 09/29/17  1830 09/29/17  0321 09/28/17  0615   WBC 9.2 8.9  --   9.3 7.1   RBC 2.51* 2.89*  --  3.16* 3.09*   HGB 7.7* 8.7*  --  9.4* 9.1*   HCT 24.0* 27.2*  --  28.6* 27.8*   MCV 96 94  --  91 90   MCH 30.7 30.1  --  29.7 29.4   MCHC 32.1 32.0  --  32.9 32.7   RDW 24.3* 23.0*  --  21.4* 20.7*    170 141* 88* 114*     INR    Recent Labs  Lab 10/01/17  0816 09/30/17  0800 09/29/17  0321 09/28/17  0615   INR 2.01* 1.67* 1.52* 1.51*      Hepatic Panel   Lab Results   Component Value Date     09/27/2017     Lab Results   Component Value Date     09/27/2017     Lab Results   Component Value Date    ALBUMIN 1.7 09/27/2017     GLUCOSE:     Recent Labs  Lab 10/01/17  0816 10/01/17  0814 09/30/17  1932 09/30/17  1628 09/30/17  1212 09/30/17  0800 09/30/17  0759 09/29/17  2120  09/29/17  0321  09/28/17  0615  09/27/17  0639  09/26/17  0930   *  --   --   --   --  113*  --   --   --  101*  --  104*  --  121*  --  139*   BGM  --  122* 120* 117* 134*  --  118* 105*  < >  --   < >  --   < >  --   < >  --    < > = values in this interval not displayed.      Imaging:    Reviewed, none today      ASSESSMENT/PLAN: Patient is a 47 year old male with a history of prosethic aortic valve with endocarditis, now s/p redo sternotomy with mechanical aortic and mitral valve replacement on 9/21 with Dr. Gorman and Dr. Leal.     Neuro:  - Acute post-op pain: IV dilaudid, tylenol and oxycodone prn  - Acute ischemic L MCA stroke: perioperative, goal MAPs >85. Neuro consult, appreciate recs, MRI corresponded to head CT findings, no acute hemorrhage. Reports right weakness is improving   - Superior hemianopsia, consult ophthalmology, recommended outpatient follow-up     CV:  -  mg, holding metoprolol for now given goal MAPs >85.  - Blood pressure: MAP goals >85 given CVA, less strict now patient stable and improving  - TPW removed 9/28 without complication      Resp: extubated POD 3  - IS, encourage activity  - Mediastinal CT removed 9/27, remaining right pleural removed 9/28,  f/u CXR this PM       FEN/GI:   - SLP, advanced to regular diet, tolerating, TF at goal, + BM  - LFTs elevated, trending down, except for ALP which is starting to level off, trending up slightly, all others trending down/stable, will continue to follow and discuss with hep-alejandra team if necessary   - Now regular diet with thin liquids. Start cycled tube feeds 10/1    Renal/:   - Creatinine stable, at baseline, adequate UOP with IV lasix  - Volume status: hypervolemic, will increase diuersis gently with soft blood pressures. Continue lasix 20 mg PO BID. Would like to increase diuresis given LE edema and crackles, however BPs softer  - Urology saw patient for penile swelling - no intervention - follow-up on outpatient basis, moore out 9/30, adequate UOP with moore out      ID: Completed roxanna-op abx,   - WBC normal, afebrile, no signs or symptoms of infection  - ID following for endocarditis, started on ceftriaxone 2g IV q24h for ecoli on mitral valve until 11/3/17, obtaining one more set of blood and urine cultures 9/25. Add levofloxacin 500 mg IV (PO will bind with tube feeds) daily 9/27 to 11/8/17. Weekly CBC, CMP, CRP.     Heme:   - Hgb 8.7->7.7, will recheck this PM, no signs or symptoms of bleeding      Endo:   - BG well controlled, on SSI      PPx:   - PPI      Anticoagulation:   - INR goal 2.5-3.5 for combined mechanical AVR and MVR, INR 1.67->2.01 on heparin gtt for bridge, okayed per neurology with hx of ischemic stroke.      Dispo:   - 6C since 9/25  - Therapy recommending d/c to ARU, waiting therapeutic INR and discontinuation of heparin gtt, likely Monday or Tuesday      Discussed with CVTS Fellow   Staff surgeons have been informed of changes through both  verbal and written communication.      Callum Lema PA-C  Cardiothoracic Surgery  October 1, 2017 7:29 AM   p: [phone]

## 2017-10-01 NOTE — PLAN OF CARE
Problem: Goal Outcome Summary  Goal: Goal Outcome Summary  1. Pt s pain will be well controlled   PT / 6C -      Discharge Planner PT   Patient plan for discharge: Rehab  Current status: Pt completes sit<>stand transfers with CGA-SBA, occasional cues to adhere to sternal precautions. Pt ambulates ~150ft x 2 with FWW + SBA, cues for improved gait mechanics. Trialed ambulation with 1 UE support on IV pole for ~40ft + close CGA, pt required cues to increase R foot clearance and control R LE, slow pace and slight unsteadiness. Pt engaged in standing LE exercises for improved strength and balance. Requires frequent rest breaks 2/2 fatigue. VSS on RA.  Barriers to return to prior living situation: Pt lives in two story home w/ bedroom on 2nd story; pt's siblings work during the day and are unavailable to assist pt during the day; pt now w/ sternal precautions; R sided weakness and coordination deficits (UE more pronounced than RLE) following CVA; impaired vision  Recommendations for discharge: ARU, however pt making good gains with mobility.  Rationale for recommendations: Continued progression of strength, balance, activity tolerance/endurance, and safe mobility. Pt also with complex PMHx, in addition to functional impairments.       Entered by: Lee Ann Pablo 10/01/2017 2:31 PM

## 2017-10-01 NOTE — PLAN OF CARE
Problem: Goal Outcome Summary  Goal: Goal Outcome Summary  1. Pt s pain will be well controlled   Outcome: Improving  Pt admitted 9/15 with dyspnea s/p CABG, mechanical AVR/MVR, and D/C PPM.  SR/ST low 100's, VS'S on RA with incisional pain and medicated with prn Roxicodone.  Lido patch on left neck r/t abscess.  Hep gtt continues at 1050 units/hr.  1.5 Peptamen tube feed continues at 50 ml/hr (goal:  ml/q4h).  Mild right sided weakness.  2-3 + LE edema and lymph's on.  Otherwise, pt resting well and up SBA.  Continue to monitor and with POC.

## 2017-10-01 NOTE — PROGRESS NOTES
Calorie Counts      Intake Recorded For: 9/30                Kcals: 917                 Protein: 43g      # Meals Recorded: 3 meals (First- 100% 1 Citizen of the [place] toast with syrup and butter)      (Second- 100% apple sauce, fruit, vanilla pudding)      (Third- 100% chicken breast from home, rice, 8oz chicken noodle soup)      # Supplements Recorded: 0

## 2017-10-01 NOTE — PLAN OF CARE
Problem: Goal Outcome Summary  Goal: Goal Outcome Summary  1. Pt s pain will be well controlled   OT/EDEMA 6C: Patient tolerated wear of gradient compression bandaging well according to set x24 hour schedule and with reductions indicated in 8/10 circumferential measurements from MTP to knee creases. Only 1-2+ pitting edema present over dorsum of feet and below knee creases and appropriate to transition into compression stockings for more gentle compression/edema management purposes. Patient fit and issued with size F Comperm which may be worn x23/24 hours per day (okay for patient to remove at night time as needed for comfort). Stockings to be discontinued if increased pain, numbness/tingling or soiling occurs.

## 2017-10-01 NOTE — PLAN OF CARE
Problem: Goal Outcome Summary  Goal: Goal Outcome Summary  1. Pt s pain will be well controlled   Outcome: Improving  D: Admit 9/15 dyspnea; s/p CABG and mechanical AVR/MVR (9/21).  I/A: VSS. BP soft (80s/60s, map 70s). Denies pain. Neuro checks q4h; intact.  SR. Up with walker and SBA; ambulating in hallways 2-3 times. regular diet; calorie counts continued. NG tube; TF cont turned off at 1000; ordered to cycle TF at 65 ml/hr from 2000 - 0800 overnight.  PIV x2; infusing abx and Hep gtt running at 10.5 ml/hr. Hep 10a therapeutic. Edema in lower extremities; lymphedema present to rewrap LE. Voiding good amounts with lasix. Penis edema; patient reports no pain and will be following up outpatient with urology. BS WNL. Potassium replaced per protocol.   P: Update CVTS if questions/concerns.   Mora Benitez RN

## 2017-10-01 NOTE — PROGRESS NOTES
CLINICAL NUTRITION SERVICES - BRIEF NOTE    [place] counts:   9/28: 300 kcals, 23 g pro  9/30: 917 kcals, 43 g pro    MD called to request we cycle pts TF. Will change TF order as below. Continue calorie counts.    INTERVENTIONS  Recommendations / Nutrition Prescription  Once pt is consuming 25% of meal trays, rec transition to cycled nocturnal TFs, Peptamen 1.5 at 65 mL/hr x 12 hrs. Conintue ProSource TF modular BID for additional kcals/protein. Regimen provides ~75% of kcal and protein needs. TFs may be adjusted pending oral intake.      Once kcal counts reveal that pt is consuming at least 1200 kcals and 64 g protein daily on average, discontinue TFs.        Nilda Herbert, BIANCA, MS, LD

## 2017-10-02 ENCOUNTER — APPOINTMENT (OUTPATIENT)
Dept: OCCUPATIONAL THERAPY | Facility: CLINIC | Age: 47
DRG: 216 | End: 2017-10-02
Attending: PHYSICIAN ASSISTANT
Payer: COMMERCIAL

## 2017-10-02 ENCOUNTER — APPOINTMENT (OUTPATIENT)
Dept: SPEECH THERAPY | Facility: CLINIC | Age: 47
DRG: 216 | End: 2017-10-02
Attending: PHYSICIAN ASSISTANT
Payer: COMMERCIAL

## 2017-10-02 LAB
ALBUMIN SERPL-MCNC: 2 G/DL (ref 3.4–5)
ALP SERPL-CCNC: 305 U/L (ref 40–150)
ALT SERPL W P-5'-P-CCNC: 192 U/L (ref 0–70)
ANION GAP SERPL CALCULATED.3IONS-SCNC: 8 MMOL/L (ref 3–14)
AST SERPL W P-5'-P-CCNC: 187 U/L (ref 0–45)
BILIRUB SERPL-MCNC: 3.6 MG/DL (ref 0.2–1.3)
BUN SERPL-MCNC: 12 MG/DL (ref 7–30)
CALCIUM SERPL-MCNC: 7.7 MG/DL (ref 8.5–10.1)
CHLORIDE SERPL-SCNC: 105 MMOL/L (ref 94–109)
CO2 SERPL-SCNC: 24 MMOL/L (ref 20–32)
CREAT SERPL-MCNC: 0.5 MG/DL (ref 0.66–1.25)
CRP SERPL-MCNC: 41 MG/L (ref 0–8)
ERYTHROCYTE [DISTWIDTH] IN BLOOD BY AUTOMATED COUNT: 25.5 % (ref 10–15)
GFR SERPL CREATININE-BSD FRML MDRD: >90 ML/MIN/1.7M2
GLUCOSE BLDC GLUCOMTR-MCNC: 109 MG/DL (ref 70–99)
GLUCOSE BLDC GLUCOMTR-MCNC: 110 MG/DL (ref 70–99)
GLUCOSE SERPL-MCNC: 102 MG/DL (ref 70–99)
HCT VFR BLD AUTO: 25.8 % (ref 40–53)
HGB BLD-MCNC: 8 G/DL (ref 13.3–17.7)
INR PPP: 2.28 (ref 0.86–1.14)
LMWH PPP CHRO-ACNC: 0.21 IU/ML
MAGNESIUM SERPL-MCNC: 2.2 MG/DL (ref 1.6–2.3)
MCH RBC QN AUTO: 29.9 PG (ref 26.5–33)
MCHC RBC AUTO-ENTMCNC: 31 G/DL (ref 31.5–36.5)
MCV RBC AUTO: 96 FL (ref 78–100)
PHOSPHATE SERPL-MCNC: 3.4 MG/DL (ref 2.5–4.5)
PLATELET # BLD AUTO: 213 10E9/L (ref 150–450)
POTASSIUM SERPL-SCNC: 4.8 MMOL/L (ref 3.4–5.3)
PROT SERPL-MCNC: 5.8 G/DL (ref 6.8–8.8)
RBC # BLD AUTO: 2.68 10E12/L (ref 4.4–5.9)
SODIUM SERPL-SCNC: 137 MMOL/L (ref 133–144)
WBC # BLD AUTO: 7.8 10E9/L (ref 4–11)

## 2017-10-02 PROCEDURE — 97110 THERAPEUTIC EXERCISES: CPT | Mod: GO

## 2017-10-02 PROCEDURE — 86140 C-REACTIVE PROTEIN: CPT | Performed by: SURGERY

## 2017-10-02 PROCEDURE — 83735 ASSAY OF MAGNESIUM: CPT | Performed by: SURGERY

## 2017-10-02 PROCEDURE — 93010 ELECTROCARDIOGRAM REPORT: CPT | Performed by: INTERNAL MEDICINE

## 2017-10-02 PROCEDURE — 80053 COMPREHEN METABOLIC PANEL: CPT | Performed by: SURGERY

## 2017-10-02 PROCEDURE — 93005 ELECTROCARDIOGRAM TRACING: CPT

## 2017-10-02 PROCEDURE — 40000133 ZZH STATISTIC OT WARD VISIT

## 2017-10-02 PROCEDURE — 25000132 ZZH RX MED GY IP 250 OP 250 PS 637: Performed by: PHYSICIAN ASSISTANT

## 2017-10-02 PROCEDURE — 27210913 ZZH NUTRITION PRODUCT INTERMEDIATE PACKET

## 2017-10-02 PROCEDURE — 25000132 ZZH RX MED GY IP 250 OP 250 PS 637: Performed by: SURGERY

## 2017-10-02 PROCEDURE — 40000225 ZZH STATISTIC SLP WARD VISIT: Performed by: SPEECH-LANGUAGE PATHOLOGIST

## 2017-10-02 PROCEDURE — 85027 COMPLETE CBC AUTOMATED: CPT | Performed by: SURGERY

## 2017-10-02 PROCEDURE — 92526 ORAL FUNCTION THERAPY: CPT | Mod: GN | Performed by: SPEECH-LANGUAGE PATHOLOGIST

## 2017-10-02 PROCEDURE — 25000128 H RX IP 250 OP 636: Performed by: PHYSICIAN ASSISTANT

## 2017-10-02 PROCEDURE — 25000132 ZZH RX MED GY IP 250 OP 250 PS 637: Performed by: THORACIC SURGERY (CARDIOTHORACIC VASCULAR SURGERY)

## 2017-10-02 PROCEDURE — 85610 PROTHROMBIN TIME: CPT | Performed by: SURGERY

## 2017-10-02 PROCEDURE — 00000146 ZZHCL STATISTIC GLUCOSE BY METER IP

## 2017-10-02 PROCEDURE — 85027 COMPLETE CBC AUTOMATED: CPT | Performed by: PHYSICIAN ASSISTANT

## 2017-10-02 PROCEDURE — 25000132 ZZH RX MED GY IP 250 OP 250 PS 637: Performed by: ANESTHESIOLOGY

## 2017-10-02 PROCEDURE — 27210437 ZZH NUTRITION PRODUCT SEMIELEM INTERMED LITER

## 2017-10-02 PROCEDURE — 25000128 H RX IP 250 OP 636: Performed by: SURGERY

## 2017-10-02 PROCEDURE — 21400006 ZZH R&B CCU INTERMEDIATE UMMC

## 2017-10-02 PROCEDURE — 36415 COLL VENOUS BLD VENIPUNCTURE: CPT | Performed by: SURGERY

## 2017-10-02 PROCEDURE — 85520 HEPARIN ASSAY: CPT | Performed by: SURGERY

## 2017-10-02 PROCEDURE — 97535 SELF CARE MNGMENT TRAINING: CPT | Mod: GO

## 2017-10-02 PROCEDURE — 84100 ASSAY OF PHOSPHORUS: CPT | Performed by: SURGERY

## 2017-10-02 RX ORDER — LEVOFLOXACIN 500 MG/1
500 TABLET, FILM COATED ORAL DAILY
Status: DISCONTINUED | OUTPATIENT
Start: 2017-10-03 | End: 2017-10-05 | Stop reason: HOSPADM

## 2017-10-02 RX ORDER — WARFARIN SODIUM 1 MG/1
2 TABLET ORAL
Status: COMPLETED | OUTPATIENT
Start: 2017-10-02 | End: 2017-10-02

## 2017-10-02 RX ADMIN — ASPIRIN 81 MG CHEWABLE TABLET 81 MG: 81 TABLET CHEWABLE at 09:38

## 2017-10-02 RX ADMIN — FUROSEMIDE 20 MG: 20 TABLET ORAL at 17:35

## 2017-10-02 RX ADMIN — FUROSEMIDE 20 MG: 20 TABLET ORAL at 09:39

## 2017-10-02 RX ADMIN — POTASSIUM & SODIUM PHOSPHATES POWDER PACK 280-160-250 MG 1 PACKET: 280-160-250 PACK at 17:34

## 2017-10-02 RX ADMIN — PANTOPRAZOLE SODIUM 40 MG: 40 TABLET, DELAYED RELEASE ORAL at 09:40

## 2017-10-02 RX ADMIN — Medication 2 PACKET: at 09:41

## 2017-10-02 RX ADMIN — THERA TABS 1 TABLET: TAB at 09:40

## 2017-10-02 RX ADMIN — OXYCODONE HYDROCHLORIDE 5 MG: 5 TABLET ORAL at 20:59

## 2017-10-02 RX ADMIN — CHLORHEXIDINE GLUCONATE 15 ML: 1.2 RINSE ORAL at 21:17

## 2017-10-02 RX ADMIN — POTASSIUM & SODIUM PHOSPHATES POWDER PACK 280-160-250 MG 1 PACKET: 280-160-250 PACK at 09:38

## 2017-10-02 RX ADMIN — POTASSIUM & SODIUM PHOSPHATES POWDER PACK 280-160-250 MG 1 PACKET: 280-160-250 PACK at 13:13

## 2017-10-02 RX ADMIN — WARFARIN SODIUM 2 MG: 1 TABLET ORAL at 17:34

## 2017-10-02 RX ADMIN — LEVOFLOXACIN 500 MG: 5 INJECTION, SOLUTION INTRAVENOUS at 09:37

## 2017-10-02 RX ADMIN — LIDOCAINE 2 PATCH: 50 PATCH CUTANEOUS at 20:59

## 2017-10-02 RX ADMIN — OXYCODONE HYDROCHLORIDE 10 MG: 5 TABLET ORAL at 00:19

## 2017-10-02 RX ADMIN — CEFTRIAXONE SODIUM 2 G: 2 INJECTION, POWDER, FOR SOLUTION INTRAMUSCULAR; INTRAVENOUS at 17:35

## 2017-10-02 RX ADMIN — HEPARIN SODIUM 1050 UNITS/HR: 10000 INJECTION, SOLUTION INTRAVENOUS at 04:32

## 2017-10-02 RX ADMIN — CHLORHEXIDINE GLUCONATE 15 ML: 1.2 RINSE ORAL at 09:38

## 2017-10-02 RX ADMIN — POTASSIUM & SODIUM PHOSPHATES POWDER PACK 280-160-250 MG 1 PACKET: 280-160-250 PACK at 20:59

## 2017-10-02 RX ADMIN — POTASSIUM CHLORIDE 20 MEQ: 750 TABLET, EXTENDED RELEASE ORAL at 09:39

## 2017-10-02 ASSESSMENT — VISUAL ACUITY
OU: BASELINE

## 2017-10-02 NOTE — PLAN OF CARE
Problem: Goal Outcome Summary  Goal: Goal Outcome Summary  1. Pt s pain will be well controlled   Discharge Planner OT   Patient plan for discharge: ARU  Current status: SBA/CGA to ambulate ~400' in hallway. Pt completed sit<>Stand transfer SBA. Nu-step activity tolerated well, 13 minutes at workload level 1. Pt endorsed OMNI level of 6, SOB level 5.  Barriers to return to prior living situation: Decreased activity tolerance, strength, endurance, maintaining sternal precautions.   Recommendations for discharge: ARU   Rationale for recommendations: To further increase activity tolerance, strength, and endurance. Pt with good support system at home, motivated in therapy.        Entered by: Varsha Gray 10/02/2017 9:43 AM

## 2017-10-02 NOTE — PLAN OF CARE
Problem: Goal Outcome Summary  Goal: Goal Outcome Summary  OT/EDEMA 6C: Patient tolerating wear of compression stockings well and reported improved comfort versus using gradient compression bandaging. Okay for patient to proceed with wear of size F Comperm from MTP to knee creases to assist in gentle compression/edema management purposes x23/24 hours per day. Please refer to patient care order for detail and remove compression if increased pain, numbness/tingling or soiling occurs.

## 2017-10-02 NOTE — PROGRESS NOTES
CVTS Daily Note  10/2/2017  Attending: Zeb Leal MD    S:   No overnight events.  Sleeping well.  Feeling stronger.   Pt seen at bedside resting comfortably.    No acute complaints.      Denies F/C/Sweats.  No CP, SOB, or calf pain.    Tolerating diet and cycled tube feeds.  + BM.  + Flatus.    Ambulated well with assistance.    Pain controlled well.    O:   Vitals:    10/02/17 0423 10/02/17 0500 10/02/17 0821 10/02/17 1108   BP: 104/69  92/65 95/64   BP Location: Right arm   Left arm   Pulse:       Resp: 16 16 16   Temp: 97.8  F (36.6  C)  99.5  F (37.5  C) 98.4  F (36.9  C)   TempSrc: Oral  Oral Oral   SpO2: 98%  99% 99%   Weight:  66.7 kg (147 lb)     Height:         Vitals:    09/30/17 0400 10/01/17 0041 10/02/17 0500   Weight: 69.1 kg (152 lb 4.8 oz) 67.8 kg (149 lb 6.4 oz) 66.7 kg (147 lb)     - 2 kg since admit      Intake/Output Summary (Last 24 hours) at 10/02/17 1224  Last data filed at 10/02/17 1100   Gross per 24 hour   Intake              960 ml   Output             3050 ml   Net            -2090 ml       MAPs: 72 - 83  Gen: AAO x 3, pleasant, NAD  CV: RRR, S1S2 normal, no murmurs, rubs, or gallops.   Pulm: CTA, no rhonchi or wheezes  Abd: soft, non-tender, no guarding  Ext: moderate peripheral edema, 2 + pitting to knees   Incision: clean, dry, intact, no erythema  Chest Tube sites: dressings clean and dry    Labs:  [facility]    Recent Labs  Lab 10/02/17  0934 10/01/17  0816 09/30/17  0800 09/29/17  0321    137 136 136   POTASSIUM 4.8 3.9 4.0 4.0   CHLORIDE 105 106 104 104   CHECO 7.7* 7.6* 7.9* 7.3*   CO2 24 25 24 23   BUN 12 14 14 15   CR 0.50* 0.48* 0.49* 0.50*   * 110* 113* 101*     CBC    Recent Labs  Lab 10/02/17  0934 10/01/17  1341 10/01/17  0816 09/30/17  0800 09/29/17  1830 09/29/17  0321   WBC 7.8  --  9.2 8.9  --  9.3   RBC 2.68*  --  2.51* 2.89*  --  3.16*   HGB 8.0* 8.1* 7.7* 8.7*  --  9.4*   HCT 25.8*  --  24.0* 27.2*  --  28.6*   MCV 96  --  96 94  --  91   MCH 29.9  --  30.7  30.1  --  29.7   MCHC 31.0*  --  32.1 32.0  --  32.9   RDW 25.5*  --  24.3* 23.0*  --  21.4*     --  184 170 141* 88*      Ref. Range 9/15/2017 22:17 10/2/2017 09:34   CRP Inflammation Latest Ref Range: 0.0 - 8.0 mg/L   40.0 (H)   41.0 (H)     INR    Recent Labs  Lab 10/02/17  0934 10/01/17  0816 09/30/17  0800 09/29/17  0321   INR 2.28* 2.01* 1.67* 1.52*      Hepatic Panel   Lab Results   Component Value Date     10/02/2017     Lab Results   Component Value Date     10/02/2017     Lab Results   Component Value Date    ALBUMIN 2.0 10/02/2017     GLUCOSE:     Recent Labs  Lab 10/02/17  0934 10/02/17  0427 10/02/17  0028 10/01/17  2128 10/01/17  1731 10/01/17  0816 10/01/17  0814 09/30/17  1932  09/30/17  0800  09/29/17  0321  09/28/17  0615  09/27/17  0639   *  --   --   --   --  110*  --   --   --  113*  --  101*  --  104*  --  121*   BGM  --  110* 109* 150* 100*  --  122* 120*  < >  --   < >  --   < >  --   < >  --    < > = values in this interval not displayed.      Imaging:  Reviewed recent imaging       ASSESSMENT/PLAN: Patient is a 47 year old male with a history of prosethic aortic valve with endocarditis, now s/p redo sternotomy with mechanical aortic and mitral valve replacement on 9/21 with Dr. Gorman and Dr. Leal.      Neuro:  - Acute post-op pain: IV dilaudid, tylenol and oxycodone prn  - Acute ischemic L MCA stroke: perioperative, goal MAPs >85. Neuro consult, appreciate recs, MRI corresponded to head CT findings, no acute hemorrhage. Reports right weakness is improving   - Superior hemianopsia, consult ophthalmology, recommended outpatient follow-up      CV:  -  mg, holding metoprolol for now given goal MAPs >85.  - Blood pressure: MAP goals >85 given CVA, less strict now patient stable and improving  - TPW removed 9/28 without complication      Resp: extubated POD 3  - IS, encourage activity  - Mediastinal CT removed 9/27, remaining right pleural removed 9/28, f/u  CXR this PM       FEN/GI:   - SLP, advanced to regular diet, tolerating, TF at goal, + BM  - LFTs elevated, trending down, except for ALP which is still trending up slightly, all others trending down/stable, will continue to follow and discuss with hep-alejandra team if necessary   - Now regular diet with thin liquids. Start cycled tube feeds 10/1.      Renal/:   - Creatinine stable, at baseline, adequate UOP with IV lasix  - Volume status: hypervolemic, will increase diuersis gently with soft blood pressures. Continue lasix 20 mg PO BID. Would like to increase diuresis given LE edema and crackles, however BPs soft  - Urology saw patient for penile swelling - no intervention - follow-up on outpatient basis, moore out 9/30, adequate UOP with moore out      ID: Completed roxanna-op abx,   - WBC normal, afebrile, no signs or symptoms of infection  - ID following for endocarditis, started on ceftriaxone 2g IV q24h for ecoli on mitral valve until 11/3/17. Add levofloxacin 500 mg PO daily 9/27 to 11/8/17. Weekly CBC, CMP, CRP.      Heme:   - Hgb 8.0, no signs or symptoms of bleeding      Endo:   - BG well controlled, on SSI      PPx:   - PPI      Anticoagulation:   - INR goal 2.5-3.5 for combined mechanical AVR and MVR, INR 2.28, stop heparin gtt 10/2.        Dispo:   - 6C since 9/25  - Therapy recommending d/c to ARU, waiting therapeutic INR and discontinuation of heparin gtt, likely Monday or Tuesday         Discussed with CVTS Fellow   Staff surgeons have been informed of changes through both  verbal and written communication.      Diogenes Kraft PA-C  Cardiothoracic Surgery  Pager [phone]    12:24 PM   October 2, 2017

## 2017-10-02 NOTE — PLAN OF CARE
Problem: Patient Care Overview  Goal: Plan of Care/Patient Progress Review     D/I: INR 2.28, Heparin gtt discontinued, received coumadin 2 mg po this evening. Calorie counts recorded throughout day, ate % of food ordered. Denied pain, although does use lidoderm patches on left neck overnight. BP low this evening at 89/66, MAP 71; this also happened yesterday around the same time period, pt declined feeling symptomatic. Ambulating halls with SBA and walker. Sinus Rhythm/Sinus Tach, incisions c/d/i and open to air. Continues to receive one abx via IV.     A: Eating adequate amount during daytime hours, adequately anticoagulated. Improving s/p 9/21 AVR & MVR.     P: Continue to monitor calorie counts; anticipate nasoduodenal tube to be removed in next day or two. Provide TF's at 65 cc/hr x12 hours tonight. Continue to monitor INR and provide coumadin as prescribed. Anticipate discharge to TCU in next 1-3 days.     Pinky Luz RN  Cardiology

## 2017-10-02 NOTE — PROGRESS NOTES
Calorie Counts    Intake Recorded For: 10/1 Kcals: 836 Protein: 27g    # Meals Recorded: 2 meals    # Supplements Recorded: 100% of 1 Ensure Plus

## 2017-10-02 NOTE — PLAN OF CARE
Problem: Goal Outcome Summary  Goal: Goal Outcome Summary  Discharge Planner SLP      Patient plan for discharge: IP Rehab stay  Current status: Pt continues to tolerate regular diet and thin liquids w/ questionable penetration or pharyngeal residuals vs irritation from TF. Pt with intermittent throat clearing w/ thin liquids and solids. Pt indicates he feels like he is swallowing well but TF irritation is causing throat clearing. Pt's goal is to have TF removed over the next day or so.  ST will plan to f/u for diet tolerance TF removed. Recommend continue with regular diet and thin liquids. Continue with small sips/bites and use of chin tuck with all thin liquids.   Barriers to return to prior living situation: Deconditioning  Recommendations for discharge: Pt will likely meet swallow goals prior to discharge but would benefit from IP rehab for PT and OT needs  Rationale for recommendations: Ongoing deconditioning       Entered by: Cony Matthew 10/02/2017 10:03 AM

## 2017-10-02 NOTE — PLAN OF CARE
Problem: Goal Outcome Summary  Goal: Goal Outcome Summary  1. Pt s pain will be well controlled   Outcome: Improving  D: Admit 9/15 dyspnea; s/p CABG and mechanical AVR/MVR (9/21).  I/A: VSS. BP soft MAP 70's. Pt received Oxycodone x 1 overnight for incisional pain. Neuro checks q4h; intact.  SR. Up with walker and SBA. Regular diet; calorie counts continued. NG tube; TF at 65 ml/hr from 2100 - 0900. PIV x2; infusing abx and Hep gtt running at 10.5 ml/hr. Hep 10a therapeutic from yesterday 10 A lab result. Edema in lower extremities. Lymph wraps on.  Voiding good adequately overnight. BS WNL overnight.  P: RN will continue to monitor and assess. RN will update team with any changes/concerns. Vernell Modi

## 2017-10-02 NOTE — PROGRESS NOTES
Social Work Services Progress Note    Hospital Day: 17  Date of Initial Social Work Evaluation:  9/29/17  Collaborated with:  CVTS, ARU    Data:  Pt is a 47 year old male being followed by SW for placement at ARU.    Intervention:  Per CVTS team, pt is not therapeutic with his INR and cannot discharge to ARU today.  Per ARU admissions, pt will be followed for placement likely Wednesday.    Assessment:  ARU placement pending medical stability (INR) and bed availability.    Plan:    Anticipated Disposition:  Facility:  [facility]    Barriers to d/c plan:  INR    Follow Up:  SW to follow up with admissions tomorrow pending medical stability.    KINZA Mcdaniels, APSW  6C Unit   Phone: [phone]  Pager: [phone]  Unit: [phone]

## 2017-10-03 ENCOUNTER — APPOINTMENT (OUTPATIENT)
Dept: SPEECH THERAPY | Facility: CLINIC | Age: 47
DRG: 216 | End: 2017-10-03
Attending: PHYSICIAN ASSISTANT
Payer: COMMERCIAL

## 2017-10-03 ENCOUNTER — APPOINTMENT (OUTPATIENT)
Dept: PHYSICAL THERAPY | Facility: CLINIC | Age: 47
DRG: 216 | End: 2017-10-03
Attending: PHYSICIAN ASSISTANT
Payer: COMMERCIAL

## 2017-10-03 LAB
ALBUMIN SERPL-MCNC: 1.8 G/DL (ref 3.4–5)
ALP SERPL-CCNC: 274 U/L (ref 40–150)
ALT SERPL W P-5'-P-CCNC: 162 U/L (ref 0–70)
ANION GAP SERPL CALCULATED.3IONS-SCNC: 7 MMOL/L (ref 3–14)
AST SERPL W P-5'-P-CCNC: 152 U/L (ref 0–45)
BILIRUB SERPL-MCNC: 2.9 MG/DL (ref 0.2–1.3)
BUN SERPL-MCNC: 15 MG/DL (ref 7–30)
CALCIUM SERPL-MCNC: 8 MG/DL (ref 8.5–10.1)
CHLORIDE SERPL-SCNC: 104 MMOL/L (ref 94–109)
CO2 SERPL-SCNC: 26 MMOL/L (ref 20–32)
CREAT SERPL-MCNC: 0.58 MG/DL (ref 0.66–1.25)
ERYTHROCYTE [DISTWIDTH] IN BLOOD BY AUTOMATED COUNT: 25.5 % (ref 10–15)
GFR SERPL CREATININE-BSD FRML MDRD: >90 ML/MIN/1.7M2
GLUCOSE BLDC GLUCOMTR-MCNC: 113 MG/DL (ref 70–99)
GLUCOSE BLDC GLUCOMTR-MCNC: 116 MG/DL (ref 70–99)
GLUCOSE SERPL-MCNC: 105 MG/DL (ref 70–99)
HCT VFR BLD AUTO: 24.5 % (ref 40–53)
HGB BLD-MCNC: 7.6 G/DL (ref 13.3–17.7)
INR PPP: 2.26 (ref 0.86–1.14)
INTERPRETATION ECG - MUSE: NORMAL
MCH RBC QN AUTO: 29.9 PG (ref 26.5–33)
MCHC RBC AUTO-ENTMCNC: 31 G/DL (ref 31.5–36.5)
MCV RBC AUTO: 97 FL (ref 78–100)
PLATELET # BLD AUTO: 205 10E9/L (ref 150–450)
POTASSIUM SERPL-SCNC: 4.2 MMOL/L (ref 3.4–5.3)
PROT SERPL-MCNC: 5.4 G/DL (ref 6.8–8.8)
RBC # BLD AUTO: 2.54 10E12/L (ref 4.4–5.9)
SODIUM SERPL-SCNC: 136 MMOL/L (ref 133–144)
WBC # BLD AUTO: 5.5 10E9/L (ref 4–11)

## 2017-10-03 PROCEDURE — 21400006 ZZH R&B CCU INTERMEDIATE UMMC

## 2017-10-03 PROCEDURE — 40000225 ZZH STATISTIC SLP WARD VISIT: Performed by: SPEECH-LANGUAGE PATHOLOGIST

## 2017-10-03 PROCEDURE — 80053 COMPREHEN METABOLIC PANEL: CPT | Performed by: PHYSICIAN ASSISTANT

## 2017-10-03 PROCEDURE — 00000146 ZZHCL STATISTIC GLUCOSE BY METER IP

## 2017-10-03 PROCEDURE — 97110 THERAPEUTIC EXERCISES: CPT | Mod: GP | Performed by: REHABILITATION PRACTITIONER

## 2017-10-03 PROCEDURE — 85610 PROTHROMBIN TIME: CPT | Performed by: SURGERY

## 2017-10-03 PROCEDURE — 40000193 ZZH STATISTIC PT WARD VISIT: Performed by: REHABILITATION PRACTITIONER

## 2017-10-03 PROCEDURE — 92526 ORAL FUNCTION THERAPY: CPT | Mod: GN | Performed by: SPEECH-LANGUAGE PATHOLOGIST

## 2017-10-03 PROCEDURE — 25000132 ZZH RX MED GY IP 250 OP 250 PS 637: Performed by: PHYSICIAN ASSISTANT

## 2017-10-03 PROCEDURE — 25000132 ZZH RX MED GY IP 250 OP 250 PS 637: Performed by: THORACIC SURGERY (CARDIOTHORACIC VASCULAR SURGERY)

## 2017-10-03 PROCEDURE — 25000132 ZZH RX MED GY IP 250 OP 250 PS 637: Performed by: ANESTHESIOLOGY

## 2017-10-03 PROCEDURE — 97530 THERAPEUTIC ACTIVITIES: CPT | Mod: GP | Performed by: REHABILITATION PRACTITIONER

## 2017-10-03 PROCEDURE — 25000128 H RX IP 250 OP 636: Performed by: PHYSICIAN ASSISTANT

## 2017-10-03 PROCEDURE — 36415 COLL VENOUS BLD VENIPUNCTURE: CPT | Performed by: SURGERY

## 2017-10-03 PROCEDURE — 85027 COMPLETE CBC AUTOMATED: CPT | Performed by: PHYSICIAN ASSISTANT

## 2017-10-03 PROCEDURE — 25000132 ZZH RX MED GY IP 250 OP 250 PS 637: Performed by: SURGERY

## 2017-10-03 PROCEDURE — 97116 GAIT TRAINING THERAPY: CPT | Mod: GP | Performed by: REHABILITATION PRACTITIONER

## 2017-10-03 RX ORDER — PANTOPRAZOLE SODIUM 40 MG/1
40 TABLET, DELAYED RELEASE ORAL EVERY MORNING
Status: DISCONTINUED | OUTPATIENT
Start: 2017-10-03 | End: 2017-10-05 | Stop reason: HOSPADM

## 2017-10-03 RX ORDER — WARFARIN SODIUM 4 MG/1
4 TABLET ORAL
Status: COMPLETED | OUTPATIENT
Start: 2017-10-03 | End: 2017-10-03

## 2017-10-03 RX ADMIN — POTASSIUM & SODIUM PHOSPHATES POWDER PACK 280-160-250 MG 1 PACKET: 280-160-250 PACK at 09:17

## 2017-10-03 RX ADMIN — WARFARIN SODIUM 4 MG: 4 TABLET ORAL at 18:16

## 2017-10-03 RX ADMIN — LEVOFLOXACIN 500 MG: 500 TABLET, FILM COATED ORAL at 12:31

## 2017-10-03 RX ADMIN — PANTOPRAZOLE SODIUM 40 MG: 40 TABLET, DELAYED RELEASE ORAL at 12:31

## 2017-10-03 RX ADMIN — POTASSIUM & SODIUM PHOSPHATES POWDER PACK 280-160-250 MG 1 PACKET: 280-160-250 PACK at 21:01

## 2017-10-03 RX ADMIN — FUROSEMIDE 20 MG: 20 TABLET ORAL at 17:08

## 2017-10-03 RX ADMIN — LIDOCAINE 1 PATCH: 50 PATCH CUTANEOUS at 21:00

## 2017-10-03 RX ADMIN — CEFTRIAXONE SODIUM 2 G: 2 INJECTION, POWDER, FOR SOLUTION INTRAMUSCULAR; INTRAVENOUS at 16:59

## 2017-10-03 RX ADMIN — OXYCODONE HYDROCHLORIDE 5 MG: 5 TABLET ORAL at 21:00

## 2017-10-03 RX ADMIN — ASPIRIN 81 MG CHEWABLE TABLET 81 MG: 81 TABLET CHEWABLE at 09:17

## 2017-10-03 RX ADMIN — THERA TABS 1 TABLET: TAB at 09:17

## 2017-10-03 RX ADMIN — SENNOSIDES AND DOCUSATE SODIUM 2 TABLET: 8.6; 5 TABLET ORAL at 09:17

## 2017-10-03 RX ADMIN — CHLORHEXIDINE GLUCONATE 15 ML: 1.2 RINSE ORAL at 09:17

## 2017-10-03 RX ADMIN — POTASSIUM & SODIUM PHOSPHATES POWDER PACK 280-160-250 MG 1 PACKET: 280-160-250 PACK at 12:31

## 2017-10-03 RX ADMIN — CHLORHEXIDINE GLUCONATE 15 ML: 1.2 RINSE ORAL at 21:11

## 2017-10-03 RX ADMIN — FUROSEMIDE 20 MG: 20 TABLET ORAL at 09:17

## 2017-10-03 RX ADMIN — POTASSIUM & SODIUM PHOSPHATES POWDER PACK 280-160-250 MG 1 PACKET: 280-160-250 PACK at 17:08

## 2017-10-03 RX ADMIN — POTASSIUM CHLORIDE 20 MEQ: 750 TABLET, EXTENDED RELEASE ORAL at 09:17

## 2017-10-03 RX ADMIN — OXYCODONE HYDROCHLORIDE 10 MG: 5 TABLET ORAL at 00:26

## 2017-10-03 ASSESSMENT — VISUAL ACUITY
OU: BASELINE
OU: BASELINE

## 2017-10-03 NOTE — PLAN OF CARE
Problem: Goal Outcome Summary  Goal: Goal Outcome Summary  PT - per plan established by the Physical Therapist, according to functional mobility the  discharge recommendation is TCU for cont skilled PT to progress functional mobility. Pt is limited by weakness, fatigue. Pt amb up to 200 with WW and 200 no AD needing SBA, some mild instability no LOB. Pt demo gait and balance drills. Pt demo up and down 3 step x 3 with one rail following sternal precautions.   Discharge Planner PT   Patient plan for discharge: TCU  Current status: see above.   Barriers to return to prior living situation: weakness, fatigue.   Recommendations for discharge: TCU  Rationale for recommendations: skilled PT to progress functional mobility.      At this date pt has met 4/4 goals.        Entered by: Toby Doll 10/03/2017 3:05 PM

## 2017-10-03 NOTE — PROGRESS NOTES
CVTS Daily Note  10/3/2017  Attending: Zeb Leal MD    S:   No overnight events. Slept well. Feeling good overall. Stronger daily.     Pt seen in chair resting comfortably.    No acute complaints.      Denies F/C/Sweats.  No CP, SOB, or calf pain.    Tolerating diet and tube feeds.  + BM.  + Flatus.    Ambulated well without assistance.    Pain controlled well.    O:   Vitals:    10/03/17 0313 10/03/17 0649 10/03/17 0808 10/03/17 0843   BP: (!) 85/64   91/67   BP Location: Left arm  Right arm Right arm   Pulse:   93    Resp: 20  20    Temp: 98.1  F (36.7  C)  98.8  F (37.1  C)    TempSrc: Axillary  Oral    SpO2: 95%  98%    Weight:  65.2 kg (143 lb 12.8 oz)     Height:         Vitals:    10/01/17 0041 10/02/17 0500 10/03/17 0649   Weight: 67.8 kg (149 lb 6.4 oz) 66.7 kg (147 lb) 65.2 kg (143 lb 12.8 oz)       Intake/Output Summary (Last 24 hours) at 10/03/17 0925  Last data filed at 10/03/17 0811   Gross per 24 hour   Intake             2045 ml   Output             2475 ml   Net             -430 ml       MAPs: 69 - 80  Gen: AAO x 3, pleasant, NAD  CV: RRR, pronounced S1S2 normal, soft systolic murmur, no rubs or gallops.   Pulm: CTA, no rhonchi or wheezes  Abd: soft, non-tender, no guarding  Ext: trace peripheral edema, 1 + pitting  Incision: clean, dry, intact, no erythema  Chest Tube sites: dressings clean and dry    Labs:  BMP    Recent Labs  Lab 10/03/17  0735 10/02/17  0934 10/01/17  0816 09/30/17  0800    137 137 136   POTASSIUM 4.2 4.8 3.9 4.0   CHLORIDE 104 105 106 104   CHECO 8.0* 7.7* 7.6* 7.9*   CO2 26 24 25 24   BUN 15 12 14 14   CR 0.58* 0.50* 0.48* 0.49*   * 102* 110* 113*     CBC    Recent Labs  Lab 10/03/17  0735 10/02/17  0934 10/01/17  1341 10/01/17  0816 09/30/17  0800   WBC 5.5 7.8  --  9.2 8.9   RBC 2.54* 2.68*  --  2.51* 2.89*   HGB 7.6* 8.0* 8.1* 7.7* 8.7*   HCT 24.5* 25.8*  --  24.0* 27.2*   MCV 97 96  --  96 94   MCH 29.9 29.9  --  30.7 30.1   MCHC 31.0* 31.0*  --  32.1 32.0    RDW 25.5* 25.5*  --  24.3* 23.0*    213  --  184 170     INR    Recent Labs  Lab 10/03/17  0735 10/02/17  0934 10/01/17  0816 09/30/17  0800   INR 2.26* 2.28* 2.01* 1.67*      Hepatic Panel   Lab Results   Component Value Date     10/03/2017     Lab Results   Component Value Date     10/03/2017     Lab Results   Component Value Date    ALBUMIN 1.8 10/03/2017     GLUCOSE:     Recent Labs  Lab 10/03/17  0735 10/03/17  0314 10/03/17  0001 10/02/17  0934 10/02/17  0427 10/02/17  0028 10/01/17  2128 10/01/17  1731 10/01/17  0816  09/30/17  0800  09/29/17  0321  09/28/17  0615   *  --   --  102*  --   --   --   --  110*  --  113*  --  101*  --  104*   BGM  --  116* 113*  --  110* 109* 150* 100*  --   < >  --   < >  --   < >  --    < > = values in this interval not displayed.      Imaging:  reviewed recent imaging    ASSESSMENT/PLAN: Patient is a 47 year old male with a history of prosethic aortic valve with endocarditis, now s/p redo sternotomy with mechanical aortic and mitral valve replacement on 9/21 with Dr. Gorman and Dr. Leal.      Neuro:  - Acute post-op pain: IV dilaudid, tylenol and oxycodone prn  - Acute ischemic L MCA stroke: perioperative, goal MAPs >85. Neuro consult, appreciate recs, MRI corresponded to head CT findings, no acute hemorrhage. Reports right weakness is improving   - Superior hemianopsia, consult ophthalmology, recommended outpatient follow-up      CV:  -  mg, holding metoprolol for now given goal MAPs >85.  - Blood pressure: MAP goals >85 given CVA, less strict now patient stable and improving  - TPW removed 9/28 without complication      Resp: extubated POD 3  - IS, encourage activity  - Mediastinal CT removed 9/27, remaining right pleural removed 9/28, f/u CXR this PM       FEN/GI:   - SLP, advanced to regular diet, tolerating, TF at goal, + BM  - LFTs elevated, trending down, except for ALP which is still trending up slightly, all others trending  down/stable, will continue to follow and discuss with hep-alejandra team if necessary   - Now regular diet with thin liquids. Start cycled tube feeds 10/1.   - Removing NJ 10/3.       Renal/:   - Creatinine stable, at baseline, adequate UOP with IV lasix  - Volume status: hypervolemic, will increase diuersis gently with soft blood pressures. Continue lasix 20 mg PO BID. Would like to increase diuresis given LE edema and crackles, however BPs soft  - Urology saw patient for penile swelling - no intervention - follow-up on outpatient basis, moore out 9/30, adequate UOP with moore out      ID: Completed roxanna-op abx,   - WBC normal, afebrile, no signs or symptoms of infection  - ID following for endocarditis, started on ceftriaxone 2g IV q24h for ecoli on mitral valve until 11/3/17. Add levofloxacin 500 mg PO daily 9/27 to 11/8/17. Weekly CBC, CMP, CRP.      Heme:   - Hgb 7.6, no signs or symptoms of bleeding      Endo:   - BG well controlled, on SSI      PPx:   - PPI      Anticoagulation:   - INR goal 2.5-3.5 for combined mechanical AVR and MVR, INR 2.26, stop heparin gtt 10/2.        Dispo:   - 6C since 9/25  - Therapy recommending d/c to ARU, likely Tuesday or Wed       Discussed with CVTS Fellow   Staff surgeons have been informed of changes through both  verbal and written communication.      Diogenes Kraft PA-C  Cardiothoracic Surgery  Pager [phone]    9:25 AM   October 3, 2017

## 2017-10-03 NOTE — PLAN OF CARE
Problem: Patient Care Overview  Goal: Plan of Care/Patient Progress Review     OT/6C:  Cancel - pt declining participation in OT this PM due to recently finishing PT appointment and feeling fatigued.  Will reschedule.

## 2017-10-03 NOTE — PLAN OF CARE
Problem: Goal Outcome Summary  Goal: Goal Outcome Summary  1. Pt s pain will be well controlled   Outcome: Improving  D: Admit 9/15 dyspnea; s/p CABG and mechanical AVR/MVR (9/21).  I/A: VSS. BP soft MAP 70's. Pt received Oxycodone x 2 overnight for incisional pain. Neuro checks q4h; intact.  SR. Up with walker and SBA. Regular diet; calorie counts continued. NJ tube; TF running at 65 ml/hr from 2100 - 0900. PIV x2. Edema in lower extremities. Penile edema; pt saw urology and will follow up outpt. Lymph wraps on. Voiding adequately overnight. BS WNL overnight.  P: RN will continue to monitor and assess. RN will update team with any changes/concerns. Vernell Modi

## 2017-10-03 NOTE — PLAN OF CARE
Problem: Goal Outcome Summary  Goal: Goal Outcome Summary  Doing well today post-op. Ambulating in halls with walker; denies pain. Incisions healing well. Maintaining NSR. FT removed this morning & so far pt has had good appetite today. Calorie counts complete but documenting intake. BM today. INR remains 2.26. No rehab beds available today-hoping to transfer tomorrow if space opens.

## 2017-10-03 NOTE — PLAN OF CARE
Problem: Goal Outcome Summary  Goal: Goal Outcome Summary  Discharge Planner SLP      Patient plan for discharge: ARU  Current status: Pt had his NJ removed this morning prior to session. Pt tolerating regular diet and thin liquids well w/o outward s/sx of aspiration.  Dysphagia has resolved.  No further swallow tx warranted.  ST to sign off.   Barriers to return to prior living situation: None from SLPstandpoint  Recommendations for discharge: Per PT and OT recs  Rationale for recommendations: No further swallow needs.  D/c per PT and OT recs       Entered by: Cony Matthew 10/03/2017 2:45 PM       Comments:   Speech Language Therapy Discharge Summary     Reason for therapy discharge:    All goals and outcomes met, no further needs identified.     Progress towards therapy goal(s). See goals on Care Plan in [facility] electronic health record for goal details.  Goals met     Therapy recommendation(s):    No further therapy is recommended.Pt discharged on regular diet and thin liquids.  No further SLP needs warranted.

## 2017-10-03 NOTE — DISCHARGE SUMMARY
[facility], [place]   Cardiothoracic Surgery Hospital Discharge Summary     Harvey Almanzar MRN# [id number]   Age: 47 year old YOB: 1970     Admitting Physician:  Zeb Leal MD  Discharge Physician:  JAMIE Moctezuma  Primary Care Physician:        No Ref-Primary, Physician     DATE OF ADMISSION:  9/15/2017     DATE OF DISCHARGE: October 5, 2017          Admission Diagnoses:   1. 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 Severe homograft aortic valve insufficiency  3. Severe mitral valve regurgitation  4. Occluded saphenous vein graft to the right coronary artery          Discharge Diagnosis:   1.  S/P Aortic and mitral valve replacement with mechanical valve  2.  Left MCA filling defect with resolving right sided functional deficits.   3.  Multiple scattered acute to subacute left MCA distribution  4.  Elevated liver transaminases, improving   5.  S/P 1 vessel CAB to right heart (PDA)    PROCEDURES PERFORMED:   Date: 9/21/2017.  Surgeon: Dr. Nicola Gorman and Dr Leal   1. Redo sternotomy  2. Aortic valve replacement with a 17 mm St. Jerald Masters mechanical valve  3. Mitral valve replacement with a 27 mm St. Jerald Masters mechanical valve  4. Coronary artery bypass grafting x1 with a reverse saphenous vein graft to the posterior descending artery  5. Endoscopic vein harvest from the left lower extremity  6. Left femoral arterial cannulation.   7. Intraoperative IZA    OPERATIVE FINDINGS:  The patient had a severely calcified homograft as expected.  The leaflets were also calcified and degenerative but there was no evidence of active infection and we did not see an abscess or an abscess cavity. Intraoperative gram stain did not show any organisms.  The mitral valve was severely regurgitant but, again, there was no evidence of infection or vegetations.  The aortic root was severely calcified  "including the coronary buttons and we decided that taking down the homograft and replacing it will be extremely risky.  For this reason, we decided to take the valve out and placed on mechanical valve in the aortic position.     PATHOLOGY RESULTS:  none     CULTURE RESULTS:    1. Blood culture 9/25/17: no growth.   2. Stool C diff 9/24/17: negative.   3. Mitral Valve culture 9/21/17: light growth e coli.   4. Aortic Valve culture 9/21/17: no growth.     DRAINS/TUBES PRESENT AT DISCHARGE:  None    CONSULTS:    1.  PT/OT  2.  Ophthalmology  3.  Urology   4.  Neuro ICU team  5.  Infectious Disease     Patient discharged on aspirin:  Yes 81 mg  Patient discharged on beta blocker: no    Patient discharged on ACE Inhibitor/ARB: no             Discharge Disposition:   Discharged to rehabilitation facility            Condition on Discharge:   Discharge condition: Stable   Discharge vitals: Blood pressure 97/61, pulse 93, temperature 97.1  F (36.2  C), temperature source Oral, resp. rate 18, height 1.626 m (5' 4\"), weight 64.2 kg (141 lb 9.6 oz), SpO2 97 %.     Code status on discharge: Full Code     DAY OF DISCHARGE PHYSICAL EXAM:  Vitals:    10/02/17 2005 10/03/17 0000 10/03/17 0313 10/03/17 0649   BP: 97/76 (!) 82/62 (!) 85/64    BP Location: Left arm Left arm Left arm    Pulse:       Resp: 20 18 20    Temp: 97.9  F (36.6  C) 99.6  F (37.6  C) 98.1  F (36.7  C)    TempSrc: Oral Oral Axillary    SpO2: 100% 99% 95%    Weight:    65.2 kg (143 lb 12.8 oz)   Height:         Vitals:    10/01/17 0041 10/02/17 0500 10/03/17 0649   Weight: 67.8 kg (149 lb 6.4 oz) 66.7 kg (147 lb) 65.2 kg (143 lb 12.8 oz)     Gen: AAO x 3, pleasant, NAD  CV: RRR, pronounced S1S2, soft systolic murmur, no rubs or gallops.   Pulm: CTA, no rhonchi or wheezes  Abd: soft, non-tender, no guarding  Ext: moderate peripheral edema, 2 + pitting  Incision: clean, dry, intact, no erythema  Chest Tube sites: dressings clean and dry    BMP    Recent Labs  Lab " 10/05/17  0707 10/04/17  0656 10/03/17  0735 10/02/17  0934    136 136 137   POTASSIUM 3.9 4.0 4.2 4.8   CHLORIDE 106 102 104 105   CHECO 7.9* 8.2* 8.0* 7.7*   CO2 28 27 26 24   BUN 12 14 15 12   CR 0.54* 0.60* 0.58* 0.50*   GLC 82 86 105* 102*     CBC    Recent Labs  Lab 10/05/17  0707 10/04/17  0656 10/03/17  0735 10/02/17  0934   WBC 5.7 6.1 5.5 7.8   RBC 2.52* 2.61* 2.54* 2.68*   HGB 7.8* 8.1* 7.6* 8.0*   HCT 25.2* 26.0* 24.5* 25.8*    100 97 96   MCH 31.0 31.0 29.9 29.9   MCHC 31.0* 31.2* 31.0* 31.0*   RDW 25.8* 25.3* 25.5* 25.5*    220 205 213     INR    Recent Labs  Lab 10/05/17  0707 10/04/17  0656 10/03/17  0735   INR 4.22* 2.53* 2.26*       Ref. Range 9/15/2017 22:17 10/2/2017 09:34   CRP Inflammation Latest Ref Range: 0.0 - 8.0 mg/L   40.0 (H)   41.0 (H)     Hepatic Panel   Results for HARVEY MENDENHALL (MRN [id number]) as of 10/5/2017 08:38   Ref. Range 10/3/2017 07:35 10/4/2017 06:56 10/5/2017 07:07   Albumin Latest Ref Range: 3.4 - 5.0 g/dL   1.8 (L)   2.0 (L)   2.0 (L)   Protein Total Latest Ref Range: 6.8 - 8.8 g/dL   5.4 (L)   5.6 (L)   5.6 (L)   Bilirubin Total Latest Ref Range: 0.2 - 1.3 mg/dL   2.9 (H)   3.1 (H)   2.4 (H)   Alkaline Phosphatase Latest Ref Range: 40 - 150 U/L   74 (H)   304 (H)   304 (H)   ALT Latest Ref Range: 0 - 70 U/L   162 (H)   155 (H)   141 (H)   AST Latest Ref Range: 0 - 45 U/L   152 (H)   145 (H)   128 (H)         Recent Labs  Lab 10/05/17  0707 10/04/17  0656 10/03/17  0735 10/03/17  0314 10/03/17  0001   GLC 82 86 105*  --   --    BGM  --   --   --  116* 113*       BRIEF HISTORY OF ILLNESS:  Mr. Harvey Mendenhall is a 47-year-old gentleman with a very complicated cardiac history.  He had a bacterial endocarditis involving his native aortic valve and underwent a homograft replacement in 1996 in [place], [place].  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  He did well over the years, but in the summer of 2017 he developed streptococcal parasanguinis bacteremia and presumed prosthetic (homograft aortic valve endocarditis with severe aortic regurgitation).  He was recently admitted (9/15/17) to our hospital with heart failure.  The patient was placed on broad-spectrum antibiotics.  He clinically deteriorated during this admission and was therefore taken to the operating room for aortic valve replacement and mitral valve replacement.     HOSPITAL COURSE: Harvey Almanzar is a 47 year old male who on 9/21/2017 underwent the above-named procedures.  He tolerated the operation well and postoperatively was admitted to the CVICU.  Sedation was weaned for neuro assessment and weaning to extubation, he was noted to have R sided weakness and neurology was consulted with no intervention recommended, symptoms resolved with keeping MAPs > 85 for a day.  Pressors were weaned as tolerated.  His ICU stay was complicated by elevated LFTs and bilirubin, RUQ ultrasound showed cholestasis without signs of acute cholecystitis. He was extubated on POD #3 to 4 lpm via NC.  Urology consulted for penile edema, was thought to be secondary to injections under his skin, had penile block without epi.  Infectious Disease continued to follow, recommended converting meropenem to Ceftriaxone. He was transferred to the post-surgical telemetry unit on POD # 4 when deemed stable by Neuro team.    Neuro:  - Acute post-op pain: initially IV dilaudid, tylenol and oxycodone prn. Not needing pain meds at discharge.   - Acute ischemic L MCA stroke: perioperative, goal MAPs >85. Neuro consult, appreciate recs, MRI corresponded to head CT findings, no acute hemorrhage. Reports right weakness is improving and MAP goals relaxed to > 60.    - Superior hemianopsia, consult ophthalmology, recommended outpatient follow-up, no treatment needed.       CV:  -  mg, holding metoprolol for now given  goal MAPs >85.  - Blood pressure: MAP goals >85 given CVA, less strict now patient stable and improving  - TPW removed 9/28 without complication      Resp: extubated POD 3  - IS, encourage activity  - Mediastinal CT removed 9/27, remaining right pleural removed 9/28, f/u CXR this PM       FEN/GI:   - SLP, advanced to regular diet, tolerating, TF at goal, + BM  - LFTs elevated, trending down, except for ALP which is still trending up slightly, all others trending down/stable, will continue to follow and discuss with hep-alejandra team if necessary   - Now regular diet with thin liquids. Start cycled tube feeds 10/1.   - Removed NJ 10/3.       Renal/:   - Creatinine stable, at baseline, adequate UOP with IV lasix  - Volume status: hypervolemic, will increase diuersis gently with soft blood pressures. Continue lasix 20 mg PO BID. Would like to increase diuresis given LE edema and crackles, however BPs soft  - Urology saw patient for penile swelling - no intervention - follow-up on outpatient basis, moore out 9/30, adequate UOP with moore out.        ID: Completed roxanna-op abx,   - WBC normal, afebrile, no signs or symptoms of infection  - ID following for endocarditis, started on ceftriaxone 2g IV q24h for ecoli on mitral valve until 11/3/17. Added levofloxacin 500 mg PO daily 9/27 to 11/8/17. Weekly CBC, CMP, CRP.      Heme:   - Hgb 7.8, no signs or symptoms of bleeding      Endo:   - BG well controlled, on SSI      PPx:   - PPI.       Anticoagulation:   - INR goal 2.5-3.5 for combined mechanical AVR and MVR, INR 4.22, stopped heparin gtt 10/2.        Dispo:   - 6C since 9/25  - Therapy recommended d/c to ARU, likely Tuesday or Wed    Prior to discharge, his pain was controlled well, he was able to perform most ADLs and ambulate without difficulty, and had full return of bowel and bladder function.  On October 5, 2017, he was discharged to home with family assist in stable condition.    Intra-Op TE ECHOCARDIOGRAM,  2017-   Limited intra-operative IZA performed while on cardiopulmonary bypass to  assess gradients s/p mechanical AVR & MVR.  Pre-op images show severe AI, moderate-severe functional MR, LVEF 45-50%,  moderate-severe RV dysfxn.  Post-op images show a well seated mechanical MVR, with no residual MR, and  mean gr of 2-3 mmHg.  There is a mechanical AVR 17 mm placed in pre-existing homograft. Mean gr is  28 mmHg, indicating an element of patient-prostheisis mismatch. There is  trace-mild AI.  RV fxn is improved to mild dysfxn (RCA CABG done), LV fxn is improved to 50%.  LVIDd: 4.5 cm  LVIDs: 3.3 cm  FS: 26.4 %  EDV(Teich): 91.5 ml  ESV(Teich): 44.1 ml  Time Measurements: LVET: 0.22 sec  Doppler Measurements & Calculations:   MV V2 VTI: 24.0 cm  MV dec slope: 509.2 cm/sec2  Ao V2 max: 223.8 cm/sec  Ao max P.8 mmHg  Ao V2 VTI: 60.7 cm    CT head and neck, angio 17-   1) Head CT demonstrates evolving acute infarction involving the left superior frontal  gyrus posteriorly, left caudate head, left centrum semiovale/corona radiata and  anterolateral aspect of the anterior limb of the left internal capsule. No hemorrhage is identified.  2) CT perfusion maps does not demonstrate ischemic penumbra. There is slight  asymmetric delayed MTT and slight asymmetric decreased CBF to the areas of  ischemia including left corona radiata, centrum semiovale and anterior basal ganglia area.  3) CT angiogram demonstrates a nonocclusive narrowing of the superior M2  segment of the left medial middle cerebral artery immediately following the  bifurcation. The remainder intracranial arterial structures and the cervical arterial   structures are patent. There is a 6 mm long high density tubular structure in the  expected region of the left MCA, at the level of known stenosis, this density might  represent the thrombus/ embolus (thought this is much higher density than a regular thrombus).     MRI Brain without contrast, 17-    Findings: Several images are partially degraded by artifact related to patient motion.     Restricted diffusion in the left MCA distribution involving the posterior left frontal  lobe (including involvement of the precentral gyrus), anterior limb of the left internal  capsule, corona radiata, and left subinsular white matter, correlating with regions of  hypoattenuation on the prior CT. Corresponding areas of T2 hyperintensity. No  evidence of hemorrhagic transformation, although there are numerous scattered  punctate foci of susceptibility effect in both cerebral and cerebellar hemispheres,  predominantly peripheral in distribution. Corresponding areas of punctate  calcification on the prior CT.     There is no intracranial mass lesion, mass effect, midline shift, or abnormal extra  axial fluid collection. The ventricles and sulci are normal for age. Normal  intravascular flow voids are identified.     Scattered paranasal sinus mucosal thickening, most pronounced in the left  maxillary sinus. Mild bilateral mastoid effusions, predominantly in the petrous apices.    Impression:   Multiple scattered acute to subacute left MCA distribution infarcts corresponding to  regions of hypoattenuation on the prior head CT. No acute hemorrhage.    Abdomen US 9/23/17-   - Limited evaluation of the left lobe of the liver and pancreas due to  overlying tubes and dressings.  - The partially visualized liver is normal in contour and echogenicity.  There is no intrahepatic or extrahepatic biliary ductal dilatation.  The common bile duct measures 4 mm.   - There is sludge within the gallbladder without pericholecystic fluid,  wall thickening or gallstones. Patient is sedated so no sonographic  - Lassiter's sign could be evaluated.  - The visualized inferior vena cava is normal.    - The right kidney is normal in position and echogenicity. The right  kidney measures 10.2 cm. There is no significant urinary tract dilation.   IMPRESSION:    Sludge within the gallbladder without evidence for gallstones or acute  cholecystitis in the present study. If clinical concern persists,  recommend further evaluation with nuclear medicine HIDA scan.    CXR 9/29/17-   1. Mild interval decrease in small, left greater than right, bilateral layering pleural effusions.  2. Stable to mildly improved bilateral perihilar and bibasilar opacities as well as left  retrocardiac opacities, likely representing improving atelectasis versus infection.  3. Stable cardiomegaly and pulmonary vascular congestion.      DISCHARGE INSTRUCTIONS:  Avoid lifting anything greater than ten pounds for 6 weeks after surgery and then less than 20 pounds for an additional 6 weeks.    No driving for 4 weeks after surgery or while on pain medication.    Avoid strenuous activities such as bowling, vacuuming, raking, shoveling, golf or tennis for 12 weeks after your surgery. It is okay to resume sex if you feel comfortable in doing so. You may have to try different positions with your partner.     Splint your chest incision by hugging a pillow or bringing your arms across your chest when coughing or sneezing. Avoid pushing off with your arms when getting up for the first month if you have had your sternum opened.    Shower or wash your incisions daily with soap and water (or as instructed), pat dry. Keep wound clean and dry, showers are okay after discharge, but don't let spray hit directly on incision. No baths or swimming for 1 month.  Clean wounds twice a day for 2 weeks with microklenz spray if available. Cover chest tube sites with gauze until they stop draining, then leave open. It is not abnormal for chest tube sites to drain yellowish/clear fluid for up to 2-3 weeks after surgery.   Watch for signs of infection: increased redness, tenderness, warmth or any drainage that appears infected (pus like) or is persistent.  Also a temperature > 100.5 F or chills. Call your surgeon or primary care  provider's office immediately. Remove any skin glue left on incisions after 10 days. This will not affect your incision and can speed up healing.    Exercise is very important in your recovery. Please follow the guidelines set up for you in your cardiac rehab classes at the hospital. If outpatient cardiac rehab was ordered for you, we highly recommend you participate. If you have problems arranging your cardiac rehab, please call [phone].     Avoid sitting for prolonged periods of time, try to walk every hour during the day. If you have a leg incision, elevate your leg often when you are not walking.    Check your weight when you get home from the hospital and continue to check it daily through your recovery for at least a month. If you notice a weight gain of 2-3 pounds in a week, notify your primary care physician, cardiologist or surgeon.    Bowel activity may be slow after surgery. If necessary, you may take an over the counter laxative such as Milk of Magnesia or Miralax. You may have stool softeners prescribed (docusate sodium, Senokot). We recommend using stool softeners while using narcotics for pain (oxycodone/percocet, hydrocodone/vicodin).      DENTAL VISITS AFTER SURGERY  If you have had your heart valve repaired or replaced, we do not recommend having any dental work done for 6 months and you will need to take an antibiotic prior to dental visits from now on.  Please notify your dentist before any procedure for the proper treatment needed. The antibiotic is taken by mouth one hour prior to visit. This includes routine cleanings.    DO NOT SMOKE.  IF YOU NEED HELP QUITTING, PLEASE TALK WITH YOUR CARDIOLOGIST OR PRIMARY DOCTOR.    You are on a blood thinner, follow the instructions you were given in the hospital and DO NOT SKIP this medication. Try and take it the same time everyday. Your primary care physician or coumadin clinic will manage the dosing.     REGARDING PRESCRIPTION REFILLS.  If you  need a refill on your pain medication contact us.  All other medications will be adjusted, discontinued and re-filled by your primary care physician and/or your cardiologist as they were prior to your surgery. We have given you enough for one to three month with possibly one refill.    FOLLOW UP APPOINTMENTS:   You have a follow up visit with CVTS Surgery Advance Care Practitioners on 10/17/17 at 3 pm (arrive 2:45pm) at the [facility].  You will then return to the care of your primary physician and your cardiologist.   It is important to call for an appointment to see your cardiologist in 4-6 weeks after surgery and your primary care physician in 2-4 weeks after surgery. If there is a need to return to see CT Surgery please call our  at [phone].    PRE-ADMISSION MEDICATIONS:    No current facility-administered medications on file prior to encounter.   No current outpatient prescriptions on file prior to encounter.     DISCHARGE MEDICATIONS:    Harvey Almanzar   Home Medication Instructions CORIE:[id number]    Printed on:10/05/17 6340   Medication Information                      aspirin 81 MG chewable tablet  Take 1 tablet (81 mg) by mouth daily             cefTRIAXone (ROCEPHIN) 2 GM vial  Inject 2 g into the vein every 24 hours             furosemide (LASIX) 20 MG tablet  Take 1 tablet (20 mg) by mouth daily             levofloxacin (LEVAQUIN) 500 MG tablet  Take 1 tablet (500 mg) by mouth daily             melatonin 3 MG tablet  Take 1 tablet (3 mg) by mouth nightly as needed for sleep             order for DME  Equipment being ordered: Walker Wheels ()  Treatment Diagnosis: Gait instability             oxyCODONE (ROXICODONE) 5 MG IR tablet  Take 1 tablet (5 mg) by mouth every 6 hours as needed for moderate to severe pain             potassium chloride SA (K-DUR/KLOR-CON M) 10 MEQ CR tablet  Take 1 tablet (10 mEq) by mouth daily             rosuvastatin (CRESTOR)  10 MG tablet  Take 1 tablet (10 mg) by mouth daily             senna-docusate (SENOKOT-S;PERICOLACE) 8.6-50 MG per tablet  Take 1-2 tablets by mouth 2 times daily as needed (constipation )             warfarin (COUMADIN) 2 MG tablet  Take 2 mg (1 tablet) PO at 6 pm on 10/5/17, then take 3 mg (1.5 tabs) until next INR check, then dose per goal 2.5-3.5                 CC:s  No Ref-Primary, Physician      [facility]   Cardiothoracic Surgery  Office phone: [phone]

## 2017-10-03 NOTE — PROGRESS NOTES
CLINICAL NUTRITION SERVICES     Nutrition Prescription    Recommendations already ordered by Registered Dietitian (RD):  Modified oral supplements  Discontinued TFs, free water flushes, and ProSource TF modular    Future/Additional Recommendations:  For all recommendations, see prior nutrition notes.     Per SLP, kcal counts 10/2 not reflective of pt's total nutrition intake.    Kcal counts:  9/30   917 kcals and 43 g protein (three meal/s and no supplement/s recorded)  10/1   836 kcals and 27 g protein (two meal/s and one Ensure Plus supplement/s recorded)  10/2   120 kcals and 2 g protein (no meal/s and no supplement/s recorded) - Per SLP, kcal count record is from pt's therapy session with SLP. Discussed oral intake with pt. Per pt, consumed about ~90% of his omelet and sausage and 100% of applesauce for breakfast. He consumed 100% of cod and salad for lunch yesterday. He dislikes the Gelatein supplements he is receiving and did not consume these supplements. Estimate his kcal count to be at least 1170 kcals and 57 g protein. This estimation does not include other outside food he may have eaten. Estimated needs are 3014-3619+ kcals/day (25 - 30+ kcals/kg)  grams protein/day (1.5 - 2 grams of pro/kg). He may not be meeting 100% of his nutrition needs, but oral intake is improving overall.    INTERVENTIONS:  Implementation:  1. Medical food supplement therapy: Discontinued Gelatein oral supplements and ordered chocolate Ensure Plus to be sent at 10:00, 14:00, and HS. Pt will try to consume two to three of these daily. Provided oral supplement menu.  2. Collaboration with other providers: Discussed pt with team and RN. Plan is for feeding tube removal and discontinuation of TFs. Discontinued TFs, free water flushes, and ProSource TF modular.  3. Nutrition education for nutrition relationship to health/disease: Provided verbal instruction on a heart-healthy diet (pt s/p surgery that involved CABG). Encouraged  intake of fruits and vegetables as well as whole grains. Discussed recommended fat sources and not recommended fat sources. Rec lean protein options as able. Did rec avoidance of salt and high sodium foods if needed. Provided handout from the Nutrition Care Manual on Heart-Healthy Nutrition Therapy.     Follow up/Monitoring:  Will continue to follow pt.    Monica Castillo, MS, RD, LD, [facility]   6C Pgr:  [phone]

## 2017-10-03 NOTE — PROGRESS NOTES
Social Work Services Progress Note    Hospital Day: 17  Date of Initial Social Work Evaluation:  9/29/17  Collaborated with:  CVTS, ARU    Data:  Pt is a 47 year old male being followed by SW for placement as needed    Intervention:  Pt progressing well with therapies and nursing and is ambulating well with walker/support from staff as needed.  Pt likely not ARU candidate due to progression in therapies while hospitalized.  Pt is on the list for [facility] as an alternate option.  Will discuss with rehab team and CVTS team if pt still needing TCU placement after his hospitalization.    Assessment:  Pt has strong support system at home.  Pt progressing well with therapy and likely no longer meeting ARU criteria per rehab admissions team.  Pt on the list for [facility] as an alternate option.    Plan:    Anticipated Disposition:  Facility:  Progressing to [facility]     Barriers to d/c plan:  INR    Follow Up:  SW to follow up with admissions regarding TCU bed availability.    KINZA Mcdaniels, SHAIW  6C Unit   Phone: [phone]  Pager: [phone]  Unit: [phone]

## 2017-10-03 NOTE — PROGRESS NOTES
Calorie Counts  Intake recorded for: 10/2  Kcals: 120  Protein: 2g  # Meals Recorded: 100% apple juice, raegan crackers   # Supplements Recorded: 0

## 2017-10-04 ENCOUNTER — APPOINTMENT (OUTPATIENT)
Dept: CARDIOLOGY | Facility: CLINIC | Age: 47
DRG: 216 | End: 2017-10-04
Attending: PHYSICIAN ASSISTANT
Payer: COMMERCIAL

## 2017-10-04 ENCOUNTER — APPOINTMENT (OUTPATIENT)
Dept: OCCUPATIONAL THERAPY | Facility: CLINIC | Age: 47
DRG: 216 | End: 2017-10-04
Attending: PHYSICIAN ASSISTANT
Payer: COMMERCIAL

## 2017-10-04 ENCOUNTER — HOSPITAL ENCOUNTER (INPATIENT)
Dept: VASCULAR ULTRASOUND | Facility: CLINIC | Age: 47
DRG: 216 | End: 2017-10-04
Attending: PHYSICIAN ASSISTANT
Payer: COMMERCIAL

## 2017-10-04 ENCOUNTER — APPOINTMENT (OUTPATIENT)
Dept: PHYSICAL THERAPY | Facility: CLINIC | Age: 47
DRG: 216 | End: 2017-10-04
Attending: PHYSICIAN ASSISTANT
Payer: COMMERCIAL

## 2017-10-04 LAB
ALBUMIN SERPL-MCNC: 2 G/DL (ref 3.4–5)
ALP SERPL-CCNC: 304 U/L (ref 40–150)
ALT SERPL W P-5'-P-CCNC: 155 U/L (ref 0–70)
ANION GAP SERPL CALCULATED.3IONS-SCNC: 7 MMOL/L (ref 3–14)
AST SERPL W P-5'-P-CCNC: 145 U/L (ref 0–45)
BILIRUB SERPL-MCNC: 3.1 MG/DL (ref 0.2–1.3)
BUN SERPL-MCNC: 14 MG/DL (ref 7–30)
CALCIUM SERPL-MCNC: 8.2 MG/DL (ref 8.5–10.1)
CHLORIDE SERPL-SCNC: 102 MMOL/L (ref 94–109)
CO2 SERPL-SCNC: 27 MMOL/L (ref 20–32)
CREAT SERPL-MCNC: 0.6 MG/DL (ref 0.66–1.25)
ERYTHROCYTE [DISTWIDTH] IN BLOOD BY AUTOMATED COUNT: 25.3 % (ref 10–15)
GFR SERPL CREATININE-BSD FRML MDRD: >90 ML/MIN/1.7M2
GLUCOSE SERPL-MCNC: 86 MG/DL (ref 70–99)
HCT VFR BLD AUTO: 26 % (ref 40–53)
HGB BLD-MCNC: 8.1 G/DL (ref 13.3–17.7)
INR PPP: 2.53 (ref 0.86–1.14)
MCH RBC QN AUTO: 31 PG (ref 26.5–33)
MCHC RBC AUTO-ENTMCNC: 31.2 G/DL (ref 31.5–36.5)
MCV RBC AUTO: 100 FL (ref 78–100)
PHOSPHATE SERPL-MCNC: 3.8 MG/DL (ref 2.5–4.5)
PLATELET # BLD AUTO: 220 10E9/L (ref 150–450)
POTASSIUM SERPL-SCNC: 4 MMOL/L (ref 3.4–5.3)
PROT SERPL-MCNC: 5.6 G/DL (ref 6.8–8.8)
RBC # BLD AUTO: 2.61 10E12/L (ref 4.4–5.9)
SODIUM SERPL-SCNC: 136 MMOL/L (ref 133–144)
WBC # BLD AUTO: 6.1 10E9/L (ref 4–11)

## 2017-10-04 PROCEDURE — 40000133 ZZH STATISTIC OT WARD VISIT: Performed by: OCCUPATIONAL THERAPIST

## 2017-10-04 PROCEDURE — 27210208 ZZH KIT VALVED DOUBLE LUMEN

## 2017-10-04 PROCEDURE — 25000132 ZZH RX MED GY IP 250 OP 250 PS 637: Performed by: THORACIC SURGERY (CARDIOTHORACIC VASCULAR SURGERY)

## 2017-10-04 PROCEDURE — 84100 ASSAY OF PHOSPHORUS: CPT | Performed by: PHYSICIAN ASSISTANT

## 2017-10-04 PROCEDURE — 25000132 ZZH RX MED GY IP 250 OP 250 PS 637: Performed by: SURGERY

## 2017-10-04 PROCEDURE — 40000193 ZZH STATISTIC PT WARD VISIT

## 2017-10-04 PROCEDURE — 25000132 ZZH RX MED GY IP 250 OP 250 PS 637: Performed by: ANESTHESIOLOGY

## 2017-10-04 PROCEDURE — 97530 THERAPEUTIC ACTIVITIES: CPT | Mod: GP

## 2017-10-04 PROCEDURE — 25000132 ZZH RX MED GY IP 250 OP 250 PS 637: Performed by: HOSPITALIST

## 2017-10-04 PROCEDURE — 80053 COMPREHEN METABOLIC PANEL: CPT | Performed by: PHYSICIAN ASSISTANT

## 2017-10-04 PROCEDURE — 85610 PROTHROMBIN TIME: CPT | Performed by: SURGERY

## 2017-10-04 PROCEDURE — 97110 THERAPEUTIC EXERCISES: CPT | Mod: GO | Performed by: OCCUPATIONAL THERAPIST

## 2017-10-04 PROCEDURE — 93306 TTE W/DOPPLER COMPLETE: CPT

## 2017-10-04 PROCEDURE — 25000132 ZZH RX MED GY IP 250 OP 250 PS 637: Performed by: PHYSICIAN ASSISTANT

## 2017-10-04 PROCEDURE — 25000125 ZZHC RX 250: Performed by: PHYSICIAN ASSISTANT

## 2017-10-04 PROCEDURE — 36415 COLL VENOUS BLD VENIPUNCTURE: CPT | Performed by: SURGERY

## 2017-10-04 PROCEDURE — 97116 GAIT TRAINING THERAPY: CPT | Mod: GP

## 2017-10-04 PROCEDURE — 21400006 ZZH R&B CCU INTERMEDIATE UMMC

## 2017-10-04 PROCEDURE — 84100 ASSAY OF PHOSPHORUS: CPT | Performed by: SURGERY

## 2017-10-04 PROCEDURE — 85027 COMPLETE CBC AUTOMATED: CPT | Performed by: PHYSICIAN ASSISTANT

## 2017-10-04 PROCEDURE — 93306 TTE W/DOPPLER COMPLETE: CPT | Mod: 26 | Performed by: INTERNAL MEDICINE

## 2017-10-04 PROCEDURE — 25000128 H RX IP 250 OP 636: Performed by: PHYSICIAN ASSISTANT

## 2017-10-04 PROCEDURE — 36569 INSJ PICC 5 YR+ W/O IMAGING: CPT

## 2017-10-04 RX ORDER — HEPARIN SODIUM,PORCINE 10 UNIT/ML
2-5 VIAL (ML) INTRAVENOUS
Status: COMPLETED | OUTPATIENT
Start: 2017-10-04 | End: 2017-10-05

## 2017-10-04 RX ORDER — WARFARIN SODIUM 4 MG/1
4 TABLET ORAL
Status: COMPLETED | OUTPATIENT
Start: 2017-10-04 | End: 2017-10-04

## 2017-10-04 RX ORDER — AMOXICILLIN 250 MG
1-2 CAPSULE ORAL 2 TIMES DAILY PRN
Qty: 50 TABLET | Refills: 0 | Status: SHIPPED | OUTPATIENT
Start: 2017-10-04

## 2017-10-04 RX ORDER — OXYCODONE HYDROCHLORIDE 5 MG/1
5 TABLET ORAL EVERY 6 HOURS PRN
Qty: 20 TABLET | Refills: 0 | Status: ON HOLD | OUTPATIENT
Start: 2017-10-04 | End: 2018-04-10

## 2017-10-04 RX ORDER — LIDOCAINE 40 MG/G
CREAM TOPICAL
Status: DISCONTINUED | OUTPATIENT
Start: 2017-10-04 | End: 2017-10-05 | Stop reason: HOSPADM

## 2017-10-04 RX ORDER — LANOLIN ALCOHOL/MO/W.PET/CERES
3 CREAM (GRAM) TOPICAL
Qty: 30 TABLET | Refills: 0 | Status: SHIPPED | OUTPATIENT
Start: 2017-10-04

## 2017-10-04 RX ORDER — LEVOFLOXACIN 500 MG/1
500 TABLET, FILM COATED ORAL DAILY
Qty: 35 TABLET | Refills: 0 | Status: SHIPPED | OUTPATIENT
Start: 2017-10-04 | End: 2017-11-08

## 2017-10-04 RX ORDER — CEFTRIAXONE 2 G/1
2 INJECTION, POWDER, FOR SOLUTION INTRAMUSCULAR; INTRAVENOUS EVERY 24 HOURS
Qty: 600 ML | Refills: 0 | Status: SHIPPED | OUTPATIENT
Start: 2017-10-04 | End: 2017-11-03

## 2017-10-04 RX ORDER — ASPIRIN 81 MG/1
81 TABLET, CHEWABLE ORAL DAILY
Qty: 120 TABLET | Refills: 0 | Status: SHIPPED | OUTPATIENT
Start: 2017-10-04

## 2017-10-04 RX ORDER — AMOXICILLIN 250 MG
2 CAPSULE ORAL 2 TIMES DAILY
Status: DISCONTINUED | OUTPATIENT
Start: 2017-10-04 | End: 2017-10-05 | Stop reason: HOSPADM

## 2017-10-04 RX ORDER — FUROSEMIDE 20 MG
20 TABLET ORAL DAILY
Status: DISCONTINUED | OUTPATIENT
Start: 2017-10-05 | End: 2017-10-05 | Stop reason: HOSPADM

## 2017-10-04 RX ORDER — POLYETHYLENE GLYCOL 3350 17 G/17G
17 POWDER, FOR SOLUTION ORAL DAILY PRN
Status: DISCONTINUED | OUTPATIENT
Start: 2017-10-04 | End: 2017-10-05 | Stop reason: HOSPADM

## 2017-10-04 RX ADMIN — THERA TABS 1 TABLET: TAB at 08:45

## 2017-10-04 RX ADMIN — CEFTRIAXONE SODIUM 2 G: 2 INJECTION, POWDER, FOR SOLUTION INTRAMUSCULAR; INTRAVENOUS at 13:08

## 2017-10-04 RX ADMIN — OXYCODONE HYDROCHLORIDE 5 MG: 5 TABLET ORAL at 03:18

## 2017-10-04 RX ADMIN — POTASSIUM & SODIUM PHOSPHATES POWDER PACK 280-160-250 MG 1 PACKET: 280-160-250 PACK at 08:45

## 2017-10-04 RX ADMIN — MELATONIN TAB 3 MG 3 MG: 3 TAB at 00:06

## 2017-10-04 RX ADMIN — CHLORHEXIDINE GLUCONATE 15 ML: 1.2 RINSE ORAL at 20:08

## 2017-10-04 RX ADMIN — OXYCODONE HYDROCHLORIDE 10 MG: 5 TABLET ORAL at 23:54

## 2017-10-04 RX ADMIN — ASPIRIN 81 MG CHEWABLE TABLET 81 MG: 81 TABLET CHEWABLE at 08:45

## 2017-10-04 RX ADMIN — POTASSIUM & SODIUM PHOSPHATES POWDER PACK 280-160-250 MG 1 PACKET: 280-160-250 PACK at 20:09

## 2017-10-04 RX ADMIN — LIDOCAINE 1 PATCH: 50 PATCH CUTANEOUS at 20:09

## 2017-10-04 RX ADMIN — LEVOFLOXACIN 500 MG: 500 TABLET, FILM COATED ORAL at 09:25

## 2017-10-04 RX ADMIN — WARFARIN SODIUM 4 MG: 4 TABLET ORAL at 18:23

## 2017-10-04 RX ADMIN — POTASSIUM CHLORIDE 20 MEQ: 750 TABLET, EXTENDED RELEASE ORAL at 08:51

## 2017-10-04 RX ADMIN — POTASSIUM & SODIUM PHOSPHATES POWDER PACK 280-160-250 MG 1 PACKET: 280-160-250 PACK at 13:01

## 2017-10-04 RX ADMIN — LIDOCAINE HYDROCHLORIDE 2 ML: 20 INJECTION, SOLUTION INFILTRATION; PERINEURAL at 16:28

## 2017-10-04 RX ADMIN — FUROSEMIDE 20 MG: 20 TABLET ORAL at 08:45

## 2017-10-04 RX ADMIN — OXYCODONE HYDROCHLORIDE 5 MG: 5 TABLET ORAL at 21:02

## 2017-10-04 RX ADMIN — PANTOPRAZOLE SODIUM 40 MG: 40 TABLET, DELAYED RELEASE ORAL at 08:45

## 2017-10-04 RX ADMIN — POTASSIUM & SODIUM PHOSPHATES POWDER PACK 280-160-250 MG 1 PACKET: 280-160-250 PACK at 16:40

## 2017-10-04 RX ADMIN — POTASSIUM CHLORIDE 20 MEQ: 750 TABLET, EXTENDED RELEASE ORAL at 08:45

## 2017-10-04 RX ADMIN — CHLORHEXIDINE GLUCONATE 15 ML: 1.2 RINSE ORAL at 08:45

## 2017-10-04 ASSESSMENT — PAIN DESCRIPTION - DESCRIPTORS
DESCRIPTORS: ACHING

## 2017-10-04 ASSESSMENT — VISUAL ACUITY: OU: BASELINE

## 2017-10-04 NOTE — PLAN OF CARE
Problem: Goal Outcome Summary  Goal: Goal Outcome Summary  Pt alert and oriented.  VSS on room air.  Pt ambulating halls w/ walker throughout day.  Compression stockings on.  PICC placed today for home abx.  Pt learning center appt. Tomorrow for IV abx at 0900.  Coumadin teaching started this evening.  INR 2.53.  Adequate UOP.  K+ (4.0) replaced per protocol.  Continue with plan of care and notify team w/ changes/concenrs.  Discharge tomorrow to home with family.

## 2017-10-04 NOTE — PROGRESS NOTES
Social Work Services Progress Note    Hospital Day: 18  Date of Initial Social Work Evaluation:  9/29/17  Collaborated with:  RNCC, CVTS team, SNF admissions     Data:  Pt is a 47 year old male being followed by SW for placement as needed.    Intervention:  SW met with CVTS to discuss if pt continuing to need TCU level of care.  Per CVTS pt is medically cleared for discharge to home or TCU pending pt's preference for care.  RNCC met with pt who has chosen a discharge to home with homecare and progress to OPCR.  Pt continues to have family support at home and has good adherence to sternal precautions per rehab team.  [facility] admissions liaison updated on change of care.  Prior auth to be dc.  SW will sign off as no longer needing TCU level of care.  No further SW needs identified.  Please re-consult SW if other needs arise.    Assessment:  Pt has strong support system at home.  Pt progressing well with therapy and likely no longer meeting ARU or TCU. When given the option, pt has chosen discharge to home with assist.  RNCC will set up homecare with the goal of progressing to OPCR.    Plan:    Anticipated Disposition:  Facility:  Progressing to [facility] TCU     Barriers to d/c plan: None    Follow Up:  SW will sign off for services.  Please reconsult if other needs arise.    KINZA Mcdaniels, SHAIW  6C Unit   Phone: [phone]  Pager: [phone]  Unit: [phone]

## 2017-10-04 NOTE — PLAN OF CARE
Problem: Goal Outcome Summary  Goal: Goal Outcome Summary  6C / PT     Discharge Planner PT   Patient plan for discharge: home with assist  Current status: Performing all bed mobility and transfers safely within precautions, amb with and without FWW safely without loss of balance  Barriers to return to prior living situation: no barriers noted  Recommendations for discharge: home with assist for household chores, use of FWW for community ambulation, and OP Cardiac Rehab  Rationale for recommendations: Pt is performing all functional mobility safely within precautions however demonstrates mild weakness in right LE with amb without AD, pt has family who is available to assist 24/7 as needed.       Entered by: Pinky Preston 10/04/2017 3:05 PM        Physical Therapy Discharge Summary     Reason for therapy discharge:    All goals and outcomes met, no further needs identified.     Progress towards therapy goal(s). See goals on Care Plan in [facility] electronic health record for goal details.  Goals met     Therapy recommendation(s):    Continue home exercise program.

## 2017-10-04 NOTE — PROGRESS NOTES
"  Care Coordinator Progress Note     Admission Date/Time:  9/15/2017  Attending MD:  Zeb eLal MD   Data  Chart reviewed, discussed with interdisciplinary team.   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  Pt is now s/p redo sternotomy with mechanical aortic and mitral valve replacement on 9/21/17.    Concerns with insurance coverage for discharge needs: None.  Current Living Situation: Patient said that he lives with his two brothers and his brother Anmol Barnett will be home with him \"all the time\".   Support System: Supportive and Involved brothers: Anmol Barnett and Nagi Barnett.   Services Involved: [facility]  Transportation: Family or Friend will provide  Barriers to Discharge: post-op recovery.     Coordination of Care and Referrals: Provided patient/family with options for home infusion, Warfarin and INR monitoring.    Assessment  Per PA, pt will need home IV abx arranged. Pt said that he wants to use [facility] as they were providing his home IV abx prior to this admission. Pt also needs outpt INR and Warfarin monitoring arranged. Pt said that his PCP is Dr. Chapito Kaba at the [facility].   Pt said that he plans to discharge to home with the assist of his brothers and go to a family run outpt rehab where family members will provide him with transportation.   Intervention:   Arrangements made with [facility] (Ph: [phone] Fax: [phone]) for home IV abx and PICC line care.   Arrangements made Dr. Chapito Kaba at [facility] (Ph: [phone] Fax: [phone]) for INR and Warfarin monitoring (Goal=2.5-3.5). Indication for Anticoagulation: " Mechanical MVR. Expected duration of therapy: Lifetime. Dr. Chapito Kaba to follow. Appointment made with Dr. Kaba on 10/9/17 at 11:50 AM for INR lab draw and post hospitalization follow up.    Plan  Anticipated Discharge Date:  10/5/17  Anticipated Discharge Plan:  Discharge to home.     MADIHA ROMAN RN BSN  Care Coordinator  [phone]

## 2017-10-04 NOTE — PLAN OF CARE
Problem: Goal Outcome Summary  Goal: Goal Outcome Summary     Discharge Planner OT   Patient plan for discharge: Home with OPCR  Current status: Pt able to complete functional transfers and mobility with SBA using FWW while abiding by surgical precautions. Pt tolerated ~15 min on Nustep with 2 rest breaks needed due to LE fatigue. Normal CV response - HR , O2 sats >90% on RA, pre /71, post BP 96/78.  Barriers to return to prior living situation: none  Recommendations for discharge: Home with OPCR  Rationale for recommendations: to increase functional endurance for ADL/IADLs       Entered by: Lenora Steiner 10/04/2017 2:58 PM

## 2017-10-04 NOTE — PROGRESS NOTES
CVTS Daily Note  10/4/2017  Attending: Zeb Leal MD    S:   No overnight events.  Feeling great. Getting stronger.   Says he has a lot of family around him that will help him get better.   He would like to go home.     Pt seen in chair resting comfortably.    No acute complaints.      Denies F/C/Sweats.  No CP, SOB, or calf pain.    Tolerating diet well.  + BM.  + Flatus.    Ambulated very well without assistance.    Pain controlled well.    O:   Vitals:    10/03/17 1943 10/04/17 0008 10/04/17 0315 10/04/17 0730   BP: 93/67 (!) 84/61 90/54 91/67   BP Location: Left arm Right arm Right arm Right arm   Pulse:       Resp: 16 16 16 16   Temp: 98.2  F (36.8  C) 97.7  F (36.5  C) 97.8  F (36.6  C) 98.1  F (36.7  C)   TempSrc: Oral Oral Oral Oral   SpO2: 99% 97% 97% 97%   Weight:   64.3 kg (141 lb 11.2 oz)    Height:         Vitals:    10/02/17 0500 10/03/17 0649 10/04/17 0315   Weight: 66.7 kg (147 lb) 65.2 kg (143 lb 12.8 oz) 64.3 kg (141 lb 11.2 oz)     Intake/Output Summary (Last 24 hours) at 10/04/17 1026  Last data filed at 10/04/17 0925   Gross per 24 hour   Intake             1740 ml   Output             3125 ml   Net            -1385 ml       MAPs: 67 - 75  Gen: AAO x 3, pleasant, NAD  CV: RRR, pronounced S1S2, soft systolic murmur, no rubs or gallops.   Pulm: CTA, no rhonchi or wheezes  Abd: soft, non-tender, no guarding  Ext: moderate peripheral edema, 2 + pitting  Incision: clean, dry, intact, no erythema  Chest Tube sites: dressings clean and dry      Labs:  [facility]    Recent Labs  Lab 10/04/17  0656 10/03/17  0735 10/02/17  0934 10/01/17  0816    136 137 137   POTASSIUM 4.0 4.2 4.8 3.9   CHLORIDE 102 104 105 106   CHECO 8.2* 8.0* 7.7* 7.6*   CO2 27 26 24 25   BUN 14 15 12 14   CR 0.60* 0.58* 0.50* 0.48*   GLC 86 105* 102* 110*     CBC    Recent Labs  Lab 10/04/17  0656 10/03/17  0735 10/02/17  0934 10/01/17  1341 10/01/17  0816   WBC 6.1 5.5 7.8  --  9.2   RBC 2.61* 2.54* 2.68*  --  2.51*   HGB 8.1* 7.6*  8.0* 8.1* 7.7*   HCT 26.0* 24.5* 25.8*  --  24.0*    97 96  --  96   MCH 31.0 29.9 29.9  --  30.7   MCHC 31.2* 31.0* 31.0*  --  32.1   RDW 25.3* 25.5* 25.5*  --  24.3*    205 213  --  184     INR    Recent Labs  Lab 10/04/17  0656 10/03/17  0735 10/02/17  0934 10/01/17  0816   INR 2.53* 2.26* 2.28* 2.01*      Hepatic Panel    Ref. Range 10/3/2017 07:35 10/4/2017 06:56   Albumin Latest Ref Range: 3.4 - 5.0 g/dL 1.8 (L) 2.0 (L)   Protein Total Latest Ref Range: 6.8 - 8.8 g/dL 5.4 (L) 5.6 (L)   Bilirubin Total Latest Ref Range: 0.2 - 1.3 mg/dL 2.9 (H) 3.1 (H)   Alkaline Phosphatase Latest Ref Range: 40 - 150 U/L 274 (H) 304 (H)   ALT Latest Ref Range: 0 - 70 U/L 162 (H) 155 (H)   AST Latest Ref Range: 0 - 45 U/L 152 (H) 145 (H)     GLUCOSE:     Recent Labs  Lab 10/04/17  0656 10/03/17  0735 10/03/17  0314 10/03/17  0001 10/02/17  0934 10/02/17  0427 10/02/17  0028 10/01/17  2128 10/01/17  1731 10/01/17  0816  09/30/17  0800  09/29/17  0321   GLC 86 105*  --   --  102*  --   --   --   --  110*  --  113*  --  101*   BGM  --   --  116* 113*  --  110* 109* 150* 100*  --   < >  --   < >  --    < > = values in this interval not displayed.      Imaging:  reviewed recent imaging    ASSESSMENT/PLAN: Patient is a 47 year old male with a history of prosethic aortic valve with endocarditis, now s/p redo sternotomy with mechanical aortic and mitral valve replacement on 9/21 with Dr. Gorman and Dr. Leal.      Neuro:  - Acute post-op pain: IV dilaudid, tylenol and oxycodone prn  - Acute ischemic L MCA stroke: perioperative, goal MAPs >85. Neuro consult, appreciate recs, MRI corresponded to head CT findings, no acute hemorrhage. Reports right weakness is improving   - Superior hemianopsia, consult ophthalmology, recommended outpatient follow-up      CV:  -  mg, holding metoprolol for now given goal MAPs >85.  - Blood pressure: MAP goals >85 given CVA, less strict now patient stable and improving  - TPW removed  9/28 without complication      Resp: extubated POD 3  - IS, encourage activity  - Mediastinal CT removed 9/27, remaining right pleural removed 9/28, f/u CXR this PM       FEN/GI:   - SLP, advanced to regular diet, tolerating, TF at goal, + BM  - LFTs elevated, trending down, except for ALP which is still trending up slightly, all others trending down/stable, will continue to follow and discuss with hep-alejandra team if necessary   - Now regular diet with thin liquids. Start cycled tube feeds 10/1.   - Removed NJ 10/3.       Renal/:   - Creatinine stable, at baseline, adequate UOP with IV lasix  - Volume status: hypervolemic, will increase diuersis gently with soft blood pressures. Continue lasix 20 mg PO BID. Would like to increase diuresis given LE edema and crackles, however BPs soft  - Urology saw patient for penile swelling - no intervention - follow-up on outpatient basis, moore out 9/30, adequate UOP with moore out      ID: Completed roxanna-op abx,   - WBC normal, afebrile, no signs or symptoms of infection  - ID following for endocarditis, started on ceftriaxone 2g IV q24h for ecoli on mitral valve until 11/3/17. Add levofloxacin 500 mg PO daily 9/27 to 11/8/17. Weekly CBC, CMP, CRP.      Heme:   - Hgb 8.1, no signs or symptoms of bleeding      Endo:   - BG well controlled, on SSI      PPx:   - PPI      Anticoagulation:   - INR goal 2.5-3.5 for combined mechanical AVR and MVR, INR 2.53, stop heparin gtt 10/2.        Dispo:   - 6C since 9/25  - Therapy recommending d/c to ARU, likely Tuesday or Wed      Discussed with CVTS Fellow   Staff surgeons have been informed of changes through both  verbal and written communication.      Diogenes Kraft PA-C  Cardiothoracic Surgery  Pager [phone]    10:26 AM   October 4, 2017

## 2017-10-05 ENCOUNTER — APPOINTMENT (OUTPATIENT)
Dept: OCCUPATIONAL THERAPY | Facility: CLINIC | Age: 47
DRG: 216 | End: 2017-10-05
Attending: PHYSICIAN ASSISTANT
Payer: COMMERCIAL

## 2017-10-05 VITALS
RESPIRATION RATE: 18 BRPM | WEIGHT: 141.6 LBS | BODY MASS INDEX: 24.17 KG/M2 | HEART RATE: 93 BPM | SYSTOLIC BLOOD PRESSURE: 97 MMHG | HEIGHT: 64 IN | DIASTOLIC BLOOD PRESSURE: 61 MMHG | OXYGEN SATURATION: 97 % | TEMPERATURE: 97.1 F

## 2017-10-05 LAB
ALBUMIN SERPL-MCNC: 2 G/DL (ref 3.4–5)
ALP SERPL-CCNC: 304 U/L (ref 40–150)
ALT SERPL W P-5'-P-CCNC: 141 U/L (ref 0–70)
ANION GAP SERPL CALCULATED.3IONS-SCNC: 5 MMOL/L (ref 3–14)
AST SERPL W P-5'-P-CCNC: 128 U/L (ref 0–45)
BILIRUB SERPL-MCNC: 2.4 MG/DL (ref 0.2–1.3)
BUN SERPL-MCNC: 12 MG/DL (ref 7–30)
CALCIUM SERPL-MCNC: 7.9 MG/DL (ref 8.5–10.1)
CHLORIDE SERPL-SCNC: 106 MMOL/L (ref 94–109)
CO2 SERPL-SCNC: 28 MMOL/L (ref 20–32)
CREAT SERPL-MCNC: 0.54 MG/DL (ref 0.66–1.25)
ERYTHROCYTE [DISTWIDTH] IN BLOOD BY AUTOMATED COUNT: 25.8 % (ref 10–15)
GFR SERPL CREATININE-BSD FRML MDRD: >90 ML/MIN/1.7M2
GLUCOSE SERPL-MCNC: 82 MG/DL (ref 70–99)
HCT VFR BLD AUTO: 25.2 % (ref 40–53)
HGB BLD-MCNC: 7.8 G/DL (ref 13.3–17.7)
INR PPP: 4.22 (ref 0.86–1.14)
MCH RBC QN AUTO: 31 PG (ref 26.5–33)
MCHC RBC AUTO-ENTMCNC: 31 G/DL (ref 31.5–36.5)
MCV RBC AUTO: 100 FL (ref 78–100)
PLATELET # BLD AUTO: 225 10E9/L (ref 150–450)
POTASSIUM SERPL-SCNC: 3.9 MMOL/L (ref 3.4–5.3)
PROT SERPL-MCNC: 5.6 G/DL (ref 6.8–8.8)
RBC # BLD AUTO: 2.52 10E12/L (ref 4.4–5.9)
SODIUM SERPL-SCNC: 139 MMOL/L (ref 133–144)
WBC # BLD AUTO: 5.7 10E9/L (ref 4–11)

## 2017-10-05 PROCEDURE — 25000132 ZZH RX MED GY IP 250 OP 250 PS 637: Performed by: THORACIC SURGERY (CARDIOTHORACIC VASCULAR SURGERY)

## 2017-10-05 PROCEDURE — 80053 COMPREHEN METABOLIC PANEL: CPT | Performed by: PHYSICIAN ASSISTANT

## 2017-10-05 PROCEDURE — 85027 COMPLETE CBC AUTOMATED: CPT | Performed by: PHYSICIAN ASSISTANT

## 2017-10-05 PROCEDURE — 40000133 ZZH STATISTIC OT WARD VISIT

## 2017-10-05 PROCEDURE — 40000802 ZZH SITE CHECK

## 2017-10-05 PROCEDURE — 97535 SELF CARE MNGMENT TRAINING: CPT | Mod: GO

## 2017-10-05 PROCEDURE — 97110 THERAPEUTIC EXERCISES: CPT | Mod: GO

## 2017-10-05 PROCEDURE — 25000132 ZZH RX MED GY IP 250 OP 250 PS 637: Performed by: SURGERY

## 2017-10-05 PROCEDURE — 25000128 H RX IP 250 OP 636: Performed by: PHYSICIAN ASSISTANT

## 2017-10-05 PROCEDURE — 90686 IIV4 VACC NO PRSV 0.5 ML IM: CPT | Performed by: THORACIC SURGERY (CARDIOTHORACIC VASCULAR SURGERY)

## 2017-10-05 PROCEDURE — 25000132 ZZH RX MED GY IP 250 OP 250 PS 637: Performed by: ANESTHESIOLOGY

## 2017-10-05 PROCEDURE — 25000128 H RX IP 250 OP 636: Performed by: THORACIC SURGERY (CARDIOTHORACIC VASCULAR SURGERY)

## 2017-10-05 PROCEDURE — 85610 PROTHROMBIN TIME: CPT | Performed by: SURGERY

## 2017-10-05 PROCEDURE — 36592 COLLECT BLOOD FROM PICC: CPT | Performed by: SURGERY

## 2017-10-05 PROCEDURE — 25000132 ZZH RX MED GY IP 250 OP 250 PS 637: Performed by: PHYSICIAN ASSISTANT

## 2017-10-05 RX ORDER — FUROSEMIDE 20 MG
20 TABLET ORAL DAILY
Qty: 30 TABLET | Refills: 1 | Status: SHIPPED | OUTPATIENT
Start: 2017-10-05 | End: 2018-10-18

## 2017-10-05 RX ORDER — WARFARIN SODIUM 2 MG/1
TABLET ORAL
Qty: 120 TABLET | Refills: 1 | Status: ON HOLD | OUTPATIENT
Start: 2017-10-05 | End: 2018-04-10

## 2017-10-05 RX ORDER — POTASSIUM CHLORIDE 750 MG/1
10 TABLET, EXTENDED RELEASE ORAL DAILY
Qty: 30 TABLET | Refills: 1 | Status: SHIPPED | OUTPATIENT
Start: 2017-10-05 | End: 2017-10-17

## 2017-10-05 RX ORDER — ROSUVASTATIN CALCIUM 10 MG/1
10 TABLET, COATED ORAL DAILY
Qty: 30 TABLET | Refills: 1 | Status: SHIPPED | OUTPATIENT
Start: 2017-10-05

## 2017-10-05 RX ADMIN — CHLORHEXIDINE GLUCONATE 15 ML: 1.2 RINSE ORAL at 08:25

## 2017-10-05 RX ADMIN — ASPIRIN 81 MG CHEWABLE TABLET 81 MG: 81 TABLET CHEWABLE at 08:25

## 2017-10-05 RX ADMIN — POTASSIUM & SODIUM PHOSPHATES POWDER PACK 280-160-250 MG 1 PACKET: 280-160-250 PACK at 08:24

## 2017-10-05 RX ADMIN — THERA TABS 1 TABLET: TAB at 08:24

## 2017-10-05 RX ADMIN — SODIUM CHLORIDE, PRESERVATIVE FREE 5 ML: 5 INJECTION INTRAVENOUS at 06:54

## 2017-10-05 RX ADMIN — INFLUENZA A VIRUS A/MICHIGAN/45/2015 X-275 (H1N1) ANTIGEN (FORMALDEHYDE INACTIVATED), INFLUENZA A VIRUS A/HONG KONG/4801/2014 X-263B (H3N2) ANTIGEN (FORMALDEHYDE INACTIVATED), INFLUENZA B VIRUS B/PHUKET/3073/2013 ANTIGEN (FORMALDEHYDE INACTIVATED), AND INFLUENZA B VIRUS B/BRISBANE/60/2008 ANTIGEN (FORMALDEHYDE INACTIVATED) 0.5 ML: 15; 15; 15; 15 INJECTION, SUSPENSION INTRAMUSCULAR at 11:44

## 2017-10-05 RX ADMIN — FUROSEMIDE 20 MG: 20 TABLET ORAL at 08:24

## 2017-10-05 RX ADMIN — POTASSIUM CHLORIDE 20 MEQ: 750 TABLET, EXTENDED RELEASE ORAL at 08:25

## 2017-10-05 RX ADMIN — CEFTRIAXONE SODIUM 2 G: 2 INJECTION, POWDER, FOR SOLUTION INTRAMUSCULAR; INTRAVENOUS at 11:40

## 2017-10-05 RX ADMIN — POTASSIUM CHLORIDE 20 MEQ: 750 TABLET, EXTENDED RELEASE ORAL at 10:33

## 2017-10-05 RX ADMIN — SENNOSIDES AND DOCUSATE SODIUM 2 TABLET: 8.6; 5 TABLET ORAL at 08:25

## 2017-10-05 RX ADMIN — POTASSIUM & SODIUM PHOSPHATES POWDER PACK 280-160-250 MG 1 PACKET: 280-160-250 PACK at 13:26

## 2017-10-05 RX ADMIN — PANTOPRAZOLE SODIUM 40 MG: 40 TABLET, DELAYED RELEASE ORAL at 08:23

## 2017-10-05 RX ADMIN — LEVOFLOXACIN 500 MG: 500 TABLET, FILM COATED ORAL at 11:42

## 2017-10-05 NOTE — PROGRESS NOTES
DISCHARGE   Discharged to: Home  Via: Automobile  Accompanied by: Family, brother  Discharge Instructions: diet, activity, medications, follow up appointments, when to call the MD, and what to watchout for (i.e. s/s of infection, increasing SOB, palpitations, chest pain,)  Prescriptions: To be filled by   [facility]charge    pharmacy per pt's request; medication list reviewed & sent with pt  Follow Up Appointments: arranged; information given  Belongings: All sent with pt  IV: out  Telemetry: off  Pt exhibits understanding of above discharge instructions; all questions answered.  Discharge Paperwork: faxed

## 2017-10-05 NOTE — PLAN OF CARE
Problem: Goal Outcome Summary  Goal: Goal Outcome Summary  Discharge Planner OT   Patient plan for discharge: Home    Current status: Sit<>stand mod I, ambulated in hallways ~400 mod I using FWW. Tolerated 16 minutes on Nu-step at workload level 2. Pt educated on EC/WS strategies for home.   Barriers to return to prior living situation: none  Recommendations for discharge: Home with OP CR   Rationale for recommendations: To facilitate IND in ADLs and return to PLOF       Entered by: Varsha Gray 10/05/2017 12:34 PM

## 2017-10-05 NOTE — PLAN OF CARE
Problem: Cardiac Surgery (Adult)  Goal: Signs and Symptoms of Listed Potential Problems Will be Absent, Minimized or Managed (Cardiac Surgery)  Signs and symptoms of listed potential problems will be absent, minimized or managed by discharge/transition of care (reference Cardiac Surgery (Adult) CPG).   Outcome: Improving  D:Up out of bed independently and walking in halls with walker.   Denies pain.   Denies shortness of breath.   Lungs are clear bilaterally.  Has 2+ edema in the lower extremities bilaterally.  Compression stockings are on.  Denies numbness or tingling of upper or lower extremities.   Sternal incision intact with no marked redness or swelling.   WBC 5.7,   Hgb 7.8  And 8.1 day before.  JAMIE Andino was informed of todays  Value.  INR 4.22 and no coumadin per order for today.  Patient will be instructed with discharge orders.  K+ 3.9 and was replaced with 20mEq KCL per protacol.   .   Went to [facility] for PICC line teaching as scheduled for home antibiotics.  Temp 98.1,  Rhythm is NSR with no ectopy.  HR 88  Bp 97/61  MAP 75.  O2 sat 97% on room air.     A:Is doing well and moving about in room and in halls independently.   Is free of pain.  Incision is healing well.  Afebrile and VSS.  Rhythm is stable.  Is ready to discharge to home this afternoon.     P:Discharge to home this afternoon.

## 2017-10-05 NOTE — CONSULTS
Harvey did quite well with his education. He stated that he had done this several months ago in July so we reviewed over the steps of setting up his equipment and administering his medication IV push. He needed very few prompts and new to clean his work area and hand washing. We did go over changing end caps and he demonstrated this as he had not been shown before how to do this. He was given all the written material for the class.

## 2017-10-05 NOTE — PHARMACY-ANTICOAGULATION SERVICE
Warfarin Therapy Hold Note  This patient is currently receiving warfarin for Mechanical heart valve (mitral)  Goal INR:  2.5-3.5.      Anticoagulation Dose History     Recent Dosing and Labs Latest Ref Rng & Units 9/29/2017 9/30/2017 10/1/2017 10/2/2017 10/3/2017 10/4/2017 10/5/2017    Warfarin 1 mg - 2 mg 2 mg 2 mg 2 mg - - -    Warfarin 4 mg - - - - - 4 mg 4 mg -    INR 0.86 - 1.14 1.52(H) 1.67(H) 2.01(H) 2.28(H) 2.26(H) 2.53(H) 4.22(H)    Chromogenic Factor 10 70 - 130 % - - - - - - -          Bleeding Signs/Symptoms:  None    Assessment:  Current INR is supratherapeutic.  This is most likely due to: nutrition changes, increased doses of warfarin, and possibly related to increased LFTs that may be related to ceftriaxone.     Plan:  1) HOLD today s warfarin dose.   An order has been placed in EPIC for  Warfarin- No Dose Today    2) Do not recommend reversal with vitamin K or FFP at this time.   3) Recheck next INR tomorrow with AM labs.    The primary team has been contacted about the above plan.    Yennifer Jenkins, Pharm.D., [facility]  Pager [phone]

## 2017-10-05 NOTE — PLAN OF CARE
Problem: Goal Outcome Summary  Goal: Goal Outcome Summary  Outcome: Improving     D: Hx bicuspid valve s/p prosthetic AVR, aortic valve endocarditis. Now s/p redo sternotomy with mechanical AVR/MVR (9/21/17).  I/A: VSSA, on room air. Monitor shows SR. Incisional pain controlled with prn oxycodone 5-10 mg. Using urinal with adequate UO. Up independently with walker. Sleeping between cares.   P: Probable discharge today on IV abx. PLC appt at 0900 for PICC/warfarin teaching. Continue to monitor and notify CVTS with pertinent changes.

## 2017-10-06 ENCOUNTER — CARE COORDINATION (OUTPATIENT)
Dept: CARE COORDINATION | Facility: CLINIC | Age: 47
End: 2017-10-06

## 2017-10-06 ENCOUNTER — TELEPHONE (OUTPATIENT)
Dept: PHARMACY | Facility: OTHER | Age: 47
End: 2017-10-06

## 2017-10-06 ENCOUNTER — TELEPHONE (OUTPATIENT)
Dept: INFECTIOUS DISEASES | Facility: CLINIC | Age: 47
End: 2017-10-06

## 2017-10-06 NOTE — TELEPHONE ENCOUNTER
Maddie (nurse, [facility]) called in process of admitting pt, said they received pt's D/C orders which say ID will be following him. No ID notes/encounters/appt seen by triage RN. Asking who should be following pt, who lab results should be sent to. Can be reached at [phone].

## 2017-10-06 NOTE — PROGRESS NOTES
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           Tatiana Gold RN

## 2017-10-09 NOTE — TELEPHONE ENCOUNTER
Called and left VM to let them know Dr Kirk will be following pt and labs can be sent to her.  Lenora Jj RN

## 2017-10-09 NOTE — TELEPHONE ENCOUNTER
MTM referral from: Transitions of Care (recent hospital discharge or ED visit)    MTM referral outreach attempt #2 on October 9, 2017 at 11:40 AM      Outcome: Patient is not interested at this time because they are not a [place] patient emailed [facility] MTM referral info for follow up, will route to MTM Pharmacist/Provider as an FYI. Thank you for the referral.     Crissy Jiang MTM Coordinator

## 2017-10-10 NOTE — TELEPHONE ENCOUNTER
Maddie with [facility], called back to inquire which weekly labs Dr Ernandez would like for this Pt. Pt is scheduled for labs on 10/12. Please callback Maddie at [phone].

## 2017-10-11 ENCOUNTER — HOSPITAL ENCOUNTER (OUTPATIENT)
Dept: CARDIAC REHAB | Facility: CLINIC | Age: 47
End: 2017-10-11
Attending: PHYSICIAN ASSISTANT
Payer: COMMERCIAL

## 2017-10-11 VITALS — HEIGHT: 64 IN | WEIGHT: 142.75 LBS | BODY MASS INDEX: 24.37 KG/M2

## 2017-10-11 PROCEDURE — 40000116 ZZH STATISTIC OP CR VISIT: Performed by: CLINICAL EXERCISE PHYSIOLOGIST

## 2017-10-11 PROCEDURE — 40000575 ZZH STATISTIC OP CARDIAC VISIT #2: Performed by: CLINICAL EXERCISE PHYSIOLOGIST

## 2017-10-11 PROCEDURE — 93798 PHYS/QHP OP CAR RHAB W/ECG: CPT | Performed by: CLINICAL EXERCISE PHYSIOLOGIST

## 2017-10-11 PROCEDURE — 93797 PHYS/QHP OP CAR RHAB WO ECG: CPT | Performed by: CLINICAL EXERCISE PHYSIOLOGIST

## 2017-10-11 ASSESSMENT — 6 MINUTE WALK TEST (6MWT)
GENDER SELECTION: MALE
TOTAL DISTANCE WALKED (FT): 1234
FEMALE CALC: 1915.71
MALE CALC: 1858.86
PREDICTED: 1870.2

## 2017-10-16 ENCOUNTER — PRE VISIT (OUTPATIENT)
Dept: UROLOGY | Facility: CLINIC | Age: 47
End: 2017-10-16

## 2017-10-16 NOTE — TELEPHONE ENCOUNTER
Patient with history of phimosis coming in for wound check. Patient chart reviewed, no need for call, all records available and ready for appointment.

## 2017-10-17 ENCOUNTER — TRANSFERRED RECORDS (OUTPATIENT)
Dept: HEALTH INFORMATION MANAGEMENT | Facility: CLINIC | Age: 47
End: 2017-10-17

## 2017-10-17 ENCOUNTER — OFFICE VISIT (OUTPATIENT)
Dept: CARDIOLOGY | Facility: CLINIC | Age: 47
End: 2017-10-17
Attending: SURGERY
Payer: COMMERCIAL

## 2017-10-17 VITALS
HEART RATE: 95 BPM | SYSTOLIC BLOOD PRESSURE: 98 MMHG | DIASTOLIC BLOOD PRESSURE: 67 MMHG | HEIGHT: 64 IN | WEIGHT: 148.6 LBS | BODY MASS INDEX: 25.37 KG/M2 | OXYGEN SATURATION: 98 %

## 2017-10-17 DIAGNOSIS — I50.22 CHRONIC SYSTOLIC HEART FAILURE (H): ICD-10-CM

## 2017-10-17 DIAGNOSIS — I50.22 CHRONIC SYSTOLIC CONGESTIVE HEART FAILURE (H): ICD-10-CM

## 2017-10-17 DIAGNOSIS — Z95.2 S/P AVR: ICD-10-CM

## 2017-10-17 DIAGNOSIS — Z95.2 S/P AVR (AORTIC VALVE REPLACEMENT): ICD-10-CM

## 2017-10-17 DIAGNOSIS — Z95.2 S/P MVR (MITRAL VALVE REPLACEMENT): Primary | ICD-10-CM

## 2017-10-17 DIAGNOSIS — Z95.2 HX OF MITRAL VALVE REPLACEMENT WITH MECHANICAL VALVE: ICD-10-CM

## 2017-10-17 LAB — INR PPP: 5.09 (ref 0.86–1.14)

## 2017-10-17 PROCEDURE — 99212 OFFICE O/P EST SF 10 MIN: CPT | Mod: ZF

## 2017-10-17 PROCEDURE — 36415 COLL VENOUS BLD VENIPUNCTURE: CPT | Performed by: PHYSICIAN ASSISTANT

## 2017-10-17 PROCEDURE — 85610 PROTHROMBIN TIME: CPT | Performed by: PHYSICIAN ASSISTANT

## 2017-10-17 RX ORDER — METOPROLOL SUCCINATE 25 MG/1
12.5 TABLET, EXTENDED RELEASE ORAL DAILY
Qty: 15 TABLET | Refills: 1 | Status: ON HOLD | OUTPATIENT
Start: 2017-10-17 | End: 2018-04-10

## 2017-10-17 RX ORDER — POTASSIUM CHLORIDE 750 MG/1
20 TABLET, EXTENDED RELEASE ORAL DAILY
Qty: 30 TABLET | Refills: 1 | Status: SHIPPED | OUTPATIENT
Start: 2017-10-17 | End: 2018-10-18

## 2017-10-17 ASSESSMENT — PAIN SCALES - GENERAL: PAINLEVEL: NO PAIN (0)

## 2017-10-17 NOTE — PATIENT INSTRUCTIONS
Go to Lab for INR draw today, I will call you with results.    Please call your INR clinic today or tomorrow morning to request an appointment ASAP.     Start metoprolol 12.5 mg daily     Follow-up with cardiologist in 2-4 weeks, please call your [facility] clinic to arrange this.

## 2017-10-17 NOTE — LETTER
10/17/2017      RE: Harvey Almanzar  [address] [place] [address]       Dear Colleague,    Thank you for the opportunity to participate in the care of your patient, Harvey Almanzar, at the [facility]. Please see a copy of my visit note below.    CARDIOTHORACIC SURGERY FOLLOW-UP VISIT     Harvey Almanzar   1970   [id number]      Reason for visit: Post-Op mechanical MVR, AVR, aneurysm repair and CABG x1 (SVG to PDA) with Dr. Gorman and Dr. Leal on 9/21/2017    HPI: Harvey Almanzar is a 47 year old year old male seen in clinic for a routine follow-up appointment after surgery. 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 Patient was admitted on 9/15 with acute heart failure and subsequently underwent the above procedures. Hospital course was remarkable for acute ischemic left MCA stroke. Patient's neurologic status continues to improve and he reports near full strength of his right upper extremity and improvement in his vision. Patient's ICU stay was complicated by elevated LFTs and bilirubin which had improved by discharge. Urology was consulted for penile edema which was thought to be secondary to injections under his skin. He was treated with long term antibiotics for his endocarditis and is following up with ID in the next couple of weeks.     Patient has been doing well since discharge and now returns to clinic for postop visit.    Patient denies any fever, chills, chest pain, palpitations, edema, SOB, lightheadedness, nausea and vomiting.    Patient reports incision is healed well.  Normal bowel movements.  Voiding without problems.    Has been attending cardiac rehabilitation and that is going well.    Patient is on Coumadin and reports his  "INR clinic has not been monitoring his INR \"because I'm on antibiotics and that makes it inaccurate. His last known INR was on 10/5 and was supratherapeutic.    Blood glucose levels have been under good control.      PAST MEDICAL HISTORY:  Past Medical History:   Diagnosis Date     Acute diastolic congestive heart failure (H) 8/24/2017     Aortic valve replaced 1996    Overview:  Cadaver - tissue valve in [facility]  Amoxicillin 2000 mg prior to dental work please.  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       PAST SURGICAL HISTORY:  Past Surgical History:   Procedure Laterality Date     REDO STERNOTOMY BYPASS CORONARY ARTERY N/A 9/21/2017    Procedure: REDO STERNOTOMY BYPASS CORONARY ARTERY;;  Surgeon: Nicola Gorman MD;  Location: [facility] OR     REDO STERNOTOMY REPLACE VALVE AORTIC N/A 9/21/2017    Procedure: REDO STERNOTOMY REPLACE VALVE AORTIC;;  Surgeon: Nicola Gorman MD;  Location: [facility] OR     REDO STERNOTOMY REPLACE VALVE MITRAL N/A 9/21/2017    Procedure: REDO STERNOTOMY REPLACE VALVE MITRAL;  Left groin cutdown, Redo Median Sternotomy, Lysis of adhesions, Left mini-thoracotomy,Coronary artery bypass graft x 1 using the left greater saphenous vein, using endovein harvest, Mitral valve replacement using [employer] Mechanical 27mm, Aortic Valve Replacement using a 17mm [employer] Mechanical, On Cardiopulmonary Bypass Pump;  Surgeon: Cindy Gorman       CURRENT MEDICATIONS:   Current Outpatient Prescriptions   Medication     furosemide (LASIX) 20 MG tablet     potassium chloride SA (K-DUR/KLOR-CON M) 10 MEQ CR tablet     warfarin (COUMADIN) 2 MG tablet     rosuvastatin (CRESTOR) 10 MG tablet     melatonin 3 MG tablet     oxyCODONE (ROXICODONE) 5 MG IR tablet     aspirin 81 " "MG chewable tablet     cefTRIAXone (ROCEPHIN) 2 GM vial     levofloxacin (LEVAQUIN) 500 MG tablet     senna-docusate (SENOKOT-S;PERICOLACE) 8.6-50 MG per tablet     order for DME     No current facility-administered medications for this visit.        ALLERGIES:    No Known Allergies    ROS:  Gen: denies frequent headaches, double/blurry vision, persistent insomnia, fatigue, unexplained weight loss/gain   CV: denies chest pain, SOB, palpitations, peripheral edema  Pulm: denies SOB, asthma or wheezing  GI/: denies liver or kidney problems, blood in stool or BRBPR, voiding without problems  Endo: denies thyroid problems or Diabetes  Heme/Onc: denies bleeding  MS: no weakness, tremors or gait instability  Neuro: denies depression, memory problems      PHYSICAL EXAM:   BP 98/67 (BP Location: Left arm, Patient Position: Chair, Cuff Size: Adult Regular)  Pulse 95  Ht 1.626 m (5' 4\")  Wt 67.4 kg (148 lb 9.6 oz)  SpO2 98%  BMI 25.51 kg/m2  General: alert and oriented x 3, pleasant, no acute distress, normal mood and affect  CV: S1 S2, click of mechanical valves heard, no murmurs, rubs or gallops, regular rate and rhythm, no peripheral edema  Pulm: bilateral breath sounds, clear to auscultation, easy work of breathing  GI: (+) bowel sounds, soft non-tender and non-distended  Incision: incisions clean dry and intact without erythema, swelling or drainage, sternum stable. Groin and leg incision healed without erythema or drainage.  Neuro: nonfocal    LABS:  Last Basic Metabolic Panel:  Lab Results   Component Value Date     10/05/2017      Lab Results   Component Value Date    POTASSIUM 3.9 10/05/2017     Lab Results   Component Value Date    CHLORIDE 106 10/05/2017     Lab Results   Component Value Date    CHECO 7.9 10/05/2017     Lab Results   Component Value Date    CO2 28 10/05/2017     Lab Results   Component Value Date    BUN 12 10/05/2017     Lab Results   Component Value Date    CR 0.54 10/05/2017     Lab " Results   Component Value Date    GLC 82 10/05/2017       Last CBC:   Lab Results   Component Value Date    WBC 5.7 10/05/2017     Lab Results   Component Value Date    RBC 2.52 10/05/2017     Lab Results   Component Value Date    HGB 7.8 10/05/2017     Lab Results   Component Value Date    HCT 25.2 10/05/2017     No components found for: MCT  Lab Results   Component Value Date     10/05/2017     Lab Results   Component Value Date    MCH 31.0 10/05/2017     Lab Results   Component Value Date    MCHC 31.0 10/05/2017     Lab Results   Component Value Date    RDW 25.8 10/05/2017     Lab Results   Component Value Date     10/05/2017       INR:  Lab Results   Component Value Date    INR 4.22 10/05/2017    INR 2.53 10/04/2017    INR 2.26 10/03/2017    INR 2.28 10/02/2017    INR 2.01 10/01/2017    INR 1.67 09/30/2017    INR 1.52 09/29/2017    INR 1.51 09/28/2017    INR 1.72 09/27/2017    INR 2.72 09/26/2017    INR 3.29 09/25/2017    INR 2.45 09/24/2017       IMAGING: None    PROCEDURES: None         ASSESSMENT/PLAN:  Harvey Almanzar is a 47 year old year old male status post mechanical MVR, AVR, aneurysm repair and CABG x1 (SVG to PDA) who returns to clinic for postop visit. Surgically doing well.  Incisions are healing well with no signs of infection. Hemodynamics are stable.     1. Aortic valve endocarditis - s/p redo AVR with mechanical valve - doing well post-operatively. Pain is well controlled, has normal bowel function. Denies any SOB, CP. Patient following with ID for long term antibiotics.   2. Severe mitral regurgitation - s/p mechanical MVR  3. Coronary artery disease - s/p redo CABG x 1 with SVG to PDA. Denies any angina or anginal equivalents. On ASA 81 mg and crestor 10 mg daily. Will start Toprol 12.5 mg daily.  4. Chronic systolic heart failure - EF 45-50%, euvolemic on exam, no SOB, orthopnea or LE edema. Will start low dose toprol xl.  5. Long term anticoagulation with coumadin -  indicated for mechanical AVR and MVR, goal INR 2.5-3.5. Reports he was to have INR checked on 10/10, but no results in care everywhere. Reports he has not had INR checked since because he is on antibiotics and this can effect INR. Unclear what patient's INR has been. Will check INR today and possibly start lovenox bridge if INR subtherapeutic.   6. Penile edema - stable, has follow-up with urology next week.    Plan:   1. Follow-up with urology and ID as planned.  2. Start Toprol 12.5 mg PO daily, increase potassium to 20 mEq daily  3. Patient to call [facility] to schedule cardiology follow-up in 2-3 weeks for further titration of heart failure medication.   4. Will call patient with INR results and possibly start lovenox bridge. Patient to call INR clinic immediately to be seen in INR clinic. It is imperative her anticoagulation is monitored as patient has mechanical valves.   5. Continue Cardiac Rehab until completed.   6. Continue sternal precautions for 12 weeks from surgery date.          The total time spent with the patient was 30 minutes, > 50% of which was spent in counseling.    CC  NATALIE DRIVER

## 2017-10-17 NOTE — NURSING NOTE
Chief Complaint   Patient presents with     Follow Up For     AVR/MVR/CAB     Vitals were taken and medications were reconciled.    Madeline Mckeon MA    2:55 PM

## 2017-10-17 NOTE — MR AVS SNAPSHOT
After Visit Summary   10/17/2017    Harvey Almanzar    MRN: [id number]           Patient Information     Date Of Birth          1970        Visit Information        Provider Department      10/17/2017 3:00 PM [facility] CVTS [facility] Heart [facility]        Today's Diagnoses     S/P MVR (mitral valve replacement)    -  1    S/P AVR        Chronic systolic congestive heart failure (H)        Chronic systolic heart failure (H)          Care Instructions    Go to Lab for INR draw today, I will call you with results.    Please call your INR clinic today or tomorrow morning to request an appointment ASAP.     Start metoprolol 12.5 mg daily     Follow-up with cardiologist in 2-4 weeks, please call your [facility] clinic to arrange this.             Follow-ups after your visit        Your next 10 appointments already scheduled     Oct 18, 2017  9:00 AM CDT   Cardiac Treatment with Ur Cardiac Rehab 1   [facility], Cardiac Rehabilitation ([facility])    [address] [place] [address] [address] [address]   [phone]            Oct 20, 2017  1:00 PM CDT   Cardiac Treatment with Ur Cardiac Rehab 1   [facility], Cardiac Rehabilitation ([facility])    [address] [place] [address] [address]st [address] [address]   [phone]            Oct 23, 2017  1:00 PM CDT   Cardiac Treatment with Ur Cardiac Rehab 1   [facility], Cardiac Rehabilitation ([facility])    [address] [place] [address] [address]st Floor [address] [address]   [phone]            Oct 23, 2017  5:00 PM CDT   (Arrive by 4:45 PM)   New Patient Visit with Alvin Spencer MD   [facility] Urology and [facility] for Prostate and Urologic Cancers ([facility] Clinics [facility])    [address] [facility]  4th Floor  [facility] [address]    [phone]            Oct 25, 2017  9:00 AM CDT   Cardiac Treatment with Ur Cardiac Rehab 1   [facility] ([facility])    [address] [place] [address] [address]st Floor [address] [address]   [phone]            Oct 27, 2017  1:00 PM CDT   Cardiac Treatment with Ur Cardiac Rehab 1   [facility], Cardiac Rehabilitation ([facility])    [address] [place] [address] [address]st Floor [address] [address]   [phone]            Oct 30, 2017 12:00 PM CDT   (Arrive by 11:45 AM)   Return Visit with Yelena Peralta MD   [facility] and Infectious Diseases ([facility])    [address] [place] [address]  3rd [facility] [address]   [phone]            Oct 30, 2017  1:00 PM CDT   Cardiac Treatment with Ur Cardiac Rehab 1   [facility] ([facility])    [address] [place] [address]   [phone]            Nov 01, 2017  1:00 PM CDT   Cardiac Treatment with Ur Cardiac Rehab 1   [facility] ([facility])    [address] [place] [address]   [phone]            Nov 03, 2017  1:00 PM CDT   Cardiac Treatment with Ur Cardiac Rehab 1   [facility] ([facility])    [address] [place] [address] [address] [address]   [phone]              Who to contact     If you have questions or need follow up information about today's clinic visit or your schedule please contact [facility] HEART [facility] directly at [phone].  Normal or non-critical lab and imaging results will be communicated to you by Zachariah  "letter or phone within 4 business days after the clinic has received the results. If you do not hear from us within 7 days, please contact the clinic through [employer] or phone. If you have a critical or abnormal lab result, we will notify you by phone as soon as possible.  Submit refill requests through [employer] or call your pharmacy and they will forward the refill request to us. Please allow 3 business days for your refill to be completed.          Additional Information About Your Visit        [employer] Information     [employer] lets you send messages to your doctor, view your test results, renew your prescriptions, schedule appointments and more. To sign up, go to [email or web address] . Click on \"Log in\" on the left side of the screen, which will take you to the Welcome page. Then click on \"Sign up Now\" on the right side of the page.     You will be asked to enter the access code listed below, as well as some personal information. Please follow the directions to create your username and password.     Your access code is: [email or web address]  Expires: 2017  6:30 AM     Your access code will  in 90 days. If you need help or a new code, please call your [facility] clinic or [phone].        Care EveryWhere ID     This is your Care EveryWhere ID. This could be used by other organizations to access your [facility] medical records  [id number]        Your Vitals Were     Pulse Height Pulse Oximetry BMI (Body Mass Index)          95 1.626 m (5' 4\") 98% 25.51 kg/m2         Blood Pressure from Last 3 Encounters:   10/17/17 98/67   10/05/17 97/61   17 100/55    Weight from Last 3 Encounters:   10/17/17 67.4 kg (148 lb 9.6 oz)   10/11/17 64.8 kg (142 lb 12 oz)   10/05/17 64.2 kg (141 lb 9.6 oz)              We Performed the Following     INR          Today's Medication Changes          These changes are accurate as of: 10/17/17  3:19 PM.  If you have any questions, ask your nurse or doctor.               Start " taking these medicines.        Dose/Directions    metoprolol 25 MG 24 hr tablet   Commonly known as:  TOPROL-XL   Used for:  S/P MVR (mitral valve replacement), S/P AVR, Chronic systolic heart failure (H)        Dose:  12.5 mg   Take 0.5 tablets (12.5 mg) by mouth daily   Quantity:  15 tablet   Refills:  1            Where to get your medicines      These medications were sent to [facility] [place], [place] - [address]  [address], [facility] [address]     Phone:  [phone]     metoprolol 25 MG 24 hr tablet                Primary Care Provider Office Phone # Fax #    Chapito Kaba [phone]       [facility] [address] [facility] [address]        Equal Access to Services     TE TEAGUE AH: Hadii [address] lane hadasho Soomaali, waaxda luqadaha, qaybta kaalmada adeegyada, lamont poe . So [facility] [phone].    ATENCIÓN: Si habla español, tiene a sorensen disposición servicios gratuitos de asistencia lingüística. Ll[facility] [phone].    We comply with applicable federal civil rights laws and [place] laws. We do not discriminate on the basis of race, color, national origin, age, disability, sex, sexual orientation, or gender identity.            Thank you!     Thank you for choosing [facility]  for your care. Our goal is always to provide you with excellent care. Hearing back from our patients is one way we can continue to improve our services. Please take a few minutes to complete the written survey that you may receive in the mail after your visit with us. Thank you!             Your Updated Medication List - Protect others around you: Learn how to safely use, store and throw away your medicines at www.disposemymeds.org.          This list is accurate as of: 10/17/17  3:19 PM.  Always use your most recent med list.                   Brand Name Dispense Instructions for use Diagnosis    aspirin 81 MG  chewable tablet     120 tablet    Take 1 tablet (81 mg) by mouth daily    S/P AVR (aortic valve replacement), Hx of mitral valve replacement with mechanical valve       cefTRIAXone 2 GM vial    ROCEPHIN    600 mL    Inject 2 g into the vein every 24 hours    Endocarditis of aortic valve       furosemide 20 MG tablet    LASIX    30 tablet    Take 1 tablet (20 mg) by mouth daily    S/P AVR (aortic valve replacement), Hx of mitral valve replacement with mechanical valve       levofloxacin 500 MG tablet    LEVAQUIN    35 tablet    Take 1 tablet (500 mg) by mouth daily    Endocarditis of aortic valve       melatonin 3 MG tablet     30 tablet    Take 1 tablet (3 mg) by mouth nightly as needed for sleep    S/P AVR (aortic valve replacement), Hx of mitral valve replacement with mechanical valve       metoprolol 25 MG 24 hr tablet    TOPROL-XL    15 tablet    Take 0.5 tablets (12.5 mg) by mouth daily    S/P MVR (mitral valve replacement), S/P AVR, Chronic systolic heart failure (H)       order for DME     1 each    Equipment being ordered: Walker Wheels () Treatment Diagnosis: Gait instability    Acute systolic congestive heart failure (H), Endocarditis of aortic valve       oxyCODONE 5 MG IR tablet    ROXICODONE    20 tablet    Take 1 tablet (5 mg) by mouth every 6 hours as needed for moderate to severe pain    Acute post-operative pain       potassium chloride SA 10 MEQ CR tablet    K-DUR/KLOR-CON M    30 tablet    Take 1 tablet (10 mEq) by mouth daily    S/P AVR (aortic valve replacement), Hx of mitral valve replacement with mechanical valve       rosuvastatin 10 MG tablet    CRESTOR    30 tablet    Take 1 tablet (10 mg) by mouth daily    Acute ischemic left MCA stroke (H)       senna-docusate 8.6-50 MG per tablet    SENOKOT-S;PERICOLACE    50 tablet    Take 1-2 tablets by mouth 2 times daily as needed (constipation )    Acute post-operative pain       warfarin 2 MG tablet    COUMADIN    120 tablet    Take 2 mg (1  tablet) PO at 6 pm on 10/5/17, then take 3 mg (1.5 tabs) until next INR check, then dose per goal 2.5-3.5    Hx of mitral valve replacement with mechanical valve, S/P AVR (aortic valve replacement)

## 2017-10-17 NOTE — PROGRESS NOTES
"CARDIOTHORACIC SURGERY FOLLOW-UP VISIT     Harvey Almanzar   1970   [id number]      Reason for visit: Post-Op mechanical MVR, AVR, aneurysm repair and CABG x1 (SVG to PDA) with Dr. Gorman and Dr. Leal on 9/21/2017    HPI: Harvey Almanzar is a 47 year old year old male seen in clinic for a routine follow-up appointment after surgery. 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 Patient was admitted on 9/15 with acute heart failure and subsequently underwent the above procedures. Hospital course was remarkable for acute ischemic left MCA stroke. Patient's neurologic status continues to improve and he reports near full strength of his right upper extremity and improvement in his vision. Patient's ICU stay was complicated by elevated LFTs and bilirubin which had improved by discharge. Urology was consulted for penile edema which was thought to be secondary to injections under his skin. He was treated with long term antibiotics for his endocarditis and is following up with ID in the next couple of weeks.     Patient has been doing well since discharge and now returns to clinic for postop visit.    Patient denies any fever, chills, chest pain, palpitations, edema, SOB, lightheadedness, nausea and vomiting.    Patient reports incision is healed well.  Normal bowel movements.  Voiding without problems.    Has been attending cardiac rehabilitation and that is going well.    Patient is on Coumadin and reports his INR clinic has not been monitoring his INR \"because I'm on antibiotics and that makes it inaccurate. His last known INR was on 10/5 and was supratherapeutic.    Blood glucose levels have been under good control.      PAST MEDICAL HISTORY:  Past Medical History:   Diagnosis Date     Acute diastolic congestive heart failure (H) " 8/24/2017     Aortic valve replaced 1996    Overview:  Cadaver - tissue valve in 1996 at [facility]  Amoxicillin 2000 mg prior to dental work please.  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       PAST SURGICAL HISTORY:  Past Surgical History:   Procedure Laterality Date     REDO STERNOTOMY BYPASS CORONARY ARTERY N/A 9/21/2017    Procedure: REDO STERNOTOMY BYPASS CORONARY ARTERY;;  Surgeon: Nicola Gorman MD;  Location: [facility] OR     REDO STERNOTOMY REPLACE VALVE AORTIC N/A 9/21/2017    Procedure: REDO STERNOTOMY REPLACE VALVE AORTIC;;  Surgeon: Nicola Gorman MD;  Location: [facility] OR     REDO STERNOTOMY REPLACE VALVE MITRAL N/A 9/21/2017    Procedure: REDO STERNOTOMY REPLACE VALVE MITRAL;  Left groin cutdown, Redo Median Sternotomy, Lysis of adhesions, Left mini-thoracotomy,Coronary artery bypass graft x 1 using the left greater saphenous vein, using endovein harvest, Mitral valve replacement using [employer] Mechanical 27mm, Aortic Valve Replacement using a 17mm [employer] Mechanical, On Cardiopulmonary Bypass Pump;  Surgeon: Cindy Gorman       CURRENT MEDICATIONS:   Current Outpatient Prescriptions   Medication     furosemide (LASIX) 20 MG tablet     potassium chloride SA (K-DUR/KLOR-CON M) 10 MEQ CR tablet     warfarin (COUMADIN) 2 MG tablet     rosuvastatin (CRESTOR) 10 MG tablet     melatonin 3 MG tablet     oxyCODONE (ROXICODONE) 5 MG IR tablet     aspirin 81 MG chewable tablet     cefTRIAXone (ROCEPHIN) 2 GM vial     levofloxacin (LEVAQUIN) 500 MG tablet     senna-docusate (SENOKOT-S;PERICOLACE) 8.6-50 MG per tablet     order for DME     No current facility-administered medications for this visit.        ALLERGIES:    No Known Allergies    ROS:  Gen: denies frequent headaches,  "double/blurry vision, persistent insomnia, fatigue, unexplained weight loss/gain   CV: denies chest pain, SOB, palpitations, peripheral edema  Pulm: denies SOB, asthma or wheezing  GI/: denies liver or kidney problems, blood in stool or BRBPR, voiding without problems  Endo: denies thyroid problems or Diabetes  Heme/Onc: denies bleeding  MS: no weakness, tremors or gait instability  Neuro: denies depression, memory problems      PHYSICAL EXAM:   BP 98/67 (BP Location: Left arm, Patient Position: Chair, Cuff Size: Adult Regular)  Pulse 95  Ht 1.626 m (5' 4\")  Wt 67.4 kg (148 lb 9.6 oz)  SpO2 98%  BMI 25.51 kg/m2  General: alert and oriented x 3, pleasant, no acute distress, normal mood and affect  CV: S1 S2, click of mechanical valves heard, no murmurs, rubs or gallops, regular rate and rhythm, no peripheral edema  Pulm: bilateral breath sounds, clear to auscultation, easy work of breathing  GI: (+) bowel sounds, soft non-tender and non-distended  Incision: incisions clean dry and intact without erythema, swelling or drainage, sternum stable. Groin and leg incision healed without erythema or drainage.  Neuro: nonfocal    LABS:  Last Basic Metabolic Panel:  Lab Results   Component Value Date     10/05/2017      Lab Results   Component Value Date    POTASSIUM 3.9 10/05/2017     Lab Results   Component Value Date    CHLORIDE 106 10/05/2017     Lab Results   Component Value Date    CHECO 7.9 10/05/2017     Lab Results   Component Value Date    CO2 28 10/05/2017     Lab Results   Component Value Date    BUN 12 10/05/2017     Lab Results   Component Value Date    CR 0.54 10/05/2017     Lab Results   Component Value Date    GLC 82 10/05/2017       Last CBC:   Lab Results   Component Value Date    WBC 5.7 10/05/2017     Lab Results   Component Value Date    RBC 2.52 10/05/2017     Lab Results   Component Value Date    HGB 7.8 10/05/2017     Lab Results   Component Value Date    HCT 25.2 10/05/2017     No " components found for: MCT  Lab Results   Component Value Date     10/05/2017     Lab Results   Component Value Date    MCH 31.0 10/05/2017     Lab Results   Component Value Date    MCHC 31.0 10/05/2017     Lab Results   Component Value Date    RDW 25.8 10/05/2017     Lab Results   Component Value Date     10/05/2017       INR:  Lab Results   Component Value Date    INR 4.22 10/05/2017    INR 2.53 10/04/2017    INR 2.26 10/03/2017    INR 2.28 10/02/2017    INR 2.01 10/01/2017    INR 1.67 09/30/2017    INR 1.52 09/29/2017    INR 1.51 09/28/2017    INR 1.72 09/27/2017    INR 2.72 09/26/2017    INR 3.29 09/25/2017    INR 2.45 09/24/2017       IMAGING: None    PROCEDURES: None         ASSESSMENT/PLAN:  Harvey Almanzar is a 47 year old year old male status post mechanical MVR, AVR, aneurysm repair and CABG x1 (SVG to PDA) who returns to clinic for postop visit. Surgically doing well.  Incisions are healing well with no signs of infection. Hemodynamics are stable.     1. Aortic valve endocarditis - s/p redo AVR with mechanical valve - doing well post-operatively. Pain is well controlled, has normal bowel function. Denies any SOB, CP. Patient following with ID for long term antibiotics.   2. Severe mitral regurgitation - s/p mechanical MVR  3. Coronary artery disease - s/p redo CABG x 1 with SVG to PDA. Denies any angina or anginal equivalents. On ASA 81 mg and crestor 10 mg daily. Will start Toprol 12.5 mg daily.  4. Chronic systolic heart failure - EF 45-50%, euvolemic on exam, no SOB, orthopnea or LE edema. Will start low dose toprol xl.  5. Long term anticoagulation with coumadin - indicated for mechanical AVR and MVR, goal INR 2.5-3.5. Reports he was to have INR checked on 10/10, but no results in care everywhere. Reports he has not had INR checked since because he is on antibiotics and this can effect INR. Unclear what patient's INR has been. Will check INR today and possibly start lovenox bridge  if INR subtherapeutic.   6. Penile edema - stable, has follow-up with urology next week.    Plan:   1. Follow-up with urology and ID as planned.  2. Start Toprol 12.5 mg PO daily, increase potassium to 20 mEq daily  3. Patient to call [facility] to schedule cardiology follow-up in 2-3 weeks for further titration of heart failure medication.   4. Will call patient with INR results and possibly start lovenox bridge. Patient to call INR clinic immediately to be seen in INR clinic. It is imperative her anticoagulation is monitored as patient has mechanical valves.   5. Continue Cardiac Rehab until completed.   6. Continue sternal precautions for 12 weeks from surgery date.          The total time spent with the patient was 30 minutes, > 50% of which was spent in counseling.    CC  NATALIE DRIVER

## 2017-10-18 ENCOUNTER — CARE COORDINATION (OUTPATIENT)
Dept: CARDIOLOGY | Facility: CLINIC | Age: 47
End: 2017-10-18

## 2017-10-18 NOTE — TELEPHONE ENCOUNTER
Called and LVM for Maddie. Per Dr. Kirk's last hospital note, pt needs weekly CBC, CMP, CRP while on IV abx. Instructed Maddie to have lab orders faxed to Dr. Kirk here at the clinic.  Whitney Prakash RN

## 2017-10-19 LAB
FUNGUS SPEC CULT: NORMAL
FUNGUS SPEC CULT: NORMAL
SPECIMEN SOURCE: NORMAL
SPECIMEN SOURCE: NORMAL

## 2017-10-19 NOTE — PROGRESS NOTES
Called patient per request of JAMIE Zazueta as patient has not been following with his INR clinic and has not had his INR checked since 10/05 his hospital discharge.  INR checked Tuesday at [facility] clinic follow up was 5.09.  PA called patient and asked he hold his coumadin for 2 days and make an appointment to see the INR nurse.     Patient states he followed up with his INR nurse on the 18th as requested by PA and he was waiting for them to call him with results and instructions.  Patient states he feels well, he has no pain, his incision is healing well and he has no s/sx of bleeding.  Patient states he did get the message left by [facility] on 10/17 and did as he was instructed.  Patient will continue to follow up at his [facility] for INRs and coumadin dosing.

## 2017-10-25 NOTE — ADDENDUM NOTE
Encounter addended by: Fany Lundberg OT on: 10/25/2017  3:32 PM<BR>     Actions taken: Sign clinical note, Follow-up modified, Episode resolved

## 2017-10-25 NOTE — PROGRESS NOTES
Cardiac Rehab Discharge Summary    Reason for discharge:    Pt came for initial evaluation then no show cancelled multiple sessions.     Progress towards goals:  No opportunity to work on goals, initial eval only.     Recommendation(s):    Return to rehab as per pt desire and phys order.

## 2018-04-03 ENCOUNTER — HOSPITAL ENCOUNTER (INPATIENT)
Facility: CLINIC | Age: 48
LOS: 7 days | Discharge: HOME | DRG: 315 | End: 2018-04-10
Attending: THORACIC SURGERY (CARDIOTHORACIC VASCULAR SURGERY) | Admitting: THORACIC SURGERY (CARDIOTHORACIC VASCULAR SURGERY)
Payer: COMMERCIAL

## 2018-04-03 ENCOUNTER — TRANSFERRED RECORDS (OUTPATIENT)
Dept: HEALTH INFORMATION MANAGEMENT | Facility: CLINIC | Age: 48
End: 2018-04-03

## 2018-04-03 DIAGNOSIS — I35.8 ENDOCARDITIS OF AORTIC VALVE: ICD-10-CM

## 2018-04-03 DIAGNOSIS — Z95.2 S/P AVR (AORTIC VALVE REPLACEMENT): ICD-10-CM

## 2018-04-03 DIAGNOSIS — Z95.2 AORTIC VALVE REPLACED: Primary | ICD-10-CM

## 2018-04-03 DIAGNOSIS — Z95.2 HX OF MITRAL VALVE REPLACEMENT WITH MECHANICAL VALVE: ICD-10-CM

## 2018-04-03 PROBLEM — I38 ENDOCARDITIS: Status: ACTIVE | Noted: 2017-09-21

## 2018-04-03 LAB
ALBUMIN SERPL-MCNC: 2.9 G/DL (ref 3.4–5)
ALP SERPL-CCNC: 69 U/L (ref 40–150)
ALT SERPL W P-5'-P-CCNC: 21 U/L (ref 0–70)
ALT SERPL-CCNC: 18 U/L (ref 0–55)
ANION GAP SERPL CALCULATED.3IONS-SCNC: 7 MMOL/L (ref 3–14)
APTT PPP: 66 SEC (ref 22–37)
AST SERPL W P-5'-P-CCNC: 44 U/L (ref 0–45)
AST SERPL-CCNC: 34 U/L (ref 10–40)
BILIRUB SERPL-MCNC: 2.1 MG/DL (ref 0.2–1.3)
BUN SERPL-MCNC: 11 MG/DL (ref 7–30)
CALCIUM SERPL-MCNC: 7.3 MG/DL (ref 8.5–10.1)
CHLORIDE SERPL-SCNC: 112 MMOL/L (ref 94–109)
CO2 SERPL-SCNC: 23 MMOL/L (ref 20–32)
CREAT SERPL-MCNC: 0.81 MG/DL (ref 0.66–1.25)
CREAT SERPL-MCNC: 0.89 MG/DL (ref 0.73–1.18)
EJECTION FRACTION: 70
ERYTHROCYTE [DISTWIDTH] IN BLOOD BY AUTOMATED COUNT: 16.8 % (ref 10–15)
FIBRINOGEN PPP-MCNC: 345 MG/DL (ref 200–420)
GFR SERPL CREATININE-BSD FRML MDRD: >60 ML/MIN/1.73M2
GFR SERPL CREATININE-BSD FRML MDRD: >90 ML/MIN/1.7M2
GLUCOSE BLDC GLUCOMTR-MCNC: 123 MG/DL (ref 70–99)
GLUCOSE SERPL-MCNC: 104 MG/DL (ref 70–99)
GLUCOSE SERPL-MCNC: 108 MG/DL (ref 70–180)
HCT VFR BLD AUTO: 30.9 % (ref 40–53)
HGB BLD-MCNC: 9.7 G/DL (ref 13.3–17.7)
INR PPP: 3.5 (ref 0.9–1.1)
INR PPP: 4.53 (ref 0.86–1.14)
MAGNESIUM SERPL-MCNC: 2.8 MG/DL (ref 1.6–2.3)
MCH RBC QN AUTO: 29.6 PG (ref 26.5–33)
MCHC RBC AUTO-ENTMCNC: 31.4 G/DL (ref 31.5–36.5)
MCV RBC AUTO: 94 FL (ref 78–100)
PHOSPHATE SERPL-MCNC: 2.4 MG/DL (ref 2.5–4.5)
PLATELET # BLD AUTO: 164 10E9/L (ref 150–450)
POTASSIUM SERPL-SCNC: 3.3 MMOL/L (ref 3.4–5.3)
POTASSIUM SERPL-SCNC: 3.4 MMOL/L (ref 3.5–5.1)
PROT SERPL-MCNC: 5.7 G/DL (ref 6.8–8.8)
RBC # BLD AUTO: 3.28 10E12/L (ref 4.4–5.9)
SODIUM SERPL-SCNC: 142 MMOL/L (ref 133–144)
WBC # BLD AUTO: 7 10E9/L (ref 4–11)

## 2018-04-03 PROCEDURE — 84100 ASSAY OF PHOSPHORUS: CPT | Performed by: THORACIC SURGERY (CARDIOTHORACIC VASCULAR SURGERY)

## 2018-04-03 PROCEDURE — 25000125 ZZHC RX 250: Performed by: SURGERY

## 2018-04-03 PROCEDURE — 20000004 ZZH R&B ICU UMMC

## 2018-04-03 PROCEDURE — 00000146 ZZHCL STATISTIC GLUCOSE BY METER IP

## 2018-04-03 PROCEDURE — 25000128 H RX IP 250 OP 636: Performed by: SURGERY

## 2018-04-03 PROCEDURE — 87641 MR-STAPH DNA AMP PROBE: CPT | Performed by: SURGERY

## 2018-04-03 PROCEDURE — 85730 THROMBOPLASTIN TIME PARTIAL: CPT | Performed by: THORACIC SURGERY (CARDIOTHORACIC VASCULAR SURGERY)

## 2018-04-03 PROCEDURE — 86850 RBC ANTIBODY SCREEN: CPT | Performed by: THORACIC SURGERY (CARDIOTHORACIC VASCULAR SURGERY)

## 2018-04-03 PROCEDURE — 80053 COMPREHEN METABOLIC PANEL: CPT | Performed by: THORACIC SURGERY (CARDIOTHORACIC VASCULAR SURGERY)

## 2018-04-03 PROCEDURE — 87040 BLOOD CULTURE FOR BACTERIA: CPT | Performed by: THORACIC SURGERY (CARDIOTHORACIC VASCULAR SURGERY)

## 2018-04-03 PROCEDURE — 85027 COMPLETE CBC AUTOMATED: CPT | Performed by: THORACIC SURGERY (CARDIOTHORACIC VASCULAR SURGERY)

## 2018-04-03 PROCEDURE — 87640 STAPH A DNA AMP PROBE: CPT | Performed by: SURGERY

## 2018-04-03 PROCEDURE — 83036 HEMOGLOBIN GLYCOSYLATED A1C: CPT | Performed by: THORACIC SURGERY (CARDIOTHORACIC VASCULAR SURGERY)

## 2018-04-03 PROCEDURE — 85610 PROTHROMBIN TIME: CPT | Performed by: THORACIC SURGERY (CARDIOTHORACIC VASCULAR SURGERY)

## 2018-04-03 PROCEDURE — 83605 ASSAY OF LACTIC ACID: CPT | Performed by: THORACIC SURGERY (CARDIOTHORACIC VASCULAR SURGERY)

## 2018-04-03 PROCEDURE — 85384 FIBRINOGEN ACTIVITY: CPT | Performed by: THORACIC SURGERY (CARDIOTHORACIC VASCULAR SURGERY)

## 2018-04-03 PROCEDURE — 86900 BLOOD TYPING SEROLOGIC ABO: CPT | Performed by: THORACIC SURGERY (CARDIOTHORACIC VASCULAR SURGERY)

## 2018-04-03 PROCEDURE — 83735 ASSAY OF MAGNESIUM: CPT | Performed by: THORACIC SURGERY (CARDIOTHORACIC VASCULAR SURGERY)

## 2018-04-03 PROCEDURE — 86901 BLOOD TYPING SEROLOGIC RH(D): CPT | Performed by: THORACIC SURGERY (CARDIOTHORACIC VASCULAR SURGERY)

## 2018-04-03 PROCEDURE — 36415 COLL VENOUS BLD VENIPUNCTURE: CPT | Performed by: THORACIC SURGERY (CARDIOTHORACIC VASCULAR SURGERY)

## 2018-04-03 RX ORDER — SODIUM CHLORIDE, SODIUM LACTATE, POTASSIUM CHLORIDE, CALCIUM CHLORIDE 600; 310; 30; 20 MG/100ML; MG/100ML; MG/100ML; MG/100ML
INJECTION, SOLUTION INTRAVENOUS CONTINUOUS
Status: DISCONTINUED | OUTPATIENT
Start: 2018-04-03 | End: 2018-04-06

## 2018-04-03 RX ORDER — ONDANSETRON 2 MG/ML
4 INJECTION INTRAMUSCULAR; INTRAVENOUS EVERY 6 HOURS PRN
Status: DISCONTINUED | OUTPATIENT
Start: 2018-04-03 | End: 2018-04-10 | Stop reason: HOSPADM

## 2018-04-03 RX ORDER — ASPIRIN 81 MG/1
81 TABLET, CHEWABLE ORAL DAILY
Status: DISCONTINUED | OUTPATIENT
Start: 2018-04-04 | End: 2018-04-10 | Stop reason: HOSPADM

## 2018-04-03 RX ORDER — NICOTINE POLACRILEX 4 MG
15-30 LOZENGE BUCCAL
Status: DISCONTINUED | OUTPATIENT
Start: 2018-04-03 | End: 2018-04-10 | Stop reason: HOSPADM

## 2018-04-03 RX ORDER — POTASSIUM CHLORIDE 7.45 MG/ML
10 INJECTION INTRAVENOUS
Status: DISCONTINUED | OUTPATIENT
Start: 2018-04-03 | End: 2018-04-10 | Stop reason: HOSPADM

## 2018-04-03 RX ORDER — ONDANSETRON 4 MG/1
4 TABLET, ORALLY DISINTEGRATING ORAL EVERY 6 HOURS PRN
Status: DISCONTINUED | OUTPATIENT
Start: 2018-04-03 | End: 2018-04-10 | Stop reason: HOSPADM

## 2018-04-03 RX ORDER — AMOXICILLIN 250 MG
1 CAPSULE ORAL 2 TIMES DAILY PRN
Status: DISCONTINUED | OUTPATIENT
Start: 2018-04-03 | End: 2018-04-10 | Stop reason: HOSPADM

## 2018-04-03 RX ORDER — DEXTROSE MONOHYDRATE 25 G/50ML
25-50 INJECTION, SOLUTION INTRAVENOUS
Status: DISCONTINUED | OUTPATIENT
Start: 2018-04-03 | End: 2018-04-10 | Stop reason: HOSPADM

## 2018-04-03 RX ORDER — POTASSIUM CHLORIDE 750 MG/1
20-40 TABLET, EXTENDED RELEASE ORAL
Status: DISCONTINUED | OUTPATIENT
Start: 2018-04-03 | End: 2018-04-10 | Stop reason: HOSPADM

## 2018-04-03 RX ORDER — POTASSIUM CHLORIDE 1.5 G/1.58G
20-40 POWDER, FOR SOLUTION ORAL
Status: DISCONTINUED | OUTPATIENT
Start: 2018-04-03 | End: 2018-04-10 | Stop reason: HOSPADM

## 2018-04-03 RX ORDER — LANOLIN ALCOHOL/MO/W.PET/CERES
3 CREAM (GRAM) TOPICAL
Status: DISCONTINUED | OUTPATIENT
Start: 2018-04-03 | End: 2018-04-10 | Stop reason: HOSPADM

## 2018-04-03 RX ORDER — ACETAMINOPHEN 325 MG/1
650 TABLET ORAL EVERY 4 HOURS PRN
Status: DISCONTINUED | OUTPATIENT
Start: 2018-04-03 | End: 2018-04-10 | Stop reason: HOSPADM

## 2018-04-03 RX ORDER — CEFTRIAXONE 2 G/1
2 INJECTION, POWDER, FOR SOLUTION INTRAMUSCULAR; INTRAVENOUS EVERY 24 HOURS
Status: DISCONTINUED | OUTPATIENT
Start: 2018-04-04 | End: 2018-04-10 | Stop reason: HOSPADM

## 2018-04-03 RX ORDER — AMOXICILLIN 250 MG
2 CAPSULE ORAL 2 TIMES DAILY PRN
Status: DISCONTINUED | OUTPATIENT
Start: 2018-04-03 | End: 2018-04-10 | Stop reason: HOSPADM

## 2018-04-03 RX ORDER — ROSUVASTATIN CALCIUM 10 MG/1
10 TABLET, COATED ORAL DAILY
Status: DISCONTINUED | OUTPATIENT
Start: 2018-04-04 | End: 2018-04-10 | Stop reason: HOSPADM

## 2018-04-03 RX ORDER — MAGNESIUM SULFATE HEPTAHYDRATE 40 MG/ML
4 INJECTION, SOLUTION INTRAVENOUS EVERY 4 HOURS PRN
Status: DISCONTINUED | OUTPATIENT
Start: 2018-04-03 | End: 2018-04-10 | Stop reason: HOSPADM

## 2018-04-03 RX ORDER — POTASSIUM CL/LIDO/0.9 % NACL 10MEQ/0.1L
10 INTRAVENOUS SOLUTION, PIGGYBACK (ML) INTRAVENOUS
Status: DISCONTINUED | OUTPATIENT
Start: 2018-04-03 | End: 2018-04-10 | Stop reason: HOSPADM

## 2018-04-03 RX ORDER — NALOXONE HYDROCHLORIDE 0.4 MG/ML
.1-.4 INJECTION, SOLUTION INTRAMUSCULAR; INTRAVENOUS; SUBCUTANEOUS
Status: DISCONTINUED | OUTPATIENT
Start: 2018-04-03 | End: 2018-04-10 | Stop reason: HOSPADM

## 2018-04-03 RX ORDER — POTASSIUM CHLORIDE 29.8 MG/ML
20 INJECTION INTRAVENOUS
Status: DISCONTINUED | OUTPATIENT
Start: 2018-04-03 | End: 2018-04-10 | Stop reason: HOSPADM

## 2018-04-03 RX ADMIN — NOREPINEPHRINE BITARTRATE 0.1 MCG/KG/MIN: 1 INJECTION INTRAVENOUS at 23:05

## 2018-04-03 ASSESSMENT — ACTIVITIES OF DAILY LIVING (ADL)
EATING: 0 - INDEPENDENT
RETIRED_COMMUNICATION: 0-->UNDERSTANDS/COMMUNICATES WITHOUT DIFFICULTY
COMMUNICATION: 0 - UNDERSTANDS/COMMUNICATES WITHOUT DIFFICULTY
TOILETING: 0 - INDEPENDENT
CHANGE_IN_FUNCTIONAL_STATUS_SINCE_ONSET_OF_CURRENT_ILLNESS/INJURY: NO
TRANSFERRING: 0 - INDEPENDENT
TOILETING: 0-->INDEPENDENT
COGNITION: 0 - NO COGNITION ISSUES REPORTED
AMBULATION: 0 - INDEPENDENT
BATHING: 0-->INDEPENDENT
RETIRED_EATING: 0-->INDEPENDENT
TRANSFERRING: 0-->INDEPENDENT
SWALLOWING: 0-->SWALLOWS FOODS/LIQUIDS WITHOUT DIFFICULTY
FALL_HISTORY_WITHIN_LAST_SIX_MONTHS: NO
AMBULATION: 0-->INDEPENDENT
DRESS: 0 - INDEPENDENT
DRESS: 0-->INDEPENDENT
BATHING: 0 - INDEPENDENT
SWALLOWING: 0 - SWALLOWS FOODS/LIQUIDS WITHOUT DIFFICULTY

## 2018-04-03 NOTE — IP AVS SNAPSHOT
MRN:[id number]                      After Visit Summary   4/3/2018    Harvey Almanzar    MRN: [id number]           Thank you!     Thank you for choosing [facility] for your care. Our goal is always to provide you with excellent care. Hearing back from our patients is one way we can continue to improve our services. Please take a few minutes to complete the written survey that you may receive in the mail after you visit with us. Thank you!        Patient Information     Date Of Birth          1970        Designated Caregiver       Most Recent Value    Caregiver    Will someone help with your care after discharge? yes    Name of designated caregiver Ra    Phone number of caregiver [phone]    Caregiver address [place]      About your hospital stay     You were admitted on:  April 3, 2018 You last received care in the:  [address] [facility]    You were discharged on:  April 10, 2018        Reason for your hospital stay       Partial dehiscence of prosthetic aortic valve.                  Who to Call     For medical emergencies, please call 911.  For non-urgent questions about your medical care, please call your primary care provider or clinic, [phone]          Attending Provider     Provider Specialty    Tobi Moreno MD Cardiology       Primary Care Provider Office Phone # Fax #    Chapito Kaba [phone]      After Care Instructions     Activity       Your activity upon discharge: activity as tolerated            Diet       Follow this diet upon discharge: low sodium                  Follow-up Appointments     Adult [facility] Follow-up and recommended labs and tests       Follow up with INR clinic on Thursday for an INR draw 4/12. Dr. Lundberg's (Cardiologist) office will contact you for procedure within the week.     Appointments on [place] and/or [facility] (with [facility] or [facility] provider or service). Call [phone] if you  "haven't heard regarding these appointments within 7 days of discharge.                  Warfarin Instruction     You have started taking a medicine called warfarin. This is a blood-thinning medicine (anticoagulant). It helps prevent and treat blood clots.      Before leaving the hospital, make sure you know how much to take and how long to take it.      You will need regular blood tests to make sure your blood is clotting safely. It is very important to see your doctor for regular blood tests.    Talk to your doctor before taking any new medicine (this includes over-the-counter drugs and herbal products). Many medicines can interact with warfarin. This may cause more bleeding or too much clotting.     Eating a lot of vitamin K--found in green, leafy vegetables--can change the way warfarin works in your body. Do NOT avoid these foods. Instead, try to eat the same amount each day.     Bleeding is the most common side-effect of warfarin. You may notice bleeding gums, a bloody nose, bruises and bleeding longer when you cut yourself. See a doctor at once if:   o You cough up blood  o You find blood in your stool (poop)  o You have a deep cut, or a cut that bleeds longer than 10 minutes   o You have a bad cut, hard fall, accident or hit your head (go to urgent care or the emergency room).    For women who can get pregnant: This medicine can harm an unborn baby. Be very careful not to get pregnant while taking this medicine. If you think you might be pregnant, call your doctor right away.    For more information, read \"Guide to Warfarin Therapy,  the booklet you received in the hospital.        Pending Results     Date and Time Order Name Status Description    4/3/2018 1557 Basic metabolic panel (PCU Collect TBD) In process             Statement of Approval     Ordered          04/10/18 5510  I have reviewed and agree with all the recommendations and orders detailed in this document.  EFFECTIVE NOW     Approved and " "electronically signed by:  Thu Castillo PA-C             Admission Information     Date & Time Provider Department Dept. Phone    4/3/2018 Tobi Moreno MD [address] [facility] [phone]      Your Vitals Were     Blood Pressure Pulse Temperature Respirations Height Weight    120/61 (BP Location: Right leg) 90 98.3  F (36.8  C) (Oral) 18 1.626 m (5' 4\") 74.6 kg (164 lb 7.4 oz)    Pulse Oximetry BMI (Body Mass Index)                100% 28.23 kg/m2          [employer] Information     [employer] lets you send messages to your doctor, view your test results, renew your prescriptions, schedule appointments and more. To sign up, go to [email or web address] . Click on \"Log in\" on the left side of the screen, which will take you to the Welcome page. Then click on \"Sign up Now\" on the right side of the page.     You will be asked to enter the access code listed below, as well as some personal information. Please follow the directions to create your username and password.     Your access code is: [email or web address]  Expires: 2018  4:13 PM     Your access code will  in 90 days. If you need help or a new code, please call your [facility] clinic or [phone].        Care EveryWhere ID     This is your Care EveryWhere ID. This could be used by other organizations to access your [facility] medical records  [id number]        Equal Access to Services     [facility] CenterAL[facility] AH: Hadii aad ku hadasho Soomaali, waaxda luqadaha, qaybta kaalmada adeegyada, lamont poe . So [facility] [phone].    ATENCIÓN: Si habla español, tiene a sorensen disposición servicios gratuitos de asistencia lingüística. Llame al [phone].    We comply with applicable federal civil rights laws and [place] laws. We do not discriminate on the basis of race, color, national origin, age, disability, sex, sexual orientation, or gender identity.               Review of your medicines      CONTINUE these medicines which may have " CHANGED, or have new prescriptions. If we are uncertain of the size of tablets/capsules you have at home, strength may be listed as something that might have changed.        Dose / Directions    warfarin 2 MG tablet   Commonly known as:  COUMADIN   This may have changed:  additional instructions   Used for:  Hx of mitral valve replacement with mechanical valve, S/P AVR (aortic valve replacement)        Take 3 mg (1 tablet) daily until next INR check, next INR draw Thursday 4/12. Then dose per goal 2.5-3.5   Quantity:  120 tablet   Refills:  1         CONTINUE these medicines which have NOT CHANGED        Dose / Directions    aspirin 81 MG chewable tablet   Used for:  S/P AVR (aortic valve replacement), Hx of mitral valve replacement with mechanical valve        Dose:  81 mg   Take 1 tablet (81 mg) by mouth daily   Quantity:  120 tablet   Refills:  0       furosemide 20 MG tablet   Commonly known as:  LASIX   Used for:  S/P AVR (aortic valve replacement), Hx of mitral valve replacement with mechanical valve        Dose:  20 mg   Take 1 tablet (20 mg) by mouth daily   Quantity:  30 tablet   Refills:  1       lisinopril 5 MG tablet   Commonly known as:  PRINIVIL/ZESTRIL   Used for:  S/P AVR (aortic valve replacement)        Dose:  2.5 mg   Take 0.5 tablets (2.5 mg) by mouth daily   Quantity:  90 tablet   Refills:  3       melatonin 3 MG tablet   Used for:  S/P AVR (aortic valve replacement), Hx of mitral valve replacement with mechanical valve        Dose:  3 mg   Take 1 tablet (3 mg) by mouth nightly as needed for sleep   Quantity:  30 tablet   Refills:  0       order for DME   Used for:  Acute systolic congestive heart failure (H), Endocarditis of aortic valve        Equipment being ordered: Walker Wheels () Treatment Diagnosis: Gait instability   Quantity:  1 each   Refills:  0       potassium chloride SA 10 MEQ CR tablet   Commonly known as:  K-DUR/KLOR-CON M   Used for:  S/P AVR (aortic valve replacement), Hx  of mitral valve replacement with mechanical valve        Dose:  20 mEq   Take 2 tablets (20 mEq) by mouth daily   Quantity:  30 tablet   Refills:  1       rosuvastatin 10 MG tablet   Commonly known as:  CRESTOR   Used for:  Acute ischemic left MCA stroke (H)        Dose:  10 mg   Take 1 tablet (10 mg) by mouth daily   Quantity:  30 tablet   Refills:  1       senna-docusate 8.6-50 MG per tablet   Commonly known as:  SENOKOT-S;PERICOLACE   Used for:  Acute post-operative pain        Dose:  1-2 tablet   Take 1-2 tablets by mouth 2 times daily as needed (constipation )   Quantity:  50 tablet   Refills:  0       SUMAtriptan 50 MG tablet   Commonly known as:  IMITREX        Take one tablet by mouth at onset of headache. May repeat one tablet after 2 hours if headache recurs.   Refills:  0         STOP taking     metoprolol succinate 25 MG 24 hr tablet   Commonly known as:  TOPROL-XL           metoprolol tartrate 25 MG tablet   Commonly known as:  LOPRESSOR           oxyCODONE IR 5 MG tablet   Commonly known as:  ROXICODONE                    Protect others around you: Learn how to safely use, store and throw away your medicines at www.disposemymeds.org.             Medication List: This is a list of all your medications and when to take them. Check marks below indicate your daily home schedule. Keep this list as a reference.      Medications           Morning Afternoon Evening Bedtime As Needed    aspirin 81 MG chewable tablet   Take 1 tablet (81 mg) by mouth daily   Last time this was given:  81 mg on 4/10/2018  8:42 AM                                   furosemide 20 MG tablet   Commonly known as:  LASIX   Take 1 tablet (20 mg) by mouth daily                                   lisinopril 5 MG tablet   Commonly known as:  PRINIVIL/ZESTRIL   Take 0.5 tablets (2.5 mg) by mouth daily                                melatonin 3 MG tablet   Take 1 tablet (3 mg) by mouth nightly as needed for sleep                            At  bedtime       order for DME   Equipment being ordered: Walker Wheels () Treatment Diagnosis: Gait instability                                potassium chloride SA 10 MEQ CR tablet   Commonly known as:  K-DUR/KLOR-CON M   Take 2 tablets (20 mEq) by mouth daily   Last time this was given:  20 mEq on 4/10/2018  8:45 AM                                   rosuvastatin 10 MG tablet   Commonly known as:  CRESTOR   Take 1 tablet (10 mg) by mouth daily   Last time this was given:  10 mg on 4/10/2018  8:42 AM                                   senna-docusate 8.6-50 MG per tablet   Commonly known as:  SENOKOT-S;PERICOLACE   Take 1-2 tablets by mouth 2 times daily as needed (constipation )                                   SUMAtriptan 50 MG tablet   Commonly known as:  IMITREX   Take one tablet by mouth at onset of headache. May repeat one tablet after 2 hours if headache recurs.                                   warfarin 2 MG tablet   Commonly known as:  COUMADIN   Take 3 mg (1 tablet) daily until next INR check, next INR draw Thursday 4/12. Then dose per goal 2.5-3.5   Last time this was given:  5 mg on 4/9/2018  6:17 PM

## 2018-04-03 NOTE — IP AVS SNAPSHOT
[address] [address]    Phone:  [phone]                                       After Visit Summary   4/3/2018    Harvey Almanzar    MRN: [id number]           After Visit Summary Signature Page     I have received my discharge instructions, and my questions have been answered. I have discussed any challenges I see with this plan with the nurse or doctor.    ..........................................................................................................................................  Patient/Patient Representative Signature      ..........................................................................................................................................  Patient Representative Print Name and Relationship to Patient    ..................................................               ................................................  Date                                            Time    ..........................................................................................................................................  Reviewed by Signature/Title    ...................................................              ..............................................  Date                                                            Time

## 2018-04-03 NOTE — LETTER
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  Endocarditis  Admit Date/Time: 4/3/2018 10:23 PM  Discharge Date: 4/10/18  Payor Source: [facility] / Plan: [employer] MA / Product Type: HMO   Readmission Assessment Measure (JAKE) Risk Score/category: elevated  Reason for Communication Hand-off Referral: Multiple providers/specialties  Discharge Plan:   Concern for non-adherence with plan of care: no  Discharge Needs Assessment:  Needs       Most Recent Value    Anticipated Changes Related to Illness none    Equipment Currently Used at Home none    Other Resources Other (see comment)    Other -- [[facility] for INR. ]      Follow-up specialty is recommended: Yes             Key Recommendations:  Post hospitalization follow up.     MADIHA ROMAN RN CC

## 2018-04-04 ENCOUNTER — APPOINTMENT (OUTPATIENT)
Dept: GENERAL RADIOLOGY | Facility: CLINIC | Age: 48
DRG: 315 | End: 2018-04-04
Attending: SURGERY
Payer: COMMERCIAL

## 2018-04-04 ENCOUNTER — APPOINTMENT (OUTPATIENT)
Dept: CARDIOLOGY | Facility: CLINIC | Age: 48
DRG: 315 | End: 2018-04-04
Attending: PHYSICIAN ASSISTANT
Payer: COMMERCIAL

## 2018-04-04 LAB
ABO + RH BLD: NORMAL
ABO + RH BLD: NORMAL
ANION GAP SERPL CALCULATED.3IONS-SCNC: 7 MMOL/L (ref 3–14)
BLD GP AB SCN SERPL QL: NORMAL
BLOOD BANK CMNT PATIENT-IMP: NORMAL
BUN SERPL-MCNC: 9 MG/DL (ref 7–30)
CALCIUM SERPL-MCNC: 7.5 MG/DL (ref 8.5–10.1)
CHLORIDE SERPL-SCNC: 113 MMOL/L (ref 94–109)
CO2 SERPL-SCNC: 23 MMOL/L (ref 20–32)
CREAT SERPL-MCNC: 0.78 MG/DL (ref 0.66–1.25)
GFR SERPL CREATININE-BSD FRML MDRD: >90 ML/MIN/1.7M2
GLUCOSE BLDC GLUCOMTR-MCNC: 103 MG/DL (ref 70–99)
GLUCOSE BLDC GLUCOMTR-MCNC: 104 MG/DL (ref 70–99)
GLUCOSE BLDC GLUCOMTR-MCNC: 133 MG/DL (ref 70–99)
GLUCOSE SERPL-MCNC: 104 MG/DL (ref 70–99)
HBA1C MFR BLD: 4.4 % (ref 0–6.4)
INR PPP: 4.21 (ref 0.86–1.14)
LACTATE BLD-SCNC: 0.9 MMOL/L (ref 0.7–2)
MRSA DNA SPEC QL NAA+PROBE: ABNORMAL
MRSA DNA SPEC QL NAA+PROBE: NEGATIVE
POTASSIUM SERPL-SCNC: 3.9 MMOL/L (ref 3.4–5.3)
SODIUM SERPL-SCNC: 143 MMOL/L (ref 133–144)
SPECIMEN EXP DATE BLD: NORMAL
SPECIMEN SOURCE: ABNORMAL
SPECIMEN SOURCE: NORMAL

## 2018-04-04 PROCEDURE — 25000128 H RX IP 250 OP 636: Performed by: INTERNAL MEDICINE

## 2018-04-04 PROCEDURE — 25000128 H RX IP 250 OP 636: Performed by: THORACIC SURGERY (CARDIOTHORACIC VASCULAR SURGERY)

## 2018-04-04 PROCEDURE — 40000275 ZZH STATISTIC RCP TIME EA 10 MIN

## 2018-04-04 PROCEDURE — 71045 X-RAY EXAM CHEST 1 VIEW: CPT

## 2018-04-04 PROCEDURE — 87493 C DIFF AMPLIFIED PROBE: CPT | Performed by: THORACIC SURGERY (CARDIOTHORACIC VASCULAR SURGERY)

## 2018-04-04 PROCEDURE — 25000132 ZZH RX MED GY IP 250 OP 250 PS 637: Performed by: SURGERY

## 2018-04-04 PROCEDURE — 25000128 H RX IP 250 OP 636

## 2018-04-04 PROCEDURE — 93325 DOPPLER ECHO COLOR FLOW MAPG: CPT | Mod: 26 | Performed by: INTERNAL MEDICINE

## 2018-04-04 PROCEDURE — 25000128 H RX IP 250 OP 636: Performed by: SURGERY

## 2018-04-04 PROCEDURE — 87040 BLOOD CULTURE FOR BACTERIA: CPT | Performed by: THORACIC SURGERY (CARDIOTHORACIC VASCULAR SURGERY)

## 2018-04-04 PROCEDURE — 85610 PROTHROMBIN TIME: CPT | Performed by: SURGERY

## 2018-04-04 PROCEDURE — 93312 ECHO TRANSESOPHAGEAL: CPT | Mod: 26 | Performed by: INTERNAL MEDICINE

## 2018-04-04 PROCEDURE — 93320 DOPPLER ECHO COMPLETE: CPT

## 2018-04-04 PROCEDURE — 36415 COLL VENOUS BLD VENIPUNCTURE: CPT | Performed by: THORACIC SURGERY (CARDIOTHORACIC VASCULAR SURGERY)

## 2018-04-04 PROCEDURE — 87040 BLOOD CULTURE FOR BACTERIA: CPT | Performed by: PHYSICIAN ASSISTANT

## 2018-04-04 PROCEDURE — 00000146 ZZHCL STATISTIC GLUCOSE BY METER IP

## 2018-04-04 PROCEDURE — 20000004 ZZH R&B ICU UMMC

## 2018-04-04 PROCEDURE — 76376 3D RENDER W/INTRP POSTPROCES: CPT

## 2018-04-04 PROCEDURE — 36620 INSERTION CATHETER ARTERY: CPT | Performed by: ANESTHESIOLOGY

## 2018-04-04 PROCEDURE — 99291 CRITICAL CARE FIRST HOUR: CPT | Mod: 25 | Performed by: ANESTHESIOLOGY

## 2018-04-04 PROCEDURE — 25000125 ZZHC RX 250: Performed by: SURGERY

## 2018-04-04 PROCEDURE — 40000014 ZZH STATISTIC ARTERIAL MONITORING DAILY

## 2018-04-04 PROCEDURE — 80048 BASIC METABOLIC PNL TOTAL CA: CPT | Performed by: SURGERY

## 2018-04-04 PROCEDURE — 93320 DOPPLER ECHO COMPLETE: CPT | Mod: 26 | Performed by: INTERNAL MEDICINE

## 2018-04-04 RX ORDER — FENTANYL CITRATE 50 UG/ML
50-100 INJECTION, SOLUTION INTRAMUSCULAR; INTRAVENOUS
Status: COMPLETED | OUTPATIENT
Start: 2018-04-04 | End: 2018-04-04

## 2018-04-04 RX ORDER — SODIUM CHLORIDE 9 MG/ML
INJECTION, SOLUTION INTRAVENOUS
Status: COMPLETED
Start: 2018-04-04 | End: 2018-04-04

## 2018-04-04 RX ORDER — GENTAMICIN SULFATE 80 MG/100ML
1 INJECTION, SOLUTION INTRAVENOUS EVERY 8 HOURS
Status: DISCONTINUED | OUTPATIENT
Start: 2018-04-04 | End: 2018-04-05

## 2018-04-04 RX ORDER — METOPROLOL TARTRATE 50 MG
50-100 TABLET ORAL
Status: CANCELLED | OUTPATIENT
Start: 2018-04-04

## 2018-04-04 RX ADMIN — VANCOMYCIN HYDROCHLORIDE 1500 MG: 10 INJECTION, POWDER, LYOPHILIZED, FOR SOLUTION INTRAVENOUS at 01:00

## 2018-04-04 RX ADMIN — SODIUM CHLORIDE, POTASSIUM CHLORIDE, SODIUM LACTATE AND CALCIUM CHLORIDE: 600; 310; 30; 20 INJECTION, SOLUTION INTRAVENOUS at 13:09

## 2018-04-04 RX ADMIN — POTASSIUM CHLORIDE 20 MEQ: 29.8 INJECTION, SOLUTION INTRAVENOUS at 20:14

## 2018-04-04 RX ADMIN — ASPIRIN 81 MG CHEWABLE TABLET 81 MG: 81 TABLET CHEWABLE at 08:09

## 2018-04-04 RX ADMIN — MIDAZOLAM 1 MG: 1 INJECTION INTRAMUSCULAR; INTRAVENOUS at 16:05

## 2018-04-04 RX ADMIN — MIDAZOLAM 1 MG: 1 INJECTION INTRAMUSCULAR; INTRAVENOUS at 16:09

## 2018-04-04 RX ADMIN — POTASSIUM CHLORIDE 40 MEQ: 750 TABLET, EXTENDED RELEASE ORAL at 00:14

## 2018-04-04 RX ADMIN — MIDAZOLAM 1.5 MG: 1 INJECTION INTRAMUSCULAR; INTRAVENOUS at 16:11

## 2018-04-04 RX ADMIN — ROSUVASTATIN CALCIUM 10 MG: 10 TABLET, FILM COATED ORAL at 08:09

## 2018-04-04 RX ADMIN — SODIUM CHLORIDE 500 ML: 9 INJECTION, SOLUTION INTRAVENOUS at 20:14

## 2018-04-04 RX ADMIN — GENTAMICIN SULFATE 80 MG: 80 INJECTION, SOLUTION INTRAVENOUS at 04:15

## 2018-04-04 RX ADMIN — GENTAMICIN SULFATE 80 MG: 80 INJECTION, SOLUTION INTRAVENOUS at 22:54

## 2018-04-04 RX ADMIN — POTASSIUM CHLORIDE 20 MEQ: 29.8 INJECTION, SOLUTION INTRAVENOUS at 01:42

## 2018-04-04 RX ADMIN — CEFTRIAXONE 2 G: 2 INJECTION, POWDER, FOR SOLUTION INTRAMUSCULAR; INTRAVENOUS at 19:51

## 2018-04-04 RX ADMIN — SODIUM CHLORIDE, POTASSIUM CHLORIDE, SODIUM LACTATE AND CALCIUM CHLORIDE: 600; 310; 30; 20 INJECTION, SOLUTION INTRAVENOUS at 00:15

## 2018-04-04 RX ADMIN — POTASSIUM PHOSPHATE, MONOBASIC AND POTASSIUM PHOSPHATE, DIBASIC 15 MMOL: 224; 236 INJECTION, SOLUTION INTRAVENOUS at 03:55

## 2018-04-04 RX ADMIN — GENTAMICIN SULFATE 80 MG: 80 INJECTION, SOLUTION INTRAVENOUS at 17:51

## 2018-04-04 RX ADMIN — FENTANYL CITRATE 50 MCG: 50 INJECTION INTRAMUSCULAR; INTRAVENOUS at 16:05

## 2018-04-04 RX ADMIN — VANCOMYCIN HYDROCHLORIDE 1500 MG: 10 INJECTION, POWDER, LYOPHILIZED, FOR SOLUTION INTRAVENOUS at 13:08

## 2018-04-04 RX ADMIN — POTASSIUM CHLORIDE 20 MEQ: 29.8 INJECTION, SOLUTION INTRAVENOUS at 06:15

## 2018-04-04 NOTE — H&P
CVTS History and Physical  4/3/2018    PRIMARY TEAM: CVTS  PRIMARY PHYSICIAN: Dr Moreno    HISTORY PRESENTING ILLNESS: This is a 47 year old male with PMH significant for HFrEF (EF 45-50%) and cardiomyopathy. Aortic valve initially replaced 1996 due to endocarditis, replaced 9/2017 at [facility] with mechanical valve. Also had MVR, as well as single vessel homograft at that time as well. History also significant for left MCA CVA 10/2017. Apparently this morning had numbness on left side with dizziness and nausea. Presented to [facility] where he was found to be hypotensive and have leukocytosis. TTE showed hyperdynamic LV (EF 70%), mild paravalvular regurg, and possible abscess/dehiscence at aortic root. Decision was made to transfer to [facility] for further cares.     Review of systems: 10 point ROS neg other than the symptoms noted above in the HPI.     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    PAST SURGICAL HISTORY:   has a past surgical history that includes Redo sternotomy replace valve mitral (N/A, 9/21/2017); Redo sternotomy replace valve aortic (N/A, 9/21/2017); and Redo sternotomy bypass coronary artery (N/A, 9/21/2017).    FAMILY HISTORY:  family history is not on file.    SOCIAL HISTORY:   reports that he has quit smoking. He has a 15.50 pack-year smoking history. He has never used smokeless tobacco. He reports that he does not drink alcohol or use illicit drugs.    ALLERGIES:  Allergies   Allergen Reactions     Vancomycin Other (See Comments) and Rash     Flushed       MEDICATIONS:  Prescriptions Prior to Admission   Medication Sig Dispense Refill Last Dose     metoprolol (LOPRESSOR) 25 MG tablet Take 25 mg  by mouth        SUMAtriptan (IMITREX) 50 MG tablet Take one tablet by mouth at onset of headache. May repeat one tablet after 2 hours if headache recurs.        metoprolol (TOPROL-XL) 25 MG 24 hr tablet Take 0.5 tablets (12.5 mg) by mouth daily 15 tablet 1      potassium chloride SA (K-DUR/KLOR-CON M) 10 MEQ CR tablet Take 2 tablets (20 mEq) by mouth daily 30 tablet 1      furosemide (LASIX) 20 MG tablet Take 1 tablet (20 mg) by mouth daily 30 tablet 1 Taking     warfarin (COUMADIN) 2 MG tablet Take 2 mg (1 tablet) PO at 6 pm on 10/5/17, then take 3 mg (1.5 tabs) until next INR check, then dose per goal 2.5-3.5 120 tablet 1 Taking     rosuvastatin (CRESTOR) 10 MG tablet Take 1 tablet (10 mg) by mouth daily 30 tablet 1 Taking     melatonin 3 MG tablet Take 1 tablet (3 mg) by mouth nightly as needed for sleep 30 tablet 0 Taking     oxyCODONE (ROXICODONE) 5 MG IR tablet Take 1 tablet (5 mg) by mouth every 6 hours as needed for moderate to severe pain 20 tablet 0 Taking     aspirin 81 MG chewable tablet Take 1 tablet (81 mg) by mouth daily 120 tablet 0 Taking     senna-docusate (SENOKOT-S;PERICOLACE) 8.6-50 MG per tablet Take 1-2 tablets by mouth 2 times daily as needed (constipation ) 50 tablet 0 Taking     order for DME Equipment being ordered: Walker Wheels ()  Treatment Diagnosis: Gait instability 1 each 0 Taking     PHYSICAL EXAMINATION:  Temp:  [100.4  F (38  C)-100.7  F (38.2  C)] 100.7  F (38.2  C)  Pulse:  [120] 120  Heart Rate:  [118-120] 120  Resp:  [16] 16  BP: (83-97)/(58-67) 97/67  SpO2:  [98 %] 98 %    General: alert, no distress, resting comfortably in bed. Orientated x 3  Neuro: Alert and oriented, no focal deficits.  Pupils equal and reactive  CV: Regular  Resp: Breathing unlabored, lung sounds clear  Abdomen: Abdomen soft, non-tender  MSK: Normal peripheral pulses, no edema, no gross deformities. Residual weakness on right upper and lower.     LABS:  Arterial Blood Gases   No lab results found in  last 7 days.  Complete Blood Count   No lab results found in last 7 days.  Basic Metabolic Panel  No lab results found in last 7 days.  Liver Function Tests  No lab results found in last 7 days.  Pancreatic Enzymes  No lab results found in last 7 days.  Coagulation Profile  No lab results found in last 7 days.  Lactate  Invalid input(s): LACTATE    IMAGING:  CXR: FINDINGS: New right IJ central venous catheter distal tip projects at low SVC near junction with right atrium. No pneumothorax. Other findings unchanged. Mild cardiac negative. No pulmonary edema. Trace pleural effusions.    Echo:   CONCLUSION:    Hyperdynamic LV function with EF 70%. No clear wall motion     abnormalities.    Normal RV size and function.     Left atrium moderately dilated. Not well seen.     Aortic valve mechanical prosthesis not well seen with mean gradient     30 mm Hg.  Probable mild paravalvular regurgitation.  Aortic root     tissue appears unusually thickened in with possible echo free space     and possible dehiscence/abcess.   In direct comparison to 1/11/2018     study this thickening may be slightly more prominent.    Mechanical mitral valve with mean gradient 10 mm Hg at HR of 121     bpm.      Compared to prior study, mean aortic valve gradient is stable, mean     mitral valve gradient mildly increased but may be due to heart rate.    Would recommend IZA and further evaluation of possible aortic     root abcess/AV dehiscence.     ASSESSMENT: Harvey Almanzar is a 47 year old male with complex cardiac history, most recently with MVR/AVR, singel vessel CAB using saph vein in Sept 2017 who presents to outside hospital in septic shock with concerns for valve failure and possible abscess.     PLAN:   Neuro/ pain/ sedation:   #Hx of CVA        ? Monitor neurological status. Notify the MD for any acute changes in exam.        ? Tylenol for pain, no sedation indicated   Pulmonary care:    #No acute issues        ? Supplemental  oxygen to keep saturation above 92 %.          Cardiovascular:     #Hx of endocarditis, s/p AVR/MVR replacement   #septic shock        ? Monitor hemodynamic status. Pressors as needed        ? Will plan for IZA in AM for better evaluation of aortic root        ? Cont ASA, statin. Holding HTN meds/lasix for now.   GI care:         ? NPO except ice chips and medications.     Fluids/ Electrolytes/ Nutrition:         ? Volume replacement        ? Replace lytes        ? No indication for parenteral nutrition.   Renal/ Fluid Balance:          ? Will continue to monitor intake and output.   Endocrine:          ? No acute issues        ? Sliding scale for diabetes management.    ID/ Antibiotics:   #Hx of Strep parasanguinis endocarditis        ? Cont vanc/ceftriaxone/gentamicin        ? Blood Cxs pending from OSH, will repeat        ? ID consult in AM   Heme:           ? Hemoglobin stable.         ? Leukocytosis, likely from infectious process. Will trend.    Prophylaxis:          ? Mechanical prophylaxis for DVT.         ?Anti-coagulation for mechanical valve   Lines/ tubes/ drains:        ? CVC from OSH (placed 4/3)   Disposition:        ? Surgical ICU.     Patient seen, findings and plan discussed with  Dr Moreno.    Lc Blue MD   CVTS [facility]

## 2018-04-04 NOTE — PLAN OF CARE
Problem: Patient Care Overview  Goal: Plan of Care/Patient Progress Review  Outcome: No Change  Patient stable throughout shift. Now on 0.06mcg/kg/min Levo. MAPs in mid 70's. No shortness of breath; sating mid 90s on RA. NPO. Loose stools with patient reporting he stills takes stool softeners regularly.  Describes left sided numbness to arm & leg (known). Plan for IZA today. Will continue to monitor and address as needed.

## 2018-04-04 NOTE — PHARMACY-ANTICOAGULATION SERVICE
Clinical Pharmacy - Warfarin Dosing Consult     Pharmacy has been consulted to manage this patient s warfarin therapy.  Indication: Mechanical Mitral Valve Replacement  Therapy Goal: INR 2.5-3.5  Warfarin Prior to Admission: Yes  Warfarin PTA Regimen: 3 mg daily ++Addendum 4/6: confirmed PTA dosing with outpatient clinic ([phone]) 3mg M/W/Th/Sa, 4mg Tu/F - (due next INR May 14th)     INR   Date Value Ref Range Status   04/03/2018 4.53 (H) 0.86 - 1.14 Final   10/17/2017 5.09 (HH) 0.86 - 1.14 Final     Comment:     Critical Value called to and read back by  JOHANNA AYALA AT 1616 ON 10/17/17 BY FIONA       Chromogenic Factor 10   Date Value Ref Range Status   09/24/2017 35 (L) 70 - 130 % Final     Comment:     Therapeutic Range:  A Chromogenic Factor 10 level of approximately 20-40%   inversely correlates with an INR of 2-3 for patients receiving Warfarin.   Chromogenic Factor 10 levels below 20% indicate an INR greater than 3 and   levels above 40% indicate an INR less than 2.       Patient got 4 mg of Warfarin PTA at OSH.    Pharmacy will monitor Harvey Almanzar daily and order warfarin doses to achieve specified goal.      Please contact pharmacy as soon as possible if the warfarin needs to be held for a procedure or if the warfarin goals change.      Arlin Laureano, PharmD, BCPS

## 2018-04-04 NOTE — CONSULTS
[facility] ID SERVICE CONSULTATION     Patient:  Harvey Almanzar   Date of birth 1970, Medical record number [id number]  Date of Visit:  04/04/2018  Date of Admission: 4/3/2018  Consult Requester:Tobi Moreno          Assessment and Recommendations:   PROBLEM LIST:  1. Aortic valve endocarditis with AVR in 1996 and redo AVR in 1998  2. 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 E. Coli cultured from the mitral valve tissue (9/2017)  3. Post-Op mechanical MVR, AVR, aneurysm repair and CABG x1 (SVG to PDA) - (9/21/2017)  4. HFrEF (EF 45-50%)  5. L MCA stroke (9/232017)    RECOMMENDATION:  1. Please collect another set of blood cultures, please include anaerobic blood cultures   2. Continue Gentamicin 1mg/kg Q8H. Target Peak 3-4 ug/mL, Trough 1 ug/mL. PharmD to monitor CrCl and gent levels closely and dose adjustment to meet goal peaks and troughs.   3. Continue Vancomycin trough 15-20  4. Please add on Ceftriaxone 2 gram daily   5. Follow up blood cultures collected at [facility]     ASSESSMENT:    Mr. Almanzar is a 46 y/o man with a history of aortic valve endocarditis with AVR in 1996 and redo AVR in 1998, streptococcal parasanguinis bacteremia and presumed, prosthetic endocarditis with severe AR (7/2017), E. Coli cultured from the mitral valve tissue (9/2017), post-Op mechanical MVR, AVR, aneurysm repair and CABG x1 (SVG to PDA) - (9/21/2017) who presented to [facility] with left sided numbness and found to be hypotensive (BP 67/37). A TTE at that time demonstrated aortic root  thickening in with possible echo free space and possible dehiscence/abcess. He states that he was not having any symptoms until 4/3 (no fevers). Given his history as well as findings from the TTE we are going to treat him as definitive endocarditis at this time. His lack of symptoms make it difficult to understand if this is a smoldering, sub-acute  endocarditis or if this is a new re-infection that lead to endocarditis. Until we are better able to assess his valves with a IZA, we recommend covering with Vancomycin (cover staph, CONS, MRE, and enterococcus), continue Gentamicin (given the presence of two prosthetic values), and add on Ceftriaxone (cover strep species which are the most usual orgs as well as e coli which he previously grew on culture). We will follow up with [facility] regarding the blood cultures collected on admission.     Patient was seen and discussed with Dr. Ramon Hummel, Infectious Diseases Fellow   [phone]    ID Staff Assessment and Plan:   Patient was seen and examined by me with the ID Fellow. The note above reflects our joint assessment and recommendations. I have reviewed the medical record, labs, imaging, vitals signs and have discussed the patient with the ID fellow in detail. In summary we suspect a possible endocarditis. Patient is at high risk for this given prosthetic heart valves and concern for possible perivalvular abscess on outside echocardiogram. Agree with plan as outlined in the ID fellow note above.   Filomena Navarro MD  ID staff physician  Pager [phone]    ________________________________________________________________    Consult Question:.  Admission Diagnosis: aortic graft infection with possible dehiscence  Endocarditis         History of Present Illness:     Mr Almanzar is a 48 y/o man with a history of L MCA stroke (9/23), HFrEF (EF 45-50%), bacterial endocarditis involving his native aortic valve with homograft replacement in 1996, and repair of a false aneurysm in the subvalvular area in 1999. 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 Blood cultures were positive on 7/13, 7/14, 7/15 for strep parasanguinas (sensitive to Ceftriaxone LONG 0.094, and PCN LONG  "0.19) and were negative on 7/16, 7/17, and 8/25 . He was admitted to [facility] on 9/15/2017 with heart failure. He was started on Ceftriaxone + 2 weeks of gentamicin (shortened duration due to increasing Creatinine). On 9/21 he underwent redo median sternotomy, CABG, MVR, and AVR on 9/21. Mitral valve tissue grew Ecoli, sensitive to ceftriaxone. Given that Ecoli grew from the valve itself, he was treated for Ecoli prosthetic valve endocarditis with 6 weeks of Ceftriaxone and Levofloxacin.     Mr. Almanzar presented on 4/03/2018 to [facility] with dizziness, L-sided numbness, rigors, and hypotension. He was started on Levoped, Vancomycin, Ceftriaxone, and Gentamicin. He had a TTE on 4/3 that demonstrated \"Aortic root  tissue appears unusually thickened in with possible echo free space and possible dehiscence/abcess.\"  He was then transferred to [facility] for further cares.     Mr. Almanzar states that he had been doing well up until 4/03 when he awake from sleep and left left sided numbness and lightheadedness. Prior to 4/3 he denies any fevers, chills, night sweats, generalized weakness, fatigue, loss of appetite. He denied any chest pain, shortness of breath, cough. He denied any abdominal pain. No new skin rashes or ulcerations. No recently surgical interventions or procedures. No recent dental procedures. No recent illness.          Review of Systems:   CONSTITUTIONAL:  No fevers or chills  EYES: negative for icterus  ENT:  negative for hearing loss, tinnitus and sore throat  RESPIRATORY:  negative for cough with sputum and dyspnea  CARDIOVASCULAR:  negative for chest pain, dyspnea  GASTROINTESTINAL:  negative for nausea, vomiting, diarrhea and constipation  GENITOURINARY:  negative for dysuria  HEME:  No easy bruising  INTEGUMENT:  negative for rash and pruritus  NEURO:  Negative for headache         Past Medical History:     Past Medical History:   Diagnosis Date     Acute diastolic congestive heart " failure (H) 8/24/2017     Aortic valve replaced 1996    Overview:  Cadaver - tissue valve in 1996 at [facility]  Amoxicillin 2000 mg prior to dental work please.  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          Past Surgical History:     Past Surgical History:   Procedure Laterality Date     REDO STERNOTOMY BYPASS CORONARY ARTERY N/A 9/21/2017    Procedure: REDO STERNOTOMY BYPASS CORONARY ARTERY;;  Surgeon: Nicola Gorman MD;  Location: [facility] OR     REDO STERNOTOMY REPLACE VALVE AORTIC N/A 9/21/2017    Procedure: REDO STERNOTOMY REPLACE VALVE AORTIC;;  Surgeon: Nicola Gorman MD;  Location: [facility] OR     REDO STERNOTOMY REPLACE VALVE MITRAL N/A 9/21/2017    Procedure: REDO STERNOTOMY REPLACE VALVE MITRAL;  Left groin cutdown, Redo Median Sternotomy, Lysis of adhesions, Left mini-thoracotomy,Coronary artery bypass graft x 1 using the left greater saphenous vein, using endovein harvest, Mitral valve replacement using St. Jerald Mechanical 27mm, Aortic Valve Replacement using a 17mm [employer]. Jerald Mechanical, On Cardiopulmonary Bypass Pump;  Surgeon: Cindy Gorman            Family History:   History reviewed. No pertinent family history.  Brother and paternal grandfather with CAD.        Social History:     Social History   Substance Use Topics     Smoking status: Former Smoker     Packs/day: 0.50     Years: 31.00     Smokeless tobacco: Never Used     Alcohol use No     History   Sexual Activity     Sexual activity: Not Currently          Current Medications (antimicrobials listed in bold):       vancomycin (VANCOCIN) IV  1,500 mg Intravenous Q12H     gentamicin  1 mg/kg Intravenous Q8H     insulin aspart  1-12 Units Subcutaneous Q4H     aspirin  81 mg Oral Daily     rosuvastatin  10 mg Oral  Daily     cefTRIAXone  2 g Intravenous Q24H          Allergies:     Allergies   Allergen Reactions     Vancomycin Other (See Comments) and Rash     Flushed          Physical Exam:   Vitals were reviewed  Ranges for his vital signs:  Temp:  [100.1  F (37.8  C)-100.7  F (38.2  C)] 100.1  F (37.8  C)  Pulse:  [120] 120  Heart Rate:  [113-128] 114  Resp:  [16] 16  BP: ()/(50-78) 96/70  SpO2:  [95 %-99 %] 98 %    Physical Examination:  GENERAL:  well-developed, well-nourished, in bed in no acute distress.   HEENT:  Head is normocephalic, atraumatic   EYES:  Eyes have anicteric sclerae without conjunctival injection or stigmata of endocarditis.    ENT:  Oropharynx is moist without exudates or ulcers. Tongue is midline.   NECK:  Supple. No  Cervical lymphadenopathy  LUNGS:  Clear to auscultation bilateral.   CARDIOVASCULAR:  Tachy, regular rate, mechanical click heard, there is a holo-systolic murmur best heard at the left 4-5th intercostal space. Sternotomy scar healed   ABDOMEN:  Normal bowel sounds, soft, nontender. No appreciable hepatosplenomegaly  SKIN:  No acute rashes.  Line(s) are in place without any surrounding erythema or exudate. No stigmata of endocarditis.  NEUROLOGIC:  Grossly nonfocal. Active x4 extremities         Laboratory Data:     Hematology Studies    Recent Labs   Lab Test  04/03/18   2237  10/05/17   0707  10/04/17   0656  10/03/17   0735  10/02/17   0934  10/01/17   1341  10/01/17   0816   09/21/17   1620   09/15/17   2217   WBC  7.0  5.7  6.1  5.5  7.8   --   9.2   < >  6.7   < >  8.8   ANEU   --    --    --    --    --    --    --    --   6.1   --   5.9   AEOS   --    --    --    --    --    --    --    --   0.0   --   0.0   HGB  9.7*  7.8*  8.1*  7.6*  8.0*  8.1*  7.7*   < >  9.1*  9.2*   < >  11.9*   MCV  94  100  100  97  96   --   96   < >  90   < >  89   PLT  164  225  220  205  213   --   184   < >  82*   < >  267    < > = values in this interval not displayed.     Metabolic Studies      Recent Labs   Lab Test  04/04/18   0535  04/03/18   2237  10/05/17   0707  10/04/17   0656  10/03/17   0735   NA  143  142  139  136  136   POTASSIUM  3.9  3.3*  3.9  4.0  4.2   CHLORIDE  113*  112*  106  102  104   CO2  23  23  28  27  26   BUN  9  11  12  14  15   CR  0.78  0.81  0.54*  0.60*  0.58*   GFRESTIMATED  >90  >90  >90  >90  >90     Hepatic Studies    Recent Labs   Lab Test  04/03/18   2237  10/05/17   0707  10/04/17   0656  10/03/17   0735  10/02/17   0934  10/01/17   0816   BILITOTAL  2.1*  2.4*  3.1*  2.9*  3.6*  3.9*   ALKPHOS  69  304*  304*  274*  305*  248*   ALBUMIN  2.9*  2.0*  2.0*  1.8*  2.0*  1.8*   AST  44  128*  145*  152*  187*  170*   ALT  21  141*  155*  162*  192*  174*     Microbiology:    CULTURE RESULTS:    1. Blood cultures 7/13, 7/14, 7/15 positive for strep parasanguinas (sensitive to Ceftriaxone LONG 0.094, and PCN LONG 0.19).  2. Blood cultures 7/16, 7/17, 8/25 were negative.  3. Blood culture 9/25/17: no growth.   4. Stool C diff 9/24/17: negative.   5. Mitral Valve culture 9/21/17: light growth e coli.   6. Aortic Valve culture 9/21/17: no growth    4/03/2018 Blood cultures from [facility]: Pending   04/04/2018 Blood culture x 2: Pending     Radiology:   4/4/2018 CXR:   Impression: Small bilateral pleural effusions. New right IJ central  line tip in the low superior vena cava.    TTE done at [facility] on 4/3:   Hyperdynamic LV function with EF 70%. No clear wall motion     abnormalities.    Normal RV size and function.     Left atrium moderately dilated. Not well seen.     Aortic valve mechanical prosthesis not well seen with mean gradient     30 mm Hg.  Probable mild paravalvular regurgitation.  Aortic root     tissue appears unusually thickened in with possible echo free space     and possible dehiscence/abcess. In direct comparison to 1/11/2018     study this thickening may be slightly more prominent.    Mechanical mitral valve with mean gradient 10 mm Hg at  HR of 121     bpm.      Compared to prior study, mean aortic valve gradient is stable, mean     mitral valve gradient mildly increased but may be due to heart rate.    Would recommend IZA and further evaluation of possible aortic     root abcess/AV dehiscence.      Left Ventricular Ejection Fraction: 70 %       Procedures:   Date: 9/21/2017.  Surgeon: Dr. Nicola Gorman and Dr Leal   1. Redo sternotomy  2. Aortic valve replacement with a 17 mm St. Jerald Masters mechanical valve  3. Mitral valve replacement with a 27 mm St. Jerald Masters mechanical valve  4. Coronary artery bypass grafting x1 with a reverse saphenous vein graft to the posterior descending artery  5. Endoscopic vein harvest from the left lower extremity  6. Left femoral arterial cannulation.   7. Intraoperative IZA

## 2018-04-04 NOTE — PLAN OF CARE
Problem: Patient Care Overview  Goal: Plan of Care/Patient Progress Review  Outcome: Improving  ECHO performed at bedside. Patient on levo gtt currently at 0.04 mcg/kg/min. LR at 100 ml/hr. Voiding per urinal. Neuro WNL. HR SR 90s - 110s. Pressures 90s - 100s / 50s - 60s with MAPs 70s. Clear, diminished lungs. 2LNC post ECHO with oxygen saturations 95 - 100 percent. Pulses 2/2. No other events.

## 2018-04-04 NOTE — PHARMACY-AMINOGLYCOSIDE DOSING SERVICE
Pharmacy Aminoglycoside Initial Note  Date of Service 2018  Patient's  1970  47 year old, male    Weight (Actual):  78.1 kg    Indication: Endocarditis    Current estimated CrCl = Estimated Creatinine Clearance: 105.4 mL/min (based on Cr of 0.81).    Creatinine for last 3 days  4/3/2018: 10:37 PM Creatinine 0.81 mg/dL     Nephrotoxins and other renal medications (Future)    Start     Dose/Rate Route Frequency Ordered Stop    18 0500  gentamicin (GARAMYCIN) infusion 80 mg      1 mg/kg × 78.1 kg  over 60 Minutes Intravenous EVERY 8 HOURS 18 0212      18 0100  vancomycin (VANCOCIN) 1,500 mg in sodium chloride 0.9 % 250 mL intermittent infusion      1,500 mg  over 90 Minutes Intravenous EVERY 12 HOURS 18 0028      18 2245  norepinephrine (LEVOPHED) 16 mg in D5W 250 mL infusion      0.03-0.4 mcg/kg/min × 79.3 kg  2.2-29.7 mL/hr  Intravenous CONTINUOUS 18 2244            Contrast Orders - past 72 hours     None          Aminoglycoside Levels - past 2 days  No results found for requested labs within last 48 hours.    Aminoglycosides IV Administrations (past 72 hours)      No aminoglycosides orders with administrations in past 72 hours.                Plan:  1.  Start Gentamicin 80 mg (1 mg/kg) IV q8h.   2.  Target goals based on synergy dosing  3.  Goal peak level: 3-5 mg/L  4.  Goal trough level: <1 mg/L  5.  Pharmacy will continue to follow and check levels as appropriate in 1-3 Days    Arlin Laureano, JonesD, BCPS

## 2018-04-04 NOTE — PLAN OF CARE
Problem: Patient Care Overview  Goal: Plan of Care/Patient Progress Review  Outcome: No Change  Patient stable throughout night on 0.09mcg/kg/min Levo. MAP >60%. Patient denies shortness of breath; sats >90% on RA. NPO. Describes left sided numbness to arm & leg (known). Plan for IZA today. Will continue to monitor and report to oncoming nurse.

## 2018-04-04 NOTE — PHARMACY-VANCOMYCIN DOSING SERVICE
Pharmacy Vancomycin Initial Note  Date of Service 2018  Patient's  1970  47 year old, male    Indication: Endocarditis    Current estimated CrCl = Estimated Creatinine Clearance: 105.4 mL/min (based on Cr of 0.81).    Creatinine for last 3 days  4/3/2018: 10:37 PM Creatinine 0.81 mg/dL    Recent Vancomycin Level(s) for last 3 days  No results found for requested labs within last 72 hours.      Vancomycin IV Administrations (past 72 hours)      No vancomycin orders with administrations in past 72 hours.                Nephrotoxins and other renal medications (Future)    Start     Dose/Rate Route Frequency Ordered Stop    18 0100  vancomycin (VANCOCIN) 1,500 mg in sodium chloride 0.9 % 250 mL intermittent infusion      1,500 mg  over 90 Minutes Intravenous EVERY 12 HOURS 18 0028      18 2245  norepinephrine (LEVOPHED) 16 mg in D5W 250 mL infusion      0.03-0.4 mcg/kg/min × 79.3 kg  2.2-29.7 mL/hr  Intravenous CONTINUOUS 18 2244            Contrast Orders - past 72 hours     None        Plan:  1.  Patient was given Vancomycin 2000 mg x 1 at OSH.  Will continue 1500 mg IV q12h.   2.  Goal Trough Level: 15-20 mg/L   3.  Pharmacy will check trough levels as appropriate in 1-3 Days.    4. Serum creatinine levels will be ordered daily for the first week of therapy and at least twice weekly for subsequent weeks.    5. [place] method utilized to dose vancomycin therapy: Method 2    Arlin Laureano, PharmD, BCPS

## 2018-04-04 NOTE — PROGRESS NOTES
CV ICU PROGRESS NOTE  April 4, 2018      CO-MORBIDITIES:   Aortic valve replaced  (primary encounter diagnosis)  Endocarditis of aortic valve    ASSESSMENT: Harvey Almanzar is a 47 year old male with complex cardiac history, most recently with MVR/AVR, singel vessel CAB using saph vein in Sept 2017 who presents to outside hospital in septic shock with concerns for valve failure and possible abscess.       TODAY's PLAN:  - IZA, planned for this pm in CVICU  - ID agrees with antibiotics, wants blood Cx here w/ anaerobes  - place art line to titrate pressors      PLAN:   Neuro/ pain/ sedation:   #Hx of CVA  - Monitor neurological status. Notify the MD for any acute changes in exam.  - Tylenol for pain, no sedation indicated     Pulmonary care:    #No acute issues  - Supplemental oxygen to keep saturation above 92 %.          Cardiovascular:     #Hx of endocarditis, s/p AVR/MVR replacement   #septic shock  - Monitor hemodynamic status. Goal: maintain volume, elevated HR, and decrease afterload.  - Will plan for IZA to evaluate aortic root  - Cont ASA, statin. Holding HTN meds/lasix for now.  - NE gtt, wean as able     GI care:   - NPO except ice chips and medications. (pending IZA)     Fluids/ Electrolytes/ Nutrition:   - Volume replacement  - Replace lytes  - No indication for parenteral nutrition.     Renal/ Fluid Balance:    - Will continue to monitor intake and output.     Endocrine:    - No acute issues  - Sliding scale for diabetes management.      ID/ Antibiotics:   #Hx of Strep parasanguinis endocarditis  - Cont vanc/ceftriaxone/gentamicin  - Blood Cxs pending from OSH, will repeat  - ID consulted and following     Heme:     - INR: goal 2.5-3.5 (mechanical MV), currently supratherapeutic  - Hemoglobin stable.  - Leukocytosis, likely from infectious process. Will trend.      Prophylaxis:    - Mechanical prophylaxis for DVT.   - Anti-coagulation for mechanical valve held.      Lines/ tubes/ drains:  -  CVC from OSH (placed 4/3)     Disposition:  - CVICU    Patient seen, findings and plan discussed with CV ICU staff, Dr. Dodson.    Alex Cramer MD  CA-2/PGY-3    ====================================    TODAY'S PROGRESS:   SUBJECTIVE:   -Comfortable and in NAD. Denying any pain.     OBJECTIVE:   1. VITAL SIGNS:   Temp:  [99.8  F (37.7  C)-100.7  F (38.2  C)] 99.8  F (37.7  C)  Pulse:  [120] 120  Heart Rate:  [105-128] 107  Resp:  [16-18] 18  BP: ()/(50-78) 106/71  SpO2:  [95 %-100 %] 97 %  Resp: 18    2. INTAKE/ OUTPUT:   I/O last 3 completed shifts:  In: 2096.61 [P.O.:240; I.V.:1856.61]  Out: 2400 [Urine:2050; Emesis/NG output:350]    3. PHYSICAL EXAMINATION:   General: laying in bed comfortable  Neuro: A&Ox3, NAD  Resp: Breathing non-labored  CV: RRR  Abdomen: Soft, Non-distended, Non-tender  Incisions: CDI  Extremities: warm and well perfused    4. INVESTIGATIONS:   Arterial Blood Gases   No lab results found in last 7 days.  Complete Blood Count     Recent Labs  Lab 04/03/18 2237   WBC 7.0   HGB 9.7*        Basic Metabolic Panel    Recent Labs  Lab 04/04/18 0535 04/03/18 2237    142   POTASSIUM 3.9 3.3*   CHLORIDE 113* 112*   CO2 23 23   BUN 9 11   CR 0.78 0.81   * 104*     Liver Function Tests    Recent Labs  Lab 04/04/18 0535 04/03/18 2237   AST  --  44   ALT  --  21   ALKPHOS  --  69   BILITOTAL  --  2.1*   ALBUMIN  --  2.9*   INR 4.21* 4.53*     Pancreatic Enzymes  No lab results found in last 7 days.  Coagulation Profile    Recent Labs  Lab 04/04/18 0535 04/03/18 2237   INR 4.21* 4.53*   PTT  --  66*     Lactate  Invalid input(s): LACTATE    5. RADIOLOGY:   Recent Results (from the past 24 hour(s))   XR Chest Port 1 View    Narrative    Exam: XR CHEST PORT 1 VW, 4/4/2018 6:12 AM    Indication: Endocarditis;     Comparison: 9/29/2017    Findings:   New right IJ central line tip in the low superior vena cava.  Prosthetic aortic valve. Heart within normal limits for a supine  film.  Pulmonary vasculature normal. Mild blunting of both costophrenic  angles suggesting small effusions.      Impression    Impression: Small bilateral pleural effusions. New right IJ central  line tip in the low superior vena cava.    HAILEY BLANC MD       =========================================

## 2018-04-05 ENCOUNTER — APPOINTMENT (OUTPATIENT)
Dept: CT IMAGING | Facility: CLINIC | Age: 48
DRG: 315 | End: 2018-04-05
Attending: THORACIC SURGERY (CARDIOTHORACIC VASCULAR SURGERY)
Payer: COMMERCIAL

## 2018-04-05 ENCOUNTER — APPOINTMENT (OUTPATIENT)
Dept: GENERAL RADIOLOGY | Facility: CLINIC | Age: 48
DRG: 315 | End: 2018-04-05
Attending: THORACIC SURGERY (CARDIOTHORACIC VASCULAR SURGERY)
Payer: COMMERCIAL

## 2018-04-05 LAB
ANION GAP SERPL CALCULATED.3IONS-SCNC: 7 MMOL/L (ref 3–14)
BUN SERPL-MCNC: 7 MG/DL (ref 7–30)
C DIFF TOX B STL QL: NEGATIVE
CALCIUM SERPL-MCNC: 8 MG/DL (ref 8.5–10.1)
CHLORIDE SERPL-SCNC: 111 MMOL/L (ref 94–109)
CO2 SERPL-SCNC: 24 MMOL/L (ref 20–32)
CREAT SERPL-MCNC: 0.77 MG/DL (ref 0.66–1.25)
CRP SERPL-MCNC: 73 MG/L (ref 0–8)
ERYTHROCYTE [DISTWIDTH] IN BLOOD BY AUTOMATED COUNT: 16.3 % (ref 10–15)
GENTAMICIN SERPL-MCNC: 0.8 MG/L
GENTAMICIN SERPL-MCNC: 2.3 MG/L
GFR SERPL CREATININE-BSD FRML MDRD: >90 ML/MIN/1.7M2
GLUCOSE BLDC GLUCOMTR-MCNC: 105 MG/DL (ref 70–99)
GLUCOSE BLDC GLUCOMTR-MCNC: 91 MG/DL (ref 70–99)
GLUCOSE BLDC GLUCOMTR-MCNC: 96 MG/DL (ref 70–99)
GLUCOSE BLDC GLUCOMTR-MCNC: 99 MG/DL (ref 70–99)
GLUCOSE SERPL-MCNC: 96 MG/DL (ref 70–99)
HCT VFR BLD AUTO: 28 % (ref 40–53)
HGB BLD-MCNC: 8.9 G/DL (ref 13.3–17.7)
INR PPP: 4.07 (ref 0.86–1.14)
LDH SERPL L TO P-CCNC: 865 U/L (ref 85–227)
MCH RBC QN AUTO: 29.7 PG (ref 26.5–33)
MCHC RBC AUTO-ENTMCNC: 31.8 G/DL (ref 31.5–36.5)
MCV RBC AUTO: 93 FL (ref 78–100)
PLATELET # BLD AUTO: 131 10E9/L (ref 150–450)
POTASSIUM SERPL-SCNC: 4 MMOL/L (ref 3.4–5.3)
RBC # BLD AUTO: 3 10E12/L (ref 4.4–5.9)
SODIUM SERPL-SCNC: 142 MMOL/L (ref 133–144)
SPECIMEN SOURCE: NORMAL
VANCOMYCIN SERPL-MCNC: 12.4 MG/L
WBC # BLD AUTO: 3.9 10E9/L (ref 4–11)

## 2018-04-05 PROCEDURE — 25000132 ZZH RX MED GY IP 250 OP 250 PS 637: Performed by: SURGERY

## 2018-04-05 PROCEDURE — 99291 CRITICAL CARE FIRST HOUR: CPT | Mod: GC | Performed by: ANESTHESIOLOGY

## 2018-04-05 PROCEDURE — 25000128 H RX IP 250 OP 636: Performed by: THORACIC SURGERY (CARDIOTHORACIC VASCULAR SURGERY)

## 2018-04-05 PROCEDURE — 25000128 H RX IP 250 OP 636: Performed by: SURGERY

## 2018-04-05 PROCEDURE — 85610 PROTHROMBIN TIME: CPT | Performed by: SURGERY

## 2018-04-05 PROCEDURE — 71045 X-RAY EXAM CHEST 1 VIEW: CPT

## 2018-04-05 PROCEDURE — 80202 ASSAY OF VANCOMYCIN: CPT | Performed by: THORACIC SURGERY (CARDIOTHORACIC VASCULAR SURGERY)

## 2018-04-05 PROCEDURE — 85027 COMPLETE CBC AUTOMATED: CPT | Performed by: SURGERY

## 2018-04-05 PROCEDURE — 25000128 H RX IP 250 OP 636: Performed by: ANESTHESIOLOGY

## 2018-04-05 PROCEDURE — 75572 CT HRT W/3D IMAGE: CPT | Mod: 26 | Performed by: INTERNAL MEDICINE

## 2018-04-05 PROCEDURE — 20000004 ZZH R&B ICU UMMC

## 2018-04-05 PROCEDURE — 80170 ASSAY OF GENTAMICIN: CPT | Performed by: THORACIC SURGERY (CARDIOTHORACIC VASCULAR SURGERY)

## 2018-04-05 PROCEDURE — 80048 BASIC METABOLIC PNL TOTAL CA: CPT | Performed by: SURGERY

## 2018-04-05 PROCEDURE — 25000128 H RX IP 250 OP 636: Performed by: INTERNAL MEDICINE

## 2018-04-05 PROCEDURE — 86140 C-REACTIVE PROTEIN: CPT | Performed by: THORACIC SURGERY (CARDIOTHORACIC VASCULAR SURGERY)

## 2018-04-05 PROCEDURE — 75572 CT HRT W/3D IMAGE: CPT

## 2018-04-05 PROCEDURE — 25000132 ZZH RX MED GY IP 250 OP 250 PS 637: Performed by: THORACIC SURGERY (CARDIOTHORACIC VASCULAR SURGERY)

## 2018-04-05 PROCEDURE — 83615 LACTATE (LD) (LDH) ENZYME: CPT | Performed by: THORACIC SURGERY (CARDIOTHORACIC VASCULAR SURGERY)

## 2018-04-05 PROCEDURE — 00000146 ZZHCL STATISTIC GLUCOSE BY METER IP

## 2018-04-05 RX ORDER — IOPAMIDOL 755 MG/ML
100 INJECTION, SOLUTION INTRAVASCULAR ONCE
Status: COMPLETED | OUTPATIENT
Start: 2018-04-05 | End: 2018-04-05

## 2018-04-05 RX ORDER — WARFARIN SODIUM 1 MG/1
1 TABLET ORAL
Status: COMPLETED | OUTPATIENT
Start: 2018-04-05 | End: 2018-04-05

## 2018-04-05 RX ADMIN — IOPAMIDOL 100 ML: 755 INJECTION, SOLUTION INTRAVENOUS at 10:35

## 2018-04-05 RX ADMIN — GENTAMICIN SULFATE 60 MG: 40 INJECTION, SOLUTION INTRAMUSCULAR; INTRAVENOUS at 21:20

## 2018-04-05 RX ADMIN — WARFARIN SODIUM 1 MG: 1 TABLET ORAL at 21:18

## 2018-04-05 RX ADMIN — VANCOMYCIN HYDROCHLORIDE 1500 MG: 10 INJECTION, POWDER, LYOPHILIZED, FOR SOLUTION INTRAVENOUS at 14:15

## 2018-04-05 RX ADMIN — SODIUM CHLORIDE, POTASSIUM CHLORIDE, SODIUM LACTATE AND CALCIUM CHLORIDE: 600; 310; 30; 20 INJECTION, SOLUTION INTRAVENOUS at 18:26

## 2018-04-05 RX ADMIN — ROSUVASTATIN CALCIUM 10 MG: 10 TABLET, FILM COATED ORAL at 11:14

## 2018-04-05 RX ADMIN — VANCOMYCIN HYDROCHLORIDE 1500 MG: 10 INJECTION, POWDER, LYOPHILIZED, FOR SOLUTION INTRAVENOUS at 00:07

## 2018-04-05 RX ADMIN — SODIUM CHLORIDE, POTASSIUM CHLORIDE, SODIUM LACTATE AND CALCIUM CHLORIDE 500 ML: 600; 310; 30; 20 INJECTION, SOLUTION INTRAVENOUS at 12:58

## 2018-04-05 RX ADMIN — GENTAMICIN SULFATE 80 MG: 80 INJECTION, SOLUTION INTRAVENOUS at 04:10

## 2018-04-05 RX ADMIN — CEFTRIAXONE 2 G: 2 INJECTION, POWDER, FOR SOLUTION INTRAMUSCULAR; INTRAVENOUS at 20:41

## 2018-04-05 RX ADMIN — ASPIRIN 81 MG CHEWABLE TABLET 81 MG: 81 TABLET CHEWABLE at 11:14

## 2018-04-05 RX ADMIN — GENTAMICIN SULFATE 80 MG: 80 INJECTION, SOLUTION INTRAVENOUS at 12:29

## 2018-04-05 RX ADMIN — POTASSIUM CHLORIDE 20 MEQ: 29.8 INJECTION, SOLUTION INTRAVENOUS at 06:09

## 2018-04-05 NOTE — PROGRESS NOTES
CV ICU PROGRESS NOTE  April 5, 2018      CO-MORBIDITIES:   Aortic valve replaced  (primary encounter diagnosis)  Endocarditis of aortic valve    ASSESSMENT: Harvey Almanzar is a 47 year old male with complex cardiac history, most recently with MVR/AVR, singel vessel CAB using saph vein in Sept 2017 who presents to outside hospital in septic shock with concerns for valve failure and possible abscess.       TODAY's PLAN:  - waiting on CTA heart read- small perivalvular leak.   - has episodes of SBP drop that recover. 500 LR bolus  - ok for diet      PLAN:   Neuro/ pain/ sedation:   #Hx of CVA  - Monitor neurological status. Notify the MD for any acute changes in exam.  - Tylenol for pain, no sedation indicated     Pulmonary care:    #No acute issues  - Supplemental oxygen to keep saturation above 92 %.          Cardiovascular:     #Hx of endocarditis, s/p AVR/MVR replacement   #septic shock  - Monitor hemodynamic status. Goal: maintain volume, elevated HR, and decrease afterload.  - IZA inconclusive so CTA heart ordered.   - Cont ASA, statin. Holding HTN meds/lasix for now.  - NE off  - Cards consult for non surgical intervention vs heart transplant eval if non surgical fail.      GI care:   - ADAT PO     Fluids/ Electrolytes/ Nutrition:   - TKO, bolused LR to keep preload high   - Replace lytes  - No indication for parenteral nutrition.     Renal/ Fluid Balance:    - Will continue to monitor intake and output.     Endocrine:    - No acute issues  - Sliding scale for diabetes management.      ID/ Antibiotics:   #Hx of Strep parasanguinis endocarditis  - Cont vanc/ceftriaxone/gentamicin  - Blood Cxs pending from OSH, will repeat  - ID consulted and following     Heme:     - INR: goal 2.5-3.5 (mechanical MV), currently supratherapeutic  - Hemoglobin stable.  - Leukocytosis, likely from infectious process. Will trend.      Prophylaxis:    - Mechanical prophylaxis for DVT.   - Anti-coagulation for mechanical  valve held, given      Lines/ tubes/ drains:  - CVC from OSH (placed 4/3), ok to remove femoral a-line.      Disposition:  - To the floor.     Patient seen, findings and plan discussed with CV ICU staff, Dr. Dodson.    Alex Cramer MD  CA-2/PGY-3    ====================================    TODAY'S PROGRESS:   SUBJECTIVE:   -Comfortable and in NAD. Denying any pain.     OBJECTIVE:   1. VITAL SIGNS:   Temp:  [99  F (37.2  C)-100.4  F (38  C)] 99  F (37.2  C)  Heart Rate:  [] 88  Resp:  [14-18] 18  MAP:  [64 mmHg-81 mmHg] 71 mmHg  Arterial Line BP: ()/(46-60) 102/50  SpO2:  [94 %-100 %] 98 %  Resp: 18    2. INTAKE/ OUTPUT:   I/O last 3 completed shifts:  In: 4357.5 [P.O.:120; I.V.:4237.5]  Out: 3425 [Urine:3425]    3. PHYSICAL EXAMINATION:   General: laying in bed comfortable  Neuro: A&Ox3, NAD  Resp: Breathing non-labored  CV: RRR  Abdomen: Soft, Non-distended, Non-tender  Incisions: CDI  Extremities: warm and well perfused    4. INVESTIGATIONS:   Arterial Blood Gases   No lab results found in last 7 days.  Complete Blood Count     Recent Labs  Lab 04/05/18 0352 04/03/18 2237   WBC 3.9* 7.0   HGB 8.9* 9.7*   * 164     Basic Metabolic Panel    Recent Labs  Lab 04/05/18 0352 04/04/18  0535 04/03/18 2237    143 142   POTASSIUM 4.0 3.9 3.3*   CHLORIDE 111* 113* 112*   CO2 24 23 23   BUN 7 9 11   CR 0.77 0.78 0.81   GLC 96 104* 104*     Liver Function Tests    Recent Labs  Lab 04/05/18 0352 04/04/18  0535 04/03/18 2237   AST  --   --  44   ALT  --   --  21   ALKPHOS  --   --  69   BILITOTAL  --   --  2.1*   ALBUMIN  --   --  2.9*   INR 4.07* 4.21* 4.53*     Pancreatic Enzymes  No lab results found in last 7 days.  Coagulation Profile    Recent Labs  Lab 04/05/18  0352 04/04/18  0535 04/03/18  2237   INR 4.07* 4.21* 4.53*   PTT  --   --  66*     Lactate  Invalid input(s): LACTATE    5. RADIOLOGY:   Recent Results (from the past 24 hour(s))   XR Chest Port 1 View    Narrative    Exam: XR CHEST  PORT 1 VW, 4/5/2018 2:36 AM    Indication: cvts surgery.;     Comparison: Yesterday    Findings:   Right IJ central line tip in the low superior vena cava. Prosthetic  aortic valve. Heart within normal limits for a supine film. Pulmonary  vasculature normal. Mild blunting of both costophrenic angles  suggesting small effusions.      Impression    Impression: Small bilateral pleural effusions.    I have personally reviewed the examination and initial interpretation  and I agree with the findings.    HAILEY BLANC MD   Radiologist Consult For Cardiology    Narrative    Radiology Consult to Cardiology, 4/5/2018 1:48 PM    History: Evaluate aortic valve for dehiscence, vegetations, paravalvar  leak    Comparison: CTA 9/1/2017    Technique: Cardiac CT was performed by cardiology. Interpretation of  the noncardiac portions of the study was requested.  Field of view  extending from approximately the [place] to the upper abdomen.    Contrast dose: 120 cc of isovue 370    Findings:  Scattered small reactive appearing mediastinal and hilar lymph nodes.  Central airways are patent. No focal consolidation or suspicious  pulmonary nodule.    Heart is enlarged. Postsurgical changes of mitral and aortic valve  replacements. The aortic valve is heavily calcified, and no evidence  of vegetations are seen. There is a relatively unchanged  contrast-filled spaces along the medial/anterior descending  aorta/aortic arch (axial series 5, images 75-98). There are additional  small contrast-filled foci along the posterolateral aspect of the  ascending aorta measuring 6 mm each (axial series 5, images 91 and  115). The proximal right coronary artery is heavily calcified. As seen  on multiple prior CTs, the proximal right coronary artery is not  opacified, with downstream reconstitution. Left coronary artery and  major branches are patent. Main pulmonary artery measures 3.1 cm in  diameter.    Limited evaluation of the upper abdomen is  unremarkable.    No suspicious osseous abnormality.       Impression    Impression:  1. The visualized lungs are clear.   2. Postsurgical changes of mitral and aortic valve replacements.  Heavily calcified aortic prosthetic valve, without CT evidence of  vegetations. However, there is contrast seen outside the inner aortic  lumen/aortic valve prosthesis, which may represent paravalvular leaks  or aortic ulcerations.   3. 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  4. Please see cardiology dictation for detailed findings related to  the heart and mediastinum.    I have personally reviewed the examination and initial interpretation  and I agree with the findings.    VENU PADILLA MD   CT Heart Angio    Narrative    Procedure: CTA ANGIOGRAM HEART   Examination Date: 4/5/2018 11:12 AM   INDICATION: 47 year-old male with history of homograft AVR with  subsequent 17 mm mechanical AVR and 27 mm mechanical MVR for recurrent  endocarditis and secondary mitral regurgitation, with retention of the  calcified aortic homograft, now with echocardiogram with question of  paravalvular abscess and dehiscence of the aortic valve prosthesis.  Evaluate for prosthetic valve endocarditis or paravalvular infection  and assess integrity of aortic valve sewing ring.   Ordering Provider: Dr. Tobi Moreno  Overall quality of the study: Good.     PROCEDURE: ECG gated multi-slice computed tomography of the heart   with intravenous contrast  (Isovue 370, 120 cc, wasted 0 cc)  was   performed on a Siemens Dual Source Flash scanner without incident. CTA  was performed in the Spiral mode at a heart rate of 72 bpm with 120  kVp with the field of view optimized for visualization of the aortic  and mitral valves and subvalvar apparatus. Images were reconstructed  and analyzed on a [employer] workstation. Scan protocol was optimized  to minimize radiation exposure. The total radiation exposure  was  calculated to be 1385 DLP, and 19.4 mSv.      Impression    IMPRESSION:  1.  Focal dehiscence involving approximately 15% of the sewing ring of  the mechanical aortic valve, with communication between the LVOT and  the aorta, as described below. No CT evidence of active endocarditis  or perivalvular abscess.  2.   Normal appearance of the mechanical mitral valve.  3.  The aortic homograft is severely calcified.   4.  The left main coronary artery appears patent at its ostium. The  RCA appears patent at its ostium.  5.  SVG-PDA graft is patent at its ostium but is not well-imaged  beyond this point. The origin of this graft is just below the sternum.    6.  No evidence of intracardiac thrombus.    FINDINGS: .  1.  The mechanical aortic valve prosthesis has an area of focal  dehiscence involving approximately 15% of the sewing ring  circumference in the area of the RV outflow tract. There is a small  tract between the LV outflow tract and the aorta at the site of this  dehiscence. There is no rocking motion of the valve. There is no edema  or other signs of active abscess. Both leaflets open normally. No  vegetations are seen.   2.  The mechanical mitral valve prosthesis is well-seated with normal  leaflet motion. The sewing ring of the mitral valve is intact. There  is no evidence of perivalvular abscess. No vegetations are seen.  3.  The aortic homograft is severely calcified.   4.  The left main coronary artery appears patent at its ostium. The  RCA appears patent at its ostium.  5.  SVG-PDA graft is patent at its ostium but is not well-imaged  beyond this point. The origin of this graft is just below the sternum.  6.  The left atrial appendage is free of thrombus.   7.  The distal ascending aorta, visualized aortic arch, and descending  thoracic aorta are normal.  8.  Normal pulmonary venous anatomy with all pulmonary veins draining  into the left atrium.  9.  Postoperative changes of median sternotomy.  10.   Please review Radiology report for incidental noncardiac findings  that will follow separately.    ABIMAEL QUIROZ MD       =========================================

## 2018-04-05 NOTE — PLAN OF CARE
Problem: Patient Care Overview  Goal: Plan of Care/Patient Progress Review  Outcome: No Change  Patient off levo. Pressures 90s - 100s / 50s - 60s (60s - 70s). WNL neurologically. Pulses 2/2. RA, clear lung sounds, oxygen saturations 95 - 100 percent. Will have transient drops in blood pressure, systolic 70s - 80s with MAPs 50 - 55, but recovers with no intervention. Awaiting plan from treatment team. A-line in place. Voids per urinal. No other events.

## 2018-04-05 NOTE — PROGRESS NOTES
[facility] SERVICE PROGRESS NOTE    Patient:  Harvey Almanzar, Date of birth 1970, Medical record number [id number]  Date of Visit:  04/05/2018         Assessment and Recommendations:   PROBLEM LIST:  1. Aortic valve endocarditis with AVR in 1996 and redo AVR in 1998  2. 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 E. Coli cultured from the mitral valve tissue (9/2017)  3. Post-Op mechanical MVR, AVR, aneurysm repair and CABG x1 (SVG to PDA) - (9/21/2017)  4. HFrEF (EF 45-50%)  5. L MCA stroke (9/232017)     RECOMMENDATION:  1) Continue Vancomycin, Gentamicin, and Ceftriaxone   2) Gentamicin 1mg/kg Q8H. Target Peak 3-4 ug/mL, Trough 1 ug/mL. PharmD to monitor CrCl and gent levels closely and dose adjustment to meet goal peaks and troughs.   3) Awaiting results of the CTA     ASSESSMENT:     Mr. Almanzar is a 46 y/o man with a history of aortic valve endocarditis with AVR in 1996 and redo AVR in 1998, streptococcal parasanguinis bacteremia and presumed, prosthetic endocarditis with severe AR (7/2017), E. Coli cultured from the mitral valve tissue (9/2017), post-Op mechanical MVR, AVR, aneurysm repair and CABG x1 (SVG to PDA) - (9/21/2017) who presented to [facility] with left sided numbness and found to be hypotensive (BP 67/37). A TTE at that time demonstrated aortic root  thickening in with possible echo free space and possible dehiscence/abcess. He states that he was not having any symptoms until 4/3 (no fevers). Given his history as well as findings from the TTE we are going to treat him as definitive endocarditis at this time. His lack of symptoms make it difficult to understand if this is a smoldering, sub-acute endocarditis or if this is a new re-infection that lead to endocarditis. Until we are better able to assess his valves, we recommend covering with Vancomycin (cover staph, CONS, MRE, and enterococcus), continue  Gentamicin (given the presence of two prosthetic values), and add on Ceftriaxone (cover strep species which are the most usual orgs as well as e coli which he previously grew on culture). We will follow up with [facility] regarding the blood cultures collected on admission.   Infectious Disease will continue to follow with you.    Patient was discussed with Dr. Ramon Hummel, Infectious Diseases Fellow   [phone]    ID Staff Assessment and Plan:   Patient was seen and examined by me with the ID Fellow. The note above reflects our joint assessment and recommendations. I have reviewed the medical record, labs, imaging, vitals signs and have discussed the patient with the ID fellow in detail. Patient states he is feeling better this PM. CTA reported that the mechanical aortic valve prosthesis has an area of focal dehiscence involving approximately 15% of the sewing ring circumference in the area of the RV outflow tract. There is a small tract between the LV outflow tract and the aorta at the site of this dehiscence. There is no rocking motion of the valve. There is no edema or other signs of active abscess. Both leaflets open normally. No vegetations are seen. MV without vegetations as well. Blood cultures remain negative to date. Therefore at this time my suspicion for endocarditis is lower. Would continue the IV antibiotics for now pending longer incubation of the blood cultures. If blood cultures still negative tomorrow from [facility] and [facility] would stop the gent.     Filomena Navarro MD  ID staff physician  Pager [phone]        Interval History:     No acute events overnight. He was weaned off of Norepi yesterday. Hemodynamically stable. Tmax 100.4 @ 00:00. IZA was done yesterday, CTA ordered for today. Tolerating abx well. Denies any chest pain, shortness of breath.    Review of Systems:  All other ROS negative          Current Medications & Allergies:       vancomycin (VANCOCIN) IV  1,500 mg Intravenous Q12H      gentamicin  1 mg/kg Intravenous Q8H     warfarin-No DOSE today  1 each Does not apply no dose today (warfarin)     insulin aspart  1-12 Units Subcutaneous Q4H     aspirin  81 mg Oral Daily     rosuvastatin  10 mg Oral Daily     cefTRIAXone  2 g Intravenous Q24H       Infusions/Drips:    - MEDICATION INSTRUCTIONS -       lactated ringers 100 mL/hr at 04/04/18 1800     norepinephrine Stopped (04/04/18 2300)     Warfarin Therapy Reminder         Allergies   Allergen Reactions     Vancomycin Other (See Comments) and Rash     Flushed            Physical Exam:   Vitals were reviewed.    Temp:  [99.6  F (37.6  C)-100.4  F (38  C)] 99.8  F (37.7  C)  Heart Rate:  [] 98  Resp:  [14-18] 14  BP: ()/(50-78) 106/71  MAP:  [64 mmHg-81 mmHg] 72 mmHg  Arterial Line BP: ()/(46-60) 101/56  SpO2:  [94 %-100 %] 97 %  Vitals:    04/03/18 2100 04/03/18 2234 04/03/18 2249   Weight: 78.1 kg (172 lb 2.9 oz) 79.3 kg (174 lb 13.2 oz) 78.1 kg (172 lb 2.9 oz)     Physical Examination:  GENERAL:  well-developed, well-nourished, in bed in no acute distress.   HEENT:  Head is normocephalic, atraumatic   EYES:  Eyes have anicteric sclerae without conjunctival injection or stigmata of endocarditis.    ENT:  Oropharynx is moist without exudates or ulcers. Tongue is midline.   NECK:  Supple. No  Cervical lymphadenopathy  LUNGS:  Clear to auscultation bilateral.   CARDIOVASCULAR:  Tachy, regular rate, mechanical click heard, there is a holo-systolic murmur best heard at the left 4-5th intercostal space. Sternotomy scar healed   ABDOMEN:  Normal bowel sounds, soft, nontender. No appreciable hepatosplenomegaly  SKIN:  No acute rashes.  Line(s) are in place without any surrounding erythema or exudate. No stigmata of endocarditis.  NEUROLOGIC:  Grossly nonfocal. Active x4 extremities         Laboratory Data:     Inflammatory Markers    Recent Labs   Lab Test  10/02/17   0934  09/15/17   2217   SED   --   42*   CRP  41.0*  40.0*      Metabolic Studies       Recent Labs   Lab Test  04/05/18 0352 04/04/18 0535  04/03/18   2237   10/01/17   2012   NA  142  143  142   < >   --    POTASSIUM  4.0  3.9  3.3*   < >   --    CHLORIDE  111*  113*  112*   < >   --    CO2  24  23  23   < >   --    ANIONGAP  7  7  7   < >   --    BUN  7  9  11   < >   --    CR  0.77  0.78  0.81   < >   --    GFRESTIMATED  >90  >90  >90   < >   --    GLC  96  104*  104*   < >   --    A1C   --    --   4.4   --    --    CHECO  8.0*  7.5*  7.3*   < >   --    PHOS   --    --   2.4*   < >   --    MAG   --    --   2.8*   < >   --    LACT   --    --   0.9   --   1.0    < > = values in this interval not displayed.     Hepatic Studies    Recent Labs   Lab Test  04/03/18   2237  10/05/17   0707  10/04/17   0656   09/23/17   0344   BILITOTAL  2.1*  2.4*  3.1*   < >  12.7*   DBIL   --    --    --    --   8.1*   ALKPHOS  69  304*  304*   < >  52   PROTTOTAL  5.7*  5.6*  5.6*   < >  4.6*   ALBUMIN  2.9*  2.0*  2.0*   < >  2.3*   AST  44  128*  145*   < >  226*   ALT  21  141*  155*   < >  307*    < > = values in this interval not displayed.     Hematology Studies      Recent Labs   Lab Test  04/05/18   0352  04/03/18   2237  10/05/17   0707  10/04/17   0656  10/03/17   0735  10/02/17   0934   09/21/17   1620   09/15/17   2217   WBC  3.9*  7.0  5.7  6.1  5.5  7.8   < >  6.7   < >  8.8   ANEU   --    --    --    --    --    --    --   6.1   --   5.9   ALYM   --    --    --    --    --    --    --   0.2*   --   2.2   DORINDA   --    --    --    --    --    --    --   0.4   --   0.7   AEOS   --    --    --    --    --    --    --   0.0   --   0.0   HGB  8.9*  9.7*  7.8*  8.1*  7.6*  8.0*   < >  9.1*  9.2*   < >  11.9*   HCT  28.0*  30.9*  25.2*  26.0*  24.5*  25.8*   < >  28.2*   < >  36.0*   PLT  131*  164  225  220  205  213   < >  82*   < >  267    < > = values in this interval not displayed.     Microbiology:     CULTURE RESULTS:    1. Blood cultures 7/13, 7/14, 7/15 positive for strep  parasanguinas (sensitive to Ceftriaxone LONG 0.094, and PCN LONG 0.19).  2. Blood cultures 7/16, 7/17, 8/25 were negative.  3. Blood culture 9/25/17: no growth.   4. Stool C diff 9/24/17: negative.   5. Mitral Valve culture 9/21/17: light growth e coli.   6. Aortic Valve culture 9/21/17: no growth     Current Admission:   04/03/2018 Blood cultures from [facility]: Pending   04/04/2018 Blood culture x 3: NGTD     Radiology:   4/4/2018 CXR:   Impression: Small bilateral pleural effusions. New right IJ central  line tip in the low superior vena cava.     TTE done at [facility] on 4/3:   Hyperdynamic LV function with EF 70%. No clear wall motion     abnormalities.    Normal RV size and function.     Left atrium moderately dilated. Not well seen.     Aortic valve mechanical prosthesis not well seen with mean gradient     30 mm Hg.  Probable mild paravalvular regurgitation.  Aortic root     tissue appears unusually thickened in with possible echo free space     and possible dehiscence/abcess. In direct comparison to 1/11/2018     study this thickening may be slightly more prominent.    Mechanical mitral valve with mean gradient 10 mm Hg at HR of 121     bpm.      Compared to prior study, mean aortic valve gradient is stable, mean     mitral valve gradient mildly increased but may be due to heart rate.    Would recommend IZA and further evaluation of possible aortic     root abcess/AV dehiscence.      Left Ventricular Ejection Fraction: 70 %     04/04/2018 IZA   Interpretation Summary  17 mm [employer]. [employer]erald Masters bileaflet mechanical prosthesis is present in the  aortic position. Imaging of the mechanical aortic valve and surrounding  structures is suboptimal despite repeated attempts in multiple views. There  appears to be an area of focal (<25%) dehiscence with probably mild  paravalvular AI. Theere is no rocking motion of the prosthetic valve. Presence  and extent of dehiscence are difficult to definitively assess  on this study.  Recommend gated CTA to confirm.     27mm St. Jerald Masters bileaflet mechanical prosthesis is present in the mitral  position. The mechanical valve appears well-seated. The sewing ring is intact.  Both leaflets open well. Doppler interrogation of the mechanical mitral valve  is normal. No vegetations are seen.  Left ventricular function, chamber size, wall motion, and wall thickness are  normal.The EF is 60-65%.  Right ventricular function, chamber size, wall motion, and thickness are  normal.     This study was compared with the study from 10/4/17 (TTE) and 9/21/17 (intra-  op IZA) : LVEF has increased and aortic valve findings appear new.    Procedure: CTA ANGIOGRAM HEART   Examination Date: 4/5/2018 11:12 AM   INDICATION: 47 year-old male with history of homograft AVR with  subsequent 17 mm mechanical AVR and 27 mm mechanical MVR for recurrent  endocarditis and secondary mitral regurgitation, with retention of the  calcified aortic homograft, now with echocardiogram with question of  paravalvular abscess and dehiscence of the aortic valve prosthesis.  Evaluate for prosthetic valve endocarditis or paravalvular infection  and assess integrity of aortic valve sewing ring.   Ordering Provider: Dr. Tobi Moreno  Overall quality of the study: Good.      PROCEDURE: ECG gated multi-slice computed tomography of the heart   with intravenous contrast  (Isovue 370, 120 cc, wasted 0 cc)  was   performed on a Siemens Dual Source Flash scanner without incident. CTA  was performed in the Spiral mode at a heart rate of 72 bpm with 120  kVp with the field of view optimized for visualization of the aortic  and mitral valves and subvalvar apparatus. Images were reconstructed  and analyzed on a [employer] workstation. Scan protocol was optimized  to minimize radiation exposure. The total radiation exposure was  calculated to be 1385 DLP, and 19.4 mSv.         IMPRESSION:  1.  Focal dehiscence involving approximately  15% of the sewing ring of  the mechanical aortic valve, with communication between the LVOT and  the aorta, as described below. No CT evidence of active endocarditis  or perivalvular abscess.  2.   Normal appearance of the mechanical mitral valve.  3.  The aortic homograft is severely calcified.   4.  The left main coronary artery appears patent at its ostium. The  RCA appears patent at its ostium.  5.  SVG-PDA graft is patent at its ostium but is not well-imaged  beyond this point. The origin of this graft is just below the sternum.     6.  No evidence of intracardiac thrombus.     FINDINGS: .  1.  The mechanical aortic valve prosthesis has an area of focal  dehiscence involving approximately 15% of the sewing ring  circumference in the area of the RV outflow tract. There is a small  tract between the LV outflow tract and the aorta at the site of this  dehiscence. There is no rocking motion of the valve. There is no edema  or other signs of active abscess. Both leaflets open normally. No  vegetations are seen.   2.  The mechanical mitral valve prosthesis is well-seated with normal  leaflet motion. The sewing ring of the mitral valve is intact. There  is no evidence of perivalvular abscess. No vegetations are seen.  3.  The aortic homograft is severely calcified.   4.  The left main coronary artery appears patent at its ostium. The  RCA appears patent at its ostium.  5.  SVG-PDA graft is patent at its ostium but is not well-imaged  beyond this point. The origin of this graft is just below the sternum.  6.  The left atrial appendage is free of thrombus.   7.  The distal ascending aorta, visualized aortic arch, and descending  thoracic aorta are normal.  8.  Normal pulmonary venous anatomy with all pulmonary veins draining  into the left atrium.  9.  Postoperative changes of median sternotomy.  10.  Please review Radiology report for incidental noncardiac findings  that will follow separately.     ABIMAEL QUIROZ,  MD

## 2018-04-05 NOTE — PROCEDURES
Pre-Procedure   Performed by [facility]   Location: right fem.   PreAnesthestic Checklist: patient identified, informed consent and pre-op evaluation   Timeout Correct Procedure: arterial line   Procedure Documentation   ASA:2   Insertion Site: right femoral.   Injection technique: Seldinger Technique   Seldinger Technique :   Position: supine  Prep: chlorhexidine gluconate and isopropyl alcohol, mask, sterile gloves, hat and full barrier precautions   US guided puncture with needle.  Guidewire inserted and seldinger technique to place catheter  Appropriate waveform seen and correllated within 10% of NIBP    [facility]

## 2018-04-05 NOTE — PLAN OF CARE
Problem: Patient Care Overview  Goal: Plan of Care/Patient Progress Review  Outcome: Improving  A&O X4. -110's, BP 's/50-60's, MAPS >60. Levo titrated off. RA, LS clear. Regular diet. One loose stool, c.diff sent and negative. Urinal voiding good quantities. LR at 100. TKO with ABX and replacements. Pulses +2. Right groin arterial, right arterial, 2 PIV's. No Complaints of pain.    Continue to monitor CV and update MD with changes and or concerns.

## 2018-04-05 NOTE — PHARMACY-VANCOMYCIN DOSING SERVICE
Pharmacy Vancomycin Note  Date of Service 2018  Patient's  1970   47 year old, male    Indication: Endocarditis  Goal Trough Level: 15-20 mg/L  Day of Therapy: 2  Current Vancomycin regimen:  1500 mg IV q12h    Current estimated CrCl = Estimated Creatinine Clearance: 110.9 mL/min (based on Cr of 0.77).    Creatinine for last 3 days  4/3/2018: 10:37 PM Creatinine 0.81 mg/dL  2018:  5:35 AM Creatinine 0.78 mg/dL  2018:  3:52 AM Creatinine 0.77 mg/dL    Recent Vancomycin Levels (past 3 days)  2018: 11:46 AM Vancomycin Level 12.4 mg/L    Vancomycin IV Administrations (past 72 hours)                   vancomycin (VANCOCIN) 1,500 mg in sodium chloride 0.9 % 250 mL intermittent infusion (mg) 1,500 mg New Bag 18 1415     1,500 mg New Bag  0007     1,500 mg New Bag 18 1308     1,500 mg New Bag  0100                Nephrotoxins and other renal medications (Future)    Start     Dose/Rate Route Frequency Ordered Stop    18 0500  gentamicin (GARAMYCIN) infusion 80 mg      1 mg/kg × 78.1 kg  over 60 Minutes Intravenous EVERY 8 HOURS 18 0212      18 0100  vancomycin (VANCOCIN) 1,500 mg in sodium chloride 0.9 % 250 mL intermittent infusion      1,500 mg  over 90 Minutes Intravenous EVERY 12 HOURS 18 0028      18 2245  norepinephrine (LEVOPHED) 16 mg in D5W 250 mL infusion      0.03-0.4 mcg/kg/min × 79.3 kg  2.2-29.7 mL/hr  Intravenous CONTINUOUS 18 2244               Contrast Orders - past 72 hours (72h ago through future)    Start     Dose/Rate Route Frequency Ordered Stop    18 1015  iopamidol (ISOVUE-370) solution 100 mL      100 mL Intravenous ONCE 18 1011 18 1035          Interpretation of levels and current regimen:  Trough level is  Subtherapeutic    Has serum creatinine changed > 50% in last 72 hours: No    Urine output:  good urine output    Renal Function: Stable    Plan:  1.  Increase Dose to 1750mg IV q12 hours  2.  Pharmacy  will check trough levels as appropriate in 1-3 Days.    3. Serum creatinine levels will be ordered daily for the first week of therapy and at least twice weekly for subsequent weeks.      Nicholas oV, PharmD, BCPS        .

## 2018-04-05 NOTE — PHARMACY-AMINOGLYCOSIDE DOSING SERVICE
Pharmacy Aminoglycoside Follow-Up Note  Date of Service 2018  Patient's  1970   47 year old, male    Weight (Actual): 78.1 kg    Indication: Endocarditis---synergy dosing  Current Gentamicin regimen:  80 mg IV q8h  Day of therapy: 2    Target goals based on synergy dosing  Goal Peak level: 3-5 mg/L  Goal Trough level: </= 1 mg/L    Current estimated CrCl: Estimated Creatinine Clearance: 110.9 mL/min (based on Cr of 0.77).    Creatinine for last 3 days  4/3/2018: 10:37 PM Creatinine 0.81 mg/dL  2018:  5:35 AM Creatinine 0.78 mg/dL  2018:  3:52 AM Creatinine 0.77 mg/dL    Nephrotoxins and other renal medications (Future)    Start     Dose/Rate Route Frequency Ordered Stop    18 0200  vancomycin (VANCOCIN) 1,750 mg in sodium chloride 0.9 % 250 mL intermittent infusion      1,750 mg (central catheter)  over 60 Minutes Intravenous EVERY 12 HOURS 18 1550      18 2100  gentamicin (GARAMYCIN) 60 mg in sodium chloride 0.9 % 50 mL intermittent infusion      60 mg  over 60 Minutes Intravenous EVERY 8 HOURS 18 1558      18 2245  norepinephrine (LEVOPHED) 16 mg in D5W 250 mL infusion      0.03-0.4 mcg/kg/min × 79.3 kg  2.2-29.7 mL/hr  Intravenous CONTINUOUS 18 2244            Contrast Orders - past 72 hours (72h ago through future)    Start     Dose/Rate Route Frequency Ordered Stop    18 1015  iopamidol (ISOVUE-370) solution 100 mL      100 mL Intravenous ONCE 18 1011 18 1035          Aminoglycoside Levels - past 2 days  2018:  8:00 AM Gentamicin Level 2.3 mg/L; 11:46 AM Gentamicin Level 0.8 mg/L    Aminoglycosides IV Administrations (past 72 hours)                   gentamicin (GARAMYCIN) infusion 80 mg (mg) 80 mg New Bag 18 1229     80 mg New Bag  0410     80 mg New Bag 18 2254     80 mg New Bag  1751     80 mg New Bag  0415                Pharmacokinetic Analysis  Calculated Peak level: 5.4 mg/L  Calculated Trough level: 0.69  mg/L  Volume of distribution: 0.18 L/kg  Half-life: 2.4 hours        Interpretation of levels and current regimen:  Aminoglycoside levels are outside of goal range    Has serum creatinine changed greater than 50% in the last 72 hours: No    Urine output:  good urine output    Renal function: Stable    Plan  1. gentamicin peak slightly supratherapeutic for synergy dosing, decrease dose to 60mg q 8 hours    2.  Method of evaluation: 2 post dose levels    3. Pharmacy will continue to follow and check levels  as appropriate in 1-3 Days    Nicholas Vo, JonesD, BCPS

## 2018-04-06 ENCOUNTER — APPOINTMENT (OUTPATIENT)
Dept: GENERAL RADIOLOGY | Facility: CLINIC | Age: 48
DRG: 315 | End: 2018-04-06
Attending: THORACIC SURGERY (CARDIOTHORACIC VASCULAR SURGERY)
Payer: COMMERCIAL

## 2018-04-06 LAB
ANION GAP SERPL CALCULATED.3IONS-SCNC: 8 MMOL/L (ref 3–14)
BUN SERPL-MCNC: 9 MG/DL (ref 7–30)
CALCIUM SERPL-MCNC: 8.1 MG/DL (ref 8.5–10.1)
CHLORIDE SERPL-SCNC: 111 MMOL/L (ref 94–109)
CO2 SERPL-SCNC: 25 MMOL/L (ref 20–32)
CREAT SERPL-MCNC: 0.74 MG/DL (ref 0.66–1.25)
ERYTHROCYTE [DISTWIDTH] IN BLOOD BY AUTOMATED COUNT: 16 % (ref 10–15)
ERYTHROCYTE [DISTWIDTH] IN BLOOD BY AUTOMATED COUNT: 16.4 % (ref 10–15)
GFR SERPL CREATININE-BSD FRML MDRD: >90 ML/MIN/1.7M2
GLUCOSE BLDC GLUCOMTR-MCNC: 81 MG/DL (ref 70–99)
GLUCOSE BLDC GLUCOMTR-MCNC: 86 MG/DL (ref 70–99)
GLUCOSE BLDC GLUCOMTR-MCNC: 87 MG/DL (ref 70–99)
GLUCOSE SERPL-MCNC: 82 MG/DL (ref 70–99)
HCT VFR BLD AUTO: 27.8 % (ref 40–53)
HCT VFR BLD AUTO: 30.8 % (ref 40–53)
HGB BLD-MCNC: 8.9 G/DL (ref 13.3–17.7)
HGB BLD-MCNC: 9.8 G/DL (ref 13.3–17.7)
INR PPP: 2.38 (ref 0.86–1.14)
LMWH PPP CHRO-ACNC: 0.26 IU/ML
MAGNESIUM SERPL-MCNC: 2 MG/DL (ref 1.6–2.3)
MAGNESIUM SERPL-MCNC: 2.5 MG/DL (ref 1.6–2.3)
MCH RBC QN AUTO: 29.5 PG (ref 26.5–33)
MCH RBC QN AUTO: 29.9 PG (ref 26.5–33)
MCHC RBC AUTO-ENTMCNC: 31.8 G/DL (ref 31.5–36.5)
MCHC RBC AUTO-ENTMCNC: 32 G/DL (ref 31.5–36.5)
MCV RBC AUTO: 93 FL (ref 78–100)
MCV RBC AUTO: 93 FL (ref 78–100)
PHOSPHATE SERPL-MCNC: 3.8 MG/DL (ref 2.5–4.5)
PLATELET # BLD AUTO: 122 10E9/L (ref 150–450)
PLATELET # BLD AUTO: 144 10E9/L (ref 150–450)
POTASSIUM SERPL-SCNC: 3.8 MMOL/L (ref 3.4–5.3)
POTASSIUM SERPL-SCNC: 4.2 MMOL/L (ref 3.4–5.3)
RBC # BLD AUTO: 2.98 10E12/L (ref 4.4–5.9)
RBC # BLD AUTO: 3.32 10E12/L (ref 4.4–5.9)
SODIUM SERPL-SCNC: 144 MMOL/L (ref 133–144)
WBC # BLD AUTO: 3 10E9/L (ref 4–11)
WBC # BLD AUTO: 3.2 10E9/L (ref 4–11)

## 2018-04-06 PROCEDURE — 40000893 ZZH STATISTIC PT IP EVAL DEFER

## 2018-04-06 PROCEDURE — 84132 ASSAY OF SERUM POTASSIUM: CPT | Performed by: PHYSICIAN ASSISTANT

## 2018-04-06 PROCEDURE — 25000125 ZZHC RX 250: Performed by: SURGERY

## 2018-04-06 PROCEDURE — 25000132 ZZH RX MED GY IP 250 OP 250 PS 637: Performed by: SURGERY

## 2018-04-06 PROCEDURE — 84100 ASSAY OF PHOSPHORUS: CPT | Performed by: SURGERY

## 2018-04-06 PROCEDURE — 25000128 H RX IP 250 OP 636

## 2018-04-06 PROCEDURE — 36415 COLL VENOUS BLD VENIPUNCTURE: CPT | Performed by: SURGERY

## 2018-04-06 PROCEDURE — 25000128 H RX IP 250 OP 636: Performed by: THORACIC SURGERY (CARDIOTHORACIC VASCULAR SURGERY)

## 2018-04-06 PROCEDURE — 83735 ASSAY OF MAGNESIUM: CPT | Performed by: PHYSICIAN ASSISTANT

## 2018-04-06 PROCEDURE — 71045 X-RAY EXAM CHEST 1 VIEW: CPT

## 2018-04-06 PROCEDURE — 85027 COMPLETE CBC AUTOMATED: CPT | Performed by: PHYSICIAN ASSISTANT

## 2018-04-06 PROCEDURE — 00000146 ZZHCL STATISTIC GLUCOSE BY METER IP

## 2018-04-06 PROCEDURE — 25000132 ZZH RX MED GY IP 250 OP 250 PS 637: Performed by: THORACIC SURGERY (CARDIOTHORACIC VASCULAR SURGERY)

## 2018-04-06 PROCEDURE — 99221 1ST HOSP IP/OBS SF/LOW 40: CPT | Mod: GC | Performed by: INTERNAL MEDICINE

## 2018-04-06 PROCEDURE — 83735 ASSAY OF MAGNESIUM: CPT | Performed by: SURGERY

## 2018-04-06 PROCEDURE — 25000128 H RX IP 250 OP 636: Performed by: SURGERY

## 2018-04-06 PROCEDURE — 21400003 ZZH R&B CCU CRITICAL UMMC

## 2018-04-06 PROCEDURE — 85520 HEPARIN ASSAY: CPT | Performed by: SURGERY

## 2018-04-06 PROCEDURE — 80048 BASIC METABOLIC PNL TOTAL CA: CPT | Performed by: SURGERY

## 2018-04-06 PROCEDURE — 85027 COMPLETE CBC AUTOMATED: CPT | Performed by: SURGERY

## 2018-04-06 PROCEDURE — 25000128 H RX IP 250 OP 636: Performed by: PHYSICIAN ASSISTANT

## 2018-04-06 PROCEDURE — 85610 PROTHROMBIN TIME: CPT | Performed by: SURGERY

## 2018-04-06 PROCEDURE — 40000894 ZZH STATISTIC OT IP EVAL DEFER

## 2018-04-06 RX ORDER — SODIUM CHLORIDE 9 MG/ML
INJECTION, SOLUTION INTRAVENOUS
Status: COMPLETED
Start: 2018-04-06 | End: 2018-04-06

## 2018-04-06 RX ORDER — HEPARIN SODIUM 10000 [USP'U]/100ML
0-3500 INJECTION, SOLUTION INTRAVENOUS CONTINUOUS
Status: DISCONTINUED | OUTPATIENT
Start: 2018-04-06 | End: 2018-04-10

## 2018-04-06 RX ORDER — WARFARIN SODIUM 2.5 MG/1
2.5 TABLET ORAL
Status: COMPLETED | OUTPATIENT
Start: 2018-04-06 | End: 2018-04-06

## 2018-04-06 RX ADMIN — VANCOMYCIN HYDROCHLORIDE 1750 MG: 10 INJECTION, POWDER, LYOPHILIZED, FOR SOLUTION INTRAVENOUS at 02:11

## 2018-04-06 RX ADMIN — HEPARIN SODIUM 950 UNITS/HR: 10000 INJECTION, SOLUTION INTRAVENOUS at 12:27

## 2018-04-06 RX ADMIN — VANCOMYCIN HYDROCHLORIDE 1750 MG: 10 INJECTION, POWDER, LYOPHILIZED, FOR SOLUTION INTRAVENOUS at 17:03

## 2018-04-06 RX ADMIN — POTASSIUM CHLORIDE 20 MEQ: 29.8 INJECTION, SOLUTION INTRAVENOUS at 06:02

## 2018-04-06 RX ADMIN — SODIUM CHLORIDE, POTASSIUM CHLORIDE, SODIUM LACTATE AND CALCIUM CHLORIDE: 600; 310; 30; 20 INJECTION, SOLUTION INTRAVENOUS at 02:10

## 2018-04-06 RX ADMIN — CEFTRIAXONE 2 G: 2 INJECTION, POWDER, FOR SOLUTION INTRAMUSCULAR; INTRAVENOUS at 20:40

## 2018-04-06 RX ADMIN — WARFARIN SODIUM 2.5 MG: 2.5 TABLET ORAL at 18:28

## 2018-04-06 RX ADMIN — SODIUM CHLORIDE 500 ML: 9 INJECTION, SOLUTION INTRAVENOUS at 14:51

## 2018-04-06 RX ADMIN — Medication 2 G: at 06:02

## 2018-04-06 RX ADMIN — ASPIRIN 81 MG CHEWABLE TABLET 81 MG: 81 TABLET CHEWABLE at 07:42

## 2018-04-06 RX ADMIN — GENTAMICIN SULFATE 60 MG: 40 INJECTION, SOLUTION INTRAMUSCULAR; INTRAVENOUS at 13:30

## 2018-04-06 RX ADMIN — ROSUVASTATIN CALCIUM 10 MG: 10 TABLET, FILM COATED ORAL at 07:42

## 2018-04-06 RX ADMIN — GENTAMICIN SULFATE 60 MG: 40 INJECTION, SOLUTION INTRAMUSCULAR; INTRAVENOUS at 04:08

## 2018-04-06 RX ADMIN — GENTAMICIN SULFATE 60 MG: 40 INJECTION, SOLUTION INTRAMUSCULAR; INTRAVENOUS at 21:48

## 2018-04-06 NOTE — PROGRESS NOTES
CVTS PROGRESS NOTE  April 5, 2018      CO-MORBIDITIES:   Aortic valve replaced  (primary encounter diagnosis)  Endocarditis of aortic valve    ASSESSMENT: Harvey Almanzar is a 47 year old male with complex cardiac history, most recently with MVR/AVR, singel vessel CAB using saph vein in Sept 2017 who presents to outside hospital in septic shock with concerns for valve failure and possible abscess.       TODAY's PLAN:  - waiting on CTA heart read- small perivalvular leak.   - has episodes of SBP drop that recover. 500 LR bolus  - ok for diet      PLAN:   Neuro/ pain/ sedation:   #Hx of CVA  - Monitor neurological status. Notify the MD for any acute changes in exam.  - Tylenol for pain, no sedation indicated     Pulmonary care:    #No acute issues  - Supplemental oxygen to keep saturation above 92 %.          Cardiovascular:     #Hx of endocarditis, s/p AVR/MVR replacement   #septic shock  - Monitor hemodynamic status. Goal: maintain volume, elevated HR, and decrease afterload.  - IZA inconclusive so CTA heart ordered.   - Cont ASA, statin. Holding HTN meds/lasix for now.  - NE off  - Cards consult for non surgical intervention vs heart transplant eval if non surgical fail.      GI care:   - ADAT PO     Fluids/ Electrolytes/ Nutrition:   - TKO, bolused LR to keep preload high   - Replace lytes  - No indication for parenteral nutrition.     Renal/ Fluid Balance:    - Will continue to monitor intake and output.     Endocrine:    - No acute issues  - Sliding scale for diabetes management.      ID/ Antibiotics:   #Hx of Strep parasanguinis endocarditis  - Cont vanc/ceftriaxone/gentamicin  - Blood Cxs pending from OSH, will repeat  - ID consulted and following     Heme:     - INR: goal 2.5-3.5 (mechanical MV), currently supratherapeutic  - Hemoglobin stable.  - Leukocytosis, likely from infectious process. Will trend.      Prophylaxis:    - Mechanical prophylaxis for DVT.   - Anti-coagulation for mechanical valve  held, given      Lines/ tubes/ drains:  - CVC from OSH (placed 4/3), ok to remove femoral a-line.      Disposition:  - To the floor.     Patient seen, findings and plan discussed with CVTS fellow.     Alex Cramer MD  CA-2/PGY-3    ====================================    TODAY'S PROGRESS:   SUBJECTIVE:   -Comfortable and in NAD. Denying any pain.     OBJECTIVE:   1. VITAL SIGNS:   Temp:  [99  F (37.2  C)-100.4  F (38  C)] 99  F (37.2  C)  Heart Rate:  [] 88  Resp:  [14-18] 18  MAP:  [64 mmHg-81 mmHg] 71 mmHg  Arterial Line BP: ()/(46-60) 102/50  SpO2:  [94 %-100 %] 98 %  Resp: 18    2. INTAKE/ OUTPUT:   I/O last 3 completed shifts:  In: 4357.5 [P.O.:120; I.V.:4237.5]  Out: 3425 [Urine:3425]    3. PHYSICAL EXAMINATION:   General: laying in bed comfortable  Neuro: A&Ox3, NAD  Resp: Breathing non-labored  CV: RRR  Abdomen: Soft, Non-distended, Non-tender  Incisions: CDI  Extremities: warm and well perfused    4. INVESTIGATIONS:   Arterial Blood Gases   No lab results found in last 7 days.  Complete Blood Count     Recent Labs  Lab 04/05/18 0352 04/03/18 2237   WBC 3.9* 7.0   HGB 8.9* 9.7*   * 164     Basic Metabolic Panel    Recent Labs  Lab 04/05/18 0352 04/04/18  0535 04/03/18 2237    143 142   POTASSIUM 4.0 3.9 3.3*   CHLORIDE 111* 113* 112*   CO2 24 23 23   BUN 7 9 11   CR 0.77 0.78 0.81   GLC 96 104* 104*     Liver Function Tests    Recent Labs  Lab 04/05/18 0352 04/04/18  0535 04/03/18 2237   AST  --   --  44   ALT  --   --  21   ALKPHOS  --   --  69   BILITOTAL  --   --  2.1*   ALBUMIN  --   --  2.9*   INR 4.07* 4.21* 4.53*     Pancreatic Enzymes  No lab results found in last 7 days.  Coagulation Profile    Recent Labs  Lab 04/05/18  0352 04/04/18  0535 04/03/18  2237   INR 4.07* 4.21* 4.53*   PTT  --   --  66*     Lactate  Invalid input(s): LACTATE    5. RADIOLOGY:   Recent Results (from the past 24 hour(s))   XR Chest Port 1 View    Narrative    Exam: XR CHEST PORT 1 VW,  4/5/2018 2:36 AM    Indication: cvts surgery.;     Comparison: Yesterday    Findings:   Right IJ central line tip in the low superior vena cava. Prosthetic  aortic valve. Heart within normal limits for a supine film. Pulmonary  vasculature normal. Mild blunting of both costophrenic angles  suggesting small effusions.      Impression    Impression: Small bilateral pleural effusions.    I have personally reviewed the examination and initial interpretation  and I agree with the findings.    HAILEY BLANC MD   Radiologist Consult For Cardiology    Narrative    Radiology Consult to Cardiology, 4/5/2018 1:48 PM    History: Evaluate aortic valve for dehiscence, vegetations, paravalvar  leak    Comparison: CTA 9/1/2017    Technique: Cardiac CT was performed by cardiology. Interpretation of  the noncardiac portions of the study was requested.  Field of view  extending from approximately the [place] to the upper abdomen.    Contrast dose: 120 cc of isovue 370    Findings:  Scattered small reactive appearing mediastinal and hilar lymph nodes.  Central airways are patent. No focal consolidation or suspicious  pulmonary nodule.    Heart is enlarged. Postsurgical changes of mitral and aortic valve  replacements. The aortic valve is heavily calcified, and no evidence  of vegetations are seen. There is a relatively unchanged  contrast-filled spaces along the medial/anterior descending  aorta/aortic arch (axial series 5, images 75-98). There are additional  small contrast-filled foci along the posterolateral aspect of the  ascending aorta measuring 6 mm each (axial series 5, images 91 and  115). The proximal right coronary artery is heavily calcified. As seen  on multiple prior CTs, the proximal right coronary artery is not  opacified, with downstream reconstitution. Left coronary artery and  major branches are patent. Main pulmonary artery measures 3.1 cm in  diameter.    Limited evaluation of the upper abdomen is  unremarkable.    No suspicious osseous abnormality.       Impression    Impression:  1. The visualized lungs are clear.   2. Postsurgical changes of mitral and aortic valve replacements.  Heavily calcified aortic prosthetic valve, without CT evidence of  vegetations. However, there is contrast seen outside the inner aortic  lumen/aortic valve prosthesis, which may represent paravalvular leaks  or aortic ulcerations.   3. 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  4. Please see cardiology dictation for detailed findings related to  the heart and mediastinum.    I have personally reviewed the examination and initial interpretation  and I agree with the findings.    VENU PADILLA MD   CT Heart Angio    Narrative    Procedure: CTA ANGIOGRAM HEART   Examination Date: 4/5/2018 11:12 AM   INDICATION: 47 year-old male with history of homograft AVR with  subsequent 17 mm mechanical AVR and 27 mm mechanical MVR for recurrent  endocarditis and secondary mitral regurgitation, with retention of the  calcified aortic homograft, now with echocardiogram with question of  paravalvular abscess and dehiscence of the aortic valve prosthesis.  Evaluate for prosthetic valve endocarditis or paravalvular infection  and assess integrity of aortic valve sewing ring.   Ordering Provider: Dr. Tobi Moreno  Overall quality of the study: Good.     PROCEDURE: ECG gated multi-slice computed tomography of the heart   with intravenous contrast  (Isovue 370, 120 cc, wasted 0 cc)  was   performed on a Siemens Dual Source Flash scanner without incident. CTA  was performed in the Spiral mode at a heart rate of 72 bpm with 120  kVp with the field of view optimized for visualization of the aortic  and mitral valves and subvalvar apparatus. Images were reconstructed  and analyzed on a [employer] workstation. Scan protocol was optimized  to minimize radiation exposure. The total radiation exposure  was  calculated to be 1385 DLP, and 19.4 mSv.      Impression    IMPRESSION:  1.  Focal dehiscence involving approximately 15% of the sewing ring of  the mechanical aortic valve, with communication between the LVOT and  the aorta, as described below. No CT evidence of active endocarditis  or perivalvular abscess.  2.   Normal appearance of the mechanical mitral valve.  3.  The aortic homograft is severely calcified.   4.  The left main coronary artery appears patent at its ostium. The  RCA appears patent at its ostium.  5.  SVG-PDA graft is patent at its ostium but is not well-imaged  beyond this point. The origin of this graft is just below the sternum.    6.  No evidence of intracardiac thrombus.    FINDINGS: .  1.  The mechanical aortic valve prosthesis has an area of focal  dehiscence involving approximately 15% of the sewing ring  circumference in the area of the RV outflow tract. There is a small  tract between the LV outflow tract and the aorta at the site of this  dehiscence. There is no rocking motion of the valve. There is no edema  or other signs of active abscess. Both leaflets open normally. No  vegetations are seen.   2.  The mechanical mitral valve prosthesis is well-seated with normal  leaflet motion. The sewing ring of the mitral valve is intact. There  is no evidence of perivalvular abscess. No vegetations are seen.  3.  The aortic homograft is severely calcified.   4.  The left main coronary artery appears patent at its ostium. The  RCA appears patent at its ostium.  5.  SVG-PDA graft is patent at its ostium but is not well-imaged  beyond this point. The origin of this graft is just below the sternum.  6.  The left atrial appendage is free of thrombus.   7.  The distal ascending aorta, visualized aortic arch, and descending  thoracic aorta are normal.  8.  Normal pulmonary venous anatomy with all pulmonary veins draining  into the left atrium.  9.  Postoperative changes of median sternotomy.  10.   Please review Radiology report for incidental noncardiac findings  that will follow separately.    ABIMAEL QUIROZ MD       =========================================

## 2018-04-06 NOTE — PLAN OF CARE
Problem: Patient Care Overview  Goal: Plan of Care/Patient Progress Review  Outcome: Therapy, progress toward functional goals as expected    CNS: Denies pain. Alert and oriented.   Resp: Room air. Oxygen sats >92%.  CV: SR. MAP>65. Heparin drip started.  GI: 2 g sodium diet. Tolerating diet.   : Urine output >200 cc/hr.

## 2018-04-06 NOTE — PLAN OF CARE
Problem: Patient Care Overview  Goal: Plan of Care/Patient Progress Review  OT:  Per chart review, discussion with nursing and pt - pt with no OT/PT needs at this time.  Pt I with ambulation, ADLs, transfers.  Please re-order OT if pt's functional status changes.

## 2018-04-06 NOTE — PROGRESS NOTES
CARDIOTHORACIC SURGERY PROGRESS NOTE  4/6/2018      SUBJECTIVE:    No acute issues over night. No acute complaints. Off pressors  Patient denies SOB, CP, fever, chills, sweats.   +BM    OBJECTIVE:   Temp:  [98.4  F (36.9  C)-99.6  F (37.6  C)] 98.4  F (36.9  C)  Heart Rate:  [79-97] 84  Resp:  [16-18] 18  BP: (81-97)/(56-62) 96/59  MAP:  [64 mmHg-75 mmHg] 66 mmHg  Arterial Line BP: ()/(46-55) 99/50  SpO2:  [95 %-100 %] 98 %    Gen: A&Ox3, NAD  Neck: No JVD   CV: RRR, normal S1, S2, no murmurs, rubs or gallops   Pulm: Lungs CTA, no wheezing or rhonchi  Abd: Soft, NT, ND, +BS  Ext: No LE edema  Neuro: Nonfocal  Incision: c/d/i, no erythema, sternum stable    I&O's:  I/O last 3 completed shifts:  In: 3632 [I.V.:3632]  Out: 3150 [Urine:3150]    Labs:   BMP  Recent Labs  Lab 04/06/18  0357 04/05/18  0352 04/04/18  0535 04/03/18 2237    142 143 142   POTASSIUM 3.8 4.0 3.9 3.3*   CHLORIDE 111* 111* 113* 112*   CHECO 8.1* 8.0* 7.5* 7.3*   CO2 25 24 23 23   BUN 9 7 9 11   CR 0.74 0.77 0.78 0.81   GLC 82 96 104* 104*     CBC  Recent Labs  Lab 04/06/18  0357 04/05/18  0352 04/03/18 2237   WBC 3.0* 3.9* 7.0   RBC 2.98* 3.00* 3.28*   HGB 8.9* 8.9* 9.7*   HCT 27.8* 28.0* 30.9*   MCV 93 93 94   MCH 29.9 29.7 29.6   MCHC 32.0 31.8 31.4*   RDW 16.4* 16.3* 16.8*   * 131* 164     INR  Recent Labs  Lab 04/06/18  0357 04/05/18  0352 04/04/18  0535 04/03/18 2237   INR 2.38* 4.07* 4.21* 4.53*      Hepatic Panel   Recent Labs  Lab 04/03/18 2237   AST 44   ALT 21   ALKPHOS 69   BILITOTAL 2.1*   ALBUMIN 2.9*     Glucose  Recent Labs  Lab 04/06/18  0741 04/06/18  0405 04/06/18  0357 04/05/18  2118 04/05/18  0804 04/05/18  0415 04/05/18  0352 04/05/18  0009  04/04/18  0535  04/03/18 2237   GLC  --   --  82  --   --   --  96  --   --  104*  --  104*   BGM 86 81  --  91 105* 99  --  96  < >  --   < >  --    < > = values in this interval not displayed.    Imaging:   Recent Results (from the past 24 hour(s))   XR Chest Port  1 View    Narrative    Exam: XR CHEST PORT 1 VW, 4/6/2018 12:40 AM    Indication: cvts surgery.;     Comparison: Yesterday    Findings:   Right IJ central line tip in the low superior vena cava. Prosthetic  aortic valve. Heart within normal limits for a supine film. Pulmonary  vasculature normal. Mild blunting of both costophrenic angles  suggesting small effusions.      Impression    Impression: Small bilateral pleural effusions.    I have personally reviewed the examination and initial interpretation  and I agree with the findings.    HAILEY BLANC MD         ASSESSMENT/PLAN: Patient is a 47 year old male with a complex cardiac history, most recently with MVR/AVR, singel vessel CAB using saph vein in Sept 2017 who presents to outside hospital in septic shock with concerns for valve failure and possible abscess.    Plan:  Neuro:  -Neuro intact, no pain, prn tylenol    CV: s/p redo MVR, AVR mechanical valves  - ASA, statin.  - Blood pressure well controlled.   - Cardiac CTA showed 15% focal dehiscence, no evidence of active endocarditis. Continue to follow cultures. Will have interventional cardiology assess.     Resp:  - IS, encourage activity  - On RA      FEN/GI:    - cardiac diet, + BM    Renal:   - Creatinine WNL, at baseline, adequate UOP      ID: Possible root abscess on TTE at OSH   - WBC WNL, afebrile. On Vanc/gent/ceftriaxone, ID following  - Continue to follow-up cultures (NGTD)    Heme:   - Hgb stable, no signs or symptoms of bleeding      Endo:   - BG well controlled      PPx:   - SCD, on hep gtt      Anticoagulation:   - INR now subtherapeutic, restart heparin gtt, increase coumadin dosing (was supratherapeutic), INR goal 2.5-3.5 for mechanical valves      Dispo:   - Transfer to       Staff surgeons have been informed of changes through both verbal and written communication.         Callum Lema PA-C  Cardiothoracic Surgery  April 6, 2018 11:24 AM   p: [phone]

## 2018-04-06 NOTE — PLAN OF CARE
Problem: Patient Care Overview  Goal: Individualization & Mutuality    Uneventful night. Sinus rhythm 80s-90s, BP softer but w/ stable MAPs off levo, femoral A-line pulled. Room air. Denies pain overnight. Good UO via urinal. Electrolytes replaced. Transfer to  when bed available. Continue to monitor, notify team w/ changes.

## 2018-04-06 NOTE — CONSULTS
Cardiology Consult         Date of Service (when I saw the patient): 04/06/18    ASSESSMENT:   Mr. 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 Patient was transferred for surgical evaluation.     Patient had IZA and Cardiac CT which confirmed AV dehiscence with mild paravalvular AI, but there is no evidence of active endocarditis or paravalvular abscess. Usually prosthetic valve dehiscence is a surgical disease; however, given his calcified homograft there is high concern that a new valve would not be adequately sewn onto the heart (this was a difficulty noted in the 2017 surgery). Therefore, cardiology was consulted for possible non-surgical methods to address AV dehiscence and to also evaluate for transplant given limited options for patient's valve disease.        RECOMMEND:    #Aortic Valve Dehiscence   -Would continue to follow cultures to ensure patient does not have active infection. Could consider checking ESR/CRP/Procalcitonin levels to rule out underlying inflammatory process.   -Given concern for surgical repair, would ask CSI to see patient and evaluate for possible plug. Patient's with TAVR have undergone plugs to correct PVL; therefore, this could be a consideration in this patient if he is not infected and has suitable anatomy.  -With regards to transplant question, patient is not a candidate at this time given his normal LV function. Would purse valve repair either through interventional cardiology as above to resolve the issue.     Alban Parra    REASON FOR CONSULT: Evaluation for non-surgical intervention to manage aortic  valve dehiscence     History of Present Illness   Mr. 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 Patient underwent TTE which showed aortic root thickening with possible dehiscence. Therefore, patient was transferred here for further medical management. On arrival, patient underwent IZA which showed that there could be a focal area of dehiscence in the aortic valve with mild paravalvular AI. There was no rocking of the valve; however, a Cardiac CT was recommended. Of note, patient's LV function was normal at 60-65%. Cardiac CT showed focal dehiscence involving 15% of the sewing ring of the mechanical aortic valve with communication between the LVOT and aorta. There was no CT evidence of active endocarditis or abscess formation. The mitral valve appeared to be normal.      During the operation in 2017, it was noted that it was very difficult to sew the aortic valve into the homograft because it was very calcified. Therefore, Cardiology was consulted to see if there is any non-surgical methods to repair the aortic valve.     On examination, patient has no symptoms of fevers, chills, night sweats, chest pain, abdominal pain, shortness of breath. His cultures have been negative. He does not have any signs of end organ damage.     Past Medical History    I have reviewed this patient's medical history and updated it with pertinent information if needed.   Past Medical History:   Diagnosis Date     Acute diastolic congestive heart failure (H) 8/24/2017     Aortic valve replaced 1996    Overview:  Cadaver - tissue valve in 1996 at [facility]   Amoxicillin 2000 mg prior to dental work please.  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  Past Surgical History:   Procedure Laterality Date     REDO STERNOTOMY BYPASS CORONARY ARTERY N/A 9/21/2017    Procedure: REDO STERNOTOMY BYPASS CORONARY ARTERY;;  Surgeon: Nicola Gorman MD;  Location: [facility] OR     REDO STERNOTOMY REPLACE VALVE AORTIC N/A 9/21/2017    Procedure: REDO STERNOTOMY REPLACE VALVE AORTIC;;  Surgeon: Nicola Gorman MD;  Location: [facility] OR     REDO STERNOTOMY REPLACE VALVE MITRAL N/A 9/21/2017    Procedure: REDO STERNOTOMY REPLACE VALVE MITRAL;  Left groin cutdown, Redo Median Sternotomy, Lysis of adhesions, Left mini-thoracotomy,Coronary artery bypass graft x 1 using the left greater saphenous vein, using endovein harvest, Mitral valve replacement using St. Jerald Mechanical 27mm, Aortic Valve Replacement using a 17mm St. Jerald Mechanical, On Cardiopulmonary Bypass Pump;  Surgeon: Cindy Gorman       Prior to Admission Medications   Prior to Admission Medications   Prescriptions Last Dose Informant Patient Reported? Taking?   SUMAtriptan (IMITREX) 50 MG tablet   Yes No   Sig: Take one tablet by mouth at onset of headache. May repeat one tablet after 2 hours if headache recurs.   aspirin 81 MG chewable tablet   No No   Sig: Take 1 tablet (81 mg) by mouth daily   furosemide (LASIX) 20 MG tablet   No No   Sig: Take 1 tablet (20 mg) by mouth daily   melatonin 3 MG tablet   No No   Sig: Take 1 tablet (3 mg) by mouth nightly as needed for sleep   metoprolol (LOPRESSOR) 25 MG tablet   Yes No   Sig: Take 25 mg by mouth   metoprolol (TOPROL-XL) 25 MG 24 hr tablet    No No   Sig: Take 0.5 tablets (12.5 mg) by mouth daily   order for DME   No No   Sig: Equipment being ordered: Walker Wheels ()  Treatment Diagnosis: Gait instability   oxyCODONE (ROXICODONE) 5 MG IR tablet   No No   Sig: Take 1 tablet (5 mg) by mouth every 6 hours as needed for moderate to severe pain   potassium chloride SA (K-DUR/KLOR-CON M) 10 MEQ CR tablet   No No   Sig: Take 2 tablets (20 mEq) by mouth daily   rosuvastatin (CRESTOR) 10 MG tablet   No No   Sig: Take 1 tablet (10 mg) by mouth daily   senna-docusate (SENOKOT-S;PERICOLACE) 8.6-50 MG per tablet   No No   Sig: Take 1-2 tablets by mouth 2 times daily as needed (constipation )   warfarin (COUMADIN) 2 MG tablet   No No   Sig: Take 2 mg (1 tablet) PO at 6 pm on 10/5/17, then take 3 mg (1.5 tabs) until next INR check, then dose per goal 2.5-3.5      Facility-Administered Medications: None     Allergies   Allergies   Allergen Reactions     Vancomycin Other (See Comments) and Rash     Flushed       Social History   I have reviewed this patient's social history and updated it with pertinent information if needed. Harvey Barnettnhzack  reports that he has quit smoking. He has a 15.50 pack-year smoking history. He has never used smokeless tobacco. He reports that he does not drink alcohol or use illicit drugs.    Family History   I have reviewed this patient's family history and updated it with pertinent information if needed.   History reviewed. No pertinent family history.    Review of Systems   The 10 point Review of Systems is negative other than noted in the HPI or here.     Physical Exam   Temp: 98.8  F (37.1  C) Temp src: Oral BP: 94/55   Heart Rate: 73 Resp: 16 SpO2: 100 % O2 Device: None (Room air)    Vital Signs with Ranges  Temp:  [98.3  F (36.8  C)-99.6  F (37.6  C)] 98.8  F (37.1  C)  Heart Rate:  [73-97] 73  Resp:  [16-18] 16  BP: (81-97)/(55-62) 94/55  MAP:  [66 mmHg-75 mmHg] 66 mmHg  Arterial Line BP: ()/(48-55) 99/50  SpO2:  [95  %-100 %] 100 %  172 lbs 2.87 oz    GEN: NAD, pleasant  HEENT: no icterus  CV: RRR, mechanical S2 with systolic murmur.   CHEST: CTAB  ABD: soft, NT/ND   : no flank/suprapubic tenderness  NEURO: AA&Ox3, fluent/appropriate, motor grossly nonfocal  PSYCH: cooperative, affect appropriate    Data   Data reviewed today:  I personally reviewed the EKG tracing showing sinus rhythm with possible LVH.    Recent Labs  Lab 04/06/18  1158 04/06/18  0357 04/05/18  0352 04/04/18  0535 04/03/18  2237   WBC 3.2* 3.0* 3.9*  --  7.0   HGB 9.8* 8.9* 8.9*  --  9.7*   MCV 93 93 93  --  94   * 122* 131*  --  164   INR  --  2.38* 4.07* 4.21* 4.53*   NA  --  144 142 143 142   POTASSIUM 4.2 3.8 4.0 3.9 3.3*   CHLORIDE  --  111* 111* 113* 112*   CO2  --  25 24 23 23   BUN  --  9 7 9 11   CR  --  0.74 0.77 0.78 0.81   ANIONGAP  --  8 7 7 7   CHECO  --  8.1* 8.0* 7.5* 7.3*   GLC  --  82 96 104* 104*   ALBUMIN  --   --   --   --  2.9*   PROTTOTAL  --   --   --   --  5.7*   BILITOTAL  --   --   --   --  2.1*   ALKPHOS  --   --   --   --  69   ALT  --   --   --   --  21   AST  --   --   --   --  44       Recent Results (from the past 24 hour(s))   XR Chest Port 1 View    Narrative    Exam: XR CHEST PORT 1 VW, 4/6/2018 12:40 AM    Indication: cvts surgery.;     Comparison: Yesterday    Findings:   Right IJ central line tip in the low superior vena cava. Prosthetic  aortic valve. Heart within normal limits for a supine film. Pulmonary  vasculature normal. Mild blunting of both costophrenic angles  suggesting small effusions.      Impression    Impression: Small bilateral pleural effusions.    I have personally reviewed the examination and initial interpretation  and I agree with the findings.    HAILEY BLANC MD       Thank you for allowing me to care for this patient. This has been discussed with the attending physician.    Alban Parra PGY-4   Cardiology Fellow   Pager: [phone]      I have reviewed today's vital signs, notes,  medications, labs and imaging.  I have also seen and examined the patient and agree with the findings and plan as outlined above.  PT in good spirits without complaints.  VSS with pt afebrile and HR 83 with BP 96/59 and 100 cc UO/ hr.  Lungs clear and mechanical S1 and S2 with II/VI sys m.  No S3.  Labs with Cr 0.74 and INR 2.38.  Assessment: Pt with hx of endocarditis in past now with AoV and MV St. Jerald with dehiscence of Ao prosthetic valve.  Plan to consult CSI to determine the possibility of using a percutaneous strategy to address dehiscence of mechanical valve.  Pt seen X2 with total critical care time 15 min.     Darren Aguirre MD, PhD  Professor, Heart Failure and Cardiac Transplantation  [facility]

## 2018-04-06 NOTE — PROGRESS NOTES
[facility] SERVICE PROGRESS NOTE    Patient:  Harvey Almanzar, Date of birth 1970, Medical record number [id number]  Date of Visit:  04/06/2018         Assessment and Recommendations:   PROBLEM LIST:  1. Aortic valve endocarditis with AVR in 1996 and redo AVR in 1998  2. 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 E. Coli cultured from the mitral valve tissue (9/2017)  3. Post-Op mechanical MVR, AVR, aneurysm repair and CABG x1 (SVG to PDA) - (9/21/2017)  4. HFrEF (EF 45-50%)  5. L MCA stroke (9/232017)     RECOMMENDATION:  1) Can discontinue Gentamicin today.   2) Continue Vancomycin and Ceftriaxone until all cultures finalize (anticipate the next 2-3 days). If nothing grows from blood cultures from here and [facility], given the CTA and Echocardiogram findings of no vegetations ,and no fevers or evidence of septic emboli findings we would recommend discontinuing all abx therapy, since he would not then meet diagnostic criteria for endocarditis. We would be hesitant and would not recommend discharging with a PICC line for IV antibiotics given the risk of developing endocarditis if he were to get a catheter associated line infection.   3) Consider CT brain and ophthalmology exam of retinas to rule out or help establish evidence of septic emboli to brain or retina.      ASSESSMENT:  Mr. Almanzar is a 48 y/o man with a history of aortic valve endocarditis with AVR in 1996 and redo AVR in 1998, streptococcal parasanguinis bacteremia and presumed, prosthetic endocarditis with severe AR (7/2017), E. Coli cultured from the mitral valve tissue (9/2017), post-Op mechanical MVR, AVR, aneurysm repair and CABG x1 (SVG to PDA) - (9/21/2017) who presented to [facility] with left sided numbness and found to be hypotensive (BP 67/37). A TTE at that time demonstrated aortic root  thickening in with possible echo free space and possible  dehiscence/abcess. The IZA and CTA demonstrated AV dehiscence with mild paravalvular AI, but no evidence of active endocarditis or paravalvular abscess. Once cultures finalize, and if all of them are negative, we would recommend discontinuing all antibiotic therapy.     ID will continue to follow along with you.     Patient was discussed with Dr. Ramon Hummel, Infectious Diseases Fellow   [phone]    ID Staff Assessment and Plan:   Patient was seen and examined by me with the ID Fellow. The note above reflects our joint assessment and recommendations. I have reviewed the medical record, labs, imaging, vitals signs and have discussed the patient with the ID fellow in detail.   Filomena Navarro MD  ID staff physician  Pager [phone]              Interval History:     No acute events overnight. Hemodynamically stable. Tmax 99.9. No complaints this morning. No subjective fevers, chills, night sweats. No chest pain, shortness of breath. No abdominal pain, N/V/D.     Review of Systems:  All other ROS negative          Current Medications & Allergies:       warfarin  2.5 mg Oral ONCE at 18:00     vancomycin (VANCOCIN) IV  1,750 mg (central catheter) Intravenous Q12H     gentamicin  60 mg Intravenous Q8H     insulin aspart  1-12 Units Subcutaneous Q4H     aspirin  81 mg Oral Daily     rosuvastatin  10 mg Oral Daily     cefTRIAXone  2 g Intravenous Q24H       Infusions/Drips:    - MEDICATION INSTRUCTIONS -       lactated ringers 100 mL/hr at 04/06/18 0700     norepinephrine Stopped (04/04/18 2300)     Warfarin Therapy Reminder         Allergies   Allergen Reactions     Vancomycin Other (See Comments) and Rash     Flushed            Physical Exam:   Vitals were reviewed.    Temp:  [98.9  F (37.2  C)-99.9  F (37.7  C)] 98.9  F (37.2  C)  Heart Rate:  [79-97] 81  Resp:  [16-18] 16  BP: (81-97)/(56-62) 91/59  MAP:  [64 mmHg-75 mmHg] 66 mmHg  Arterial Line BP: ()/(46-55) 99/50  SpO2:  [95 %-100 %] 98 %  Vitals:     04/03/18 2100 04/03/18 2234 04/03/18 2249   Weight: 78.1 kg (172 lb 2.9 oz) 79.3 kg (174 lb 13.2 oz) 78.1 kg (172 lb 2.9 oz)     Physical Examination:  GENERAL:  well-developed, well-nourished, in bed in no acute distress.   HEENT:  Head is normocephalic, atraumatic   EYES:  Eyes have anicteric sclerae without conjunctival injection or stigmata of endocarditis.    ENT:  Oropharynx is moist without exudates or ulcers. Tongue is midline.   NECK:  Supple. No  Cervical lymphadenopathy  LUNGS:  Clear to auscultation bilateral.   CARDIOVASCULAR:  Tachy, regular rate, mechanical click heard, there is a holo-systolic murmur best heard at the left 4-5th intercostal space. Sternotomy scar healed   ABDOMEN:  Normal bowel sounds, soft, nontender. No appreciable hepatosplenomegaly  SKIN:  No acute rashes.  Line(s) are in place without any surrounding erythema or exudate. No stigmata of endocarditis.  NEUROLOGIC:  Grossly nonfocal. Active x4 extremities         Laboratory Data:     Inflammatory Markers    Recent Labs   Lab Test  04/05/18   1146  10/02/17   0934  09/15/17   2217   SED   --    --   42*   CRP  73.0*  41.0*  40.0*     Metabolic Studies       Recent Labs   Lab Test  04/06/18   0357  04/05/18   0352   04/03/18   2237   10/01/17   2012   NA  144  142   < >  142   < >   --    POTASSIUM  3.8  4.0   < >  3.3*   < >   --    CHLORIDE  111*  111*   < >  112*   < >   --    CO2  25  24   < >  23   < >   --    ANIONGAP  8  7   < >  7   < >   --    BUN  9  7   < >  11   < >   --    CR  0.74  0.77   < >  0.81   < >   --    GFRESTIMATED  >90  >90   < >  >90   < >   --    GLC  82  96   < >  104*   < >   --    A1C   --    --    --   4.4   --    --    CHECO  8.1*  8.0*   < >  7.3*   < >   --    PHOS  3.8   --    --   2.4*   < >   --    MAG  2.0   --    --   2.8*   < >   --    LACT   --    --    --   0.9   --   1.0    < > = values in this interval not displayed.     Hepatic Studies    Recent Labs   Lab Test  04/05/18   1146  04/03/18    2237  10/05/17   0707  10/04/17   0656   09/23/17   0344   BILITOTAL   --   2.1*  2.4*  3.1*   < >  12.7*   DBIL   --    --    --    --    --   8.1*   ALKPHOS   --   69  304*  304*   < >  52   PROTTOTAL   --   5.7*  5.6*  5.6*   < >  4.6*   ALBUMIN   --   2.9*  2.0*  2.0*   < >  2.3*   AST   --   44  128*  145*   < >  226*   ALT   --   21  141*  155*   < >  307*   LDH  865*   --    --    --    --    --     < > = values in this interval not displayed.     Hematology Studies      Recent Labs   Lab Test  04/06/18   0357  04/05/18   0352  04/03/18   2237  10/05/17   0707  10/04/17   0656  10/03/17   0735   09/21/17   1620   09/15/17   2217   WBC  3.0*  3.9*  7.0  5.7  6.1  5.5   < >  6.7   < >  8.8   ANEU   --    --    --    --    --    --    --   6.1   --   5.9   ALYM   --    --    --    --    --    --    --   0.2*   --   2.2   DORINDA   --    --    --    --    --    --    --   0.4   --   0.7   AEOS   --    --    --    --    --    --    --   0.0   --   0.0   HGB  8.9*  8.9*  9.7*  7.8*  8.1*  7.6*   < >  9.1*  9.2*   < >  11.9*   HCT  27.8*  28.0*  30.9*  25.2*  26.0*  24.5*   < >  28.2*   < >  36.0*   PLT  122*  131*  164  225  220  205   < >  82*   < >  267    < > = values in this interval not displayed.     Microbiology:     CULTURE RESULTS:    1. Blood cultures 7/13, 7/14, 7/15 positive for strep parasanguinas (sensitive to Ceftriaxone LONG 0.094, and PCN LONG 0.19).  2. Blood cultures 7/16, 7/17, 8/25 were negative.  3. Blood culture 9/25/17: no growth.   4. Stool C diff 9/24/17: negative.   5. Mitral Valve culture 9/21/17: light growth e coli.   6. Aortic Valve culture 9/21/17: no growth     Current Admission:   04/03/2018 Blood cultures from [facility]: Pending   04/04/2018 Blood culture x 3: NGTD     Radiology:   4/4/2018 CXR:   Impression: Small bilateral pleural effusions. New right IJ central  line tip in the low superior vena cava.     TTE done at [facility] on 4/3:   Hyperdynamic LV function with EF  70%. No clear wall motion     abnormalities.    Normal RV size and function.     Left atrium moderately dilated. Not well seen.     Aortic valve mechanical prosthesis not well seen with mean gradient     30 mm Hg.  Probable mild paravalvular regurgitation.  Aortic root     tissue appears unusually thickened in with possible echo free space     and possible dehiscence/abcess. In direct comparison to 1/11/2018     study this thickening may be slightly more prominent.    Mechanical mitral valve with mean gradient 10 mm Hg at HR of 121     bpm.      Compared to prior study, mean aortic valve gradient is stable, mean     mitral valve gradient mildly increased but may be due to heart rate.    Would recommend IZA and further evaluation of possible aortic     root abcess/AV dehiscence.      Left Ventricular Ejection Fraction: 70 %     04/04/2018 IZA   Interpretation Summary  17 mm St. Jerald Masters bileaflet mechanical prosthesis is present in the  aortic position. Imaging of the mechanical aortic valve and surrounding  structures is suboptimal despite repeated attempts in multiple views. There  appears to be an area of focal (<25%) dehiscence with probably mild  paravalvular AI. Theere is no rocking motion of the prosthetic valve. Presence  and extent of dehiscence are difficult to definitively assess on this study.  Recommend gated CTA to confirm.     27mm St. Jerald Masters bileaflet mechanical prosthesis is present in the mitral  position. The mechanical valve appears well-seated. The sewing ring is intact.  Both leaflets open well. Doppler interrogation of the mechanical mitral valve  is normal. No vegetations are seen.  Left ventricular function, chamber size, wall motion, and wall thickness are  normal.The EF is 60-65%.  Right ventricular function, chamber size, wall motion, and thickness are  normal.     This study was compared with the study from 10/4/17 (TTE) and 9/21/17 (intra-  op IZA) : LVEF has increased and  aortic valve findings appear new.    CTA ANGIOGRAM HEART   Examination Date: 4/5/2018 11:12 AM     IMPRESSION:  1.  Focal dehiscence involving approximately 15% of the sewing ring of  the mechanical aortic valve, with communication between the LVOT and  the aorta, as described below. No CT evidence of active endocarditis  or perivalvular abscess.  2.   Normal appearance of the mechanical mitral valve.  3.  The aortic homograft is severely calcified.   4.  The left main coronary artery appears patent at its ostium. The  RCA appears patent at its ostium.  5.  SVG-PDA graft is patent at its ostium but is not well-imaged  beyond this point. The origin of this graft is just below the sternum.  6.  No evidence of intracardiac thrombus.     FINDINGS: .  1.  The mechanical aortic valve prosthesis has an area of focal  dehiscence involving approximately 15% of the sewing ring  circumference in the area of the RV outflow tract. There is a small  tract between the LV outflow tract and the aorta at the site of this  dehiscence. There is no rocking motion of the valve. There is no edema  or other signs of active abscess. Both leaflets open normally. No  vegetations are seen.   2.  The mechanical mitral valve prosthesis is well-seated with normal  leaflet motion. The sewing ring of the mitral valve is intact. There  is no evidence of perivalvular abscess. No vegetations are seen.  3.  The aortic homograft is severely calcified.   4.  The left main coronary artery appears patent at its ostium. The  RCA appears patent at its ostium.  5.  SVG-PDA graft is patent at its ostium but is not well-imaged  beyond this point. The origin of this graft is just below the sternum.  6.  The left atrial appendage is free of thrombus.   7.  The distal ascending aorta, visualized aortic arch, and descending  thoracic aorta are normal.  8.  Normal pulmonary venous anatomy with all pulmonary veins draining  into the left atrium.  9.  Postoperative  changes of median sternotomy.  10.  Please review Radiology report for incidental noncardiac findings  that will follow separately.

## 2018-04-07 ENCOUNTER — APPOINTMENT (OUTPATIENT)
Dept: GENERAL RADIOLOGY | Facility: CLINIC | Age: 48
DRG: 315 | End: 2018-04-07
Attending: PHYSICIAN ASSISTANT
Payer: COMMERCIAL

## 2018-04-07 LAB
ANION GAP SERPL CALCULATED.3IONS-SCNC: 7 MMOL/L (ref 3–14)
BUN SERPL-MCNC: 10 MG/DL (ref 7–30)
CALCIUM SERPL-MCNC: 8.3 MG/DL (ref 8.5–10.1)
CHLORIDE SERPL-SCNC: 108 MMOL/L (ref 94–109)
CO2 SERPL-SCNC: 26 MMOL/L (ref 20–32)
CREAT SERPL-MCNC: 0.72 MG/DL (ref 0.66–1.25)
ERYTHROCYTE [DISTWIDTH] IN BLOOD BY AUTOMATED COUNT: 15.9 % (ref 10–15)
GFR SERPL CREATININE-BSD FRML MDRD: >90 ML/MIN/1.7M2
GLUCOSE SERPL-MCNC: 79 MG/DL (ref 70–99)
HCT VFR BLD AUTO: 28.9 % (ref 40–53)
HGB BLD-MCNC: 9.5 G/DL (ref 13.3–17.7)
INR PPP: 1.84 (ref 0.86–1.14)
LMWH PPP CHRO-ACNC: 0.33 IU/ML
MAGNESIUM SERPL-MCNC: 2.3 MG/DL (ref 1.6–2.3)
MCH RBC QN AUTO: 29.6 PG (ref 26.5–33)
MCHC RBC AUTO-ENTMCNC: 32.9 G/DL (ref 31.5–36.5)
MCV RBC AUTO: 90 FL (ref 78–100)
PLATELET # BLD AUTO: 152 10E9/L (ref 150–450)
POTASSIUM SERPL-SCNC: 4 MMOL/L (ref 3.4–5.3)
RBC # BLD AUTO: 3.21 10E12/L (ref 4.4–5.9)
SODIUM SERPL-SCNC: 141 MMOL/L (ref 133–144)
WBC # BLD AUTO: 3.8 10E9/L (ref 4–11)

## 2018-04-07 PROCEDURE — 80048 BASIC METABOLIC PNL TOTAL CA: CPT | Performed by: PHYSICIAN ASSISTANT

## 2018-04-07 PROCEDURE — 71045 X-RAY EXAM CHEST 1 VIEW: CPT

## 2018-04-07 PROCEDURE — 85610 PROTHROMBIN TIME: CPT | Performed by: PHYSICIAN ASSISTANT

## 2018-04-07 PROCEDURE — 25000132 ZZH RX MED GY IP 250 OP 250 PS 637: Performed by: THORACIC SURGERY (CARDIOTHORACIC VASCULAR SURGERY)

## 2018-04-07 PROCEDURE — 25000128 H RX IP 250 OP 636: Performed by: SURGERY

## 2018-04-07 PROCEDURE — 25000128 H RX IP 250 OP 636: Performed by: THORACIC SURGERY (CARDIOTHORACIC VASCULAR SURGERY)

## 2018-04-07 PROCEDURE — 25000132 ZZH RX MED GY IP 250 OP 250 PS 637: Performed by: SURGERY

## 2018-04-07 PROCEDURE — 36415 COLL VENOUS BLD VENIPUNCTURE: CPT | Performed by: PHYSICIAN ASSISTANT

## 2018-04-07 PROCEDURE — 21400006 ZZH R&B CCU INTERMEDIATE UMMC

## 2018-04-07 PROCEDURE — 85520 HEPARIN ASSAY: CPT | Performed by: PHYSICIAN ASSISTANT

## 2018-04-07 PROCEDURE — 85027 COMPLETE CBC AUTOMATED: CPT | Performed by: PHYSICIAN ASSISTANT

## 2018-04-07 PROCEDURE — 83735 ASSAY OF MAGNESIUM: CPT | Performed by: PHYSICIAN ASSISTANT

## 2018-04-07 PROCEDURE — 25000128 H RX IP 250 OP 636: Performed by: PHYSICIAN ASSISTANT

## 2018-04-07 RX ORDER — WARFARIN SODIUM 3 MG/1
3 TABLET ORAL
Status: COMPLETED | OUTPATIENT
Start: 2018-04-07 | End: 2018-04-07

## 2018-04-07 RX ADMIN — HEPARIN SODIUM 950 UNITS/HR: 10000 INJECTION, SOLUTION INTRAVENOUS at 14:41

## 2018-04-07 RX ADMIN — VANCOMYCIN HYDROCHLORIDE 1750 MG: 10 INJECTION, POWDER, LYOPHILIZED, FOR SOLUTION INTRAVENOUS at 18:04

## 2018-04-07 RX ADMIN — CEFTRIAXONE 2 G: 2 INJECTION, POWDER, FOR SOLUTION INTRAMUSCULAR; INTRAVENOUS at 21:07

## 2018-04-07 RX ADMIN — ROSUVASTATIN CALCIUM 10 MG: 10 TABLET, FILM COATED ORAL at 07:40

## 2018-04-07 RX ADMIN — ASPIRIN 81 MG CHEWABLE TABLET 81 MG: 81 TABLET CHEWABLE at 07:40

## 2018-04-07 RX ADMIN — ACETAMINOPHEN 650 MG: 325 TABLET, FILM COATED ORAL at 13:49

## 2018-04-07 RX ADMIN — WARFARIN SODIUM 3 MG: 3 TABLET ORAL at 18:05

## 2018-04-07 RX ADMIN — GENTAMICIN SULFATE 60 MG: 40 INJECTION, SOLUTION INTRAMUSCULAR; INTRAVENOUS at 04:30

## 2018-04-07 RX ADMIN — VANCOMYCIN HYDROCHLORIDE 1750 MG: 10 INJECTION, POWDER, LYOPHILIZED, FOR SOLUTION INTRAVENOUS at 05:46

## 2018-04-07 NOTE — PLAN OF CARE
Problem: Patient Care Overview  Goal: Plan of Care/Patient Progress Review  VSS on RA. A&Ox4. Sinus 75-120s. Right groin site wnl. Right arm bruised/swollen not progressing worse. Tylenol given for right are soreness. Ambulating halls. Heparin gtt therapeutic, infusing 950u/r. IV antibiotic vanco administration. Will continue POC, CVTS primary.

## 2018-04-07 NOTE — PROGRESS NOTES
"CVTS Daily Note  4/7/2018  Attending: Tobi Moreno, *    S:   No overnight events. Up from ICU.   Pt seen at bedside resting comfortably.    Does complain of \"activity restrictions\" that were likely related to removal of A-lines last night, otherwise no acute complaints.      Denies F/C/Sweats.  No CP, SOB, or calf pain.    Tolerating diet.  + BM.  + Flatus.    Ambulated well without assistance.    Pain level tolerable. Plan as per Neuro section below.     O:   Vitals:    04/06/18 2213 04/06/18 2338 04/07/18 0547 04/07/18 0737   BP: 98/60 90/60 102/66 127/68   BP Location: Left arm Right arm  Left leg   Pulse:       Resp: 18 18 16   Temp: 99  F (37.2  C) 98.7  F (37.1  C)  98.6  F (37  C)   TempSrc: Oral   Oral   SpO2: 100% 100%  98%   Weight:   75.1 kg (165 lb 8 oz)    Height:         Vitals:    04/03/18 2234 04/03/18 2249 04/07/18 0547   Weight: 79.3 kg (174 lb 13.2 oz) 78.1 kg (172 lb 2.9 oz) 75.1 kg (165 lb 8 oz)     - 3 kg since admit    Intake/Output Summary (Last 24 hours) at 04/07/18 0942  Last data filed at 04/07/18 0800   Gross per 24 hour   Intake           2338.2 ml   Output             3000 ml   Net           -661.8 ml       MAPs: 71 - 86  Gen: AAO x 3, pleasant, NAD  CV: RRR, S1S2 normal, no murmurs, rubs, or gallops.   Pulm: CTA, no rhonchi or wheezes  Abd: soft, non-tender, no guarding  Ext: trace peripheral edema, non-pitting    Labs:  BMP    Recent Labs  Lab 04/07/18  0650 04/06/18  1158 04/06/18  0357 04/05/18  0352 04/04/18  0535     --  144 142 143   POTASSIUM 4.0 4.2 3.8 4.0 3.9   CHLORIDE 108  --  111* 111* 113*   CHECO 8.3*  --  8.1* 8.0* 7.5*   CO2 26  --  25 24 23   BUN 10  --  9 7 9   CR 0.72  --  0.74 0.77 0.78   GLC 79  --  82 96 104*     CBC    Recent Labs  Lab 04/07/18  0650 04/06/18  1158 04/06/18  0357 04/05/18  0352   WBC 3.8* 3.2* 3.0* 3.9*   RBC 3.21* 3.32* 2.98* 3.00*   HGB 9.5* 9.8* 8.9* 8.9*   HCT 28.9* 30.8* 27.8* 28.0*   MCV 90 93 93 93   MCH 29.6 29.5 29.9 " 29.7   MCHC 32.9 31.8 32.0 31.8   RDW 15.9* 16.0* 16.4* 16.3*    144* 122* 131*     INR    Recent Labs  Lab 04/07/18  0650 04/06/18  0357 04/05/18  0352 04/04/18  0535   INR 1.84* 2.38* 4.07* 4.21*      Hepatic Panel   Lab Results   Component Value Date    AST 44 04/03/2018     Lab Results   Component Value Date    ALT 21 04/03/2018     Lab Results   Component Value Date    ALBUMIN 2.9 04/03/2018     GLUCOSE:     Recent Labs  Lab 04/07/18  0650 04/06/18  2212 04/06/18  0741 04/06/18  0405 04/06/18  0357 04/05/18  2118 04/05/18  0804 04/05/18  0415 04/05/18  0352  04/04/18  0535  04/03/18  2237   GLC 79  --   --   --  82  --   --   --  96  --  104*  --  104*   BGM  --  87 86 81  --  91 105* 99  --   < >  --   < >  --    < > = values in this interval not displayed.      Imaging:  reviewed recent imaging       ASSESSMENT/PLAN: Patient is a 47 year old male with a complex cardiac history, most recently with MVR/AVR, singel vessel CAB using saph vein in Sept 2017 who presents to outside hospital in septic shock with concerns for valve failure and possible abscess.     Plan:  Neuro:  -Neuro intact, no pain, prn tylenol     CV:   - s/p redo MVR (2017), AVR (1996, 2017) all mechanical valves  - ASA, statin.  - Blood pressure well controlled.   - Cardiac CTA showed 15% focal dehiscence, no evidence of active endocarditis. Continue to follow cultures. Will have interventional cardiology assess for possible PVL plug placement.   - Likely not transplant candidate due to normal LV function       Resp:  - IS, encourage activity  - On RA      FEN/GI:    - cardiac diet, + BM     Renal:   - Creatinine WNL, at baseline, adequate UOP      ID:   - Possible root abscess on TTE at OSH but not confirmed here   - WBC WNL, afebrile. On Vanc/ceftriaxone, ID following  - Continue to follow-up cultures (NGTD)     Heme:   - Hgb stable, no signs or symptoms of bleeding      Endo:   - BG well controlled      PPx:   - SCD, on hep  gtt      Anticoagulation:   - INR now subtherapeutic, restart heparin gtt, increase coumadin dosing (was supratherapeutic), INR goal 2.5-3.5 for mechanical valves.  INR 1.84.       Dispo:   - Transferred to  on 4/6      Discussed with CVTS Fellow   Staff surgeons have been informed of changes through both  verbal and written communication.      Diogenes Kraft PA-C  Cardiothoracic Surgery  Pager [phone]    9:42 AM   April 7, 2018

## 2018-04-07 NOTE — PROGRESS NOTES
D/I/A: Pt transfer from .  Heparin infusing on arrival and therapeutic at current rate.  A+O x4 and able to make needs known.  VSSA.  Denies pain or sob.  Pleasant and cooperative with cares and treatments.    P: Continue to monitor status.

## 2018-04-07 NOTE — PLAN OF CARE
Problem: Patient Care Overview  Goal: Plan of Care/Patient Progress Review  PT 4E: Defer- per chart review and discussion with interdisciplinary team, patient is active and independent with ambulation and transfers, no acute PT needs at this time. Please place new orders if there is a change in functional status.

## 2018-04-07 NOTE — PLAN OF CARE
Problem: Patient Care Overview  Goal: Individualization & Mutuality    Pt sleeping well overnight. A/O, denies pain. SR 80-90. BP improving. Voiding per urinal. Heparin gtt therapeutic 950 units/hr. Numerous IV abx. Pleasant and able to make needs known. Continue to monitor, notify team w/ changes.

## 2018-04-07 NOTE — PROGRESS NOTES
Telephone report given to FARZAD Guzman RN from unit 6 C. Per Megan RN, patient will be transferred to unit   6 C, room 17-1 after 1900.

## 2018-04-08 LAB
ANION GAP SERPL CALCULATED.3IONS-SCNC: 9 MMOL/L (ref 3–14)
BUN SERPL-MCNC: 12 MG/DL (ref 7–30)
CALCIUM SERPL-MCNC: 8.3 MG/DL (ref 8.5–10.1)
CHLORIDE SERPL-SCNC: 109 MMOL/L (ref 94–109)
CO2 SERPL-SCNC: 24 MMOL/L (ref 20–32)
CREAT SERPL-MCNC: 0.73 MG/DL (ref 0.66–1.25)
ERYTHROCYTE [DISTWIDTH] IN BLOOD BY AUTOMATED COUNT: 16.1 % (ref 10–15)
GFR SERPL CREATININE-BSD FRML MDRD: >90 ML/MIN/1.7M2
GLUCOSE SERPL-MCNC: 85 MG/DL (ref 70–99)
HCT VFR BLD AUTO: 27.8 % (ref 40–53)
HGB BLD-MCNC: 8.7 G/DL (ref 13.3–17.7)
INR PPP: 1.76 (ref 0.86–1.14)
LMWH PPP CHRO-ACNC: 0.33 IU/ML
MCH RBC QN AUTO: 29 PG (ref 26.5–33)
MCHC RBC AUTO-ENTMCNC: 31.3 G/DL (ref 31.5–36.5)
MCV RBC AUTO: 93 FL (ref 78–100)
PLATELET # BLD AUTO: 138 10E9/L (ref 150–450)
POTASSIUM SERPL-SCNC: 4 MMOL/L (ref 3.4–5.3)
RBC # BLD AUTO: 3 10E12/L (ref 4.4–5.9)
SODIUM SERPL-SCNC: 142 MMOL/L (ref 133–144)
VANCOMYCIN SERPL-MCNC: 18.1 MG/L
WBC # BLD AUTO: 4.2 10E9/L (ref 4–11)

## 2018-04-08 PROCEDURE — 80048 BASIC METABOLIC PNL TOTAL CA: CPT | Performed by: PHYSICIAN ASSISTANT

## 2018-04-08 PROCEDURE — 25000128 H RX IP 250 OP 636: Performed by: THORACIC SURGERY (CARDIOTHORACIC VASCULAR SURGERY)

## 2018-04-08 PROCEDURE — 25000128 H RX IP 250 OP 636: Performed by: PHYSICIAN ASSISTANT

## 2018-04-08 PROCEDURE — 85610 PROTHROMBIN TIME: CPT | Performed by: PHYSICIAN ASSISTANT

## 2018-04-08 PROCEDURE — 36415 COLL VENOUS BLD VENIPUNCTURE: CPT | Performed by: PHYSICIAN ASSISTANT

## 2018-04-08 PROCEDURE — 25000132 ZZH RX MED GY IP 250 OP 250 PS 637: Performed by: SURGERY

## 2018-04-08 PROCEDURE — 25000128 H RX IP 250 OP 636: Performed by: SURGERY

## 2018-04-08 PROCEDURE — 85520 HEPARIN ASSAY: CPT | Performed by: PHYSICIAN ASSISTANT

## 2018-04-08 PROCEDURE — 25000132 ZZH RX MED GY IP 250 OP 250 PS 637: Performed by: THORACIC SURGERY (CARDIOTHORACIC VASCULAR SURGERY)

## 2018-04-08 PROCEDURE — 85027 COMPLETE CBC AUTOMATED: CPT | Performed by: PHYSICIAN ASSISTANT

## 2018-04-08 PROCEDURE — 21400006 ZZH R&B CCU INTERMEDIATE UMMC

## 2018-04-08 PROCEDURE — 80202 ASSAY OF VANCOMYCIN: CPT | Performed by: THORACIC SURGERY (CARDIOTHORACIC VASCULAR SURGERY)

## 2018-04-08 RX ORDER — WARFARIN SODIUM 4 MG/1
4 TABLET ORAL
Status: DISCONTINUED | OUTPATIENT
Start: 2018-04-08 | End: 2018-04-08

## 2018-04-08 RX ORDER — WARFARIN SODIUM 5 MG/1
5 TABLET ORAL
Status: COMPLETED | OUTPATIENT
Start: 2018-04-08 | End: 2018-04-08

## 2018-04-08 RX ADMIN — WARFARIN SODIUM 5 MG: 5 TABLET ORAL at 17:47

## 2018-04-08 RX ADMIN — ROSUVASTATIN CALCIUM 10 MG: 10 TABLET, FILM COATED ORAL at 09:06

## 2018-04-08 RX ADMIN — ASPIRIN 81 MG CHEWABLE TABLET 81 MG: 81 TABLET CHEWABLE at 09:06

## 2018-04-08 RX ADMIN — VANCOMYCIN HYDROCHLORIDE 1750 MG: 10 INJECTION, POWDER, LYOPHILIZED, FOR SOLUTION INTRAVENOUS at 06:27

## 2018-04-08 RX ADMIN — HEPARIN SODIUM 950 UNITS/HR: 10000 INJECTION, SOLUTION INTRAVENOUS at 17:56

## 2018-04-08 RX ADMIN — CEFTRIAXONE 2 G: 2 INJECTION, POWDER, FOR SOLUTION INTRAMUSCULAR; INTRAVENOUS at 21:02

## 2018-04-08 RX ADMIN — POTASSIUM CHLORIDE 20 MEQ: 750 TABLET, EXTENDED RELEASE ORAL at 09:06

## 2018-04-08 RX ADMIN — VANCOMYCIN HYDROCHLORIDE 1750 MG: 10 INJECTION, POWDER, LYOPHILIZED, FOR SOLUTION INTRAVENOUS at 17:47

## 2018-04-08 NOTE — PHARMACY-VANCOMYCIN DOSING SERVICE
Pharmacy Vancomycin Note  Date of Service 2018  Patient's  1970   47 year old, male    Indication: Endocarditis  Goal Trough Level: 15-20 mg/L  Day of Therapy: 4  Current Vancomycin regimen:  1750 mg IV q12h    Current estimated CrCl = Estimated Creatinine Clearance: 114.6 mL/min (based on Cr of 0.73).    Creatinine for last 3 days  2018:  3:57 AM Creatinine 0.74 mg/dL  2018:  6:50 AM Creatinine 0.72 mg/dL  2018:  5:24 AM Creatinine 0.73 mg/dL    Recent Vancomycin Levels (past 3 days)  2018: 11:46 AM Vancomycin Level 12.4 mg/L  2018:  5:24 AM Vancomycin Level 18.1 mg/L    Vancomycin IV Administrations (past 72 hours)                   vancomycin (VANCOCIN) 1,750 mg in sodium chloride 0.9 % 500 mL intermittent infusion (mg) 1,750 mg New Bag 18 0627     1,750 mg New Bag 18 1804     1,750 mg New Bag  0546     1,750 mg New Bag 18 1703    vancomycin (VANCOCIN) 1,750 mg in sodium chloride 0.9 % 250 mL intermittent infusion (mg) 1,750 mg New Bag 18 0211                Nephrotoxins and other renal medications (Future)    Start     Dose/Rate Route Frequency Ordered Stop    18 1600  vancomycin (VANCOCIN) 1,750 mg in sodium chloride 0.9 % 500 mL intermittent infusion      1,750 mg  over 2 Hours Intravenous EVERY 12 HOURS 18 1548               Contrast Orders - past 72 hours (72h ago through future)    Start     Dose/Rate Route Frequency Ordered Stop    18 1015  iopamidol (ISOVUE-370) solution 100 mL      100 mL Intravenous ONCE 18 1011 18 1035          Interpretation of levels and current regimen:  Trough level is  Therapeutic    Has serum creatinine changed > 50% in last 72 hours: No    Urine output:  good urine output    Renal Function: Stable    Plan:  1.  Continue Current Dose  2.  Pharmacy will check trough levels as appropriate in 1-3 Days.    3. Serum creatinine levels will be ordered daily per other labs..      Joan CASTILLO  [place]        .

## 2018-04-08 NOTE — PROGRESS NOTES
Brief Note    Dr. Lundberg and CSI team aware of patient and consult.  Will review images to see if paravalular leak can be plugged.  Will have to order devices tomorrow and set up likely for cath end of the week.  More to follow.    Discussed with Dr. Lundberg.    Gloria Wilcox MD PGY4  Cardiology Fellow  [phone]

## 2018-04-08 NOTE — PLAN OF CARE
Problem: Patient Care Overview  Goal: Plan of Care/Patient Progress Review  VSS. A&O. Denies pain. Pleasant. Patient makes needs known which are few. Ambulating halls frequently. Conversant about possible future interventions presented. Heparin gtt therapeutic 950u/hr. Vanco administered. 2 PIV. No change to right arm bruising. Will continue POC.

## 2018-04-08 NOTE — PLAN OF CARE
Problem: Patient Care Overview  Goal: Plan of Care/Patient Progress Review  Pt a/o x 4 overnight. Appeared to sleep soundly between cares. VSS. Denies pain except for sore R upper arm and R groin for which he declines intervention. Tele SR. Continues on Heparin Gtt at 950 units/hr. Awaiting am 10a. Ambulated in [place] last evening. Voiding in urinal. Will continue to monitor pt.

## 2018-04-08 NOTE — PROGRESS NOTES
CVTS Daily Note  4/7/2018  Attending: Tobi Moreno, *    S:   No overnight events.  Pt seen at bedside resting comfortably.    Does complain of boredom, otherwise no acute complaints.      Denies F/C/Sweats.  No CP, SOB, or calf pain.    Tolerating diet.  + BM.  + Flatus.    Ambulated well without assistance.    Pain level tolerable. Plan as per Neuro section below.     O:   Vitals:    04/07/18 2225 04/08/18 0220 04/08/18 0500 04/08/18 0710   BP: 119/64 121/66  126/70   BP Location: Right leg Right leg  Right leg   Pulse:       Resp: 16 16  16   Temp: 98  F (36.7  C) 98.7  F (37.1  C)  98.7  F (37.1  C)   TempSrc: Oral Oral  Oral   SpO2: 98% 97%  98%   Weight:   75 kg (165 lb 6.4 oz)    Height:         Vitals:    04/03/18 2249 04/07/18 0547 04/08/18 0500   Weight: 78.1 kg (172 lb 2.9 oz) 75.1 kg (165 lb 8 oz) 75 kg (165 lb 6.4 oz)       Intake/Output Summary (Last 24 hours) at 04/07/18 0942  Last data filed at 04/07/18 0800   Gross per 24 hour   Intake           2338.2 ml   Output             3000 ml   Net           -661.8 ml       MAPs: 71 - 86  Gen: AAO x 3, pleasant, NAD  CV: RRR, S1S2 normal, no murmurs, rubs, or gallops.   Pulm: CTA, no rhonchi or wheezes  Abd: soft, non-tender, no guarding  Ext: trace peripheral edema, non-pitting. Bruising and soft swelling of R medial arm, tender.     Labs:  BMP    Recent Labs  Lab 04/08/18  0524 04/07/18  0650 04/06/18  1158 04/06/18  0357 04/05/18  0352    141  --  144 142   POTASSIUM 4.0 4.0 4.2 3.8 4.0   CHLORIDE 109 108  --  111* 111*   CHECO 8.3* 8.3*  --  8.1* 8.0*   CO2 24 26  --  25 24   BUN 12 10  --  9 7   CR 0.73 0.72  --  0.74 0.77   GLC 85 79  --  82 96     CBC    Recent Labs  Lab 04/08/18  0524 04/07/18  0650 04/06/18  1158 04/06/18  0357   WBC 4.2 3.8* 3.2* 3.0*   RBC 3.00* 3.21* 3.32* 2.98*   HGB 8.7* 9.5* 9.8* 8.9*   HCT 27.8* 28.9* 30.8* 27.8*   MCV 93 90 93 93   MCH 29.0 29.6 29.5 29.9   MCHC 31.3* 32.9 31.8 32.0   RDW 16.1* 15.9* 16.0*  16.4*   * 152 144* 122*     INR    Recent Labs  Lab 04/08/18  0524 04/07/18  0650 04/06/18  0357 04/05/18  0352   INR 1.76* 1.84* 2.38* 4.07*      Hepatic Panel   Lab Results   Component Value Date    AST 44 04/03/2018     Lab Results   Component Value Date    ALT 21 04/03/2018     Lab Results   Component Value Date    ALBUMIN 2.9 04/03/2018     GLUCOSE:     Recent Labs  Lab 04/08/18  0524 04/07/18  0650 04/06/18  2212 04/06/18  0741 04/06/18  0405 04/06/18  0357 04/05/18  2118 04/05/18  0804 04/05/18  0415 04/05/18  0352  04/04/18  0535  04/03/18  2237   GLC 85 79  --   --   --  82  --   --   --  96  --  104*  --  104*   BGM  --   --  87 86 81  --  91 105* 99  --   < >  --   < >  --    < > = values in this interval not displayed.      Imaging:  reviewed recent imaging       ASSESSMENT/PLAN: Patient is a 47 year old male with a complex cardiac history, most recently with MVR/AVR, singel vessel CAB using saph vein in Sept 2017 who presents to outside hospital in septic shock with concerns for valve failure and possible abscess.     Plan:  Neuro:  -Neuro intact, no pain, prn tylenol     CV:   - s/p redo MVR (2017), AVR (1996, 2017) all mechanical valves  - ASA, statin.  - Blood pressure well controlled.   - Cardiac CTA showed 15% focal dehiscence, no evidence of active endocarditis. Continue to follow cultures.   - Consult interventional cardiology to assess for possible PVL plug placement.   - Likely not transplant candidate due to normal LV function       Resp:  - IS, encourage activity  - On RA      FEN/GI:    - cardiac diet, + BM     Renal:   - Creatinine WNL, at baseline, adequate UOP      ID:   - Possible root abscess on TTE at OSH but not confirmed here   - WBC WNL, afebrile. On Vanc/ceftriaxone, ID following  - Continue to follow-up cultures (NGTD)     Heme:   - Hgb stable, no signs or symptoms of bleeding  - Stable R medial arm hematoma       Endo:   - BG well controlled      PPx:   - SCD, on hep  gtt      Anticoagulation:   - INR now subtherapeutic, restart heparin gtt, increase coumadin dosing (was supratherapeutic), INR goal 2.5-3.5 for mechanical valves.  INR 1.84.       Dispo:   - Transferred to  on 4/6      Discussed with CVTS Fellow   Staff surgeons have been informed of changes through both  verbal and written communication.      Diogenes Kraft PA-C  Cardiothoracic Surgery  Pager [phone]    9:42 AM   April 7, 2018

## 2018-04-09 LAB
ANION GAP SERPL CALCULATED.3IONS-SCNC: 8 MMOL/L (ref 3–14)
BUN SERPL-MCNC: 10 MG/DL (ref 7–30)
CALCIUM SERPL-MCNC: 8.2 MG/DL (ref 8.5–10.1)
CHLORIDE SERPL-SCNC: 109 MMOL/L (ref 94–109)
CO2 SERPL-SCNC: 24 MMOL/L (ref 20–32)
CREAT SERPL-MCNC: 0.8 MG/DL (ref 0.66–1.25)
ERYTHROCYTE [DISTWIDTH] IN BLOOD BY AUTOMATED COUNT: 16.4 % (ref 10–15)
GFR SERPL CREATININE-BSD FRML MDRD: >90 ML/MIN/1.7M2
GLUCOSE SERPL-MCNC: 87 MG/DL (ref 70–99)
HCT VFR BLD AUTO: 26.2 % (ref 40–53)
HGB BLD-MCNC: 8.3 G/DL (ref 13.3–17.7)
INR PPP: 1.95 (ref 0.86–1.14)
LMWH PPP CHRO-ACNC: 0.36 IU/ML
LMWH PPP CHRO-ACNC: 0.36 IU/ML
MCH RBC QN AUTO: 29.4 PG (ref 26.5–33)
MCHC RBC AUTO-ENTMCNC: 31.7 G/DL (ref 31.5–36.5)
MCV RBC AUTO: 93 FL (ref 78–100)
PLATELET # BLD AUTO: 207 10E9/L (ref 150–450)
POTASSIUM SERPL-SCNC: 3.8 MMOL/L (ref 3.4–5.3)
RBC # BLD AUTO: 2.82 10E12/L (ref 4.4–5.9)
SODIUM SERPL-SCNC: 141 MMOL/L (ref 133–144)
WBC # BLD AUTO: 5.2 10E9/L (ref 4–11)

## 2018-04-09 PROCEDURE — 85520 HEPARIN ASSAY: CPT | Performed by: PHYSICIAN ASSISTANT

## 2018-04-09 PROCEDURE — 85027 COMPLETE CBC AUTOMATED: CPT | Performed by: PHYSICIAN ASSISTANT

## 2018-04-09 PROCEDURE — 85520 HEPARIN ASSAY: CPT | Performed by: INTERNAL MEDICINE

## 2018-04-09 PROCEDURE — 25000132 ZZH RX MED GY IP 250 OP 250 PS 637: Performed by: THORACIC SURGERY (CARDIOTHORACIC VASCULAR SURGERY)

## 2018-04-09 PROCEDURE — 25000128 H RX IP 250 OP 636: Performed by: THORACIC SURGERY (CARDIOTHORACIC VASCULAR SURGERY)

## 2018-04-09 PROCEDURE — 25000128 H RX IP 250 OP 636: Performed by: PHYSICIAN ASSISTANT

## 2018-04-09 PROCEDURE — 25000132 ZZH RX MED GY IP 250 OP 250 PS 637: Performed by: SURGERY

## 2018-04-09 PROCEDURE — 21400006 ZZH R&B CCU INTERMEDIATE UMMC

## 2018-04-09 PROCEDURE — 80048 BASIC METABOLIC PNL TOTAL CA: CPT | Performed by: PHYSICIAN ASSISTANT

## 2018-04-09 PROCEDURE — 36415 COLL VENOUS BLD VENIPUNCTURE: CPT | Performed by: INTERNAL MEDICINE

## 2018-04-09 PROCEDURE — 36415 COLL VENOUS BLD VENIPUNCTURE: CPT | Performed by: PHYSICIAN ASSISTANT

## 2018-04-09 PROCEDURE — 25000128 H RX IP 250 OP 636: Performed by: SURGERY

## 2018-04-09 PROCEDURE — 85610 PROTHROMBIN TIME: CPT | Performed by: PHYSICIAN ASSISTANT

## 2018-04-09 RX ORDER — WARFARIN SODIUM 5 MG/1
5 TABLET ORAL
Status: COMPLETED | OUTPATIENT
Start: 2018-04-09 | End: 2018-04-09

## 2018-04-09 RX ADMIN — POTASSIUM CHLORIDE 20 MEQ: 750 TABLET, EXTENDED RELEASE ORAL at 08:53

## 2018-04-09 RX ADMIN — VANCOMYCIN HYDROCHLORIDE 1750 MG: 10 INJECTION, POWDER, LYOPHILIZED, FOR SOLUTION INTRAVENOUS at 18:16

## 2018-04-09 RX ADMIN — WARFARIN SODIUM 5 MG: 5 TABLET ORAL at 18:17

## 2018-04-09 RX ADMIN — VANCOMYCIN HYDROCHLORIDE 1750 MG: 10 INJECTION, POWDER, LYOPHILIZED, FOR SOLUTION INTRAVENOUS at 06:01

## 2018-04-09 RX ADMIN — HEPARIN SODIUM 800 UNITS/HR: 10000 INJECTION, SOLUTION INTRAVENOUS at 21:23

## 2018-04-09 RX ADMIN — ASPIRIN 81 MG CHEWABLE TABLET 81 MG: 81 TABLET CHEWABLE at 08:48

## 2018-04-09 RX ADMIN — ROSUVASTATIN CALCIUM 10 MG: 10 TABLET, FILM COATED ORAL at 08:48

## 2018-04-09 RX ADMIN — CEFTRIAXONE 2 G: 2 INJECTION, POWDER, FOR SOLUTION INTRAMUSCULAR; INTRAVENOUS at 21:10

## 2018-04-09 NOTE — PROGRESS NOTES
Reviewed IZA and CT Heart angiogram with imaging staff Dr. Watts and Dr. Cruz.  Paravalvular leak extends from LVOT to aorta adjacent to RVOT.  Approximately 15-20% of valve circumference. 2-4 plugs may be needed. Per CV surgery, patient wishes to be discharged and return, which they believe he is stable enough to do so.      Discussed with Dr. Lundberg.  Patient can be discharged and return for procedure as outpatient.  Will take longer than a week to organize (order devices, schedule anesthesia, cath lab or OR 24 time).  No specific INR goal but ideally <= 2.  Patient on warfarin for mechanical valves, mitral and aortic, with INR goal 2.5-3.5.  Patient may requiring heparin bridging before procedures.  Interventional team to orchestrate.    Gloria Wilcox MD PGY4  Cardiology Fellow  [phone]

## 2018-04-09 NOTE — PROGRESS NOTES
CVTS Daily Note  4/9/2018  Attending: Tobi Moreno, *    S:   No overnight events. Walking halls to stay active.   Pt seen at bedside resting comfortably.    Does complain of boredom, otherwise no acute complaints.      Denies F/C/Sweats.  No CP, SOB, or calf pain.    Tolerating diet.  + BM.  + Flatus.    Ambulated well without assistance.    Pain level tolerable. Plan as per Neuro section below.     O:   Vitals:    04/08/18 1908 04/08/18 2354 04/09/18 0600 04/09/18 0732   BP: 127/69 125/70  125/68   BP Location: Right leg Right leg  Right leg   Pulse:       Resp: 16 16  16   Temp: 98.2  F (36.8  C)   98.2  F (36.8  C)   TempSrc: Oral   Oral   SpO2: 99% 99%  99%   Weight:   74.8 kg (164 lb 14.4 oz)    Height:         Vitals:    04/07/18 0547 04/08/18 0500 04/09/18 0600   Weight: 75.1 kg (165 lb 8 oz) 75 kg (165 lb 6.4 oz) 74.8 kg (164 lb 14.4 oz)     Intake/Output Summary (Last 24 hours) at 04/09/18 0917  Last data filed at 04/09/18 0000   Gross per 24 hour   Intake          1422.33 ml   Output             1225 ml   Net           197.33 ml        MAPs: 82 - 91   Gen: AAO x 3, pleasant, NAD  CV: RRR, S1S2 normal, no murmurs, rubs, or gallops.   Pulm: CTA, no rhonchi or wheezes  Abd: soft, non-tender, no guarding  Ext: no peripheral edema, R upper medial arm with stable hematoma       Labs:  BMP    Recent Labs  Lab 04/09/18  0634 04/08/18  0524 04/07/18  0650 04/06/18  1158 04/06/18  0357    142 141  --  144   POTASSIUM 3.8 4.0 4.0 4.2 3.8   CHLORIDE 109 109 108  --  111*   CHECO 8.2* 8.3* 8.3*  --  8.1*   CO2 24 24 26  --  25   BUN 10 12 10  --  9   CR 0.80 0.73 0.72  --  0.74   GLC 87 85 79  --  82     CBC    Recent Labs  Lab 04/09/18  0634 04/08/18  0524 04/07/18  0650 04/06/18  1158   WBC 5.2 4.2 3.8* 3.2*   RBC 2.82* 3.00* 3.21* 3.32*   HGB 8.3* 8.7* 9.5* 9.8*   HCT 26.2* 27.8* 28.9* 30.8*   MCV 93 93 90 93   MCH 29.4 29.0 29.6 29.5   MCHC 31.7 31.3* 32.9 31.8   RDW 16.4* 16.1* 15.9* 16.0*     138* 152 144*     INR    Recent Labs  Lab 04/09/18  0634 04/08/18  0524 04/07/18  0650 04/06/18  0357   INR 1.95* 1.76* 1.84* 2.38*      Hepatic Panel   Lab Results   Component Value Date    AST 44 04/03/2018     Lab Results   Component Value Date    ALT 21 04/03/2018     Lab Results   Component Value Date    ALBUMIN 2.9 04/03/2018     GLUCOSE:     Recent Labs  Lab 04/09/18  0634 04/08/18  0524 04/07/18  0650 04/06/18  2212 04/06/18  0741 04/06/18  0405 04/06/18  0357 04/05/18  2118 04/05/18  0804 04/05/18  0415 04/05/18  0352  04/04/18  0535   GLC 87 85 79  --   --   --  82  --   --   --  96  --  104*   BGM  --   --   --  87 86 81  --  91 105* 99  --   < >  --    < > = values in this interval not displayed.      Imaging:  reviewed recent imaging      ASSESSMENT/PLAN: Patient is a 47 year old male with a complex cardiac history, most recently with MVR/AVR, singel vessel CAB using saph vein in Sept 2017 who presents to outside hospital in septic shock with concerns for valve failure and possible abscess.      Plan:  Neuro:  -Neuro intact, no pain, prn tylenol      CV:   - s/p redo MVR (2017), AVR (1996, 2017) all mechanical valves  - ASA, statin.  - Blood pressure well controlled.   - Cardiac CTA showed 15% focal dehiscence, no evidence of active endocarditis. Continue to follow cultures.   - Consult interventional cardiology to assess for possible PVL plug placement. The team is looking into possible procedure Friday?   - Likely not transplant candidate due to normal LV function at this point.       Resp:  - IS, encourage activity  - On RA      FEN/GI:    - cardiac diet, + BM      Renal:   - Creatinine WNL, at baseline, adequate UOP      ID:   - Possible root abscess on TTE at OSH but not confirmed here   - WBC WNL, afebrile. On Vanc/ceftriaxone, ID following  - Continue to follow-up cultures (NGTD)      Heme:   - Hgb stable, no signs or symptoms of bleeding  - Stable R medial arm hematoma       Endo:   - BG well  controlled      PPx:   - SCD, on hep gtt       Anticoagulation:   - INR now subtherapeutic, restart heparin gtt, increase coumadin dosing (was supratherapeutic), INR goal 2.5-3.5 for mechanical valves.  INR 1.95.       Dispo:   - Transferred to  on 4/6  - Could potentially DC and return on Friday for procedure if INR therapeutic.       Discussed with CVTS Fellow   Staff surgeons have been informed of changes through both  verbal and written communication.      Doigenes Kraft PA-C  Cardiothoracic Surgery  Pager [phone]    9:17 AM   April 9, 2018

## 2018-04-10 VITALS
WEIGHT: 164.46 LBS | TEMPERATURE: 98.3 F | HEIGHT: 64 IN | OXYGEN SATURATION: 100 % | DIASTOLIC BLOOD PRESSURE: 61 MMHG | BODY MASS INDEX: 28.08 KG/M2 | SYSTOLIC BLOOD PRESSURE: 120 MMHG | RESPIRATION RATE: 18 BRPM | HEART RATE: 90 BPM

## 2018-04-10 LAB
ANION GAP SERPL CALCULATED.3IONS-SCNC: 7 MMOL/L (ref 3–14)
BACTERIA SPEC CULT: NO GROWTH
BUN SERPL-MCNC: 10 MG/DL (ref 7–30)
CALCIUM SERPL-MCNC: 8.3 MG/DL (ref 8.5–10.1)
CHLORIDE SERPL-SCNC: 112 MMOL/L (ref 94–109)
CO2 SERPL-SCNC: 26 MMOL/L (ref 20–32)
CREAT SERPL-MCNC: 0.79 MG/DL (ref 0.66–1.25)
ERYTHROCYTE [DISTWIDTH] IN BLOOD BY AUTOMATED COUNT: 16.8 % (ref 10–15)
GFR SERPL CREATININE-BSD FRML MDRD: >90 ML/MIN/1.7M2
GLUCOSE SERPL-MCNC: 95 MG/DL (ref 70–99)
HCT VFR BLD AUTO: 28.1 % (ref 40–53)
HGB BLD-MCNC: 8.7 G/DL (ref 13.3–17.7)
INR PPP: 2.74 (ref 0.86–1.14)
LMWH PPP CHRO-ACNC: 0.23 IU/ML
LMWH PPP CHRO-ACNC: 0.23 IU/ML
MCH RBC QN AUTO: 29 PG (ref 26.5–33)
MCHC RBC AUTO-ENTMCNC: 31 G/DL (ref 31.5–36.5)
MCV RBC AUTO: 94 FL (ref 78–100)
PLATELET # BLD AUTO: 212 10E9/L (ref 150–450)
POTASSIUM SERPL-SCNC: 3.7 MMOL/L (ref 3.4–5.3)
RBC # BLD AUTO: 3 10E12/L (ref 4.4–5.9)
SODIUM SERPL-SCNC: 146 MMOL/L (ref 133–144)
SPECIMEN SOURCE: NORMAL
WBC # BLD AUTO: 5.7 10E9/L (ref 4–11)

## 2018-04-10 PROCEDURE — 36415 COLL VENOUS BLD VENIPUNCTURE: CPT | Performed by: THORACIC SURGERY (CARDIOTHORACIC VASCULAR SURGERY)

## 2018-04-10 PROCEDURE — 25000132 ZZH RX MED GY IP 250 OP 250 PS 637: Performed by: SURGERY

## 2018-04-10 PROCEDURE — 80048 BASIC METABOLIC PNL TOTAL CA: CPT | Performed by: PHYSICIAN ASSISTANT

## 2018-04-10 PROCEDURE — 85610 PROTHROMBIN TIME: CPT | Performed by: PHYSICIAN ASSISTANT

## 2018-04-10 PROCEDURE — 36415 COLL VENOUS BLD VENIPUNCTURE: CPT | Performed by: PHYSICIAN ASSISTANT

## 2018-04-10 PROCEDURE — 85027 COMPLETE CBC AUTOMATED: CPT | Performed by: PHYSICIAN ASSISTANT

## 2018-04-10 PROCEDURE — 85520 HEPARIN ASSAY: CPT | Performed by: THORACIC SURGERY (CARDIOTHORACIC VASCULAR SURGERY)

## 2018-04-10 PROCEDURE — 25000128 H RX IP 250 OP 636: Performed by: THORACIC SURGERY (CARDIOTHORACIC VASCULAR SURGERY)

## 2018-04-10 PROCEDURE — 85520 HEPARIN ASSAY: CPT | Performed by: PHYSICIAN ASSISTANT

## 2018-04-10 RX ORDER — WARFARIN SODIUM 2 MG/1
TABLET ORAL
Qty: 120 TABLET | Refills: 1 | COMMUNITY
Start: 2018-04-10

## 2018-04-10 RX ORDER — LISINOPRIL 5 MG/1
2.5 TABLET ORAL DAILY
Qty: 90 TABLET | Refills: 3 | COMMUNITY
Start: 2018-04-10 | End: 2018-06-11

## 2018-04-10 RX ORDER — WARFARIN SODIUM 2.5 MG/1
2.5 TABLET ORAL
Status: DISCONTINUED | OUTPATIENT
Start: 2018-04-10 | End: 2018-04-10 | Stop reason: HOSPADM

## 2018-04-10 RX ADMIN — ROSUVASTATIN CALCIUM 10 MG: 10 TABLET, FILM COATED ORAL at 08:42

## 2018-04-10 RX ADMIN — ASPIRIN 81 MG CHEWABLE TABLET 81 MG: 81 TABLET CHEWABLE at 08:42

## 2018-04-10 RX ADMIN — POTASSIUM CHLORIDE 20 MEQ: 750 TABLET, EXTENDED RELEASE ORAL at 08:45

## 2018-04-10 RX ADMIN — VANCOMYCIN HYDROCHLORIDE 1750 MG: 10 INJECTION, POWDER, LYOPHILIZED, FOR SOLUTION INTRAVENOUS at 05:54

## 2018-04-10 NOTE — PLAN OF CARE
Problem: Patient Care Overview  Goal: Plan of Care/Patient Progress Review  Outcome: No Change  D: Pt who presented to outside hospital with numbness in the left side with dizziness and nausea.   I/A: Pt alert and oriented x4. VSS. On RA. Sinus rhythm with HRs 80-90s. Pt denies any SOB, nausea, dizziness, or lightheadedness. Pt voiding. Last BM today. Pt ambulating hallways independently. Heparin gtt decreased to 800 units/hr. Next 10A heparin check tonight at 2340. INR this morning was 1.95. Warfarin 5 mg given this evening, next lab draw tomorrow AM. Vancomycin and rocephin given.   P: INR goal 2.5-3.5. Pt may possibly discharge home if INR therapeutic. Continue to monitor and assess pt with every encounter. Notify CVTS with any changes or questions.

## 2018-04-10 NOTE — PROGRESS NOTES
Care Coordinator- Discharge Planning     Admission Date/Time:  4/3/2018  Attending MD:  Tobi Moreno   Data  Chart reviewed, discussed with interdisciplinary team.   Patient was admitted for:   1. Aortic valve replaced    2. Endocarditis of aortic valve    3. Hx of mitral valve replacement with mechanical valve    4. S/P AVR (aortic valve replacement)    Assessment  Concerns with insurance coverage for discharge needs: None.  Current Living Situation: Patient lives with his brother.   Support System: Supportive and Involved brother.   Services Involved: Outpt INR and Warfarin monitoring with [facility].   Transportation: Family or Friend will provide    Coordination of Care and Referrals: No home care needs per pt.   Per MD, pt will return for a surgery at a later date.   Plan  Anticipated Discharge Date:  4/10/18  Anticipated Discharge Plan:  Discharge to home.     CTS Handoff completed:  YES    MADIHA ROMAN, RN BSN  Care Coordinator  [phone]

## 2018-04-10 NOTE — DISCHARGE SUMMARY
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 rate 18, height 1.626 m (5' 4\"), weight 74.6 kg (164 lb 7.4 oz), SpO2 100 %.     Code status on discharge: Full Code           Procedures:   CT angio of heart 4/5/2018  IZA 4/4/2018          Medications Prior to Admission:     Prescriptions Prior to Admission   Medication Sig Dispense Refill Last Dose     SUMAtriptan (IMITREX) 50 MG tablet Take one tablet by mouth at onset of headache. May repeat one tablet after 2 hours if headache recurs.        [DISCONTINUED] metoprolol (LOPRESSOR) 25 MG tablet Take 25 mg by mouth        potassium chloride SA (K-DUR/KLOR-CON M) 10 MEQ CR tablet Take 2 tablets (20 mEq) by mouth daily 30 tablet 1      [DISCONTINUED] metoprolol (TOPROL-XL) 25 MG 24 hr tablet Take 0.5 tablets (12.5 mg) by mouth daily 15 tablet 1      furosemide (LASIX) 20 MG tablet Take 1 tablet (20 mg) by mouth daily 30 tablet 1 Taking     rosuvastatin (CRESTOR) 10 MG tablet Take 1 tablet (10 mg) by mouth daily 30 tablet 1 Taking     melatonin 3 MG tablet Take 1 tablet (3 mg) by mouth nightly as needed for sleep 30 tablet 0 Taking     aspirin 81 MG chewable tablet Take 1 tablet (81 mg) by mouth daily 120 tablet 0 " Taking     senna-docusate (SENOKOT-S;PERICOLACE) 8.6-50 MG per tablet Take 1-2 tablets by mouth 2 times daily as needed (constipation ) 50 tablet 0 Taking     order for DME Equipment being ordered: Walker Wheels ()  Treatment Diagnosis: Gait instability 1 each 0 Taking     [DISCONTINUED] oxyCODONE (ROXICODONE) 5 MG IR tablet Take 1 tablet (5 mg) by mouth every 6 hours as needed for moderate to severe pain 20 tablet 0 Taking             Discharge Medications:     Discharge Medication List as of 4/10/2018  5:06 PM      CONTINUE these medications which have CHANGED    Details   warfarin (COUMADIN) 2 MG tablet Take 3 mg (1 tablet) daily until next INR check, next INR draw Thursday 4/12. Then dose per goal 2.5-3.5, Disp-120 tablet, R-1, Historical         CONTINUE these medications which have NOT CHANGED    Details   aspirin 81 MG chewable tablet Take 1 tablet (81 mg) by mouth daily, Disp-120 tablet, R-0, E-Prescribe      furosemide (LASIX) 20 MG tablet Take 1 tablet (20 mg) by mouth daily, Disp-30 tablet, R-1, E-Prescribe      lisinopril (PRINIVIL/ZESTRIL) 5 MG tablet Take 0.5 tablets (2.5 mg) by mouth daily, Disp-90 tablet, R-3, Historical      melatonin 3 MG tablet Take 1 tablet (3 mg) by mouth nightly as needed for sleep, Disp-30 tablet, R-0, E-Prescribe      order for DME Equipment being ordered: Walker Wheels ()  Treatment Diagnosis: Gait instabilityDisp-1 each, R-0, Local Print      potassium chloride SA (K-DUR/KLOR-CON M) 10 MEQ CR tablet Take 2 tablets (20 mEq) by mouth daily, Disp-30 tablet, R-1, E-Prescribe      rosuvastatin (CRESTOR) 10 MG tablet Take 1 tablet (10 mg) by mouth daily, Disp-30 tablet, R-1, E-Prescribe      senna-docusate (SENOKOT-S;PERICOLACE) 8.6-50 MG per tablet Take 1-2 tablets by mouth 2 times daily as needed (constipation ), Disp-50 tablet, R-0, E-Prescribe      SUMAtriptan (IMITREX) 50 MG tablet Take one tablet by mouth at onset of headache. May repeat one tablet after 2 hours  if headache recurs., Historical         STOP taking these medications       metoprolol (LOPRESSOR) 25 MG tablet Comments:   Reason for Stopping:         metoprolol (TOPROL-XL) 25 MG 24 hr tablet Comments:   Reason for Stopping:         oxyCODONE (ROXICODONE) 5 MG IR tablet Comments:   Reason for Stopping:                     Consultations:   Consultation during this admission received from cardiology, infectious disease, and PT,OT,Cardiac rehab              Brief History of Illness:   Mr. 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 Gorman on 9/21/17. He presented to [facility] with left arm numbness and found to have AV valve dehiscence on ECHO. Patient [facility] for surgical evaluation.              Hospital Course:   Patient was admitted to the hospital, he was evaluated by Dr. Moreno from CV surgery. Patient had IZA and Cardiac CT which confirmed AV dehiscence with mild paravalvular AI,  there was no evidence of active endocarditis or paravalvular abscess. Given his calcified homograft there was high concern that a new valve could not be adequately sewn onto the heart (this was a difficulty noted in the 2017 surgery). Therefore, cardiology was consulted for possible non-surgical methods to address AV dehiscence and to also evaluate for transplant given limited options for patient's valve disease. It was determined that patient may be a candidate for a possible plug by interventional cardiology if no active infection. ID was consulted and all cultures drawn remained negative. Infectious disease determined that patient likely did not have active endocarditis and he showed no clinical signs of infection. All  antibiotics that were started on admission were discontinued at discharge. Patient was discharged to home with plan for cardiology to call to arrange follow up for procedure in the next week.     Patient doing well at discharge. Vital signs stable and patient asymptomatic. He was in agreement with plan.        Exam on day of discharge    Gen: AxOx3, NAD  CV: RRR, soft murmur, rubs, or gallops.   Pulm: +CTA, no wheezing, no rhonchi  Abd: soft, NT, ND, +BS, +BM  Ext: no LE edema  Incision: c/d/i, no erythema, sternal stable  Tubes/drains: none  Labs:  CBC:  Lab Results   Component Value Date    WBC 5.7 04/10/2018     Lab Results   Component Value Date    RBC 3.00 04/10/2018     Lab Results   Component Value Date    HGB 8.7 04/10/2018     Lab Results   Component Value Date    HCT 28.1 04/10/2018     No components found for: MCT  Lab Results   Component Value Date    MCV 94 04/10/2018     Lab Results   Component Value Date    MCH 29.0 04/10/2018     Lab Results   Component Value Date    MCHC 31.0 04/10/2018     Lab Results   Component Value Date    RDW 16.8 04/10/2018     Lab Results   Component Value Date     04/10/2018     BMP:  Last Basic Metabolic Panel:  Lab Results   Component Value Date     04/10/2018      Lab Results   Component Value Date    POTASSIUM 3.7 04/10/2018     Lab Results   Component Value Date    CHLORIDE 112 04/10/2018     Lab Results   Component Value Date    CHECO 8.3 04/10/2018     Lab Results   Component Value Date    CO2 26 04/10/2018     Lab Results   Component Value Date    BUN 10 04/10/2018     Lab Results   Component Value Date    CR 0.79 04/10/2018     Lab Results   Component Value Date    GLC 95 04/10/2018     INR:  Lab Results   Component Value Date    INR 2.74 04/10/2018    INR 1.95 04/09/2018    INR 1.76 04/08/2018    INR 1.84 04/07/2018    INR 2.38 04/06/2018    INR 4.07 04/05/2018    INR 4.21 04/04/2018    INR 4.53 04/03/2018    INR 5.09 10/17/2017    INR 4.22 10/05/2017     INR 2.53 10/04/2017    INR 2.26 10/03/2017              Significant Results:   4/5/18 11:12 AM [id number] [facility], [place], [place]    [employer] Interactive Report and InfoRx   View the interactive report   PACS Images   Show images for CT Heart Angio   Study Result   Procedure: CTA ANGIOGRAM HEART   Examination Date: 4/5/2018 11:12 AM   INDICATION: 47 year-old male with history of homograft AVR with  subsequent 17 mm mechanical AVR and 27 mm mechanical MVR for recurrent  endocarditis and secondary mitral regurgitation, with retention of the  calcified aortic homograft, now with echocardiogram with question of  paravalvular abscess and dehiscence of the aortic valve prosthesis.  Evaluate for prosthetic valve endocarditis or paravalvular infection  and assess integrity of aortic valve sewing ring.   Ordering Provider: Dr. Tobi Moreno  Overall quality of the study: Good.      PROCEDURE: ECG gated multi-slice computed tomography of the heart   with intravenous contrast  (Isovue 370, 120 cc, wasted 0 cc)  was   performed on a Siemens Dual Source Flash scanner without incident. CTA  was performed in the Spiral mode at a heart rate of 72 bpm with 120  kVp with the field of view optimized for visualization of the aortic  and mitral valves and subvalvar apparatus. Images were reconstructed  and analyzed on a [employer] workstation. Scan protocol was optimized  to minimize radiation exposure. The total radiation exposure was  calculated to be 1385 DLP, and 19.4 mSv.         IMPRESSION:  1.  Focal dehiscence involving approximately 15% of the sewing ring of  the mechanical aortic valve, with communication between the LVOT and  the aorta, as described below. No CT evidence of active endocarditis  or perivalvular abscess.  2.   Normal appearance of the mechanical mitral valve.  3.  The aortic homograft is severely calcified.   4.  The left main coronary artery appears patent at its ostium. The  RCA appears patent at its  ostium.  5.  SVG-PDA graft is patent at its ostium but is not well-imaged  beyond this point. The origin of this graft is just below the sternum.     6.  No evidence of intracardiac thrombus.     FINDINGS: .  1.  The mechanical aortic valve prosthesis has an area of focal  dehiscence involving approximately 15% of the sewing ring  circumference in the area of the RV outflow tract. There is a small  tract between the LV outflow tract and the aorta at the site of this  dehiscence. There is no rocking motion of the valve. There is no edema  or other signs of active abscess. Both leaflets open normally. No  vegetations are seen.   2.  The mechanical mitral valve prosthesis is well-seated with normal  leaflet motion. The sewing ring of the mitral valve is intact. There  is no evidence of perivalvular abscess. No vegetations are seen.  3.  The aortic homograft is severely calcified.   4.  The left main coronary artery appears patent at its ostium. The  RCA appears patent at its ostium.  5.  SVG-PDA graft is patent at its ostium but is not well-imaged  beyond this point. The origin of this graft is just below the sternum.  6.  The left atrial appendage is free of thrombus.   7.  The distal ascending aorta, visualized aortic arch, and descending  thoracic aorta are normal.  8.  Normal pulmonary venous anatomy with all pulmonary veins draining  into the left atrium.  9.  Postoperative changes of median sternotomy.  10.  Please review Radiology report for incidental noncardiac findings  that will follow separately.     MD John ZAMORA Jeremy Scott, MD 2018    Narrative         [id number]  [id number]  [id number]  [id number]^ODALYS^JOHANNA^EFREN           [facility],[place]  Echocardiography Laboratory  [address] [place] [id number]        Name: HERBERTH MENDENHALL  MRN: [id number]  : 1970  Study Date: 2018 05:02 PM  Age: 47 yrs  Gender: Male  Patient  Location: [facility]  Reason For Study: Aortic Valve ReplacementEndocarditis  Ordering Physician: JOHANNA MORROW  Referring Physician: [facility]  Performed By: Jose M Lopez MD     BSA: 2.0 m2  Height: 74 in  Weight: 172 lb  HR: 104  BP: 96/61 mmHg  _____________________________________________________________________________  __     Interpretation Summary  17 mm St. Jerald Masters bileaflet mechanical prosthesis is present in the  aortic position. Imaging of the mechanical aortic valve and surrounding  structures is suboptimal despite repeated attempts in multiple views. There  appears to be an area of focal (<25%) dehiscence with probably mild  paravalvular AI. Theere is no rocking motion of the prosthetic valve. Presence  and extent of dehiscence are difficult to definitively assess on this study.  Recommend gated CTA to confirm.     27mm St. Jerald Masters bileaflet mechanical prosthesis is present in the mitral  position. The mechanical valve appears well-seated. The sewing ring is intact.  Both leaflets open well. Doppler interrogation of the mechanical mitral valve  is normal. No vegetations are seen.  Left ventricular function, chamber size, wall motion, and wall thickness are  normal.The EF is 60-65%.  Right ventricular function, chamber size, wall motion, and thickness are  normal.     This study was compared with the study from 10/4/17 (TTE) and 9/21/17 (intra-  op IZA) : LVEF has increased and aortic valve findings appear new.  _____________________________________________________________________________  __        Procedure  Transesophageal Echocardiogram with color and spectral Doppler performed. 3D  image acquisition, reconstruction, and real-time interpretation was performed.  Procedure location Patient Floor. The procedure was performed on Patient  Floor. IZA Probe #56 was used during the procedure. Patient was sedated using  Fentanyl 100 mcg. Patient was sedated using Versed 4.5 mg. Total  sedation  time: 60 minutes of continuous bedside 1:1 monitoring. The Transducer was  inserted without difficulty . The patient tolerated the procedure well.  Complications None. The patient's rhythm is sinus tachycardia.     Left Ventricle  Left ventricular function, chamber size, wall motion, and wall thickness are  normal.The EF is 60-65%.     Right Ventricle  Right ventricular function, chamber size, wall motion, and thickness are  normal.     Atria  Both atria appear normal. The left atrial appendage is normal. It is free of  spontaneous echo contrast and thrombus. The atrial septum is intact as  assessed by color Doppler . A catheter is noted in the right atrium.        Mitral Valve  27mm St. Jerald Masters bileaflet mechanical prosthesis is present in the mitral  position. The mechanical valve appears well-seated. The sewing ring is intact.  Both leaflets open well. Doppler interrogation of the mechanical mitral valve  is normal. No vegetations are seen. Trace to mild mitral insufficiency is  present. Mean gradient 7 mmHg at 107 bpm.     Aortic Valve  17 mm St. Jerald Masters bileaflet mechanical prosthesis is present in the  aortic position. Imaging of the mechanical aortic valve and surrounding  structures is suboptimal despite repeated attempts in multiple views. There  appears to be an area of focal (<25%) dehiscence with probably mild  paravalvular AI. Theere is no rocking motion of the prosthetic valve. Presence  and extent of dehiscence are difficult to definitively assess on this study.  Recommend gated CTA to confirm.     Tricuspid Valve  The tricuspid valve is normal. Trace tricuspid insufficiency is present.     Pulmonic Valve  The pulmonic valve is normal.     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     Pericardium  No pericardial effusion is present.        Compared to Previous Study  This study was compared with the study from 10/4/17 (TTE) and 9/21/17  (intra-  op IZA) . LVEF has increased and aortic valve findings appear new.     _____________________________________________________________________________  __     Doppler Measurements & Calculations  MV max PG: 10.7 mmHg  MV mean P.9 mmHg  MV V2 VTI: 27.9 cm        _____________________________________________________________________________  __           Report approved by: Mark Broderick 2018 08:30 PM              Pending Results:   None           Discharge Instructions and Follow-Up:   Discharge diet: Cardiac    Discharge activity: Activity as tolerated   Discharge follow-up: Adult [facility] Follow-up and recommended labs and tests       Follow up with INR clinic on Thursday for an INR draw . Dr. Lundberg's (Cardiologist) office will contact you for procedure within the week.                 Outpatient therapy: None    Home Care agency: None    Supplies and equipment: None   Lines and drains: None    Wound care: None   Other instructions: None       Thu Gamez PA-C  [facility] Cardiothoracic Surgery  [phone]

## 2018-04-10 NOTE — PROGRESS NOTES
Patient's INR therapeutic and is adequate for discharge. Discharge instructions given to patient and all questions answered. Bilateral PIV's removed. VSS. All belongings removed from room and taken with patient. No further needs at this time. Instructed to follow up with INR clinic to get level checked this Thursday. Patient will be notified regarding follow-up outpatient procedure.

## 2018-04-10 NOTE — PROGRESS NOTES
[facility] ID SERVICE PROGRESS NOTE    Patient:  Harvey Almanzar, Date of birth 1970, Medical record number [id number]  Date of Visit:  04/10/2018         Assessment and Recommendations:   PROBLEM LIST:  1. Aortic valve endocarditis with AVR in 1996 and redo AVR in 1998  2. 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 E. Coli cultured from the mitral valve tissue (9/2017)  3. Post-Op mechanical MVR, AVR, aneurysm repair and CABG x1 (SVG to PDA) - (9/21/2017)  4. HFrEF (EF 45-50%)  5. L MCA stroke (9/232017)  6. Prosthetic aortic valve with leak of ~15% of the circumference of the valve ring. No evidence of infectious endocarditis this 4/2018 admission though. Blood cultures here and from [facility] are all negative and finalized.      RECOMMENDATION:  1) Recommend discontinue antibiotics. OK to pull IV access and discharge to home without antibiotics.      ASSESSMENT:  Mr. Almanzar is a 46 y/o man with a history of aortic valve endocarditis with AVR in 1996 and redo AVR in 1998, streptococcal parasanguinis bacteremia and presumed, prosthetic endocarditis with severe AR (7/2017), E. Coli cultured from the mitral valve tissue (9/2017), post-Op mechanical MVR, AVR, aneurysm repair and CABG x1 (SVG to PDA) - (9/21/2017) who presented to [facility] with left sided numbness and found to be hypotensive (BP 67/37). A TTE at that time demonstrated aortic root  thickening in with possible echo free space and possible dehiscence/abcess. The IZA and CTA demonstrated AV dehiscence with mild paravalvular AI, but no evidence of active endocarditis or paravalvular abscess. Once cultures finalize, and if all of them are negative, we would recommend discontinuing all antibiotic therapy.     Filomena Navarro MD  ID staff physician  Pager [phone]              Interval History:     No acute events overnight. Hemodynamically stable. Patient is up and  ambulating around the wards today.  No subjective fevers, chills, night sweats. No chest pain, shortness of breath. No abdominal pain, N/V/D.     Review of Systems:  All other ROS negative          Current Medications & Allergies:       warfarin  2.5 mg Oral ONCE at 18:00     vancomycin (VANCOCIN) IV  1,750 mg Intravenous Q12H     aspirin  81 mg Oral Daily     rosuvastatin  10 mg Oral Daily     cefTRIAXone  2 g Intravenous Q24H       Infusions/Drips:    - MEDICATION INSTRUCTIONS -       Warfarin Therapy Reminder         Allergies   Allergen Reactions     Vancomycin Other (See Comments) and Rash     Flushed            Physical Exam:   Vitals were reviewed.    Temp:  [98.3  F (36.8  C)-98.8  F (37.1  C)] 98.3  F (36.8  C)  Pulse:  [90] 90  Heart Rate:  [73-93] 89  Resp:  [16-18] 18  BP: (115-128)/(61-69) 120/61  SpO2:  [99 %-100 %] 100 %  Vitals:    04/08/18 0500 04/09/18 0600 04/10/18 0600   Weight: 75 kg (165 lb 6.4 oz) 74.8 kg (164 lb 14.4 oz) 74.6 kg (164 lb 7.4 oz)     Physical Examination:  GENERAL:  well-developed, well-nourished, in bed in no acute distress.   HEENT:  Head is normocephalic, atraumatic   EYES:  Eyes have anicteric sclerae without conjunctival injection or stigmata of endocarditis.    ENT:  Oropharynx is moist without exudates or ulcers. Tongue is midline.   NECK:  Supple. No  Cervical lymphadenopathy  LUNGS:  Clear to auscultation bilateral.   CARDIOVASCULAR:  Tachy, regular rate, mechanical click heard, there is a 4/6 holo-systolic murmur best heard at the left 4-5th intercostal space. Sternotomy scar healed   ABDOMEN:  Normal bowel sounds, soft, nontender. No appreciable hepatosplenomegaly  SKIN:  No acute rashes.  Line(s) are in place without any surrounding erythema or exudate. No stigmata of endocarditis.  NEUROLOGIC:  Grossly nonfocal. Active x4 extremities         Laboratory Data:     Inflammatory Markers    Recent Labs   Lab Test  04/05/18   1146  10/02/17   0934  09/15/17   2217   SED    --    --   42*   CRP  73.0*  41.0*  40.0*     Metabolic Studies       Recent Labs   Lab Test  04/10/18   0728  04/09/18   0634   04/07/18   0650   04/06/18   0357   04/03/18   2237   10/01/17   2012   NA  146*  141   < >  141   --   144   < >  142   < >   --    POTASSIUM  3.7  3.8   < >  4.0   < >  3.8   < >  3.3*   < >   --    CHLORIDE  112*  109   < >  108   --   111*   < >  112*   < >   --    CO2  26  24   < >  26   --   25   < >  23   < >   --    ANIONGAP  7  8   < >  7   --   8   < >  7   < >   --    BUN  10  10   < >  10   --   9   < >  11   < >   --    CR  0.79  0.80   < >  0.72   --   0.74   < >  0.81   < >   --    GFRESTIMATED  >90  >90   < >  >90   --   >90   < >  >90   < >   --    GLC  95  87   < >  79   --   82   < >  104*   < >   --    A1C   --    --    --    --    --    --    --   4.4   --    --    CHECO  8.3*  8.2*   < >  8.3*   --   8.1*   < >  7.3*   < >   --    PHOS   --    --    --    --    --   3.8   --   2.4*   < >   --    MAG   --    --    --   2.3   < >  2.0   --   2.8*   < >   --    LACT   --    --    --    --    --    --    --   0.9   --   1.0    < > = values in this interval not displayed.     Hepatic Studies    Recent Labs   Lab Test  04/05/18   1146  04/03/18   2237  10/05/17   0707  10/04/17   0656   09/23/17   0344   BILITOTAL   --   2.1*  2.4*  3.1*   < >  12.7*   DBIL   --    --    --    --    --   8.1*   ALKPHOS   --   69  304*  304*   < >  52   PROTTOTAL   --   5.7*  5.6*  5.6*   < >  4.6*   ALBUMIN   --   2.9*  2.0*  2.0*   < >  2.3*   AST   --   44  128*  145*   < >  226*   ALT   --   21  141*  155*   < >  307*   LDH  865*   --    --    --    --    --     < > = values in this interval not displayed.     Hematology Studies      Recent Labs   Lab Test  04/10/18   0728  04/09/18   0634  04/08/18   0524  04/07/18   0650  04/06/18   1158  04/06/18   0357   09/21/17   1620   09/15/17   2217   WBC  5.7  5.2  4.2  3.8*  3.2*  3.0*   < >  6.7   < >  8.8   ANEU   --    --    --    --    --     --    --   6.1   --   5.9   ALYM   --    --    --    --    --    --    --   0.2*   --   2.2   DORINDA   --    --    --    --    --    --    --   0.4   --   0.7   AEOS   --    --    --    --    --    --    --   0.0   --   0.0   HGB  8.7*  8.3*  8.7*  9.5*  9.8*  8.9*   < >  9.1*  9.2*   < >  11.9*   HCT  28.1*  26.2*  27.8*  28.9*  30.8*  27.8*   < >  28.2*   < >  36.0*   PLT  212  207  138*  152  144*  122*   < >  82*   < >  267    < > = values in this interval not displayed.     Microbiology:     CULTURE RESULTS:    1. Blood cultures 7/13, 7/14, 7/15 positive for strep parasanguinas (sensitive to Ceftriaxone LONG 0.094, and PCN LONG 0.19).  2. Blood cultures 7/16, 7/17, 8/25 were negative.  3. Blood culture 9/25/17: no growth.   4. Stool C diff 9/24/17: negative.   5. Mitral Valve culture 9/21/17: light growth e coli.   6. Aortic Valve culture 9/21/17: no growth     Current Admission:   04/03/2018 Blood cultures from [facility]: Pending   04/04/2018 Blood culture x 3: NGTD     Radiology:   4/4/2018 CXR:   Impression: Small bilateral pleural effusions. New right IJ central  line tip in the low superior vena cava.     TTE done at [facility] on 4/3:   Hyperdynamic LV function with EF 70%. No clear wall motion     abnormalities.    Normal RV size and function.     Left atrium moderately dilated. Not well seen.     Aortic valve mechanical prosthesis not well seen with mean gradient     30 mm Hg.  Probable mild paravalvular regurgitation.  Aortic root     tissue appears unusually thickened in with possible echo free space     and possible dehiscence/abcess. In direct comparison to 1/11/2018     study this thickening may be slightly more prominent.    Mechanical mitral valve with mean gradient 10 mm Hg at HR of 121     bpm.      Compared to prior study, mean aortic valve gradient is stable, mean     mitral valve gradient mildly increased but may be due to heart rate.    Would recommend IZA and further evaluation  of possible aortic     root abcess/AV dehiscence.      Left Ventricular Ejection Fraction: 70 %     04/04/2018 IZA   Interpretation Summary  17 mm St. Jerald Masters bileaflet mechanical prosthesis is present in the  aortic position. Imaging of the mechanical aortic valve and surrounding  structures is suboptimal despite repeated attempts in multiple views. There  appears to be an area of focal (<25%) dehiscence with probably mild  paravalvular AI. Theere is no rocking motion of the prosthetic valve. Presence  and extent of dehiscence are difficult to definitively assess on this study.  Recommend gated CTA to confirm.     27mm St. Jerald Masters bileaflet mechanical prosthesis is present in the mitral  position. The mechanical valve appears well-seated. The sewing ring is intact.  Both leaflets open well. Doppler interrogation of the mechanical mitral valve  is normal. No vegetations are seen.  Left ventricular function, chamber size, wall motion, and wall thickness are  normal.The EF is 60-65%.  Right ventricular function, chamber size, wall motion, and thickness are  normal.     This study was compared with the study from 10/4/17 (TTE) and 9/21/17 (intra-  op IZA) : LVEF has increased and aortic valve findings appear new.    CTA ANGIOGRAM HEART   Examination Date: 4/5/2018 11:12 AM     IMPRESSION:  1.  Focal dehiscence involving approximately 15% of the sewing ring of  the mechanical aortic valve, with communication between the LVOT and  the aorta, as described below. No CT evidence of active endocarditis  or perivalvular abscess.  2.   Normal appearance of the mechanical mitral valve.  3.  The aortic homograft is severely calcified.   4.  The left main coronary artery appears patent at its ostium. The  RCA appears patent at its ostium.  5.  SVG-PDA graft is patent at its ostium but is not well-imaged  beyond this point. The origin of this graft is just below the sternum.  6.  No evidence of intracardiac  thrombus.     FINDINGS: .  1.  The mechanical aortic valve prosthesis has an area of focal  dehiscence involving approximately 15% of the sewing ring  circumference in the area of the RV outflow tract. There is a small  tract between the LV outflow tract and the aorta at the site of this  dehiscence. There is no rocking motion of the valve. There is no edema  or other signs of active abscess. Both leaflets open normally. No  vegetations are seen.   2.  The mechanical mitral valve prosthesis is well-seated with normal  leaflet motion. The sewing ring of the mitral valve is intact. There  is no evidence of perivalvular abscess. No vegetations are seen.  3.  The aortic homograft is severely calcified.   4.  The left main coronary artery appears patent at its ostium. The  RCA appears patent at its ostium.  5.  SVG-PDA graft is patent at its ostium but is not well-imaged  beyond this point. The origin of this graft is just below the sternum.  6.  The left atrial appendage is free of thrombus.   7.  The distal ascending aorta, visualized aortic arch, and descending  thoracic aorta are normal.  8.  Normal pulmonary venous anatomy with all pulmonary veins draining  into the left atrium.  9.  Postoperative changes of median sternotomy.  10.  Please review Radiology report for incidental noncardiac findings  that will follow separately.

## 2018-04-10 NOTE — PLAN OF CARE
Problem: Patient Care Overview  Goal: Plan of Care/Patient Progress Review  Pt a/o x 4. Denies pain. Appeared to sleep soundly overnight. Tele SR 80-90's. Heparin gtt at 800 units/hr. Midnight 10a was therapeutic. No change. INR goal is 2.5 to 3.5- awaiting am results. May possibly discharge home when INR therapeutic.  Surgery to be scheduled in next week or so. Has paravalvular leak and will need 2-4 plugs.  Pt has bruised swollen R upper arm from old art line site. Gets UAL, walks halls. Voiding in urinal. Makes needs known.

## 2018-04-11 ENCOUNTER — CARE COORDINATION (OUTPATIENT)
Dept: CARE COORDINATION | Facility: CLINIC | Age: 48
End: 2018-04-11

## 2018-04-12 NOTE — PROGRESS NOTES
Patient was called three times and no answer so post 24 hr DC follow up calls will be closed out

## 2018-04-17 ENCOUNTER — CARE COORDINATION (OUTPATIENT)
Dept: CARDIOLOGY | Facility: CLINIC | Age: 48
End: 2018-04-17

## 2018-04-17 NOTE — PROGRESS NOTES
"Return patient's telephone call.  Per the patient he was wondering about the \"scheduling of a procedure with Dr. Lundberg\", and he hadn't heard anything.  Further investigation did not lead to any information in the dc summary, however, in a cardiology note written on the same day/day before of discharge there is information speaking to a paravalvular leak that will need repair and patient can dc home and then have a procedure scheduled.  Will follow up with Dr. Lundberg about this and further clarification.    "

## 2018-04-25 DIAGNOSIS — Z95.2 S/P AORTIC VALVE REPLACEMENT: Primary | ICD-10-CM

## 2018-04-25 RX ORDER — ASPIRIN 81 MG/1
81 TABLET ORAL DAILY
Status: CANCELLED | OUTPATIENT
Start: 2018-04-25

## 2018-04-25 RX ORDER — SODIUM CHLORIDE 9 MG/ML
INJECTION, SOLUTION INTRAVENOUS CONTINUOUS
Status: CANCELLED | OUTPATIENT
Start: 2018-04-25

## 2018-04-25 RX ORDER — LIDOCAINE 40 MG/G
CREAM TOPICAL
Status: CANCELLED | OUTPATIENT
Start: 2018-04-25

## 2018-04-25 NOTE — PROGRESS NOTES
Date: 4/25/2018    Time of Call: 9:57 AM     Diagnosis:  S/p aortic valve replacement with bio-prostetic roxanna valvular leak.     [ TORB ] Ordering provider: Johny Lundberg MD  Order:   Pre procedure orders for aortic PVL repair/plugging  Intraprocedure IZA     Order received by: Jessica Nelson RN     Follow-up/additional notes:

## 2018-04-25 NOTE — PROGRESS NOTES
Orders entered, message given to [facility] cath lab  about getting procedure date set. Voicemail left for patient on 4/24 to update.

## 2018-04-26 ENCOUNTER — HOSPITAL ENCOUNTER (OUTPATIENT)
Facility: CLINIC | Age: 48
End: 2018-04-26
Payer: COMMERCIAL

## 2018-06-04 DIAGNOSIS — Z95.2 AORTIC VALVE REPLACED: Primary | ICD-10-CM

## 2018-06-04 NOTE — PROGRESS NOTES
Date: 6/4/2018    Time of Call: 9:47 AM     Diagnosis:  S/P AVR, MVR     [ TORB ] Ordering provider: Johny Lundberg MD  Order: INR checked either 6/4 or 6/5 for planning for upcoming PVL procedure.     Order received by: Jessica Nelson RN     Follow-up/additional notes: Pt instructed to have INR check either today or tomorrow to help with anticoagulation planning for upcoming PVL procedure on 6/11.

## 2018-06-06 ENCOUNTER — TELEPHONE (OUTPATIENT)
Dept: CARDIOLOGY | Facility: CLINIC | Age: 48
End: 2018-06-06

## 2018-06-06 DIAGNOSIS — Z98.890 S/P MITRAL VALVE REPAIR: ICD-10-CM

## 2018-06-06 DIAGNOSIS — Z95.2 S/P AVR: Primary | ICD-10-CM

## 2018-06-06 NOTE — TELEPHONE ENCOUNTER
[facility] Call Center    Phone Message    May a detailed message be left on voicemail: yes    Reason for Call: Other: pt is scheduled in the heart cath lab on monday and was told to discuss his coumadin prior to procedure. please call pt     Action Taken: Message routed to:  [facility] cardio

## 2018-06-06 NOTE — PROGRESS NOTES
Date: 6/6/2018    Time of Call: 1:55 PM     Diagnosis:  S/p AVR, MVR.     [ TORB ] Ordering provider: Johny Lundberg MD  Order:   Last Dose of warfarin on Thursday 6/7 hold warfarin starting Friday. Patient to be admitted for heparin bridging on Friday 6/8/2018 for his up coming PVL procedure on 6/11/2018.     Order received by: Jessica Nelson RN     Follow-up/additional notes: Patients INR on 6/4/2018 at [facility] coumadin clinic 3.8. I spoke with to instruct him to take his last dose of coumadin on Thursday and the must come to the hospital on Friday at noon to be admitted to the hospital for heparin bridging. I explained that he will need to be in  hospital starting on Friday through the weekend for his PVL procedure on Monday 6/11. Patient stated understanding of all instruction and is agreeable to plan of care.

## 2018-06-06 NOTE — TELEPHONE ENCOUNTER
Called and left voicemail letting patient know that I need to know what his INR level is today for his up coming PVL procedure on 6/11. That without this number we are not able to give him direction on what to do with his coumadin prior to procedure.

## 2018-06-07 ENCOUNTER — HOSPITAL ENCOUNTER (OUTPATIENT)
Facility: CLINIC | Age: 48
End: 2018-06-07
Attending: INTERNAL MEDICINE | Admitting: INTERNAL MEDICINE
Payer: COMMERCIAL

## 2018-06-08 PROBLEM — I50.32 CHRONIC DIASTOLIC CONGESTIVE HEART FAILURE (H): Chronic | Status: ACTIVE | Noted: 2017-08-24

## 2018-06-08 PROBLEM — I35.1 AORTIC INSUFFICIENCY: Chronic | Status: ACTIVE | Noted: 2017-09-16

## 2018-06-08 PROBLEM — B95.5 STREPTOCOCCAL ENDOCARDITIS: Status: ACTIVE | Noted: 2017-07-20

## 2018-06-08 PROBLEM — Z95.2 MITRAL VALVE REPLACED: Status: ACTIVE | Noted: 2017-10-09

## 2018-06-08 PROBLEM — I33.0 STREPTOCOCCAL ENDOCARDITIS: Status: ACTIVE | Noted: 2017-07-20

## 2018-06-08 PROBLEM — I38: Status: RESOLVED | Noted: 2017-08-24 | Resolved: 2018-06-08

## 2018-06-08 PROBLEM — I38 ENDOCARDITIS: Status: RESOLVED | Noted: 2017-09-21 | Resolved: 2018-06-08

## 2018-06-08 PROBLEM — I38: Status: ACTIVE | Noted: 2017-08-24

## 2018-06-08 PROBLEM — I63.512 CEREBROVASCULAR ACCIDENT (CVA) DUE TO STENOSIS OF LEFT MIDDLE CEREBRAL ARTERY (H): Chronic | Status: ACTIVE | Noted: 2017-10-09

## 2018-06-08 PROBLEM — Z87.891 HISTORY OF TOBACCO USE DISORDER: Chronic | Status: ACTIVE | Noted: 2017-07-14

## 2018-06-08 PROBLEM — I35.8 ENDOCARDITIS OF AORTIC VALVE: Status: RESOLVED | Noted: 2017-07-20 | Resolved: 2018-06-08

## 2018-06-08 PROBLEM — I50.31: Status: ACTIVE | Noted: 2017-08-24

## 2018-06-08 PROBLEM — Z95.2 AORTIC VALVE REPLACED: Chronic | Status: ACTIVE | Noted: 2017-07-13

## 2018-06-08 PROBLEM — I50.31: Status: RESOLVED | Noted: 2017-08-24 | Resolved: 2018-06-08

## 2018-06-08 PROBLEM — R78.81 GRAM-POSITIVE COCCI BACTEREMIA: Chronic | Status: ACTIVE | Noted: 2017-07-14

## 2018-06-08 PROBLEM — R78.81 GRAM-POSITIVE COCCI BACTEREMIA: Status: ACTIVE | Noted: 2017-07-14

## 2018-06-08 PROBLEM — I63.512 CEREBROVASCULAR ACCIDENT (CVA) DUE TO STENOSIS OF LEFT MIDDLE CEREBRAL ARTERY (H): Status: ACTIVE | Noted: 2017-10-09

## 2018-06-08 PROBLEM — I50.32 CHRONIC DIASTOLIC CONGESTIVE HEART FAILURE (H): Status: ACTIVE | Noted: 2017-08-24

## 2018-06-08 PROBLEM — I35.1 AORTIC INSUFFICIENCY: Status: ACTIVE | Noted: 2017-09-16

## 2018-06-08 PROBLEM — Z95.2 MITRAL VALVE REPLACED: Chronic | Status: ACTIVE | Noted: 2017-10-09

## 2018-06-08 PROBLEM — B95.5 STREPTOCOCCAL ENDOCARDITIS: Chronic | Status: ACTIVE | Noted: 2017-07-20

## 2018-06-08 PROBLEM — Z95.2 AORTIC VALVE REPLACED: Status: ACTIVE | Noted: 2017-07-13

## 2018-06-08 PROBLEM — I33.0 STREPTOCOCCAL ENDOCARDITIS: Chronic | Status: ACTIVE | Noted: 2017-07-20

## 2018-06-08 RX ORDER — POTASSIUM CHLORIDE 750 MG/1
20 TABLET, EXTENDED RELEASE ORAL DAILY
Status: CANCELLED | OUTPATIENT
Start: 2018-06-08

## 2018-06-08 RX ORDER — ACETAMINOPHEN 650 MG/1
650 SUPPOSITORY RECTAL EVERY 4 HOURS PRN
Status: CANCELLED | OUTPATIENT
Start: 2018-06-08

## 2018-06-08 RX ORDER — ASPIRIN 81 MG/1
81 TABLET, CHEWABLE ORAL DAILY
Status: CANCELLED | OUTPATIENT
Start: 2018-06-08

## 2018-06-08 RX ORDER — SUMATRIPTAN 50 MG/1
50 TABLET, FILM COATED ORAL EVERY 8 HOURS PRN
Status: CANCELLED | OUTPATIENT
Start: 2018-06-08

## 2018-06-08 RX ORDER — ACETAMINOPHEN 325 MG/1
650 TABLET ORAL EVERY 4 HOURS PRN
Status: CANCELLED | OUTPATIENT
Start: 2018-06-08

## 2018-06-08 RX ORDER — ROSUVASTATIN CALCIUM 10 MG/1
10 TABLET, COATED ORAL DAILY
Status: CANCELLED | OUTPATIENT
Start: 2018-06-08

## 2018-06-08 RX ORDER — LIDOCAINE 40 MG/G
CREAM TOPICAL
Status: CANCELLED | OUTPATIENT
Start: 2018-06-08

## 2018-06-08 RX ORDER — NITROGLYCERIN 0.4 MG/1
0.4 TABLET SUBLINGUAL EVERY 5 MIN PRN
Status: CANCELLED | OUTPATIENT
Start: 2018-06-08

## 2018-06-08 RX ORDER — ALUMINA, MAGNESIA, AND SIMETHICONE 2400; 2400; 240 MG/30ML; MG/30ML; MG/30ML
30 SUSPENSION ORAL EVERY 4 HOURS PRN
Status: CANCELLED | OUTPATIENT
Start: 2018-06-08

## 2018-06-08 RX ORDER — ASPIRIN 81 MG/1
81 TABLET ORAL DAILY
Status: CANCELLED | OUTPATIENT
Start: 2018-06-09

## 2018-06-08 RX ORDER — LANOLIN ALCOHOL/MO/W.PET/CERES
3 CREAM (GRAM) TOPICAL
Status: CANCELLED | OUTPATIENT
Start: 2018-06-08

## 2018-06-08 RX ORDER — LISINOPRIL 2.5 MG/1
2.5 TABLET ORAL DAILY
Status: CANCELLED | OUTPATIENT
Start: 2018-06-08

## 2018-06-08 RX ORDER — FUROSEMIDE 20 MG
20 TABLET ORAL DAILY
Status: CANCELLED | OUTPATIENT
Start: 2018-06-08

## 2018-06-11 DIAGNOSIS — Z95.2 S/P AVR (AORTIC VALVE REPLACEMENT): ICD-10-CM

## 2018-06-11 RX ORDER — LISINOPRIL 10 MG/1
10 TABLET ORAL DAILY
Qty: 90 TABLET | Refills: 3 | Status: SHIPPED | OUTPATIENT
Start: 2018-06-11

## 2018-06-11 NOTE — PROGRESS NOTES
Date: 6/11/2018    Time of Call: 10:08 AM     Diagnosis:  S/P AVR, MVR     [ TORB ] Ordering provider: Johny Lundberg MD  Order: 10 mg Lisinopril daily by mouth     Order received by: Jessica Nelson RN     Follow-up/additional notes: Spoke with pt to confirm that he currently is take 5 mg daily of Lisinopril. Dr. Lundberg orders to double from 5 mg to 10 mg daily. Patient stated understanding of new dose. Will call patient if sooner appointment opens up.

## 2018-07-03 ENCOUNTER — TELEPHONE (OUTPATIENT)
Dept: CARDIOLOGY | Facility: CLINIC | Age: 48
End: 2018-07-03

## 2018-07-03 NOTE — TELEPHONE ENCOUNTER
[facility] Call Center    Phone Message    May a detailed message be left on voicemail: no    Reason for Call: Other: Juanita Rn from Note calling stating pt came into [facility] yesterday with back pain and his BP was rather low 68/47 heart rate 103.  Juanita would like a call back today please     Action Taken: Message routed to:  [facility] Cardio

## 2018-07-03 NOTE — TELEPHONE ENCOUNTER
Spoke with Juanita from [facility] patient was not experiencing dizziness, lightheadedness or SOB. I informed her that we just rescheduled him to see Dr. Lundberg sooner he now has a 7/26 appointment.    I tired calling the patient with no answer voicemail left with contact information if patient wanted to call.

## 2018-07-26 ENCOUNTER — OFFICE VISIT (OUTPATIENT)
Dept: CARDIOLOGY | Facility: CLINIC | Age: 48
End: 2018-07-26
Attending: INTERNAL MEDICINE
Payer: COMMERCIAL

## 2018-07-26 VITALS
WEIGHT: 174.3 LBS | BODY MASS INDEX: 29.76 KG/M2 | HEIGHT: 64 IN | HEART RATE: 95 BPM | OXYGEN SATURATION: 100 % | DIASTOLIC BLOOD PRESSURE: 66 MMHG | SYSTOLIC BLOOD PRESSURE: 93 MMHG

## 2018-07-26 DIAGNOSIS — Z86.73 HISTORY OF STROKE: ICD-10-CM

## 2018-07-26 DIAGNOSIS — Z95.2 S/P AVR: Primary | ICD-10-CM

## 2018-07-26 DIAGNOSIS — Z95.2 S/P MVR (MITRAL VALVE REPLACEMENT): ICD-10-CM

## 2018-07-26 PROCEDURE — 99205 OFFICE O/P NEW HI 60 MIN: CPT | Mod: GC | Performed by: INTERNAL MEDICINE

## 2018-07-26 PROCEDURE — G0463 HOSPITAL OUTPT CLINIC VISIT: HCPCS | Mod: ZF

## 2018-07-26 RX ORDER — GABAPENTIN 300 MG/1
CAPSULE ORAL
COMMUNITY
Start: 2018-06-25

## 2018-07-26 ASSESSMENT — PAIN SCALES - GENERAL: PAINLEVEL: NO PAIN (0)

## 2018-07-26 NOTE — PATIENT INSTRUCTIONS
You were seen today in the Cardiovascular Clinic at the [facility].      Cardiology Providers you saw during your visit:  Dr. Lundberg    Recommendations:  1) You will need to have an ECHO. 2) Please follow up with neurology for your stroke follow up.     Follow-up:  In 3 months with Dr. Lundberg.    Thank you for your visit today!     Jessica Nelson RN  Structural Heart Care Coordinator   TAVR, [facility]ra[facility] and Watchman Programs  [facility]  Office: [phone]    [facility]  [address] [place] [address]  Cardiology Clinic [id number]  [place] [address]

## 2018-07-26 NOTE — PROGRESS NOTES
CARDIOLOGY NEW OFFICE VISIT    HPI: Harvey Almanzar is a 48 year old male being seen today for evaluation of paravalvular regurgitation of bioprosthetic AVR.   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 He had an acute ischemic L MCA CVA during that hospitalization, but has continued to do well afterwards. During this time he underwent IV anbitiotic treatment for his endocarditis and then underwent a mechanical MVR (27 mm St Jerald Masters mechanical valve), AVR (17 mm St Jerald Masters mechanical valve), aneurysm repair and redo - CAB single vessel with SVG to PDA (prior SVG to PDA from 1998 was occluded).    Since then he had been admitted at the [facility] in April of 2018 and was found to have significant PVL of his mechanical AVR by echoardiogram, undwerwent IZA and CT cardiac which showed regurgitation involving 15% of valve circumference consistent with mild PVL. He was initially planned for transcatheter closure of his PVL with Amplatzer plugs, however initial trial of afterload reduction with follow up was done. He now returns for follow up visit.    The patient denies a history of chest discomfort, dyspnea, PND, orthopnea, pedal edema, palpitations, lightheadedness, and syncope.    PAST MEDICAL HISTORY:  Past Medical History:   Diagnosis Date     Acute diastolic congestive heart failure (H) 8/24/2017     Aortic valve replaced 1996    Overview:  Cadaver - tissue valve in 1996 at [facility]  Amoxicillin 2000 mg prior to dental work please.  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     Gram-positive cocci bacteremia 7/14/2017     H/O aortic valve repair 1998     History of tobacco use disorder 7/14/2017     Streptococcal  endocarditis 7/20/2017       CURRENT MEDICATIONS:  Current Outpatient Prescriptions   Medication Sig Dispense Refill     aspirin 81 MG chewable tablet Take 1 tablet (81 mg) by mouth daily 120 tablet 0     furosemide (LASIX) 20 MG tablet Take 1 tablet (20 mg) by mouth daily 30 tablet 1     gabapentin (NEURONTIN) 300 MG capsule TAKE 1 CAPSULE BY MOUTH DAILY FOR 1 WEEK THEN TAKE TWICE DAILY FOR 1 WEEK THEN TAKE THREE TIMES DAILY THEREAFTER       lisinopril (PRINIVIL/ZESTRIL) 10 MG tablet Take 1 tablet (10 mg) by mouth daily 90 tablet 3     melatonin 3 MG tablet Take 1 tablet (3 mg) by mouth nightly as needed for sleep 30 tablet 0     order for DME Equipment being ordered: [employer] ()  Treatment Diagnosis: Gait instability 1 each 0     potassium chloride SA (K-DUR/KLOR-CON M) 10 MEQ CR tablet Take 2 tablets (20 mEq) by mouth daily 30 tablet 1     rosuvastatin (CRESTOR) 10 MG tablet Take 1 tablet (10 mg) by mouth daily 30 tablet 1     senna-docusate (SENOKOT-S;PERICOLACE) 8.6-50 MG per tablet Take 1-2 tablets by mouth 2 times daily as needed (constipation ) 50 tablet 0     SUMAtriptan (IMITREX) 50 MG tablet Take one tablet by mouth at onset of headache. May repeat one tablet after 2 hours if headache recurs.       warfarin (COUMADIN) 2 MG tablet Take 3 mg (1 tablet) daily until next INR check, next INR draw Thursday 4/12. Then dose per goal 2.5-3.5 120 tablet 1       PAST SURGICAL HISTORY:  Past Surgical History:   Procedure Laterality Date     REDO STERNOTOMY BYPASS CORONARY ARTERY N/A 9/21/2017    Procedure: REDO STERNOTOMY BYPASS CORONARY ARTERY;;  Surgeon: Nicola Gorman MD;  Location:  OR     REDO STERNOTOMY REPLACE VALVE AORTIC N/A 9/21/2017    Procedure: REDO STERNOTOMY REPLACE VALVE AORTIC;;  Surgeon: Nicola Gorman MD;  Location:  OR     REDO STERNOTOMY REPLACE VALVE MITRAL N/A 9/21/2017    Procedure: REDO STERNOTOMY REPLACE VALVE MITRAL;  Left groin cutdown, Redo Median  "Sternotomy, Lysis of adhesions, Left mini-thoracotomy,Coronary artery bypass graft x 1 using the left greater saphenous vein, using endovein harvest, Mitral valve replacement using St. Jerald Mechanical 27mm, Aortic Valve Replacement using a 17mm St. Jerald Mechanical, On Cardiopulmonary Bypass Pump;  Surgeon: Cindy Gorman  Vancomycin    FAMILY HX:  No family history on file.    SOCIAL HX:  Social History     Social History     Marital status: Single     Spouse name: N/A     Number of children: N/A     Years of education: N/A     Social History Main Topics     Smoking status: Former Smoker     Packs/day: 0.50     Years: 31.00     Smokeless tobacco: Never Used     Alcohol use No     Drug use: No     Sexual activity: Not Currently     Other Topics Concern     None     Social History Narrative       ROS:  Constitutional: No fever, chills, or sweats. No weight gain/loss.   ENT: No visual disturbance, ear ache, epistaxis, sore throat.   Allergies/Immunologic: Negative.   Respiratory: No cough, hemoptysis.   Cardiovascular: As per HPI.   GI: No nausea, vomiting, hematemesis, melena, or hematochezia.   : No urinary frequency, dysuria, or hematuria.   Integument: Negative.   Psychiatric: Negative.   Neuro: Negative.   Endocrinology: Negative.   Musculoskeletal: No myalgia.    VITAL SIGNS:  BP 93/66 (BP Location: Left arm, Patient Position: Chair, Cuff Size: Adult Regular)  Pulse 95  Ht 1.626 m (5' 4\")  Wt 79.1 kg (174 lb 4.8 oz)  SpO2 100%  BMI 29.92 kg/m2  Body mass index is 29.92 kg/(m^2).  Wt Readings from Last 2 Encounters:   07/26/18 79.1 kg (174 lb 4.8 oz)   04/10/18 74.6 kg (164 lb 7.4 oz)       PHYSICAL EXAM  Harvey Almanzar is a 48 year old male in no acute distress.  HEENT: Unremarkable.  Neck: JVP normal.  Carotids +4/4 bilaterally without bruits.  Lungs: CTA.  Cor: RRR. Mechanical S1 and S2. There is a soft I/IV diastolic murmur.  No rub, or gallop.  PMI in Lf 5th ICS.  Abd: Soft, " nontender, nondistended.  NABS.  No pulsatile mass.  Extremities: No C/C/E.  Pulses +4/4 symmetric in upper and lower extremities.  Neuro: right sided weakness, overall still 5/5 but L>R    LABS    Lab Results   Component Value Date    WBC 5.7 04/10/2018     Lab Results   Component Value Date    RBC 3.00 04/10/2018     Lab Results   Component Value Date    HGB 8.7 04/10/2018     Lab Results   Component Value Date    HCT 28.1 04/10/2018     No components found for: MCT  Lab Results   Component Value Date    MCV 94 04/10/2018     Lab Results   Component Value Date    MCH 29.0 04/10/2018     Lab Results   Component Value Date    MCHC 31.0 04/10/2018     Lab Results   Component Value Date    RDW 16.8 04/10/2018     Lab Results   Component Value Date     04/10/2018      Recent Labs   Lab Test  04/10/18   0728  04/09/18   0634   NA  146*  141   POTASSIUM  3.7  3.8   CHLORIDE  112*  109   CO2  26  24   ANIONGAP  7  8   GLC  95  87   BUN  10  10   CR  0.79  0.80   CHECO  8.3*  8.2*     Recent Labs   Lab Test  09/24/17   0410   CHOL  66   HDL  9*   LDL  29   TRIG  136   NHDL  56        EKG:  Sinus, LVH, early repolarization    ECHO: 4/2018  Interpretation Summary  17 mm St. Jerald Masters bileaflet mechanical prosthesis is present in the  aortic position. Imaging of the mechanical aortic valve and surrounding  structures is suboptimal despite repeated attempts in multiple views. There  appears to be an area of focal (<25%) dehiscence with probably mild  paravalvular AI. Theere is no rocking motion of the prosthetic valve. Presence  and extent of dehiscence are difficult to definitively assess on this study.  Recommend gated CTA to confirm.     27mm St. Jerald Masters bileaflet mechanical prosthesis is present in the mitral  position. The mechanical valve appears well-seated. The sewing ring is intact.  Both leaflets open well. Doppler interrogation of the mechanical mitral valve  is normal. No vegetations are seen.  Left  ventricular function, chamber size, wall motion, and wall thickness are  normal.The EF is 60-65%.  Right ventricular function, chamber size, wall motion, and thickness are  normal.     This study was compared with the study from 10/4/17 (TTE) and 9/21/17 (intra-  op IZA) : LVEF has increased and aortic valve findings appear new.    CT:    IMPRESSION:  1.  Focal dehiscence involving approximately 15% of the sewing ring of  the mechanical aortic valve, with communication between the LVOT and  the aorta, as described below. No CT evidence of active endocarditis  or perivalvular abscess.  2.   Normal appearance of the mechanical mitral valve.  3.  The aortic homograft is severely calcified.   4.  The left main coronary artery appears patent at its ostium. The  RCA appears patent at its ostium.  5.  SVG-PDA graft is patent at its ostium but is not well-imaged  beyond this point. The origin of this graft is just below the sternum.     6.  No evidence of intracardiac thrombus.    CARDIAC CATH:  9/2017  HEMODYNAMICS:  BSA 1.7  1. HR 78 bpm  2. BP 94/53/71 mmHg  3. RA 21/19/17   4. RV 64/17  5. PA 64/37/47   6. PCW 33/47/37   7. PA sat 39.1%   9. Hgb 10.6 g/dL   10. Roseanna CO 2.92   11. Roseanna CI 1.73   12. TD CO 2.47   13. TD CI 1.46   14. PVR 3.43   15. SVR 1489      CORONARY ANGIOGRAM:   Patient has prosthetic aortic valve with ascending aortic graft with reimplanted coronaries.    2. Dominance: Right  3. The LM has some distal mild luminal irregularities.  4. LAD: Type 3 [LAD supplies the entire apex].. The LAD is a large vessel and gives rise to septal perforators, large D1 and tiny D2 and D3.  The pLAD has a 20% stenosis at the D1 bifurcation. mLAD has a 30% stenosis, dLAD has luminal irregularities.  D1 has luminal irregularities.    5. LCX is initially a large vessel and gives rise to a very large OM1 with multiple small branches.  The LCx becomes a small vessel distal to OM1.  LCx has luminal irregularities.  The pOM1  has a 20% stenosis.   6. RCA has  with perfusion of the dRCA, RPDA, and RPLA by left to right collaterals.      LEFT HEART CATHETERIZATION:  1. LVEDP 37 mmHg.  2. Left ventriculography not performed  3. Severe AI is present  4. Severe MR is present    ASSESSMENT AND PLAN:    48 year old gentleman with a history of AVR and redo AVR in 1998 with homograft 24 mm and SVG to RCA at that time for native valve endocarditis. He is now status post mechanical MVR (27 mm) and AVR (17 mm) St Jerald mechanical valves in 09/2017 with redo SVG to PDA for Streptococcal prosthetic endocarditis. He returns now with mild (15% of circumference) PVL by IZA and CT for consideration of transcatheter closure. He has not been successful with afterload reduction due to symptomatic hypotension.  -- continue lisinopril at 10 mg daily  -- stop Imitrex and refer for neurology given his history of CVA.  -- continue with aspirin / warfarin for mechanical valve anticoagulation  -- repeat TTE for consideration of PVL closure, evaluate for LV dilatation as well as increase of his PVL    Discussed and seen with Dr Toño Bell  Interventional Cardiology Fellow  [phone]      Patient seen and examined with Cardiovascular fellow and agree with the assessment and plan described above.     Johny Lundberg M.D.  Interventional Cardiology  [facility]

## 2018-07-26 NOTE — MR AVS SNAPSHOT
After Visit Summary   7/26/2018    Harvey Almanzar    MRN: [id number]           Patient Information     Date Of Birth          1970        Visit Information        Provider Department      7/26/2018 2:30 PM Johny Lundberg MD [facility]        Today's Diagnoses     S/P AVR    -  1    S/P MVR (mitral valve replacement)        History of stroke          Care Instructions    You were seen today in the Cardiovascular Clinic at the [facility].      Cardiology Providers you saw during your visit:  Dr. Lundberg    Recommendations:  1) You will need to have an ECHO. 2) Please follow up with neurology for your stroke follow up.     Follow-up:  In 3 months with Dr. Lundberg.    Thank you for your visit today!     Jessica Nelson RN  Structural Heart Care Coordinator   TAVR, MitraClip and Watchman Programs  [facility]  Office: [phone]    Clinics and Surgery Center  [address] [place] [address]  Cardiology Clinic [address] [address]                  Follow-ups after your visit        Additional Services     NEUROLOGY ADULT REFERRAL       Your provider has referred you for the following:   S/p stroke follow up    Please be aware that coverage of these services is subject to the terms and limitations of your health insurance plan.  Call member services at your health plan with any benefit or coverage questions.      Please bring the following with you to your appointment:    (1) Any X-Rays, CTs or MRIs which have been performed.  Contact the facility where they were done to arrange for  prior to your scheduled appointment.    (2) List of current medications  (3) This referral request   (4) Any documents/labs given to you for this referral            Follow-Up with Cardiologist - 6 Months       3 month follow is needed please                  Your next 10 appointments already scheduled     Jul 27, 2018  9:00 AM CDT   Ech Complete with [id number]   [facility]  "Health Echo ([facility] [place])    [address]  3rd Floor  [facility] [id number] [phone]           1.  Please bring or wear a comfortable two-piece outfit. 2.  You may eat, drink and take your normal medicines. 3.  For any questions that cannot be answered, please contact the ordering physician 4.  Please do not wear perfumes or scented lotions on the day of your exam.            Oct 18, 2018  6:00 PM CDT   (Arrive by 5:45 PM)   Return Visit with Johny Lundberg MD   [facility] ([facility] [place])    [address]  [address]  [facility] [address] [phone]              Future tests that were ordered for you today     Open Future Orders        Priority Expected Expires Ordered    Follow-Up with Cardiologist - 6 Months Routine 10/26/2018 4/22/2019 7/26/2018    Echocardiogram Complete Routine 7/30/2018 7/26/2019 7/26/2018            Who to contact     If you have questions or need follow up information about today's clinic visit or your schedule please contact [facility] [address] directly at [phone].  Normal or non-critical lab and imaging results will be communicated to you by MyChart, letter or phone within 4 business days after the clinic has received the results. If you do not hear from us within 7 days, please contact the clinic through MyChart or phone. If you have a critical or abnormal lab result, we will notify you by phone as soon as possible.  Submit refill requests through [employer] or call your pharmacy and they will forward the refill request to us. Please allow 3 business days for your refill to be completed.          Additional Information About Your Visit        Care EveryWhere ID     This is your Care EveryWhere ID. This could be used by other organizations to access your [place] medical records  [id number]        Your Vitals Were     Pulse Height Pulse Oximetry BMI (Body Mass Index)          95 1.626 m (5' 4\") " 100% 29.92 kg/m2         Blood Pressure from Last 3 Encounters:   07/26/18 93/66   04/10/18 120/61   10/23/17 96/76    Weight from Last 3 Encounters:   07/26/18 79.1 kg (174 lb 4.8 oz)   04/10/18 74.6 kg (164 lb 7.4 oz)   10/23/17 67.1 kg (148 lb)              We Performed the Following     NEUROLOGY ADULT REFERRAL        Primary Care Provider Office Phone # Fax #    Chapito Kaba [phone]       [facility] [address] [facility] [address]        Equal Access to Services     [facility]: Hadii aad ku hadasho Soomaali, waaxda luqadaha, qaybta kaalmada adeegyada, lamont poe . So [facility] [phone].    ATENCIÓN: Si habla español, tiene a sorensen disposición servicios gratuitos de asistencia lingüística. Llame al [phone].    We comply with applicable federal civil rights laws and [place] laws. We do not discriminate on the basis of race, color, national origin, age, disability, sex, sexual orientation, or gender identity.            Thank you!     Thank you for choosing [facility]  for your care. Our goal is always to provide you with excellent care. Hearing back from our patients is one way we can continue to improve our services. Please take a few minutes to complete the written survey that you may receive in the mail after your visit with us. Thank you!             Your Updated Medication List - Protect others around you: Learn how to safely use, store and throw away your medicines at www.disposemymeds.org.          This list is accurate as of 7/26/18  3:09 PM.  Always use your most recent med list.                   Brand Name Dispense Instructions for use Diagnosis    aspirin 81 MG chewable tablet     120 tablet    Take 1 tablet (81 mg) by mouth daily    S/P AVR (aortic valve replacement), Hx of mitral valve replacement with mechanical valve       furosemide 20 MG tablet    LASIX    30 tablet    Take 1 tablet (20 mg) by mouth daily    S/P  AVR (aortic valve replacement), Hx of mitral valve replacement with mechanical valve       gabapentin 300 MG capsule    NEURONTIN     TAKE 1 CAPSULE BY MOUTH DAILY FOR 1 WEEK THEN TAKE TWICE DAILY FOR 1 WEEK THEN TAKE THREE TIMES DAILY THEREAFTER        lisinopril 10 MG tablet    PRINIVIL/ZESTRIL    90 tablet    Take 1 tablet (10 mg) by mouth daily    S/P AVR (aortic valve replacement)       melatonin 3 MG tablet     30 tablet    Take 1 tablet (3 mg) by mouth nightly as needed for sleep    S/P AVR (aortic valve replacement), Hx of mitral valve replacement with mechanical valve       order for DME     1 each    Equipment being ordered: [employer] () Treatment Diagnosis: Gait instability    Acute systolic congestive heart failure (H), Endocarditis of aortic valve       potassium chloride SA 10 MEQ CR tablet    K-DUR/KLOR-CON M    30 tablet    Take 2 tablets (20 mEq) by mouth daily    S/P AVR (aortic valve replacement), Hx of mitral valve replacement with mechanical valve       rosuvastatin 10 MG tablet    CRESTOR    30 tablet    Take 1 tablet (10 mg) by mouth daily    Acute ischemic left MCA stroke (H)       senna-docusate 8.6-50 MG per tablet    SENOKOT-S;PERICOLACE    50 tablet    Take 1-2 tablets by mouth 2 times daily as needed (constipation )    Acute post-operative pain       SUMAtriptan 50 MG tablet    IMITREX     Take one tablet by mouth at onset of headache. May repeat one tablet after 2 hours if headache recurs.        warfarin 2 MG tablet    COUMADIN    120 tablet    Take 3 mg (1 tablet) daily until next INR check, next INR draw Thursday 4/12. Then dose per goal 2.5-3.5    Hx of mitral valve replacement with mechanical valve, S/P AVR (aortic valve replacement)

## 2018-07-26 NOTE — NURSING NOTE
Chief Complaint   Patient presents with     Follow Up For     S/p VALVE MITRAL ST MIKEL 27MM [id number] and VALVE AORTIC MASTERS ROTAT HP 17MM [id number] both implanted on 9/21/2017. Pt is in Warfarin       Vitals were taken and medications were reconciled.    Madeline Mckeon MA    1:46 PM

## 2018-07-26 NOTE — LETTER
7/26/2018      RE: Harvey Almanzar  [address] [place] [address]       Dear Colleague,    Thank you for the opportunity to participate in the care of your patient, Harvey Almanzar, at the [facility] at [facility]. Please see a copy of my visit note below.    CARDIOLOGY NEW OFFICE VISIT    HPI: Harvey Almanzar is a 48 year old male being seen today for evaluation of paravalvular regurgitation of bioprosthetic AVR.   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 He had an acute ischemic L MCA CVA during that hospitalization, but has continued to do well afterwards. During this time he underwent IV anbitiotic treatment for his endocarditis and then underwent a mechanical MVR (27 mm St Jerald Masters mechanical valve), AVR (17 mm St Jerald Masters mechanical valve), aneurysm repair and redo - CAB single vessel with SVG to PDA (prior SVG to PDA from 1998 was occluded).    Since then he had been admitted at the [facility] in April of 2018 and was found to have significant PVL of his mechanical AVR by echoardiogram, undwerwent IZA and CT cardiac which showed regurgitation involving 15% of valve circumference consistent with mild PVL. He was initially planned for transcatheter closure of his PVL with Amplatzer plugs, however initial trial of afterload reduction with follow up was done. He now returns for follow up visit.    The patient denies a history of chest discomfort, dyspnea, PND, orthopnea, pedal edema, palpitations, lightheadedness, and syncope.    PAST MEDICAL HISTORY:  Past Medical History:   Diagnosis Date     Acute diastolic congestive heart failure (H) 8/24/2017     Aortic valve replaced 1996    Overview:  Cadaver -  tissue valve in 1996 at [facility]  Amoxicillin 2000 mg prior to dental work please.  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       CURRENT MEDICATIONS:  Current Outpatient Prescriptions   Medication Sig Dispense Refill     aspirin 81 MG chewable tablet Take 1 tablet (81 mg) by mouth daily 120 tablet 0     furosemide (LASIX) 20 MG tablet Take 1 tablet (20 mg) by mouth daily 30 tablet 1     gabapentin (NEURONTIN) 300 MG capsule TAKE 1 CAPSULE BY MOUTH DAILY FOR 1 WEEK THEN TAKE TWICE DAILY FOR 1 WEEK THEN TAKE THREE TIMES DAILY THEREAFTER       lisinopril (PRINIVIL/ZESTRIL) 10 MG tablet Take 1 tablet (10 mg) by mouth daily 90 tablet 3     melatonin 3 MG tablet Take 1 tablet (3 mg) by mouth nightly as needed for sleep 30 tablet 0     order for DME Equipment being ordered: [employer]els ()  Treatment Diagnosis: Gait instability 1 each 0     potassium chloride SA (K-DUR/KLOR-CON M) 10 MEQ CR tablet Take 2 tablets (20 mEq) by mouth daily 30 tablet 1     rosuvastatin (CRESTOR) 10 MG tablet Take 1 tablet (10 mg) by mouth daily 30 tablet 1     senna-docusate (SENOKOT-S;PERICOLACE) 8.6-50 MG per tablet Take 1-2 tablets by mouth 2 times daily as needed (constipation ) 50 tablet 0     SUMAtriptan (IMITREX) 50 MG tablet Take one tablet by mouth at onset of headache. May repeat one tablet after 2 hours if headache recurs.       warfarin (COUMADIN) 2 MG tablet Take 3 mg (1 tablet) daily until next INR check, next INR draw Thursday 4/12. Then dose per goal 2.5-3.5 120 tablet 1       PAST SURGICAL HISTORY:  Past Surgical History:   Procedure Laterality Date     REDO STERNOTOMY BYPASS CORONARY ARTERY N/A 9/21/2017    Procedure: REDO STERNOTOMY BYPASS CORONARY ARTERY;;  Surgeon: Nicola Gorman MD;   "Location: [facility] OR     REDO STERNOTOMY REPLACE VALVE AORTIC N/A 9/21/2017    Procedure: REDO STERNOTOMY REPLACE VALVE AORTIC;;  Surgeon: Nicola Gorman MD;  Location: [facility] OR     REDO STERNOTOMY REPLACE VALVE MITRAL N/A 9/21/2017    Procedure: REDO STERNOTOMY REPLACE VALVE MITRAL;  Left groin cutdown, Redo Median Sternotomy, Lysis of adhesions, Left mini-thoracotomy,Coronary artery bypass graft x 1 using the left greater saphenous vein, using endovein harvest, Mitral valve replacement using St. Jerald Mechanical 27mm, Aortic Valve Replacement using a 17mm St. Jerald Mechanical, On Cardiopulmonary Bypass Pump;  Surgeon: Cindy Gorman       ALLERGIES  Vancomycin    FAMILY HX:  No family history on file.    SOCIAL HX:  Social History     Social History     Marital status: Single     Spouse name: N/A     Number of children: N/A     Years of education: N/A     Social History Main Topics     Smoking status: Former Smoker     Packs/day: 0.50     Years: 31.00     Smokeless tobacco: Never Used     Alcohol use No     Drug use: No     Sexual activity: Not Currently     Other Topics Concern     None     Social History Narrative       ROS:  Constitutional: No fever, chills, or sweats. No weight gain/loss.   ENT: No visual disturbance, ear ache, epistaxis, sore throat.   Allergies/Immunologic: Negative.   Respiratory: No cough, hemoptysis.   Cardiovascular: As per HPI.   GI: No nausea, vomiting, hematemesis, melena, or hematochezia.   : No urinary frequency, dysuria, or hematuria.   Integument: Negative.   Psychiatric: Negative.   Neuro: Negative.   Endocrinology: Negative.   Musculoskeletal: No myalgia.    VITAL SIGNS:  BP 93/66 (BP Location: Left arm, Patient Position: Chair, Cuff Size: Adult Regular)  Pulse 95  Ht 1.626 m (5' 4\")  Wt 79.1 kg (174 lb 4.8 oz)  SpO2 100%  BMI 29.92 kg/m2  Body mass index is 29.92 kg/(m^2).  Wt Readings from Last 2 Encounters:   07/26/18 79.1 kg (174 lb 4.8 oz)   04/10/18 74.6 kg (164 lb 7.4 " oz)       PHYSICAL EXAM  Harvey Almanzar is a 48 year old male in no acute distress.  HEENT: Unremarkable.  Neck: JVP normal.  Carotids +4/4 bilaterally without bruits.  Lungs: CTA.  Cor: RRR. Mechanical S1 and S2. There is a soft I/IV diastolic murmur.  No rub, or gallop.  PMI in Lf 5th ICS.  Abd: Soft, nontender, nondistended.  NABS.  No pulsatile mass.  Extremities: No C/C/E.  Pulses +4/4 symmetric in upper and lower extremities.  Neuro: right sided weakness, overall still 5/5 but L>R    LABS    Lab Results   Component Value Date    WBC 5.7 04/10/2018     Lab Results   Component Value Date    RBC 3.00 04/10/2018     Lab Results   Component Value Date    HGB 8.7 04/10/2018     Lab Results   Component Value Date    HCT 28.1 04/10/2018     No components found for: MCT  Lab Results   Component Value Date    MCV 94 04/10/2018     Lab Results   Component Value Date    MCH 29.0 04/10/2018     Lab Results   Component Value Date    MCHC 31.0 04/10/2018     Lab Results   Component Value Date    RDW 16.8 04/10/2018     Lab Results   Component Value Date     04/10/2018      Recent Labs   Lab Test  04/10/18   0728  04/09/18   0634   NA  146*  141   POTASSIUM  3.7  3.8   CHLORIDE  112*  109   CO2  26  24   ANIONGAP  7  8   GLC  95  87   BUN  10  10   CR  0.79  0.80   CHECO  8.3*  8.2*     Recent Labs   Lab Test  09/24/17   0410   CHOL  66   HDL  9*   LDL  29   TRIG  136   NHDL  56        EKG:  Sinus, LVH, early repolarization    ECHO: 4/2018  Interpretation Summary  17 mm St. Jerald Masters bileaflet mechanical prosthesis is present in the  aortic position. Imaging of the mechanical aortic valve and surrounding  structures is suboptimal despite repeated attempts in multiple views. There  appears to be an area of focal (<25%) dehiscence with probably mild  paravalvular AI. Theere is no rocking motion of the prosthetic valve. Presence  and extent of dehiscence are difficult to definitively assess on this  study.  Recommend gated CTA to confirm.     27mm St. Jerald Masters bileaflet mechanical prosthesis is present in the mitral  position. The mechanical valve appears well-seated. The sewing ring is intact.  Both leaflets open well. Doppler interrogation of the mechanical mitral valve  is normal. No vegetations are seen.  Left ventricular function, chamber size, wall motion, and wall thickness are  normal.The EF is 60-65%.  Right ventricular function, chamber size, wall motion, and thickness are  normal.     This study was compared with the study from 10/4/17 (TTE) and 9/21/17 (intra-  op IZA) : LVEF has increased and aortic valve findings appear new.    CT:    IMPRESSION:  1.  Focal dehiscence involving approximately 15% of the sewing ring of  the mechanical aortic valve, with communication between the LVOT and  the aorta, as described below. No CT evidence of active endocarditis  or perivalvular abscess.  2.   Normal appearance of the mechanical mitral valve.  3.  The aortic homograft is severely calcified.   4.  The left main coronary artery appears patent at its ostium. The  RCA appears patent at its ostium.  5.  SVG-PDA graft is patent at its ostium but is not well-imaged  beyond this point. The origin of this graft is just below the sternum.     6.  No evidence of intracardiac thrombus.    CARDIAC CATH:  9/2017  HEMODYNAMICS:  BSA 1.7  1. HR 78 bpm  2. BP 94/53/71 mmHg  3. RA 21/19/17   4. RV 64/17  5. PA 64/37/47   6. PCW 33/47/37   7. PA sat 39.1%   9. Hgb 10.6 g/dL   10. Roseanna CO 2.92   11. Roseanna CI 1.73   12. TD CO 2.47   13. TD CI 1.46   14. PVR 3.43   15. SVR 1489      CORONARY ANGIOGRAM:   Patient has prosthetic aortic valve with ascending aortic graft with reimplanted coronaries.    2. Dominance: Right  3. The LM has some distal mild luminal irregularities.  4. LAD: Type 3 [LAD supplies the entire apex].. The LAD is a large vessel and gives rise to septal perforators, large D1 and tiny D2 and D3.  The pLAD  has a 20% stenosis at the D1 bifurcation. mLAD has a 30% stenosis, dLAD has luminal irregularities.  D1 has luminal irregularities.    5. LCX is initially a large vessel and gives rise to a very large OM1 with multiple small branches.  The LCx becomes a small vessel distal to OM1.  LCx has luminal irregularities.  The pOM1 has a 20% stenosis.   6. RCA has  with perfusion of the dRCA, RPDA, and RPLA by left to right collaterals.      LEFT HEART CATHETERIZATION:  1. LVEDP 37 mmHg.  2. Left ventriculography not performed  3. Severe AI is present  4. Severe MR is present    ASSESSMENT AND PLAN:    48 year old gentleman with a history of AVR and redo AVR in 1998 with homograft 24 mm and SVG to RCA at that time for native valve endocarditis. He is now status post mechanical MVR (27 mm) and AVR (17 mm) St Jerald mechanical valves in 09/2017 with redo SVG to PDA for Streptococcal prosthetic endocarditis. He returns now with mild (15% of circumference) PVL by IZA and CT for consideration of transcatheter closure. He has not been successful with afterload reduction due to symptomatic hypotension.  -- continue lisinopril at 10 mg daily  -- stop Imitrex and refer for neurology given his history of CVA.  -- continue with aspirin / warfarin for mechanical valve anticoagulation  -- repeat TTE for consideration of PVL closure, evaluate for LV dilatation as well as increase of his PVL    Discussed and seen with Dr Toño Bell  Interventional Cardiology Fellow  [phone]      Patient seen and examined with Cardiovascular fellow and agree with the assessment and plan described above.     Johny Lundberg M.D.  Interventional Cardiology  [facility]

## 2018-07-27 ENCOUNTER — RADIANT APPOINTMENT (OUTPATIENT)
Dept: CARDIOLOGY | Facility: CLINIC | Age: 48
End: 2018-07-27
Payer: COMMERCIAL

## 2018-07-27 DIAGNOSIS — Z95.2 S/P AVR: ICD-10-CM

## 2018-07-27 DIAGNOSIS — Z95.2 S/P MVR (MITRAL VALVE REPLACEMENT): ICD-10-CM

## 2018-08-15 NOTE — TELEPHONE ENCOUNTER
FUTURE VISIT INFORMATION      FUTURE VISIT INFORMATION:    Date: 08/21/2018    Time: 11;20 am     [place] [facility] [place]FORMATION:    Referring provider:  RUPA RODRÍGUEZ    Referring providers clinic:      Reason for visit/diagnosis        NOTES (FOR ALL VISITS) STATUS DETAILS   OFFICE NOTE from referring provider Internal 07/26/2018   OFFICE NOTE from other specialist Internal 07/26/2018   DISCHARGE SUMMARY from [facility] Internal   Care Everywhere 06/08/2018, 04/03/2018, 09/15/2017, 08/24/2017, 07/14/2017   DISCHARGE REPORT from the ER Internal 04/10/2018, 09/15/2017,   OPERATIVE REPORT Internal 06/11/2018 (Canceled),  09/21/2017   MEDICATION LIST Internal    IMAGING  (FOR ALL VISITS)     EMG Internal 07/236/2018, 07/03/2018, 06/11/2018, 06/06/2011   EEG N/A    ECT N/A    MRI (HEAD, NECK, SPINE) Internal PACS  09/26/2017,    CT (HEAD, NECK, SPINE) Internal PACS 09/24/2017, 09/23/2017   OTHER

## 2018-08-21 ENCOUNTER — PRE VISIT (OUTPATIENT)
Dept: NEUROLOGY | Facility: CLINIC | Age: 48
End: 2018-08-21

## 2018-08-21 ENCOUNTER — OFFICE VISIT (OUTPATIENT)
Dept: NEUROLOGY | Facility: CLINIC | Age: 48
End: 2018-08-21
Payer: COMMERCIAL

## 2018-08-21 VITALS
HEIGHT: 64 IN | SYSTOLIC BLOOD PRESSURE: 89 MMHG | BODY MASS INDEX: 29.98 KG/M2 | HEART RATE: 98 BPM | WEIGHT: 175.6 LBS | DIASTOLIC BLOOD PRESSURE: 59 MMHG

## 2018-08-21 DIAGNOSIS — Z95.2 MITRAL VALVE REPLACED: Chronic | ICD-10-CM

## 2018-08-21 DIAGNOSIS — I63.412 CEREBRAL INFARCTION DUE TO EMBOLISM OF LEFT MIDDLE CEREBRAL ARTERY (H): ICD-10-CM

## 2018-08-21 DIAGNOSIS — Z95.2 AORTIC VALVE REPLACED: Primary | Chronic | ICD-10-CM

## 2018-08-21 ASSESSMENT — ENCOUNTER SYMPTOMS
SPEECH CHANGE: 0
PANIC: 0
NERVOUS/ANXIOUS: 0
EYE REDNESS: 0
NIGHT SWEATS: 0
LEG PAIN: 1
HYPERTENSION: 0
MEMORY LOSS: 0
CHILLS: 0
DEPRESSION: 1
HEADACHES: 0
MUSCLE CRAMPS: 1
ALTERED TEMPERATURE REGULATION: 0
PARALYSIS: 0
BACK PAIN: 1
DIZZINESS: 1
HALLUCINATIONS: 0
DECREASED CONCENTRATION: 0
HYPOTENSION: 1
FEVER: 0
NUMBNESS: 1
EYE PAIN: 0
ORTHOPNEA: 0
FATIGUE: 1
LIGHT-HEADEDNESS: 1
TREMORS: 0
LOSS OF CONSCIOUSNESS: 0
POLYDIPSIA: 1
MUSCLE WEAKNESS: 0
DECREASED APPETITE: 0
INCREASED ENERGY: 1
WEAKNESS: 1
DOUBLE VISION: 0
INSOMNIA: 1
NECK PAIN: 1
WEIGHT GAIN: 1
JOINT SWELLING: 0
ARTHRALGIAS: 1
EXERCISE INTOLERANCE: 0
SYNCOPE: 0
TINGLING: 1
SLEEP DISTURBANCES DUE TO BREATHING: 0
DISTURBANCES IN COORDINATION: 0
WEIGHT LOSS: 0
PALPITATIONS: 0
POLYPHAGIA: 0
STIFFNESS: 0
EYE WATERING: 1
MYALGIAS: 1
SEIZURES: 0

## 2018-08-21 ASSESSMENT — PAIN SCALES - GENERAL: PAINLEVEL: NO PAIN (0)

## 2018-08-21 ASSESSMENT — PATIENT HEALTH QUESTIONNAIRE - PHQ9
10. IF YOU CHECKED OFF ANY PROBLEMS, HOW DIFFICULT HAVE THESE PROBLEMS MADE IT FOR YOU TO DO YOUR WORK, TAKE CARE OF THINGS AT HOME, OR GET ALONG WITH OTHER PEOPLE: SOMEWHAT DIFFICULT
SUM OF ALL RESPONSES TO PHQ QUESTIONS 1-9: 18
SUM OF ALL RESPONSES TO PHQ QUESTIONS 1-9: 18

## 2018-08-21 NOTE — PROGRESS NOTES
[facility] NEUROLOGY  [address]  [address] [facility] [address]          August 21, 2018    Harvey Almanzar                                                                                                                     [address] [place] [address]    Mr. Almanzar is a very pleasant 48 year old gentleman with a very significant cardiac history.   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 Both valves are mechanical. There was also an aortic aneurysm repair and redo and a single vessel CAB. During this hospitalization he had an acute ischemic stroke involving the L MCA and had not had any neurological follow up and hence his visit to the stroke clinic today. The patient had an MRI and CTA during his hospitalization as abstracted below. He has had no new stroke events since September 2017.     The patient still complains of residual deficits involving his R eye - he states the deficit is monocular and involves a left upper quadrant field cut. 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 He is R handed. He also has intermittent numbness of the L side dating back to September 2017.     His exam is documented below.     Today his BP was very low in clinic though he was completed asymptomatic. This was rechecked multiple times and his last SBP was 89 mm Hg. He will call his PCP Dr. Zelaya regarding this.     Patient has stopped tobacco smoking and this commendable.     ASSESSMENT/PLAN    Encounter Diagnoses   Name Primary?     Cerebral infarction due to embolism of left middle cerebral artery (H)      Aortic valve replaced Yes     Mitral valve replaced        The patient has had an ischemic  stroke involving the superior division of the L MCA in 2017. Likely mechanism is cardioembolism and he is on warfarin and aspirin and his anti     He has made some recovery but still has residual deficits. Given that he is almost one year out from his stroke, I believe that these deficits are going to define his baseline level of functioning. I think it is appropriate that he be considered for disability and have made a referral to rehabilitation services/physiatry for their evaluation on this. His PHQ9 is 18 today based on the questions appended to this note and I will send a copy of this note to Dr. Zelaya for consideration of antidepressants / therapy. I would recommend alternatives to triptans in this gentleman for migraine treatment. No further follow up with me at this time though I am always available if new questions arise.            Orders Placed This Encounter   Procedures     PHYSIATRY REFERRAL         Brain MRI 9/15/2017    Impression:   Multiple scattered acute to subacute left MCA distribution infarcts  corresponding to regions of hypoattenuation on the prior head CT. No  acute hemorrhage.          CTA 9/15/2017  Impression:     1) Head CT demonstrates evolving acute infarction involving the left  superior frontal gyrus posteriorly, left caudate head, left centrum  semiovale/corona radiata and anterolateral aspect of the anterior limb  of the left internal capsule. No hemorrhage is identified.     2) CT perfusion maps does not demonstrate ischemic penumbra. There is  slight asymmetric delayed MTT and slight asymmetric decreased CBF to  the areas of ischemia including left corona radiata, centrum semiovale  and anterior basal ganglia area.     3) CT angiogram demonstrates a nonocclusive narrowing of the superior  M2 segment of the left medial middle cerebral artery immediately  following the bifurcation. The remainder intracranial arterial  structures and the cervical arterial structures are patent.  "There is a  6 mm long high density tubular structure in the expected region of the  left MCA, at the level of known stenosis, this density might represent  the thrombus/ embolus (thought this is much higher density than a  regular thrombus).               Past Medical History:   Diagnosis Date     Acute diastolic congestive heart failure (H) 8/24/2017     Aortic valve replaced 1996    Overview:  Cadaver - tissue valve in 1996 at [facility]  Amoxicillin 2000 mg prior to dental work please.  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     Social History   Substance Use Topics     Smoking status: Former Smoker     Packs/day: 0.50     Years: 31.00     Smokeless tobacco: Never Used     Alcohol use No     Current Outpatient Prescriptions   Medication     aspirin 81 MG chewable tablet     furosemide (LASIX) 20 MG tablet     gabapentin (NEURONTIN) 300 MG capsule     lisinopril (PRINIVIL/ZESTRIL) 10 MG tablet     melatonin 3 MG tablet     order for DME     potassium chloride SA (K-DUR/KLOR-CON M) 10 MEQ CR tablet     rosuvastatin (CRESTOR) 10 MG tablet     senna-docusate (SENOKOT-S;PERICOLACE) 8.6-50 MG per tablet     SUMAtriptan (IMITREX) 50 MG tablet     warfarin (COUMADIN) 2 MG tablet     No current facility-administered medications for this visit.        EXAM    BP (!) 81/54 (BP Location: Right arm, Patient Position: Chair, Cuff Size: Adult Regular)  Pulse 99  Ht 1.626 m (5' 4\")  Wt 79.7 kg (175 lb 9.6 oz)  BMI 30.14 kg/m2     BP (!) 89/59 (BP Location: Right arm, Patient Position: Chair, Cuff Size: Adult Regular)  Pulse 98  Ht 1.626 m (5' 4\")  Wt 79.7 kg (175 lb 9.6 oz)  BMI 30.14 kg/m2    Orientation: Normal; Language normal; Attention: 93 76 (1/5); DLROW; (5/5) normal    Cranial nerves: 2-12 " examined normal except monocular scotoma in R eye involving R upper quadrant; mild R central VII; mild decrease in shoulder shrug    Motor: FFM slow bilaterally but much worse on R; No drift;   Strength normal in both UE and LE except;   R SA antigravity but over come easily (3+); L SA 4 (effort related?)  R EF EE 3; WE +; FE 4  HF 3+ R; L 4+  KE KF 4+ R  Normal KF KE L  DF normal L; R 4+    SA EF EE WE   FE; HF KF KE DF EHL    Sensory: no deficits to LT except decreased sensation R LE and also L LE due to harvesting of venous slime (around L knee)    Co-ordination normal in both UE and LE but very slow on R    Reflexes increased R    Gait: wide base; slow slight circumduction steady cannot walk on heels toes with R LE               Ricardo Vazquez MD    Answers for HPI/ROS submitted by the patient on 8/21/2018   General Symptoms: Yes  Skin Symptoms: No  HENT Symptoms: No  EYE SYMPTOMS: Yes  HEART SYMPTOMS: Yes  LUNG SYMPTOMS: No  INTESTINAL SYMPTOMS: No  URINARY SYMPTOMS: No  REPRODUCTIVE SYMPTOMS: No  SKELETAL SYMPTOMS: Yes  BLOOD SYMPTOMS: No  NERVOUS SYSTEM SYMPTOMS: Yes  MENTAL HEALTH SYMPTOMS: Yes  Fever: No  Loss of appetite: No  Weight loss: No  Weight gain: Yes  Fatigue: Yes  Night sweats: No  Chills: No  Increased stress: Yes  Excessive hunger: No  Excessive thirst: Yes  Feeling hot or cold when others believe the temperature is normal: No  Loss of height: No  Post-operative complications: No  Surgical site pain: Yes  Hallucinations: No  Change in or Loss of Energy: Yes  Hyperactivity: No  Confusion: No  Eye pain: No  Vision loss: Yes  Dry eyes: No  Watery eyes: Yes  Eye bulging: No  Double vision: No  Flashing of lights: No  Spots: No  Floaters: No  Redness: No  Crossed eyes: No  Tunnel Vision: No  Chest pain or pressure: Yes  Fast or irregular heartbeat: No  Pain in legs with walking: Yes  Trouble breathing while lying down: No  Fingers or toes appear blue: No  High blood pressure: No  Low blood  pressure: Yes  Fainting: No  Murmurs: No  Pacemaker: No  Varicose veins: No  Edema or swelling: No  Wake up at night with shortness of breath: No  Light-headedness: Yes  Exercise intolerance: No  Back pain: Yes  Muscle aches: Yes  Neck pain: Yes  Swollen joints: No  Joint pain: Yes  Muscle cramps: Yes  Muscle weakness: No  Joint stiffness: No  Bone fracture: No  Trouble with coordination: No  Dizziness or trouble with balance: Yes  Fainting or black-out spells: No  Memory loss: No  Headache: No  Seizures: No  Speech problems: No  Tingling: Yes  Tremor: No  Weakness: Yes  Difficulty walking: Yes  Paralysis: No  Numbness: Yes  Nervous or Anxious: No  Depression: Yes  Trouble sleeping: Yes  Trouble thinking or concentrating: No  Mood changes: Yes  Panic attacks: No  PHQ-2 Score: 4  If you checked off any problems, how difficult have these problems made it for you to do your work, take care of things at home, or get along with other people?: Somewhat difficult  PHQ9 TOTAL SCORE: 18

## 2018-08-21 NOTE — MR AVS SNAPSHOT
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 Rehab assessment for disability / work capacity  2. BP re-check  3. Will call Dr. Zelaya re: BP today          Follow-ups after your visit        Additional Services     PHYSIATRY REFERRAL       Your provider has referred you to: PREFERRED PROVIDERS: DR. BLOUNT    Patient is coming up for disability hearing. Please help with assessment of functional capability. Has 2 valves replaced and a per-procedural L MCA stroke.    Please note these locations do not prescribe narcotics or manage chronic pain.    Please be aware that coverage of these services is subject to the terms and limitations of your health insurance plan.  Call member services at your health plan with any benefit or coverage questions.      Please bring the following to your appointment:  >>   Any x-rays, CTs or MRIs which have been performed.  Contact the facility where they were done to arrange for  prior to your scheduled appointment.    >>   List of current medications   >>   This referral request   >>   Any documents/labs given to you for this referral                  Follow-up notes from your care team     Return if symptoms worsen or fail to improve, for BP Recheck.      Your next 10 appointments already scheduled     Oct 18, 2018  6:00 PM CDT   (Arrive by 5:45 PM)   Return Visit with Johny Lundberg MD   [facility] [place] [facility] ([facility])    [address] [place] [address]  [address] [place] [address] [phone]              Who to  "contact     Please call your clinic at [phone] to:    Ask questions about your health    Make or cancel appointments    Discuss your medicines    Learn about your test results    Speak to your doctor            Additional Information About Your Visit        Care EveryWhere ID     This is your Care EveryWhere ID. This could be used by other organizations to access your [facility] medical records  [id number]        Your Vitals Were     Pulse Height BMI (Body Mass Index)             98 1.626 m (5' 4\") 30.14 kg/m2          Blood Pressure from Last 3 Encounters:   08/21/18 (!) 89/59   07/26/18 93/66   04/10/18 120/61    Weight from Last 3 Encounters:   08/21/18 79.7 kg (175 lb 9.6 oz)   07/26/18 79.1 kg (174 lb 4.8 oz)   04/10/18 74.6 kg (164 lb 7.4 oz)              We Performed the Following     PHYSIATRY REFERRAL        Primary Care Provider Office Phone # Fax #    Chapito Kaba [phone]       [address] [address]        Equal Access to Services     [facility]: Hadii mateusz ku hadasho Somickie, waaxda luqadaha, qaybta kaalmamarguerite rodríguez, lamont poe . So [facility] [phone].    ATENCIÓN: Si habla español, tiene a sorensen disposición servicios gratuitos de asistencia lingüística. Mervat al [phone].    We comply with applicable federal civil rights laws and [place] laws. We do not discriminate on the basis of race, color, national origin, age, disability, sex, sexual orientation, or gender identity.            Thank you!     Thank you for choosing [facility] NEUROLOGY  for your care. Our goal is always to provide you with excellent care. Hearing back from our patients is one way we can continue to improve our services. Please take a few minutes to complete the written survey that you may receive in the mail after your visit with us. Thank you!             Your Updated Medication List - Protect others around you: Learn how to safely " use, store and throw away your medicines at www.disposemymeds.org.          This list is accurate as of 8/21/18 12:42 PM.  Always use your most recent med list.                   Brand Name Dispense Instructions for use Diagnosis    aspirin 81 MG chewable tablet     120 tablet    Take 1 tablet (81 mg) by mouth daily    S/P AVR (aortic valve replacement), Hx of mitral valve replacement with mechanical valve       furosemide 20 MG tablet    LASIX    30 tablet    Take 1 tablet (20 mg) by mouth daily    S/P AVR (aortic valve replacement), Hx of mitral valve replacement with mechanical valve       gabapentin 300 MG capsule    NEURONTIN     TAKE 1 CAPSULE BY MOUTH DAILY FOR 1 WEEK THEN TAKE TWICE DAILY FOR 1 WEEK THEN TAKE THREE TIMES DAILY THEREAFTER        lisinopril 10 MG tablet    PRINIVIL/ZESTRIL    90 tablet    Take 1 tablet (10 mg) by mouth daily    S/P AVR (aortic valve replacement)       melatonin 3 MG tablet     30 tablet    Take 1 tablet (3 mg) by mouth nightly as needed for sleep    S/P AVR (aortic valve replacement), Hx of mitral valve replacement with mechanical valve       order for DME     1 each    Equipment being ordered: Walker Wheels () Treatment Diagnosis: Gait instability    Acute systolic congestive heart failure (H), Endocarditis of aortic valve       potassium chloride SA 10 MEQ CR tablet    K-DUR/KLOR-CON M    30 tablet    Take 2 tablets (20 mEq) by mouth daily    S/P AVR (aortic valve replacement), Hx of mitral valve replacement with mechanical valve       rosuvastatin 10 MG tablet    CRESTOR    30 tablet    Take 1 tablet (10 mg) by mouth daily    Acute ischemic left MCA stroke (H)       senna-docusate 8.6-50 MG per tablet    SENOKOT-S;PERICOLACE    50 tablet    Take 1-2 tablets by mouth 2 times daily as needed (constipation )    Acute post-operative pain       SUMAtriptan 50 MG tablet    IMITREX     Take one tablet by mouth at onset of headache. May repeat one tablet after 2 hours if  headache recurs.        warfarin 2 MG tablet    COUMADIN    120 tablet    Take 3 mg (1 tablet) daily until next INR check, next INR draw Thursday 4/12. Then dose per goal 2.5-3.5    Hx of mitral valve replacement with mechanical valve, S/P AVR (aortic valve replacement)

## 2018-08-21 NOTE — PATIENT INSTRUCTIONS
1. Rehab assessment for disability / work capacity  2. BP re-check  3. Will call Dr. Zelaya re: BP today

## 2018-08-21 NOTE — LETTER
8/21/2018       RE: Harvey Almanzar  [address] [place] [address]     Dear Colleague,    Thank you for referring your patient, Harvey Almanzar, to the [facility] NEUROLOGY at [facility] acute hospital. Please see a copy of my visit note below.          [facility] NEUROLOGY  [address] [facility]  3rd Floor  [facility] [address]          August 21, 2018    Harvey Almanzar                                                                                                                     [address] [place] [address]    Mr. Almanzar is a very pleasant 48 year old gentleman with a very significant cardiac history.   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 Both valves are mechanical. There was also an aortic aneurysm repair and redo and a single vessel CAB. During this hospitalization he had an acute ischemic stroke involving the L MCA and had not had any neurological follow up and hence his visit to the stroke clinic today. The patient had an MRI and CTA during his hospitalization as abstracted below. He has had no new stroke events since September 2017.     The patient still complains of residual deficits involving his R eye - he states the deficit is monocular and involves a left upper quadrant field cut. 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 He is R handed. He also has intermittent numbness of the L side dating back to September 2017.     His exam is documented below.     Today his BP was very low in clinic though he was completed asymptomatic. This was rechecked multiple times and his last SBP was 89 mm Hg. He will call his PCP Dr. Zelaya regarding  this.     Patient has stopped tobacco smoking and this commendable.     ASSESSMENT/PLAN    Encounter Diagnoses   Name Primary?     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 Likely mechanism is cardioembolism and he is on warfarin and aspirin and his anti     He has made some recovery but still has residual deficits. Given that he is almost one year out from his stroke, I believe that these deficits are going to define his baseline level of functioning. I think it is appropriate that he be considered for disability and have made a referral to rehabilitation services/physiatry for their evaluation on this. His PHQ9 is 18 today based on the questions appended to this note and I will send a copy of this note to Dr. Zelaya for consideration of antidepressants / therapy. I would recommend alternatives to triptans in this gentleman for migraine treatment. No further follow up with me at this time though I am always available if new questions arise.            Orders Placed This Encounter   Procedures     PHYSIATRY REFERRAL         Brain MRI 9/15/2017    Impression:   Multiple scattered acute to subacute left MCA distribution infarcts  corresponding to regions of hypoattenuation on the prior head CT. No  acute hemorrhage.          CTA 9/15/2017  Impression:     1) Head CT demonstrates evolving acute infarction involving the left  superior frontal gyrus posteriorly, left caudate head, left centrum  semiovale/corona radiata and anterolateral aspect of the anterior limb  of the left internal capsule. No hemorrhage is identified.     2) CT perfusion maps does not demonstrate ischemic penumbra. There is  slight asymmetric delayed MTT and slight asymmetric decreased CBF to  the areas of ischemia including left corona radiata, centrum  "semiovale  and anterior basal ganglia area.     3) CT angiogram demonstrates a nonocclusive narrowing of the superior  M2 segment of the left medial middle cerebral artery immediately  following the bifurcation. The remainder intracranial arterial  structures and the cervical arterial structures are patent. There is a  6 mm long high density tubular structure in the expected region of the  left MCA, at the level of known stenosis, this density might represent  the thrombus/ embolus (thought this is much higher density than a  regular thrombus).               Past Medical History:   Diagnosis Date     Acute diastolic congestive heart failure (H) 8/24/2017     Aortic valve replaced 1996    Overview:  Cadaver - tissue valve in [facility] at [facility]  Amoxicillin 2000 mg prior to dental work please.  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     Social History   Substance Use Topics     Smoking status: Former Smoker     Packs/day: 0.50     Years: 31.00     Smokeless tobacco: Never Used     Alcohol use No     Current Outpatient Prescriptions   Medication     aspirin 81 MG chewable tablet     furosemide (LASIX) 20 MG tablet     gabapentin (NEURONTIN) 300 MG capsule     lisinopril (PRINIVIL/ZESTRIL) 10 MG tablet     melatonin 3 MG tablet     order for DME     potassium chloride SA (K-DUR/KLOR-CON M) 10 MEQ CR tablet     rosuvastatin (CRESTOR) 10 MG tablet     senna-docusate (SENOKOT-S;PERICOLACE) 8.6-50 MG per tablet     SUMAtriptan (IMITREX) 50 MG tablet     warfarin (COUMADIN) 2 MG tablet     No current facility-administered medications for this visit.        EXAM    BP (!) 81/54 (BP Location: Right arm, Patient Position: Chair, Cuff Size: Adult Regular)  Pulse 99  Ht 1.626 m (5' 4\")  Wt 79.7 kg (175 lb " "9.6 oz)  BMI 30.14 kg/m2     BP (!) 89/59 (BP Location: Right arm, Patient Position: Chair, Cuff Size: Adult Regular)  Pulse 98  Ht 1.626 m (5' 4\")  Wt 79.7 kg (175 lb 9.6 oz)  BMI 30.14 kg/m2    Orientation: Normal; Language normal; Attention: 93 76 (1/5); DLROW; (5/5) normal    Cranial nerves: 2-12 examined normal except monocular scotoma in R eye involving R upper quadrant; mild R central VII; mild decrease in shoulder shrug    Motor: FFM slow bilaterally but much worse on R; No drift;   Strength normal in both UE and LE except;   R SA antigravity but over come easily (3+); L SA 4 (effort related?)  R EF EE 3; WE +; FE 4  HF 3+ R; L 4+  KE KF 4+ R  Normal KF KE L  DF normal L; R 4+    SA EF EE WE   FE; HF KF KE DF EHL    Sensory: no deficits to LT except decreased sensation R LE and also L LE due to harvesting of venous slime (around L knee)    Co-ordination normal in both UE and LE but very slow on R    Reflexes increased R    Gait: wide base; slow slight circumduction steady cannot walk on heels toes with R LE               Ricardo Vazquez MD    Answers for HPI/ROS submitted by the patient on 8/21/2018   General Symptoms: Yes  Skin Symptoms: No  HENT Symptoms: No  EYE SYMPTOMS: Yes  HEART SYMPTOMS: Yes  LUNG SYMPTOMS: No  INTESTINAL SYMPTOMS: No  URINARY SYMPTOMS: No  REPRODUCTIVE SYMPTOMS: No  SKELETAL SYMPTOMS: Yes  BLOOD SYMPTOMS: No  NERVOUS SYSTEM SYMPTOMS: Yes  MENTAL HEALTH SYMPTOMS: Yes  Fever: No  Loss of appetite: No  Weight loss: No  Weight gain: Yes  Fatigue: Yes  Night sweats: No  Chills: No  Increased stress: Yes  Excessive hunger: No  Excessive thirst: Yes  Feeling hot or cold when others believe the temperature is normal: No  Loss of height: No  Post-operative complications: No  Surgical site pain: Yes  Hallucinations: No  Change in or Loss of Energy: Yes  Hyperactivity: No  Confusion: No  Eye pain: No  Vision loss: Yes  Dry eyes: No  Watery eyes: Yes  Eye bulging: No  Double " vision: No  Flashing of lights: No  Spots: No  Floaters: No  Redness: No  Crossed eyes: No  Tunnel Vision: No  Chest pain or pressure: Yes  Fast or irregular heartbeat: No  Pain in legs with walking: Yes  Trouble breathing while lying down: No  Fingers or toes appear blue: No  High blood pressure: No  Low blood pressure: Yes  Fainting: No  Murmurs: No  Pacemaker: No  Varicose veins: No  Edema or swelling: No  Wake up at night with shortness of breath: No  Light-headedness: Yes  Exercise intolerance: No  Back pain: Yes  Muscle aches: Yes  Neck pain: Yes  Swollen joints: No  Joint pain: Yes  Muscle cramps: Yes  Muscle weakness: No  Joint stiffness: No  Bone fracture: No  Trouble with coordination: No  Dizziness or trouble with balance: Yes  Fainting or black-out spells: No  Memory loss: No  Headache: No  Seizures: No  Speech problems: No  Tingling: Yes  Tremor: No  Weakness: Yes  Difficulty walking: Yes  Paralysis: No  Numbness: Yes  Nervous or Anxious: No  Depression: Yes  Trouble sleeping: Yes  Trouble thinking or concentrating: No  Mood changes: Yes  Panic attacks: No  PHQ-2 Score: 4  If you checked off any problems, how difficult have these problems made it for you to do your work, take care of things at home, or get along with other people?: Somewhat difficult  PHQ9 TOTAL SCORE: 18      Again, thank you for allowing me to participate in the care of your patient.      Sincerely,    Ricardo Vazquez MD

## 2018-08-22 ASSESSMENT — PATIENT HEALTH QUESTIONNAIRE - PHQ9: SUM OF ALL RESPONSES TO PHQ QUESTIONS 1-9: 18

## 2018-08-27 ENCOUNTER — TELEPHONE (OUTPATIENT)
Dept: PHYSICAL MEDICINE AND REHAB | Facility: CLINIC | Age: 48
End: 2018-08-27

## 2018-08-27 NOTE — TELEPHONE ENCOUNTER
[facility] Call Center    Phone Message    May a detailed message be left on voicemail: yes    Reason for Call: Other: Per call from PT is requesting an APPT to see Dr Tinoco for assessment of functional capability.  Referral is in PT's chart.  PT can be reached at the above #     Action Taken: Message routed to:  [facility] ([facility]): BREONNA

## 2018-08-28 NOTE — TELEPHONE ENCOUNTER
Patient scheduled with Dr Tinoco for 10/23/18 for CVA assessment. Patient had never seen PMR after his stroke last year and would like to see PMR MD. Patient is aware that Dr Tinoco does not perform functional capacity evaluations and if that is what he needs, he will need to get a referral to Occupational Medicine.

## 2018-10-18 ENCOUNTER — OFFICE VISIT (OUTPATIENT)
Dept: CARDIOLOGY | Facility: CLINIC | Age: 48
End: 2018-10-18
Attending: INTERNAL MEDICINE
Payer: COMMERCIAL

## 2018-10-18 VITALS
DIASTOLIC BLOOD PRESSURE: 67 MMHG | WEIGHT: 182 LBS | SYSTOLIC BLOOD PRESSURE: 101 MMHG | HEIGHT: 64 IN | HEART RATE: 93 BPM | BODY MASS INDEX: 31.07 KG/M2 | OXYGEN SATURATION: 96 %

## 2018-10-18 DIAGNOSIS — Z95.2 S/P MVR (MITRAL VALVE REPLACEMENT): ICD-10-CM

## 2018-10-18 DIAGNOSIS — Z95.2 S/P AVR: ICD-10-CM

## 2018-10-18 PROCEDURE — 99215 OFFICE O/P EST HI 40 MIN: CPT | Mod: GC | Performed by: INTERNAL MEDICINE

## 2018-10-18 PROCEDURE — G0463 HOSPITAL OUTPT CLINIC VISIT: HCPCS | Mod: ZF

## 2018-10-18 ASSESSMENT — PAIN SCALES - GENERAL: PAINLEVEL: NO PAIN (0)

## 2018-10-18 NOTE — MR AVS SNAPSHOT
After Visit Summary   10/18/2018    Harvey Almanzar    MRN: [id number]           Patient Information     Date Of Birth          1970        Visit Information        Provider Department      10/18/2018 12:30 PM Johny Lundberg MD [facility]        Today's Diagnoses     S/P AVR        S/P MVR (mitral valve replacement)          Care Instructions    You were seen today in the Cardiovascular Clinic at the [facility].      Cardiology Providers you saw during your visit:  Dr. Lundberg    Recommendations: 1) Stop your lasix and potasium. 2) In about one week please have your potasium lab draw checked    Follow up: Have an ECHO prior to seeing Dr. Lundberg in 1 year.    Thank you for your visit today!     Jessica Nelson RN  Structural Heart Care Coordinator   TAVR, MitraClip and Watchman Programs  [facility]  Office: [phone]    Paula Noe RN  Structural Heart Care Coordinator   TAVR, MitraClip and Watchman Programs  [facility]  Office: [phone]    [facility] Center  [address] [place] [address]  Cardiology Clinic [address] [address]                  Follow-ups after your visit        Your next 10 appointments already scheduled     Oct 23, 2018 12:00 PM CDT   (Arrive by 11:45 AM)   New Patient Visit with Monet Tinoco MD   [facility] Rehabilitation ([facility])    [address] [place] [address] [phone]              Who to contact     If you have questions or need follow up information about today's clinic visit or your schedule please contact [facility] directly at [phone].  Normal or non-critical lab and imaging results will be communicated to you by MyChart, letter or phone within 4 business days after the clinic has received the results. If you do not hear from us within 7 days,  "please contact the clinic through Reelhouset or phone. If you have a critical or abnormal lab result, we will notify you by phone as soon as possible.  Submit refill requests through Truecaller or call your pharmacy and they will forward the refill request to us. Please allow 3 business days for your refill to be completed.          Additional Information About Your Visit        Care EveryWhere ID     This is your Care EveryWhere ID. This could be used by other organizations to access your [place] medical records  [id number]        Your Vitals Were     Pulse Height Pulse Oximetry BMI (Body Mass Index)          93 1.626 m (5' 4\") 96% 31.24 kg/m2         Blood Pressure from Last 3 Encounters:   10/18/18 101/67   08/21/18 (!) 89/59   07/26/18 93/66    Weight from Last 3 Encounters:   10/18/18 82.6 kg (182 lb)   08/21/18 79.7 kg (175 lb 9.6 oz)   07/26/18 79.1 kg (174 lb 4.8 oz)              We Performed the Following     Follow-Up with Cardiologist - 6 Months        Primary Care Provider Office Phone # Fax #    Chapito ARIEL Sesar [phone]       [place] [address]        Equal Access to Services     [employer] : Hadii aad ku hadasho Soomaali, waaxda luqadaha, qaybta kaalmada adeegyada, waxay idiin hayparrishn alix lopez lamario alberto . So [facility] [phone].    ATENCIÓN: Si habla español, tiene a sorensen disposición servicios gratuitos de asistencia lingüística. ame al [phone].    We comply with applicable federal civil rights laws and [place] laws. We do not discriminate on the basis of race, color, national origin, age, disability, sex, sexual orientation, or gender identity.            Thank you!     Thank you for choosing [facility]  for your care. Our goal is always to provide you with excellent care. Hearing back from our patients is one way we can continue to improve our services. Please take a few minutes to complete the written survey that you may " receive in the mail after your visit with us. Thank you!             Your Updated Medication List - Protect others around you: Learn how to safely use, store and throw away your medicines at www.disposemymeds.org.          This list is accurate as of 10/18/18  1:19 PM.  Always use your most recent med list.                   Brand Name Dispense Instructions for use Diagnosis    aspirin 81 MG chewable tablet     120 tablet    Take 1 tablet (81 mg) by mouth daily    S/P AVR (aortic valve replacement), Hx of mitral valve replacement with mechanical valve       gabapentin 300 MG capsule    NEURONTIN     TAKE 1 CAPSULE BY MOUTH DAILY FOR 1 WEEK THEN TAKE TWICE DAILY FOR 1 WEEK THEN TAKE THREE TIMES DAILY THEREAFTER        lisinopril 10 MG tablet    PRINIVIL/ZESTRIL    90 tablet    Take 1 tablet (10 mg) by mouth daily    S/P AVR (aortic valve replacement)       melatonin 3 MG tablet     30 tablet    Take 1 tablet (3 mg) by mouth nightly as needed for sleep    S/P AVR (aortic valve replacement), Hx of mitral valve replacement with mechanical valve       order for DME     1 each    Equipment being ordered: Surfly () Treatment Diagnosis: Gait instability    Acute systolic congestive heart failure (H), Endocarditis of aortic valve       rosuvastatin 10 MG tablet    CRESTOR    30 tablet    Take 1 tablet (10 mg) by mouth daily    Acute ischemic left MCA stroke (H)       senna-docusate 8.6-50 MG per tablet    SENOKOT-S;PERICOLACE    50 tablet    Take 1-2 tablets by mouth 2 times daily as needed (constipation )    Acute post-operative pain       SUMAtriptan 50 MG tablet    IMITREX     Take one tablet by mouth at onset of headache. May repeat one tablet after 2 hours if headache recurs.        warfarin 2 MG tablet    COUMADIN    120 tablet    Take 3 mg (1 tablet) daily until next INR check, next INR draw Thursday 4/12. Then dose per goal 2.5-3.5    Hx of mitral valve replacement with mechanical valve, S/P AVR (aortic  valve replacement)

## 2018-10-18 NOTE — PATIENT INSTRUCTIONS
You were seen today in the Cardiovascular Clinic at the [facility].      Cardiology Providers you saw during your visit:  Dr. Lundberg    Recommendations: 1) Stop your lasix and potasium. 2) In about one week please have your potasium lab draw checked    Follow up: Have an ECHO prior to seeing Dr. Lundberg in 1 year.    Thank you for your visit today!     Jessica Nelson RN  Structural Heart Care Coordinator   TAVR, MitraClip and Watchman Programs  [facility]  Office: [phone]    Paula Noe RN  Structural Heart Care Coordinator   TAVR, MitraClip and Watchman Programs  [facility]  Office: [phone]    Cl[facility] Center  [address] [place] [address]  Cardiology Clinic [place] [address] [place] [id number]

## 2018-10-18 NOTE — LETTER
10/18/2018      RE: Harvey Almanzar  [address] [place] [address]       Dear Colleague,    Thank you for the opportunity to participate in the care of your patient, Harvey Almanzar, at the [facility] at [facility]. Please see a copy of my visit note below.    CARDIOLOGY NEW OFFICE VISIT    HPI: Harvey Almanzar is a 48 year old male being seen today for evaluation of paravalvular regurgitation of bioprosthetic AVR.   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 He had an acute ischemic L MCA CVA during that hospitalization, but has continued to do well afterwards. During this time he underwent IV anbitiotic treatment for his endocarditis and then underwent a mechanical MVR (27 mm St Jerald Masters mechanical valve), AVR (17 mm St Jerald Masters mechanical valve), aneurysm repair and redo - CAB single vessel with SVG to PDA (prior SVG to PDA from 1998 was occluded).    Since then he had been admitted at the [facility] in April of 2018 and was found to have PVL of his mechanical AVR by echoardiogram, undwerwent IZA and CT cardiac which showed regurgitation involving 15% of valve circumference consistent with mild PVL. He was initially planned for transcatheter closure of his PVL with Amplatzer plugs, however initial trial of afterload reduction with follow up was done. He was seen by us 3 months ago at which time we opted to repeat his TTE which showed EF 50-55%, normal LV dimensions, and no evidence of significant PVL. He now returns for follow up visit.    He has cut down to 2.5 mg of lisinopril daily due to lightheadedness (symptomatic hypotension). He is otherwise doing well and is able to exercise and is free of  symptoms.    The patient denies a history of chest discomfort, dyspnea, PND, orthopnea, pedal edema, palpitations, lightheadedness, and syncope.    PAST MEDICAL HISTORY:  Past Medical History:   Diagnosis Date     Acute diastolic congestive heart failure (H) 8/24/2017     Aortic valve replaced 1996    Overview:  Cadaver - tissue valve in 1996 at [facility]  Amoxicillin 2000 mg prior to dental work please.  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       CURRENT MEDICATIONS:  Current Outpatient Prescriptions   Medication Sig Dispense Refill     aspirin 81 MG chewable tablet Take 1 tablet (81 mg) by mouth daily 120 tablet 0     furosemide (LASIX) 20 MG tablet Take 1 tablet (20 mg) by mouth daily 30 tablet 1     gabapentin (NEURONTIN) 300 MG capsule TAKE 1 CAPSULE BY MOUTH DAILY FOR 1 WEEK THEN TAKE TWICE DAILY FOR 1 WEEK THEN TAKE THREE TIMES DAILY THEREAFTER       lisinopril (PRINIVIL/ZESTRIL) 10 MG tablet Take 1 tablet (10 mg) by mouth daily 90 tablet 3     melatonin 3 MG tablet Take 1 tablet (3 mg) by mouth nightly as needed for sleep 30 tablet 0     order for DME Equipment being ordered: [employer]els ()  Treatment Diagnosis: Gait instability 1 each 0     potassium chloride SA (K-DUR/KLOR-CON M) 10 MEQ CR tablet Take 2 tablets (20 mEq) by mouth daily 30 tablet 1     rosuvastatin (CRESTOR) 10 MG tablet Take 1 tablet (10 mg) by mouth daily 30 tablet 1     senna-docusate (SENOKOT-S;PERICOLACE) 8.6-50 MG per tablet Take 1-2 tablets by mouth 2 times daily as needed (constipation ) 50 tablet 0     SUMAtriptan (IMITREX) 50 MG tablet Take one tablet by mouth at onset of headache. May repeat one tablet after 2 hours if headache recurs.       warfarin (COUMADIN) 2 MG tablet Take 3 mg (1 tablet) daily  until next INR check, next INR draw Thursday 4/12. Then dose per goal 2.5-3.5 120 tablet 1       PAST SURGICAL HISTORY:  Past Surgical History:   Procedure Laterality Date     REDO STERNOTOMY BYPASS CORONARY ARTERY N/A 9/21/2017    Procedure: REDO STERNOTOMY BYPASS CORONARY ARTERY;;  Surgeon: Nicola Gorman MD;  Location: [facility] OR     REDO STERNOTOMY REPLACE VALVE AORTIC N/A 9/21/2017    Procedure: REDO STERNOTOMY REPLACE VALVE AORTIC;;  Surgeon: Nicola Gorman MD;  Location: [facility] OR     REDO STERNOTOMY REPLACE VALVE MITRAL N/A 9/21/2017    Procedure: REDO STERNOTOMY REPLACE VALVE MITRAL;  Left groin cutdown, Redo Median Sternotomy, Lysis of adhesions, Left mini-thoracotomy,Coronary artery bypass graft x 1 using the left greater saphenous vein, using endovein harvest, Mitral valve replacement using St. Jerald Mechanical 27mm, Aortic Valve Replacement using a 17mm St. Jerald Mechanical, On Cardiopulmonary Bypass Pump;  Surgeon: Cindy Gorman       ALLERGIES  Vancomycin    FAMILY HX:  No family history on file.    SOCIAL HX:  Social History     Social History     Marital status: Single     Spouse name: N/A     Number of children: N/A     Years of education: N/A     Social History Main Topics     Smoking status: Former Smoker     Packs/day: 0.50     Years: 31.00     Smokeless tobacco: Never Used     Alcohol use No     Drug use: No     Sexual activity: Not Currently     Other Topics Concern     None     Social History Narrative       ROS:  Constitutional: No fever, chills, or sweats. No weight gain/loss.   ENT: No visual disturbance, ear ache, epistaxis, sore throat.   Allergies/Immunologic: Negative.   Respiratory: No cough, hemoptysis.   Cardiovascular: As per HPI.   GI: No nausea, vomiting, hematemesis, melena, or hematochezia.   : No urinary frequency, dysuria, or hematuria.   Integument: Negative.   Psychiatric: Negative.   Neuro: Negative.   Endocrinology: Negative.   Musculoskeletal: No  "myalgia.    VITAL SIGNS:  Ht 1.626 m (5' 4\")  Wt 82.6 kg (182 lb)  BMI 31.24 kg/m2  Body mass index is 31.24 kg/(m^2).  Wt Readings from Last 2 Encounters:   10/18/18 82.6 kg (182 lb)   08/21/18 79.7 kg (175 lb 9.6 oz)     /67 (BP Location: Left arm, Patient Position: Chair, Cuff Size: Adult Regular)  Pulse 93  Ht 1.626 m (5' 4\")  Wt 82.6 kg (182 lb)  SpO2 96%  BMI 31.24 kg/m2    PHYSICAL EXAM  Harvey Almanzar is a 48 year old male in no acute distress.  HEENT: Unremarkable.  Neck: JVP normal.  Carotids +4/4 bilaterally without bruits.  Lungs: CTA.  Cor: RRR. Mechanical S1 and S2. There is a very soft II/VI systolic and a soft I/IV diastolic murmur.  No rub, or gallop.  PMI in Lf 5th ICS.  Abd: Soft, nontender, nondistended.  NABS.  No pulsatile mass.  Extremities: No C/C/E.  Pulses +4/4 symmetric in upper and lower extremities.  Neuro: right sided weakness, overall still 5/5 but L>R    LABS    Lab Results   Component Value Date    WBC 5.7 04/10/2018     Lab Results   Component Value Date    RBC 3.00 04/10/2018     Lab Results   Component Value Date    HGB 8.7 04/10/2018     Lab Results   Component Value Date    HCT 28.1 04/10/2018     No components found for: MCT  Lab Results   Component Value Date    MCV 94 04/10/2018     Lab Results   Component Value Date    MCH 29.0 04/10/2018     Lab Results   Component Value Date    MCHC 31.0 04/10/2018     Lab Results   Component Value Date    RDW 16.8 04/10/2018     Lab Results   Component Value Date     04/10/2018      Recent Labs   Lab Test  04/10/18   0728  04/09/18   0634   NA  146*  141   POTASSIUM  3.7  3.8   CHLORIDE  112*  109   CO2  26  24   ANIONGAP  7  8   GLC  95  87   BUN  10  10   CR  0.79  0.80   CHECO  8.3*  8.2*     Recent Labs   Lab Test  09/24/17   0410   CHOL  66   HDL  9*   LDL  29   TRIG  136   NHDL  56        EKG:  Sinus, LVH, early repolarization    ECHO: 4/2018  Interpretation Summary  17 mm St. Jerald s bileaflet " mechanical prosthesis is present in the  aortic position. Imaging of the mechanical aortic valve and surrounding  structures is suboptimal despite repeated attempts in multiple views. There  appears to be an area of focal (<25%) dehiscence with probably mild  paravalvular AI. Theere is no rocking motion of the prosthetic valve. Presence  and extent of dehiscence are difficult to definitively assess on this study.  Recommend gated CTA to confirm.     27mm St. Jerald Masters bileaflet mechanical prosthesis is present in the mitral  position. The mechanical valve appears well-seated. The sewing ring is intact.  Both leaflets open well. Doppler interrogation of the mechanical mitral valve  is normal. No vegetations are seen.  Left ventricular function, chamber size, wall motion, and wall thickness are  normal.The EF is 60-65%.  Right ventricular function, chamber size, wall motion, and thickness are  normal.     This study was compared with the study from 10/4/17 (TTE) and 9/21/17 (intra-  op IZA) : LVEF has increased and aortic valve findings appear new.    ECHO: 7/2018  Interpretation Summary  17 mm St. Jerald Masters bileaflet mechanical prosthesis is present in the  aortic position. There is no rocking motion of the valve. There is no evidence  of stenosis of the bioprosthetic valve. The previously-described area of  dehiscence and paravalvular AI are not clearly demonstrated on today's study  due to suboptimal image quality compared with the 4/4/18 IZA and 10/4/17 TTE.     Borderline (EF 50-55%) reduced left ventricular function is present. No  regional wall motion abnormalities are seen.  Global right ventricular function is mildly reduced.  27mm St. Jerald Masters bileaflet mechanical prosthesis is present in the mitral  position. The sewing ring is intact. The valve appears well seated. Doppler  interrogation of the mechanical mitral valve is normal.  Moderate left atrial enlargement is present.  Pulmonary artery  systolic pressure cannot be assessed.     This study was compared with the study from 4/4/2018: Focal dehiscence and  paravalvular AI are not clearly seen, likely due to suboptimal imaging on  today's study.  _____________________________________________________________________________  __        Left Ventricle  Left ventricular wall thickness is normal. Left ventricular size is normal.  Borderline (EF 50-55%) reduced left ventricular function is present. Diastolic  function not assessed due to presence of prosthetic mitral valve. No regional  wall motion abnormalities are seen.     Right Ventricle  Right ventricular wall thickness is normal. Mild right ventricular dilation is  present. Global right ventricular function is mildly reduced.     Atria  The right atria appears normal. Moderate left atrial enlargement is present.     Mitral Valve  27mm St. Jerald Masters bileaflet mechanical prosthesis is present in the mitral  position. The sewing ring is intact. The valve appears well seated. Doppler  interrogation of the mechanical mitral valve is normal. Mean mitral inflow  gradient 3 mmHg at 82 bpm. PHT 82 msec, EOA 2.3 cm2 by Continuity equation.        Aortic Valve  17 mm St. Jearld Masters bileaflet mechanical prosthesis is present in the  aortic position. There is no rocking motion of the valve. There is no evidence  of stenosis of the bioprosthetic valve. The previously-described area of  dehiscence and paravalvular AI are not clearly demonstrated on today's study  due to suboptimal image quality compared with the 4/4/18 IZA and 10/4/17 TTE.  Mild aortic insufficiency is present. The peak velocity across the aortic  valve is 2.76 m/sec. The mean gradient is 18 mmHg. The effective orifice area  is 1.8 cm^2. DVI 0.40. A paraprosthetic leak around the aortic valve is  present.     Tricuspid Valve  The tricuspid valve is normal. The peak velocity of the tricuspid regurgitant  jet is not obtainable. Pulmonary artery  systolic pressure cannot be assessed.     Pulmonic Valve  The pulmonic valve is normal. Trace pulmonic insufficiency is present.     Vessels  The aorta root is normal. The inferior vena cava was normal in size with  preserved respiratory variability. The pulmonary artery cannot be assessed.     Pericardium  No pericardial effusion is present.        Compared to Previous Study  This study was compared with the study from 2018 . Focal dehiscence and  paravalvular AI are not clearly seen, likely due to suboptimal imaging on  today's study.     Attestation  I have personally viewed the imaging and agree with the interpretation and  report as documented by the fellow, Adan Lopez, and/or edited by me.  _____________________________________________________________________________  __     MMode/2D Measurements & Calculations  IVSd: 1.2 cm  LVIDd: 4.3 cm  LVIDs: 2.4 cm  LVPWd: 1.2 cm  FS: 45.4 %  LV mass(C)d: 182.3 grams  LV mass(C)dI: 98.9 grams/m2  LA dimension: 4.6 cm  asc Aorta Diam: 2.6 cm  LVOT diam: 2.4 cm  LVOT area: 4.4 cm2  LA Volume (BP): 66.6 ml     LA Volume Index (BP): 36.2 ml/m2  RWT: 0.55        Doppler Measurements & Calculations  MV E max marty: 109.5 cm/sec  MV A max marty: 102.8 cm/sec  MV E/A: 1.1  MV max P.8 mmHg  MV mean P.8 mmHg  MV V2 VTI: 33.8 cm  MVA(VTI): 2.3 cm2  MV P1/2t max marty: 110.0 cm/sec  MV P1/2t: 81.5 msec  MVA(P1/2t): 2.7 cm2  MV dec slope: 395.3 cm/sec2  MV dec time: 0.21 sec  Ao V2 max: 223.6 cm/sec  Ao max P.0 mmHg  Ao V2 mean: 170.1 cm/sec  Ao mean P.0 mmHg  Ao V2 VTI: 38.8 cm  LISA(I,D): 2.0 cm2  LISA(V,D): 1.8 cm2  LV V1 max PG: 3.2 mmHg  LV V1 max: 89.2 cm/sec  LV V1 VTI: 17.9 cm  SV(LVOT): 79.2 ml  SI(LVOT): 43.0 ml/m2  PA acc time: 0.07 sec  AV Marty Ratio (DI): 0.40  LISA Index (cm2/m2): 1.1  E/E' av.1  Lateral E/e': 11.6     Medial E/e': 20.5    CT:    IMPRESSION:  1.  Focal dehiscence involving approximately 15% of the sewing ring of  the mechanical  aortic valve, with communication between the LVOT and  the aorta, as described below. No CT evidence of active endocarditis  or perivalvular abscess.  2.   Normal appearance of the mechanical mitral valve.  3.  The aortic homograft is severely calcified.   4.  The left main coronary artery appears patent at its ostium. The  RCA appears patent at its ostium.  5.  SVG-PDA graft is patent at its ostium but is not well-imaged  beyond this point. The origin of this graft is just below the sternum.     6.  No evidence of intracardiac thrombus.    CARDIAC CATH:  9/2017  HEMODYNAMICS:  BSA 1.7  1. HR 78 bpm  2. BP 94/53/71 mmHg  3. RA 21/19/17   4. RV 64/17  5. PA 64/37/47   6. PCW 33/47/37   7. PA sat 39.1%   9. Hgb 10.6 g/dL   10. Roseanna CO 2.92   11. Roseanna CI 1.73   12. TD CO 2.47   13. TD CI 1.46   14. PVR 3.43   15. SVR 1489      CORONARY ANGIOGRAM:   Patient has prosthetic aortic valve with ascending aortic graft with reimplanted coronaries.    2. Dominance: Right  3. The LM has some distal mild luminal irregularities.  4. LAD: Type 3 [LAD supplies the entire apex].. The LAD is a large vessel and gives rise to septal perforators, large D1 and tiny D2 and D3.  The pLAD has a 20% stenosis at the D1 bifurcation. mLAD has a 30% stenosis, dLAD has luminal irregularities.  D1 has luminal irregularities.    5. LCX is initially a large vessel and gives rise to a very large OM1 with multiple small branches.  The LCx becomes a small vessel distal to OM1.  LCx has luminal irregularities.  The pOM1 has a 20% stenosis.   6. RCA has  with perfusion of the dRCA, RPDA, and RPLA by left to right collaterals.      LEFT HEART CATHETERIZATION:  1. LVEDP 37 mmHg.  2. Left ventriculography not performed  3. Severe AI is present  4. Severe MR is present    ASSESSMENT AND PLAN:    48 year old gentleman with a history of AVR and redo AVR in 1998 with homograft 24 mm and SVG to RCA at that time for native valve endocarditis. He is now status  post mechanical MVR (27 mm) and AVR (17 mm) St Jerald mechanical valves in 09/2017 with redo SVG to PDA for Streptococcal prosthetic endocarditis. He returns now with mild (15% of circumference) PVL by IZA and CT for consideration of transcatheter closure. His repeat TTE confirms normal LV dimensions and EF, no evidence of significant PVL.  -- continue lisinopril  -- stop lasix, potassium supplements, repeat BMP in 1 week  -- continue with aspirin / warfarin for mechanical valve anticoagulation  -- We can follow up in 1 year with an echocardiogram.    Discussed and seen with Dr Toño Bell  Interventional Cardiology Fellow  [phone]    Patient seen and examined with Cardiovascular fellow and agree with the assessment and plan described above.     Johny Lundberg M.D.  Interventional Cardiology  [facility]

## 2018-10-18 NOTE — NURSING NOTE
Vitals completed successfully and medication reconciled. Mora Varela MA  Chief Complaint   Patient presents with     Follow Up For     3 month return

## 2018-10-18 NOTE — PROGRESS NOTES
CARDIOLOGY NEW OFFICE VISIT    HPI: Harvey Almanzar is a 48 year old male being seen today for evaluation of paravalvular regurgitation of bioprosthetic AVR.   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 He had an acute ischemic L MCA CVA during that hospitalization, but has continued to do well afterwards. During this time he underwent IV anbitiotic treatment for his endocarditis and then underwent a mechanical MVR (27 mm St Jerald Masters mechanical valve), AVR (17 mm St Jerald Masters mechanical valve), aneurysm repair and redo - CAB single vessel with SVG to PDA (prior SVG to PDA from 1998 was occluded).    Since then he had been admitted at the [facility] in April of 2018 and was found to have PVL of his mechanical AVR by echoardiogram, undwerwent IZA and CT cardiac which showed regurgitation involving 15% of valve circumference consistent with mild PVL. He was initially planned for transcatheter closure of his PVL with Amplatzer plugs, however initial trial of afterload reduction with follow up was done. He was seen by us 3 months ago at which time we opted to repeat his TTE which showed EF 50-55%, normal LV dimensions, and no evidence of significant PVL. He now returns for follow up visit.    He has cut down to 2.5 mg of lisinopril daily due to lightheadedness (symptomatic hypotension). He is otherwise doing well and is able to exercise and is free of symptoms.    The patient denies a history of chest discomfort, dyspnea, PND, orthopnea, pedal edema, palpitations, lightheadedness, and syncope.    PAST MEDICAL HISTORY:  Past Medical History:   Diagnosis Date     Acute diastolic congestive heart failure (H) 8/24/2017     Aortic valve replaced 1996    Overview:  Cadaver - tissue valve in 1996  at [facility]  Amoxicillin 2000 mg prior to dental work please.  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       CURRENT MEDICATIONS:  Current Outpatient Prescriptions   Medication Sig Dispense Refill     aspirin 81 MG chewable tablet Take 1 tablet (81 mg) by mouth daily 120 tablet 0     furosemide (LASIX) 20 MG tablet Take 1 tablet (20 mg) by mouth daily 30 tablet 1     gabapentin (NEURONTIN) 300 MG capsule TAKE 1 CAPSULE BY MOUTH DAILY FOR 1 WEEK THEN TAKE TWICE DAILY FOR 1 WEEK THEN TAKE THREE TIMES DAILY THEREAFTER       lisinopril (PRINIVIL/ZESTRIL) 10 MG tablet Take 1 tablet (10 mg) by mouth daily 90 tablet 3     melatonin 3 MG tablet Take 1 tablet (3 mg) by mouth nightly as needed for sleep 30 tablet 0     order for DME Equipment being ordered: Walker Wheels ()  Treatment Diagnosis: Gait instability 1 each 0     potassium chloride SA (K-DUR/KLOR-CON M) 10 MEQ CR tablet Take 2 tablets (20 mEq) by mouth daily 30 tablet 1     rosuvastatin (CRESTOR) 10 MG tablet Take 1 tablet (10 mg) by mouth daily 30 tablet 1     senna-docusate (SENOKOT-S;PERICOLACE) 8.6-50 MG per tablet Take 1-2 tablets by mouth 2 times daily as needed (constipation ) 50 tablet 0     SUMAtriptan (IMITREX) 50 MG tablet Take one tablet by mouth at onset of headache. May repeat one tablet after 2 hours if headache recurs.       warfarin (COUMADIN) 2 MG tablet Take 3 mg (1 tablet) daily until next INR check, next INR draw Thursday 4/12. Then dose per goal 2.5-3.5 120 tablet 1       PAST SURGICAL HISTORY:  Past Surgical History:   Procedure Laterality Date     REDO STERNOTOMY BYPASS CORONARY ARTERY N/A 9/21/2017    Procedure: REDO STERNOTOMY BYPASS CORONARY ARTERY;;  Surgeon: Nicola Gorman MD;  Location:  OR      "REDO STERNOTOMY REPLACE VALVE AORTIC N/A 9/21/2017    Procedure: REDO STERNOTOMY REPLACE VALVE AORTIC;;  Surgeon: Nicola Gorman MD;  Location: [facility] OR     REDO STERNOTOMY REPLACE VALVE MITRAL N/A 9/21/2017    Procedure: REDO STERNOTOMY REPLACE VALVE MITRAL;  Left groin cutdown, Redo Median Sternotomy, Lysis of adhesions, Left mini-thoracotomy,Coronary artery bypass graft x 1 using the left greater saphenous vein, using endovein harvest, Mitral valve replacement using St. Jerald Mechanical 27mm, Aortic Valve Replacement using a 17mm St. Jerald Mechanical, On Cardiopulmonary Bypass Pump;  Surgeon: Cindy Gorman       ALLERGIES  Vancomycin    FAMILY HX:  No family history on file.    SOCIAL HX:  Social History     Social History     Marital status: Single     Spouse name: N/A     Number of children: N/A     Years of education: N/A     Social History Main Topics     Smoking status: Former Smoker     Packs/day: 0.50     Years: 31.00     Smokeless tobacco: Never Used     Alcohol use No     Drug use: No     Sexual activity: Not Currently     Other Topics Concern     None     Social History Narrative       ROS:  Constitutional: No fever, chills, or sweats. No weight gain/loss.   ENT: No visual disturbance, ear ache, epistaxis, sore throat.   Allergies/Immunologic: Negative.   Respiratory: No cough, hemoptysis.   Cardiovascular: As per HPI.   GI: No nausea, vomiting, hematemesis, melena, or hematochezia.   : No urinary frequency, dysuria, or hematuria.   Integument: Negative.   Psychiatric: Negative.   Neuro: Negative.   Endocrinology: Negative.   Musculoskeletal: No myalgia.    VITAL SIGNS:  Ht 1.626 m (5' 4\")  Wt 82.6 kg (182 lb)  BMI 31.24 kg/m2  Body mass index is 31.24 kg/(m^2).  Wt Readings from Last 2 Encounters:   10/18/18 82.6 kg (182 lb)   08/21/18 79.7 kg (175 lb 9.6 oz)     /67 (BP Location: Left arm, Patient Position: Chair, Cuff Size: Adult Regular)  Pulse 93  Ht 1.626 m (5' 4\")  Wt 82.6 kg (182 " lb)  SpO2 96%  BMI 31.24 kg/m2    PHYSICAL EXAM  Harvey Almanzar is a 48 year old male in no acute distress.  HEENT: Unremarkable.  Neck: JVP normal.  Carotids +4/4 bilaterally without bruits.  Lungs: CTA.  Cor: RRR. Mechanical S1 and S2. There is a very soft II/VI systolic and a soft I/IV diastolic murmur.  No rub, or gallop.  PMI in Lf 5th ICS.  Abd: Soft, nontender, nondistended.  NABS.  No pulsatile mass.  Extremities: No C/C/E.  Pulses +4/4 symmetric in upper and lower extremities.  Neuro: right sided weakness, overall still 5/5 but L>R    LABS    Lab Results   Component Value Date    WBC 5.7 04/10/2018     Lab Results   Component Value Date    RBC 3.00 04/10/2018     Lab Results   Component Value Date    HGB 8.7 04/10/2018     Lab Results   Component Value Date    HCT 28.1 04/10/2018     No components found for: MCT  Lab Results   Component Value Date    MCV 94 04/10/2018     Lab Results   Component Value Date    MCH 29.0 04/10/2018     Lab Results   Component Value Date    MCHC 31.0 04/10/2018     Lab Results   Component Value Date    RDW 16.8 04/10/2018     Lab Results   Component Value Date     04/10/2018      Recent Labs   Lab Test  04/10/18   0728  04/09/18   0634   NA  146*  141   POTASSIUM  3.7  3.8   CHLORIDE  112*  109   CO2  26  24   ANIONGAP  7  8   GLC  95  87   BUN  10  10   CR  0.79  0.80   CHECO  8.3*  8.2*     Recent Labs   Lab Test  09/24/17   0410   CHOL  66   HDL  9*   LDL  29   TRIG  136   NHDL  56        EKG:  Sinus, LVH, early repolarization    ECHO: 4/2018  Interpretation Summary  17 mm St. Jerald Masters bileaflet mechanical prosthesis is present in the  aortic position. Imaging of the mechanical aortic valve and surrounding  structures is suboptimal despite repeated attempts in multiple views. There  appears to be an area of focal (<25%) dehiscence with probably mild  paravalvular AI. Theere is no rocking motion of the prosthetic valve. Presence  and extent of dehiscence  are difficult to definitively assess on this study.  Recommend gated CTA to confirm.     27mm St. Jerald Masters bileaflet mechanical prosthesis is present in the mitral  position. The mechanical valve appears well-seated. The sewing ring is intact.  Both leaflets open well. Doppler interrogation of the mechanical mitral valve  is normal. No vegetations are seen.  Left ventricular function, chamber size, wall motion, and wall thickness are  normal.The EF is 60-65%.  Right ventricular function, chamber size, wall motion, and thickness are  normal.     This study was compared with the study from 10/4/17 (TTE) and 9/21/17 (intra-  op IZA) : LVEF has increased and aortic valve findings appear new.    ECHO: 7/2018  Interpretation Summary  17 mm St. Jerald Masters bileaflet mechanical prosthesis is present in the  aortic position. There is no rocking motion of the valve. There is no evidence  of stenosis of the bioprosthetic valve. The previously-described area of  dehiscence and paravalvular AI are not clearly demonstrated on today's study  due to suboptimal image quality compared with the 4/4/18 IZA and 10/4/17 TTE.     Borderline (EF 50-55%) reduced left ventricular function is present. No  regional wall motion abnormalities are seen.  Global right ventricular function is mildly reduced.  27mm St. Jerald Masters bileaflet mechanical prosthesis is present in the mitral  position. The sewing ring is intact. The valve appears well seated. Doppler  interrogation of the mechanical mitral valve is normal.  Moderate left atrial enlargement is present.  Pulmonary artery systolic pressure cannot be assessed.     This study was compared with the study from 4/4/2018: Focal dehiscence and  paravalvular AI are not clearly seen, likely due to suboptimal imaging on  today's study.  _____________________________________________________________________________  __        Left Ventricle  Left ventricular wall thickness is normal. Left  ventricular size is normal.  Borderline (EF 50-55%) reduced left ventricular function is present. Diastolic  function not assessed due to presence of prosthetic mitral valve. No regional  wall motion abnormalities are seen.     Right Ventricle  Right ventricular wall thickness is normal. Mild right ventricular dilation is  present. Global right ventricular function is mildly reduced.     Atria  The right atria appears normal. Moderate left atrial enlargement is present.     Mitral Valve  27mm St. Jerald Masters bileaflet mechanical prosthesis is present in the mitral  position. The sewing ring is intact. The valve appears well seated. Doppler  interrogation of the mechanical mitral valve is normal. Mean mitral inflow  gradient 3 mmHg at 82 bpm. PHT 82 msec, EOA 2.3 cm2 by Continuity equation.        Aortic Valve  17 mm St. Jerald Masters bileaflet mechanical prosthesis is present in the  aortic position. There is no rocking motion of the valve. There is no evidence  of stenosis of the bioprosthetic valve. The previously-described area of  dehiscence and paravalvular AI are not clearly demonstrated on today's study  due to suboptimal image quality compared with the 4/4/18 IZA and 10/4/17 TTE.  Mild aortic insufficiency is present. The peak velocity across the aortic  valve is 2.76 m/sec. The mean gradient is 18 mmHg. The effective orifice area  is 1.8 cm^2. DVI 0.40. A paraprosthetic leak around the aortic valve is  present.     Tricuspid Valve  The tricuspid valve is normal. The peak velocity of the tricuspid regurgitant  jet is not obtainable. Pulmonary artery systolic pressure cannot be assessed.     Pulmonic Valve  The pulmonic valve is normal. Trace pulmonic insufficiency is present.     Vessels  The aorta root is normal. The inferior vena cava was normal in size with  preserved respiratory variability. The pulmonary artery cannot be assessed.     Pericardium  No pericardial effusion is present.        Compared to  Previous Study  This study was compared with the study from 2018 . Focal dehiscence and  paravalvular AI are not clearly seen, likely due to suboptimal imaging on  today's study.     Attestation  I have personally viewed the imaging and agree with the interpretation and  report as documented by the fellow, Adan Lopez, and/or edited by me.  _____________________________________________________________________________  __     MMode/2D Measurements & Calculations  IVSd: 1.2 cm  LVIDd: 4.3 cm  LVIDs: 2.4 cm  LVPWd: 1.2 cm  FS: 45.4 %  LV mass(C)d: 182.3 grams  LV mass(C)dI: 98.9 grams/m2  LA dimension: 4.6 cm  asc Aorta Diam: 2.6 cm  LVOT diam: 2.4 cm  LVOT area: 4.4 cm2  LA Volume (BP): 66.6 ml     LA Volume Index (BP): 36.2 ml/m2  RWT: 0.55        Doppler Measurements & Calculations  MV E max marty: 109.5 cm/sec  MV A max marty: 102.8 cm/sec  MV E/A: 1.1  MV max P.8 mmHg  MV mean P.8 mmHg  MV V2 VTI: 33.8 cm  MVA(VTI): 2.3 cm2  MV P1/2t max marty: 110.0 cm/sec  MV P1/2t: 81.5 msec  MVA(P1/2t): 2.7 cm2  MV dec slope: 395.3 cm/sec2  MV dec time: 0.21 sec  Ao V2 max: 223.6 cm/sec  Ao max P.0 mmHg  Ao V2 mean: 170.1 cm/sec  Ao mean P.0 mmHg  Ao V2 VTI: 38.8 cm  LISA(I,D): 2.0 cm2  LISA(V,D): 1.8 cm2  LV V1 max PG: 3.2 mmHg  LV V1 max: 89.2 cm/sec  LV V1 VTI: 17.9 cm  SV(LVOT): 79.2 ml  SI(LVOT): 43.0 ml/m2  PA acc time: 0.07 sec  AV Marty Ratio (DI): 0.40  LISA Index (cm2/m2): 1.1  E/E' av.1  Lateral E/e': 11.6     Medial E/e': 20.5    CT:    IMPRESSION:  1.  Focal dehiscence involving approximately 15% of the sewing ring of  the mechanical aortic valve, with communication between the LVOT and  the aorta, as described below. No CT evidence of active endocarditis  or perivalvular abscess.  2.   Normal appearance of the mechanical mitral valve.  3.  The aortic homograft is severely calcified.   4.  The left main coronary artery appears patent at its ostium. The  RCA appears patent at its ostium.  5.   SVG-PDA graft is patent at its ostium but is not well-imaged  beyond this point. The origin of this graft is just below the sternum.     6.  No evidence of intracardiac thrombus.    CARDIAC CATH:  9/2017  HEMODYNAMICS:  BSA 1.7  1. HR 78 bpm  2. BP 94/53/71 mmHg  3. RA 21/19/17   4. RV 64/17  5. PA 64/37/47   6. PCW 33/47/37   7. PA sat 39.1%   9. Hgb 10.6 g/dL   10. Roseanna CO 2.92   11. Roseanna CI 1.73   12. TD CO 2.47   13. TD CI 1.46   14. PVR 3.43   15. SVR 1489      CORONARY ANGIOGRAM:   Patient has prosthetic aortic valve with ascending aortic graft with reimplanted coronaries.    2. Dominance: Right  3. The LM has some distal mild luminal irregularities.  4. LAD: Type 3 [LAD supplies the entire apex].. The LAD is a large vessel and gives rise to septal perforators, large D1 and tiny D2 and D3.  The pLAD has a 20% stenosis at the D1 bifurcation. mLAD has a 30% stenosis, dLAD has luminal irregularities.  D1 has luminal irregularities.    5. LCX is initially a large vessel and gives rise to a very large OM1 with multiple small branches.  The LCx becomes a small vessel distal to OM1.  LCx has luminal irregularities.  The pOM1 has a 20% stenosis.   6. RCA has  with perfusion of the dRCA, RPDA, and RPLA by left to right collaterals.      LEFT HEART CATHETERIZATION:  1. LVEDP 37 mmHg.  2. Left ventriculography not performed  3. Severe AI is present  4. Severe MR is present    ASSESSMENT AND PLAN:    48 year old gentleman with a history of AVR and redo AVR in 1998 with homograft 24 mm and SVG to RCA at that time for native valve endocarditis. He is now status post mechanical MVR (27 mm) and AVR (17 mm) St Jerald mechanical valves in 09/2017 with redo SVG to PDA for Streptococcal prosthetic endocarditis. He returns now with mild (15% of circumference) PVL by IAZ and CT for consideration of transcatheter closure. His repeat TTE confirms normal LV dimensions and EF, no evidence of significant PVL.  -- continue  lisinopril  -- stop lasix, potassium supplements, repeat BMP in 1 week  -- continue with aspirin / warfarin for mechanical valve anticoagulation  -- We can follow up in 1 year with an echocardiogram.    Discussed and seen with Dr Toño Bell  Interventional Cardiology Fellow  [phone]    Patient seen and examined with Cardiovascular fellow and agree with the assessment and plan described above.     Johny Lundberg M.D.  Interventional Cardiology  [facility]

## 2018-10-23 ENCOUNTER — OFFICE VISIT (OUTPATIENT)
Dept: PHYSICAL MEDICINE AND REHAB | Facility: CLINIC | Age: 48
End: 2018-10-23
Payer: COMMERCIAL

## 2018-10-23 VITALS
SYSTOLIC BLOOD PRESSURE: 106 MMHG | HEIGHT: 64 IN | BODY MASS INDEX: 31.41 KG/M2 | DIASTOLIC BLOOD PRESSURE: 74 MMHG | WEIGHT: 184 LBS | HEART RATE: 87 BPM | OXYGEN SATURATION: 100 %

## 2018-10-23 DIAGNOSIS — M54.2 CERVICALGIA: Primary | ICD-10-CM

## 2018-10-23 RX ORDER — ROSUVASTATIN CALCIUM 20 MG/1
TABLET, COATED ORAL
COMMUNITY
Start: 2018-10-02

## 2018-10-23 RX ORDER — WARFARIN SODIUM 2 MG/1
TABLET ORAL
COMMUNITY
Start: 2018-10-22

## 2018-10-23 NOTE — MR AVS SNAPSHOT
"              After Visit Summary   10/23/2018    Harvey Almanzar    MRN: [id number]           Patient Information     Date Of Birth          1970        Visit Information        Provider Department      10/23/2018 12:00 PM Monet Tinoco MD [facility] Physical Medicine and Rehabilitation        Today's Diagnoses     Cervicalgia    -  1       Follow-ups after your visit        Additional Services     PHYSICAL THERAPY REFERRAL       If you have not heard from the scheduling office within 2 business days, please call [phone] for all locations, with the exception of [place], please call [phone] and [place], please call [phone].    Please be aware that coverage of these services is subject to the terms and limitations of your health insurance plan.  Call member services at your health plan with any benefit or coverage questions.                  Follow-up notes from your care team     Return if symptoms worsen or fail to improve.      Who to contact     Please call your clinic at [phone] to:    Ask questions about your health    Make or cancel appointments    Discuss your medicines    Learn about your test results    Speak to your doctor            Additional Information About Your Visit        Care EveryWhere ID     This is your Care EveryWhere ID. This could be used by other organizations to access your [place] medical records  [id number]        Your Vitals Were     Pulse Height Pulse Oximetry BMI (Body Mass Index)          87 1.626 m (5' 4\") 100% 31.58 kg/m2         Blood Pressure from Last 3 Encounters:   10/23/18 106/74   10/18/18 101/67   08/21/18 (!) 89/59    Weight from Last 3 Encounters:   10/23/18 83.5 kg (184 lb)   10/18/18 82.6 kg (182 lb)   08/21/18 79.7 kg (175 lb 9.6 oz)              We Performed the Following     PHYSICAL THERAPY REFERRAL        Primary Care Provider Office Phone # Fax #    Chapito Kaba [phone]       [facility] [address] [address]        Equal Access to Services     TE TEAGUE : Hadii mateusz ku hadkylee Somickie, wawillowda luqadaha, qagildata kamanuelmarguerite cagleserjiomarguerite, waxay idiin hayparrishrowena latifreynaldochinyere poe . So [facility] [phone].    ATENCIÓN: Si habla español, tiene a sorensen disposición servicios gratuitos de asistencia lingüística. Llame al [phone].    We comply with applicable federal civil rights laws and [place] laws. We do not discriminate on the basis of race, color, national origin, age, disability, sex, sexual orientation, or gender identity.            Thank you!     Thank you for choosing [facility] PHYSICAL MEDICINE AND REHABILITATION  for your care. Our goal is always to provide you with excellent care. Hearing back from our patients is one way we can continue to improve our services. Please take a few minutes to complete the written survey that you may receive in the mail after your visit with us. Thank you!             Your Updated Medication List - Protect others around you: Learn how to safely use, store and throw away your medicines at www.disposemymeds.org.          This list is accurate as of 10/23/18  1:33 PM.  Always use your most recent med list.                   Brand Name Dispense Instructions for use Diagnosis    aspirin 81 MG chewable tablet     120 tablet    Take 1 tablet (81 mg) by mouth daily    S/P AVR (aortic valve replacement), Hx of mitral valve replacement with mechanical valve       gabapentin 300 MG capsule    NEURONTIN     TAKE 1 CAPSULE BY MOUTH DAILY FOR 1 WEEK THEN TAKE TWICE DAILY FOR 1 WEEK THEN TAKE THREE TIMES DAILY THEREAFTER        lisinopril 10 MG tablet    PRINIVIL/ZESTRIL    90 tablet    Take 1 tablet (10 mg) by mouth daily    S/P AVR (aortic valve replacement)       melatonin 3 MG tablet     30 tablet    Take 1 tablet (3 mg) by mouth nightly as needed for sleep    S/P AVR (aortic valve replacement), Hx of mitral valve replacement with mechanical valve        order for DME     1 each    Equipment being ordered: Walker Wheels () Treatment Diagnosis: Gait instability    Acute systolic congestive heart failure (H), Endocarditis of aortic valve       * rosuvastatin 10 MG tablet    CRESTOR    30 tablet    Take 1 tablet (10 mg) by mouth daily    Acute ischemic left MCA stroke (H)       * rosuvastatin 20 MG tablet    CRESTOR          senna-docusate 8.6-50 MG per tablet    SENOKOT-S;PERICOLACE    50 tablet    Take 1-2 tablets by mouth 2 times daily as needed (constipation )    Acute post-operative pain       SUMAtriptan 50 MG tablet    IMITREX     Take one tablet by mouth at onset of headache. May repeat one tablet after 2 hours if headache recurs.        * warfarin 2 MG tablet    COUMADIN    120 tablet    Take 3 mg (1 tablet) daily until next INR check, next INR draw Thursday 4/12. Then dose per goal 2.5-3.5    Hx of mitral valve replacement with mechanical valve, S/P AVR (aortic valve replacement)       * warfarin 2 MG tablet    COUMADIN     Take by mouth, 2 mg (2 mg x 1) on Fri; 3 mg (2 mg x 1.5) all other days or as directed by INR Nurse        * Notice:  This list has 4 medication(s) that are the same as other medications prescribed for you. Read the directions carefully, and ask your doctor or other care provider to review them with you.

## 2018-10-23 NOTE — LETTER
10/23/2018       RE: Harvey Almanzar  [address] [place] [address]     Dear Colleague,    Thank you for referring your patient, Harvey Almanzar, to the [facility] PHYSICAL [facility]ICINE AND REHABILITATION at [facility]. Please see a copy of my visit note below.           PM&R Clinic Note   Patient Name: Harvey Almanzar : 1970 Medical Record: [id number]     Requesting Physician/clinician: Ricardo Bowman           History of Present Illness:   Harvey Almanzar is a 48 year old male with a history of endocarditis and aortic valve replacement in , redo in  and endocarditis with severe AI and MR in , which necessitated both MV and AV replacements. He also had an aortic aneurysm repair and a single vessel CAB.   That hospitalization was complicated by left MCA infarction with resultant right-sided weakness. He has noticed decreased  strength and right visual field cut. He has also noticed right lower extremity weakness. He also notices substantial fatigue after his stroke and hospitalization    Therapies/HEP,  After his stroke he completed outpatient therapies with PT and OT and is no longer doing a HEP but does walk occasionally for exercise.     Functionally,   He is using a cane, held in the right hand.   He is independent with ADLs  He is not driving  He is managing his own medications and finances           Past Medical and Surgical History:     Past Medical History:   Diagnosis Date     Acute diastolic congestive heart failure (H) 2017     Aortic valve replaced     Overview:  Cadaver - tissue valve in  at [facility]  Amoxicillin 2000 mg prior to dental work please.  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     Past Surgical History:   Procedure Laterality Date     REDO STERNOTOMY BYPASS CORONARY ARTERY N/A 9/21/2017    Procedure: REDO STERNOTOMY BYPASS CORONARY ARTERY;;  Surgeon: Nicola Gorman MD;  Location: [facility]     REDO STERNOTOMY REPLACE VALVE AORTIC N/A 9/21/2017    Procedure: REDO STERNOTOMY REPLACE VALVE AORTIC;;  Surgeon: Nicola Gorman MD;  Location: [facility] OR     REDO STERNOTOMY REPLACE VALVE MITRAL N/A 9/21/2017    Procedure: REDO STERNOTOMY REPLACE VALVE MITRAL;  Left groin cutdown, Redo Median Sternotomy, Lysis of adhesions, Left mini-thoracotomy,Coronary artery bypass graft x 1 using the left greater saphenous vein, using endovein harvest, Mitral valve replacement using [employer] 27mm, Aortic Valve Replacement using a 17mm 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    ADLs: Independent  Assistive devices: Cane for walking community distances  iADLs (medication management and finances): manages own meds and finances  Hand dominance: right  Driving: No longer driving           Family History:     Family history of cardiac disease         Medications:     Current Outpatient Prescriptions   Medication Sig Dispense Refill     aspirin 81 MG chewable tablet Take 1 tablet (81 mg) by mouth daily 120 tablet 0     gabapentin (NEURONTIN) 300 MG capsule TAKE 1 CAPSULE BY MOUTH DAILY FOR 1 WEEK THEN TAKE TWICE DAILY FOR 1 WEEK THEN TAKE THREE TIMES DAILY THEREAFTER       lisinopril  (PRINIVIL/ZESTRIL) 10 MG tablet Take 1 tablet (10 mg) by mouth daily 90 tablet 3     melatonin 3 MG tablet Take 1 tablet (3 mg) by mouth nightly as needed for sleep 30 tablet 0     order for DME Equipment being ordered: Walker Wheels ()  Treatment Diagnosis: Gait instability 1 each 0     senna-docusate (SENOKOT-S;PERICOLACE) 8.6-50 MG per tablet Take 1-2 tablets by mouth 2 times daily as needed (constipation ) 50 tablet 0     SUMAtriptan (IMITREX) 50 MG tablet Take one tablet by mouth at onset of headache. May repeat one tablet after 2 hours if headache recurs.       warfarin (COUMADIN) 2 MG tablet Take by mouth, 2 mg (2 mg x 1) on Fri; 3 mg (2 mg x 1.5) all other days or as directed by INR Nurse       rosuvastatin (CRESTOR) 10 MG tablet Take 1 tablet (10 mg) by mouth daily (Patient not taking: Reported on 10/23/2018) 30 tablet 1     rosuvastatin (CRESTOR) 20 MG tablet        warfarin (COUMADIN) 2 MG tablet Take 3 mg (1 tablet) daily until next INR check, next INR draw Thursday 4/12. Then dose per goal 2.5-3.5 120 tablet 1            Allergies:     Allergies   Allergen Reactions     Vancomycin Other (See Comments) and Rash     Flushed              ROS:   A focused ROS is negative other than the symptoms noted above in the HPI.    Constitutional: denies any fevers, chills, any recent weight loss   Eyes: denies changes in visual acuity except as above with right medial visual field cut  Ears, Nose, Throat: denies any difficulty swallowing   Cardiovascular: denies any exertional chest pain or palpitation   Respiratory: denies dyspnea   Gastrointestinal: denies any nausea, vomiting, abdominal pain, diarrhea or constipation   Genitourinary: denies any dysuria, hematuria, frequency or urgency   Musculoskeletal: has substantial neck pain or back pain   Neurologic: history of migraine headache, changes in motor function as above, no loss of balance or vertigo   Psychiatric: states he feels intermittently angry about  "his current state of health; sleeps OK              Physical Examiniation:   VITAL SIGNS: /74 (BP Location: Left arm)  Pulse 87  Ht 1.626 m (5' 4\")  Wt 83.5 kg (184 lb)  SpO2 100%  BMI 31.58 kg/m2  BMI: Estimated body mass index is 31.58 kg/(m^2) as calculated from the following:    Height as of this encounter: 1.626 m (5' 4\").    Weight as of this encounter: 83.5 kg (184 lb).    Gen: NAD, pleasant and cooperative   HEENT: extra-ocular muscles intact and visual field defect to include right middle visual field, Normocephalic, without obvious abnormality, atraumatic, sinuses nontender on palpation, external ears without lesions, oral pharynx with moist mucous membranes, skin normal  Cardio: regular pulse  Pulm: non-labored breathing in room air  Abd: benign  Ext: WWP, no edema in BLE  Neuro/MSK:   Ambulates very slowly with an antalgic gait. Circumduction of the right leg with knee held in extension  Strength in the LUE and LLE are 4+/5 with intermittent poor effort  Strength in the RUE and RLE is generally 4/5 with lots of give-way weakness  Sensation is intact to light touch bilaterally in RUE ane RLE           Laboratory/Imaging:   Brain MRI 9/15/2017     Impression:   Multiple scattered acute to subacute left MCA distribution infarcts  corresponding to regions of hypoattenuation on the prior head CT. No  acute hemorrhage.     CTA 9/15/2017  Impression:      1) Head CT demonstrates evolving acute infarction involving the left  superior frontal gyrus posteriorly, left caudate head, left centrum  semiovale/corona radiata and anterolateral aspect of the anterior limb  of the left internal capsule. No hemorrhage is identified.      2) CT perfusion maps does not demonstrate ischemic penumbra. There is  slight asymmetric delayed MTT and slight asymmetric decreased CBF to  the areas of ischemia including left corona radiata, centrum semiovale  and anterior basal ganglia area.      3) CT angiogram demonstrates " a nonocclusive narrowing of the superior  M2 segment of the left medial middle cerebral artery immediately  following the bifurcation. The remainder intracranial arterial  structures and the cervical arterial structures are patent. There is a  6 mm long high density tubular structure in the expected region of the  left MCA, at the level of known stenosis, this density might represent  the thrombus/ embolus (thought this is much higher density than a  regular thrombus).            Assessment/Plan:   48 year old, right hand dominant male presents with right visual field cut, substantial back pain, fatigue and RUE and RLE weakness, impaired functional mobility, and dependence with ADLs secondary to  left MCA infarction after cardiac surgery.      1. Patient education: In depth discussion and education was provided about the assessment and implications of each of the below recommendations for management. Patient indicated readiness to learn, all questions were answered and understanding of material presented was confirmed.  2. Work-up: None  3. Therapy/equipment/braces: He was referred to [facility] for outpatient physical therapy given his substantial back pain  4. Medications: No new medications  5. Interventions: Pt refused trigger point injections for myofascial pain  6. Follow up: As needed    Indy Pa MD  Physical Medicine & Rehabilitation PGY4      Patient seen and examined with the resident Dr Lea  He was referred to us by Dr Vazquez.     Plan of care:   Patient presents with L MCA stroke as a post op complication of MV/AV repair.   He presents with left upper quadrant field cut, and residual left sided weakness. He is further limited by neck/upper back pain with several areas of tenderness. He notes significant fatigue. His lifestyle is very sedentary at present.   He is here for evaluation of his function. While we do not perform FCE here. We are able to provide functional examination.      On my exam, he presents with modified independence of transfers and mobility without any assistive device.   His gait is slow with variable performance. Exam is consistent with give-way weakness. He likely has incoordination of the left hand.     Offered trigger point injections which he refused. Will refer him to PT to work on strengthening and ROM as well as myofascial release of the tight muscles in the back.       I spent a total of 60 minutes face-to-face with Harvey Almanzar during today's office visit. Over 50% of this time was spent counseling the patient and/or coordinating care. See note for details.     Monet Tinoco MD, A

## 2018-10-23 NOTE — PROGRESS NOTES
PM&R Clinic Note   Patient Name: Harvey Almanzar : 1970 Medical Record: [id number]     Requesting Physician/clinician: Ricardo Bowman           History of Present Illness:   Harvey Almanzar is a 48 year old male with a history of endocarditis and aortic valve replacement in , redo in  and endocarditis with severe AI and MR in , which necessitated both MV and AV replacements. He also had an aortic aneurysm repair and a single vessel CAB.   That hospitalization was complicated by left MCA infarction with resultant right-sided weakness. He has noticed decreased  strength and right visual field cut. He has also noticed right lower extremity weakness. He also notices substantial fatigue after his stroke and hospitalization    Therapies/HEP,  After his stroke he completed outpatient therapies with PT and OT and is no longer doing a HEP but does walk occasionally for exercise.     Functionally,   He is using a cane, held in the right hand.   He is independent with ADLs  He is not driving  He is managing his own medications and finances           Past Medical and Surgical History:     Past Medical History:   Diagnosis Date     Acute diastolic congestive heart failure (H) 2017     Aortic valve replaced     Overview:  Cadaver - tissue valve in  at [facility]  Amoxicillin 2000 mg prior to dental work please.  Chapito Kaba MD 2017 8:58 AM      Endocarditis of aortic valve 2017     Gram-positive cocci bacteremia 2017     H/O aortic valve repair      History of tobacco use disorder 2017     Streptococcal endocarditis 2017     Past Surgical History:   Procedure Laterality Date     REDO STERNOTOMY BYPASS CORONARY ARTERY N/A 2017    Procedure: REDO STERNOTOMY BYPASS CORONARY ARTERY;;  Surgeon: Nicola Gorman MD;  [facility]     REDO STERNOTOMY REPLACE VALVE AORTIC N/A 2017    Procedure: REDO  STERNOTOMY REPLACE VALVE AORTIC;;  Surgeon: Nicola Gorman MD;  Location: [facility] OR     REDO STERNOTOMY REPLACE VALVE MITRAL N/A 9/21/2017    Procedure: REDO STERNOTOMY REPLACE VALVE MITRAL;  Left groin cutdown, Redo Median Sternotomy, Lysis of adhesions, Left mini-thoracotomy,Coronary artery bypass graft x 1 using the left greater saphenous vein, using endovein harvest, Mitral valve replacement using [employer] 27mm, Aortic Valve Replacement using a 17mm 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    ADLs: Independent  Assistive devices: Cane for walking community distances  iADLs (medication management and finances): manages own meds and finances  Hand dominance: right  Driving: No longer driving           Family History:     Family history of cardiac disease         Medications:     Current Outpatient Prescriptions   Medication Sig Dispense Refill     aspirin 81 MG chewable tablet Take 1 tablet (81 mg) by mouth daily 120 tablet 0     gabapentin (NEURONTIN) 300 MG capsule TAKE 1 CAPSULE BY MOUTH DAILY FOR 1 WEEK THEN TAKE TWICE DAILY FOR 1 WEEK THEN TAKE THREE TIMES DAILY THEREAFTER       lisinopril (PRINIVIL/ZESTRIL) 10 MG tablet Take 1 tablet (10 mg) by mouth daily 90 tablet 3     melatonin 3 MG tablet Take 1 tablet (3 mg) by mouth nightly as needed for sleep 30 tablet 0     order for DME Equipment being ordered: Walker Wheels ()  Treatment Diagnosis: Gait instability 1 each 0     senna-docusate (SENOKOT-S;PERICOLACE)  "8.6-50 MG per tablet Take 1-2 tablets by mouth 2 times daily as needed (constipation ) 50 tablet 0     SUMAtriptan (IMITREX) 50 MG tablet Take one tablet by mouth at onset of headache. May repeat one tablet after 2 hours if headache recurs.       warfarin (COUMADIN) 2 MG tablet Take by mouth, 2 mg (2 mg x 1) on Fri; 3 mg (2 mg x 1.5) all other days or as directed by INR Nurse       rosuvastatin (CRESTOR) 10 MG tablet Take 1 tablet (10 mg) by mouth daily (Patient not taking: Reported on 10/23/2018) 30 tablet 1     rosuvastatin (CRESTOR) 20 MG tablet        warfarin (COUMADIN) 2 MG tablet Take 3 mg (1 tablet) daily until next INR check, next INR draw Thursday 4/12. Then dose per goal 2.5-3.5 120 tablet 1            Allergies:     Allergies   Allergen Reactions     Vancomycin Other (See Comments) and Rash     Flushed              ROS:   A focused ROS is negative other than the symptoms noted above in the HPI.    Constitutional: denies any fevers, chills, any recent weight loss   Eyes: denies changes in visual acuity except as above with right medial visual field cut  Ears, Nose, Throat: denies any difficulty swallowing   Cardiovascular: denies any exertional chest pain or palpitation   Respiratory: denies dyspnea   Gastrointestinal: denies any nausea, vomiting, abdominal pain, diarrhea or constipation   Genitourinary: denies any dysuria, hematuria, frequency or urgency   Musculoskeletal: has substantial neck pain or back pain   Neurologic: history of migraine headache, changes in motor function as above, no loss of balance or vertigo   Psychiatric: states he feels intermittently angry about his current state of health; sleeps OK              Physical Examiniation:   VITAL SIGNS: /74 (BP Location: Left arm)  Pulse 87  Ht 1.626 m (5' 4\")  Wt 83.5 kg (184 lb)  SpO2 100%  BMI 31.58 kg/m2  BMI: Estimated body mass index is 31.58 kg/(m^2) as calculated from the following:    Height as of this encounter: 1.626 m " "(5' 4\").    Weight as of this encounter: 83.5 kg (184 lb).    Gen: NAD, pleasant and cooperative   HEENT: extra-ocular muscles intact and visual field defect to include right middle visual field, Normocephalic, without obvious abnormality, atraumatic, sinuses nontender on palpation, external ears without lesions, oral pharynx with moist mucous membranes, skin normal  Cardio: regular pulse  Pulm: non-labored breathing in room air  Abd: benign  Ext: WWP, no edema in BLE  Neuro/MSK:   Ambulates very slowly with an antalgic gait. Circumduction of the right leg with knee held in extension  Strength in the LUE and LLE are 4+/5 with intermittent poor effort  Strength in the RUE and RLE is generally 4/5 with lots of give-way weakness  Sensation is intact to light touch bilaterally in RUE ane RLE           Laboratory/Imaging:   Brain MRI 9/15/2017     Impression:   Multiple scattered acute to subacute left MCA distribution infarcts  corresponding to regions of hypoattenuation on the prior head CT. No  acute hemorrhage.     CTA 9/15/2017  Impression:      1) Head CT demonstrates evolving acute infarction involving the left  superior frontal gyrus posteriorly, left caudate head, left centrum  semiovale/corona radiata and anterolateral aspect of the anterior limb  of the left internal capsule. No hemorrhage is identified.      2) CT perfusion maps does not demonstrate ischemic penumbra. There is  slight asymmetric delayed MTT and slight asymmetric decreased CBF to  the areas of ischemia including left corona radiata, centrum semiovale  and anterior basal ganglia area.      3) CT angiogram demonstrates a nonocclusive narrowing of the superior  M2 segment of the left medial middle cerebral artery immediately  following the bifurcation. The remainder intracranial arterial  structures and the cervical arterial structures are patent. There is a  6 mm long high density tubular structure in the expected region of the  left MCA, at the " level of known stenosis, this density might represent  the thrombus/ embolus (thought this is much higher density than a  regular thrombus).            Assessment/Plan:   48 year old, right hand dominant male presents with right visual field cut, substantial back pain, fatigue and RUE and RLE weakness, impaired functional mobility, and dependence with ADLs secondary to  left MCA infarction after cardiac surgery.      1. Patient education: In depth discussion and education was provided about the assessment and implications of each of the below recommendations for management. Patient indicated readiness to learn, all questions were answered and understanding of material presented was confirmed.  2. Work-up: None  3. Therapy/equipment/braces: He was referred to [facility] for outpatient physical therapy given his substantial back pain  4. Medications: No new medications  5. Interventions: Pt refused trigger point injections for myofascial pain  6. Follow up: As needed    Indy Pa MD  Physical Medicine & Rehabilitation PGY4      Patient seen and examined with the resident Dr Lea  He was referred to us by Dr Vazquez.     Plan of care:   Patient presents with L MCA stroke as a post op complication of MV/AV repair.   He presents with left upper quadrant field cut, and residual left sided weakness. He is further limited by neck/upper back pain with several areas of tenderness. He notes significant fatigue. His lifestyle is very sedentary at present.   He is here for evaluation of his function. While we do not perform FCE here. We are able to provide functional examination.     On my exam, he presents with modified independence of transfers and mobility without any assistive device.   His gait is slow with variable performance. Exam is consistent with give-way weakness. He likely has incoordination of the left hand.     Offered trigger point injections which he refused. Will refer him to PT to work on  strengthening and ROM as well as myofascial release of the tight muscles in the back.       I spent a total of 60 minutes face-to-face with Harvey Almanzar during today's office visit. Over 50% of this time was spent counseling the patient and/or coordinating care. See note for details.   Monet Tinoco MD, A

## 2018-10-23 NOTE — NURSING NOTE
RX from Dr. Tinoco for physical  therapy was faxed to [facility] [phone] . Pt was given a copy of the orders and will call to schedule therapy.

## 2018-11-02 ENCOUNTER — THERAPY VISIT (OUTPATIENT)
Dept: PHYSICAL THERAPY | Facility: CLINIC | Age: 48
End: 2018-11-02
Payer: COMMERCIAL

## 2018-11-02 DIAGNOSIS — M54.9 UPPER BACK PAIN: Primary | ICD-10-CM

## 2018-11-02 DIAGNOSIS — M54.2 CERVICAL PAIN: ICD-10-CM

## 2018-11-02 PROCEDURE — 97110 THERAPEUTIC EXERCISES: CPT | Mod: GP | Performed by: PHYSICAL THERAPIST

## 2018-11-02 PROCEDURE — 97163 PT EVAL HIGH COMPLEX 45 MIN: CPT | Mod: GP | Performed by: PHYSICAL THERAPIST

## 2018-11-02 PROCEDURE — 97530 THERAPEUTIC ACTIVITIES: CPT | Mod: GP | Performed by: PHYSICAL THERAPIST

## 2018-11-02 NOTE — PROGRESS NOTES
[facility] Initial Evaluation  Physical Therapy Initial Examination/Evaluation    November 2, 2018    Harvey Almanzar  is a 48 year old  male referred to physical therapy by Dr. Tinoco for treatment of neck and upper back pain.      DOI/onset MD order 10/23/2018  Mechanism of injury All of it started about 1 year ago.  I think it was due to the open heart surgery. I was cut open 3 times and it just not healed right.  I feel there are lumps through the chest that just hurt.  I think this is why I have the neck and shoulder pain as well.  I would say back in April, I really started to feel the spasms.    DOS 9/2107 heart surgical intervention  Prior treatment physical therapy. Effect of prior treatment n/a     Chief Complaint:   Lack of upper extremity movement, pain in the neck and shoulders.     Pain location: lower cervical area, bilateral shoulder region   Time of day: evening  Quality: aching  Constant/Intermittent: constant  Symptoms have worsened  since onset.    Current pain 8/10.  Pain at best 5/10.  Pain at worst 9-10/10.    Symptoms aggravated by any lifting, movement of upper extremity- I feel it right into the chest, forward computer posture.    Symptoms improved with rest.     Social history:  Pt lives with his brother in a house.  He will walk for exercise- fatigue due to the heart attack.  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  Job duties:  Small detail - sitting.  Trial of 2-3 hours of work, could not tolerate pain.  Over fatigued, possibly due to stroke.  Home and self cares challenging.    Patient having difficulty with ADLs: lifting, movement.    Patient's goals are improve pain.    Patient reports general health as good.  Related medical history stroke, pain at night, rest, heart problems, significant weakness.    Surgical History:  heart.    Imaging: see chart- none on  cervical spine.    Medications:  Cardiac.       Outcome measure:   NDI  Return to MD:  As needed    Clinical Impression: Harvey presents to [facility] with primary complaint of neck and upper back spasms s/p open heart surgical intervention and stroke affecting his right side.  Per clinical examination, pt demonstrates symptoms consistent with myofascial restrictions, decreased mobility and muscular strength.  Pt will benefit from skilled physical therapy intervention to improve overall pain and functional tolerance.        Subjective:       HPI                    Objective:  Observation:   - demonstrates ability to doff shirt independently with shoulder flexion >120 deg.  Reports back spasm bilaterally   - large keloid incision present anteriorly SCJ to T8 ribs     Posture:   - rounded shoulders, right shoulder slightly elevated.  Mild scapular abduction bilaterally.  Holding head in slight extension as pt reports this helps pain decrease.      Palpation:   - mod-max restrictions with pain through proximal incision. Discussed self management with soft tissue work.           System              Cervical/Thoracic Evaluation    AROM:  AROM Cervical:    Flexion:            26  Extension:       16  Rotation:         Left: 36     Right: 53  Side Bend:      Left: 15     Right:  21                               Shoulder Evaluation:  ROM:  AROM:    Flexion:  Left:  140    Right:  132    Abduction:  Left: 100   Right:  75            Elbow Flexion:  Left:  WFL    Right:  WFL  Elbow Extension:  Left:  WFL   Right:  WFL              Strength:    Flexion: Left:4-/5   Pain:    Right: 3-/5     Pain:     Abduction:  Left: 4+/5  Pain:    Right: 3+/5     Pain:            Elbow Flexion:  Left:4/5     Pain:    Right:3-/5     Pain:  Elbow Extension:  Left:5/5     Pain:    Right:3-/5     Pain:                                           General     ROS    Assessment/Plan:    Patient is a 48 year old male with cervical and thoracic  complaints.    Patient has the following significant findings with corresponding treatment plan.                Diagnosis 1:  Cervical, upper back pain.      Pain -  hot/cold therapy, US, manual therapy, self management, education and home program  Decreased ROM/flexibility - manual therapy and therapeutic exercise  Decreased joint mobility - manual therapy and therapeutic exercise  Decreased strength - therapeutic exercise and therapeutic activities  Impaired muscle performance - neuro re-education  Decreased function - therapeutic activities    Therapy Evaluation Codes:   1) History comprised of:   Personal factors that impact the plan of care:      Overall behavior pattern and Past/current experiences.    Comorbidity factors that impact the plan of care are:       stroke, pain at night, rest, heart problems, significant weakness.     Medications impacting care: Cardiac.  2) Examination of Body Systems comprised of:   Body structures and functions that impact the plan of care:      Cervical spine and Thoracic Spine.   Activity limitations that impact the plan of care are:      Bending, Dressing, Grasping, Lifting, Working and Laying down.  3) Clinical presentation characteristics are:   Stable/Uncomplicated.  4) Decision-Making    High complexity using standardized patient assessment instrument and/or measureable assessment of functional outcome.  Cumulative Therapy Evaluation is: High complexity.    Previous and current functional limitations:  (See Goal Flow Sheet for this information)    Short term and Long term goals: (See Goal Flow Sheet for this information)     Communication ability:  Patient appears to be able to clearly communicate and understand verbal and written communication and follow directions correctly.  Treatment Explanation - The following has been discussed with the patient:   RX ordered/plan of care  Anticipated outcomes  Possible risks and side effects  This patient would benefit from PT  intervention to resume normal activities.   Rehab potential is fair.    Frequency:  2 X week, once daily  Duration:  for 2 months  Discharge Plan:  Achieve all LTG.  Independent in home treatment program.  Reach maximal therapeutic benefit.    Please refer to the daily flowsheet for treatment today, total treatment time and time spent performing 1:1 timed codes.

## 2018-11-02 NOTE — MR AVS SNAPSHOT
After Visit Summary   11/2/2018    Harvey Almanzar    MRN: [id number]           Patient Information     Date Of Birth          1970        Visit Information        Provider Department      11/2/2018 2:40 PM Keri Hernández PT [facility] Physical Therapy        Today's Diagnoses     Upper back pain    -  1    Cervical pain           Follow-ups after your visit        Your next 10 appointments already scheduled     Nov 07, 2018  8:10 AM CST   ADIN Spine with Bonita Sanchez PTA   [facility] Physical Therapy ([facility]  )    [address] [place] [address] [id number] [phone]            Nov 13, 2018  8:50 AM CST   ADIN Spine [facility] Bonita Sanchez PTA   [facility] ([facility]  )    [address] [place] [address] [id number] [phone]            Nov 15, 2018  8:10 AM CST   ADIN Spine with Keri Hernández PT   [facility] Therapy ([facility]  )    [address] [place] [address] [id number] [phone]            Nov 20, 2018  8:50 AM CST   ADIN Spine with Keri Hernández PT   [facility] Therapy ([facility]  )    [address] [place] [address] [id number] [phone]            Nov 27, 2018  8:50 AM CST   ADIN Spine with Keri Hernández PT   [facility] Therapy ([facility]  )    [address] [place] [address] [id number] [phone]            Nov 29, 2018  8:10 AM CST   ADIN Spine with Keri Hernández PT   [facility] Physical Therapy ([facility]  )    [address] [place] [address] [id number] [phone]              Who to contact     If you have questions or need follow up information about today's clinic visit or your schedule please contact [facility] PHYSICAL THERAPY directly at [phone].  Normal or non-critical lab and imaging results will be communicated to you by MyChart, letter or phone within 4 business days after the clinic has received the results. If you do not hear from us within 7 days, please contact the clinic through MyChart or phone. If you have a critical or abnormal lab result, we will notify you by phone as soon as possible.  Submit refill requests through [employer] or call your pharmacy and they will forward the refill request to us. Please allow 3 business days for your refill to be completed.          Additional Information About Your Visit        Care EveryWhere ID     This is your Care EveryWhere ID. This could be used by other organizations to access your [facility] medical records  [id number]         Blood Pressure from Last 3 Encounters:   10/23/18 106/74   10/18/18 101/67   08/21/18 (!) 89/59    Weight from Last 3 Encounters:   10/23/18 83.5 kg (184 lb)   10/18/18 82.6 kg (182 lb)   08/21/18 79.7 kg (175 lb 9.6 oz)              We Performed the Following     ADIN Inital Eval Report     PT Eval, High Complexity (71232)     Therapeutic Activities     Therapeutic Exercises        Primary Care Provider Office Phone # Fax #    Chapito Kaba [phone]       [facility] [address] [place] [address]        Equal Access to Services     TE TEAGUE : Hadii aad ku hadasho Somickie, waaxda luqadaha, qaybta kaalmada anne, lamont poe . So [facility] [phone].    ATENCIÓN: Si habla español, tiene a sorensen disposición servicios gratuitos de asistencia lingüística. Mervat al [phone].    We comply with applicable federal civil rights laws and [place] laws. We do not discriminate on the basis of race, color, national origin, age, disability, sex, sexual orientation, or gender identity.            Thank you!     Thank you for choosing [facility]  for your care. Our goal is always to provide you with excellent care. Hearing back from our patients is one way we can continue to improve our services. Please take a few minutes to complete the written survey that you may receive in the mail after your visit with us. Thank you!             Your Updated Medication List - Protect others around you: Learn how to safely use, store and throw away your medicines at www.disposemymeds.org.          This list is accurate as of 11/2/18 11:59 PM.  Always use your most recent med list.                   Brand Name Dispense Instructions for use Diagnosis    aspirin 81 MG chewable tablet     120 tablet    Take 1 tablet (81 mg) by mouth daily    S/P AVR (aortic valve replacement), Hx of mitral valve replacement with mechanical valve       gabapentin 300 MG capsule    NEURONTIN     TAKE 1 CAPSULE BY MOUTH DAILY FOR 1 WEEK THEN TAKE TWICE DAILY FOR 1 WEEK THEN TAKE THREE TIMES DAILY THEREAFTER        lisinopril 10 MG tablet    PRINIVIL/ZESTRIL    90 tablet    Take 1 tablet (10 mg) by mouth daily    S/P AVR (aortic valve replacement)       melatonin 3 MG tablet     30 tablet    Take 1 tablet (3 mg) by mouth nightly as needed for sleep    S/P AVR (aortic valve replacement), Hx of mitral valve replacement with mechanical valve       order for DME     1 each    Equipment being ordered: Walker Wheels () Treatment Diagnosis: Gait instability    Acute systolic congestive heart failure (H), Endocarditis of aortic valve       * rosuvastatin 10 MG tablet    CRESTOR    30 tablet    Take 1 tablet (10 mg) by mouth daily    Acute ischemic left MCA stroke (H)       * rosuvastatin 20 MG tablet    CRESTOR          senna-docusate 8.6-50 MG per tablet    SENOKOT-S;PERICOLACE    50 tablet    Take 1-2 tablets by mouth 2 times daily as needed (constipation )    Acute post-operative pain       SUMAtriptan 50 MG tablet    IMITREX     Take one tablet by mouth at  onset of headache. May repeat one tablet after 2 hours if headache recurs.        * warfarin 2 MG tablet    COUMADIN    120 tablet    Take 3 mg (1 tablet) daily until next INR check, next INR draw Thursday 4/12. Then dose per goal 2.5-3.5    Hx of mitral valve replacement with mechanical valve, S/P AVR (aortic valve replacement)       * warfarin 2 MG tablet    COUMADIN     Take by mouth, 2 mg (2 mg x 1) on Fri; 3 mg (2 mg x 1.5) all other days or as directed by INR Nurse        * Notice:  This list has 4 medication(s) that are the same as other medications prescribed for you. Read the directions carefully, and ask your doctor or other care provider to review them with you.

## 2018-11-05 ENCOUNTER — THERAPY VISIT (OUTPATIENT)
Dept: PHYSICAL THERAPY | Facility: CLINIC | Age: 48
End: 2018-11-05
Payer: COMMERCIAL

## 2018-11-05 DIAGNOSIS — M54.9 UPPER BACK PAIN: ICD-10-CM

## 2018-11-05 DIAGNOSIS — M54.2 CERVICAL PAIN: ICD-10-CM

## 2018-11-05 PROCEDURE — 97140 MANUAL THERAPY 1/> REGIONS: CPT | Mod: GP

## 2018-11-05 PROCEDURE — 97110 THERAPEUTIC EXERCISES: CPT | Mod: GP

## 2018-11-07 ENCOUNTER — THERAPY VISIT (OUTPATIENT)
Dept: PHYSICAL THERAPY | Facility: CLINIC | Age: 48
End: 2018-11-07
Payer: COMMERCIAL

## 2018-11-07 DIAGNOSIS — M54.9 UPPER BACK PAIN: ICD-10-CM

## 2018-11-07 DIAGNOSIS — M54.2 CERVICAL PAIN: ICD-10-CM

## 2018-11-07 PROCEDURE — 97112 NEUROMUSCULAR REEDUCATION: CPT | Mod: GP

## 2018-11-07 PROCEDURE — 97110 THERAPEUTIC EXERCISES: CPT | Mod: GP

## 2018-11-07 PROCEDURE — 97140 MANUAL THERAPY 1/> REGIONS: CPT | Mod: GP

## 2018-11-13 ENCOUNTER — THERAPY VISIT (OUTPATIENT)
Dept: PHYSICAL THERAPY | Facility: CLINIC | Age: 48
End: 2018-11-13
Payer: COMMERCIAL

## 2018-11-13 DIAGNOSIS — M54.2 CERVICAL PAIN: ICD-10-CM

## 2018-11-13 DIAGNOSIS — M54.9 UPPER BACK PAIN: ICD-10-CM

## 2018-11-13 PROCEDURE — 97140 MANUAL THERAPY 1/> REGIONS: CPT | Mod: GP

## 2018-11-13 PROCEDURE — 97112 NEUROMUSCULAR REEDUCATION: CPT | Mod: GP

## 2018-11-13 PROCEDURE — 97110 THERAPEUTIC EXERCISES: CPT | Mod: GP

## 2018-11-13 NOTE — MR AVS SNAPSHOT
After Visit Summary   11/13/2018    Harvey Almanzar    MRN: [id number]           Patient Information     Date Of Birth          1970        Visit Information        Provider Department      11/13/2018 8:50 AM [facility] PTA [facility]        Today's Diagnoses     Upper back pain        Cervical pain           Follow-ups after your visit        Your next 10 appointments already scheduled     Nov 15, 2018  8:10 AM CST   ADIN Spine with Keri Hernández PT   [facility] Physical Therapy ([facility]  )    [address] [place] [id number] [phone]            Nov 20, 2018  8:50 AM CST   ADIN Spine with Keri Hernández PT   [facility] Physical Therapy ([facility]  )    [address] [place] [id number] [phone]            Nov 27, 2018  8:50 AM CST   ADIN Spine with Keri Hernández PT   [facility] Physical Therapy ([facility]  )    [address] [place] [id number] [phone]            Nov 29, 2018  8:10 AM CST   ADIN Spine with Keri Hernández PT   [facility] Physical Therapy ([address] [address] [phone]              Who to contact     If you have questions or need follow up information about today's clinic visit or your schedule please contact [facility] directly at [phone].  Normal or non-critical lab and imaging results will be communicated to you by MyChart, letter or phone within 4 business days after the clinic has received the results. If you do not hear from us within 7 days, please contact the clinic through MyChart or phone. If you have a critical or abnormal lab result, we will notify you by phone as soon as possible.  Submit refill  requests through [employer] or call your pharmacy and they will forward the refill request to us. Please allow 3 business days for your refill to be completed.          Additional Information About Your Visit        Care EveryWhere ID     This is your Care EveryWhere ID. This could be used by other organizations to access your [place] medical records  [id number]         Blood Pressure from Last 3 Encounters:   10/23/18 106/74   10/18/18 101/67   08/21/18 (!) 89/59    Weight from Last 3 Encounters:   10/23/18 83.5 kg (184 lb)   10/18/18 82.6 kg (182 lb)   08/21/18 79.7 kg (175 lb 9.6 oz)              We Performed the Following     Manual Ther Tech, 1+Regions, EA 15 min     Neuromuscular Re-Education     Therapeutic Exercises        Primary Care Provider Office Phone # Fax #    Chapito Kaba [phone]       [address] [address]        Equal Access to Services     TE TEAGUE : Hadii aad ku hadasho Soomaali, waaxda luqadaha, qaybta kaalmada adeegyada, waxay idiin hayparrishn alix poe . So [facility] [phone].    ATENCIÓN: Si sheryl miller, tiene a sorensen disposición servicios gratuitos de asistencia lingüística. Llame al [phone].    We comply with applicable federal civil rights laws and [place] laws. We do not discriminate on the basis of race, color, national origin, age, disability, sex, sexual orientation, or gender identity.            Thank you!     Thank you for choosing [facility] PH[facility]ICAL THERAP[facility]  for your care. Our goal is always to provide you with excellent care. Hearing back from our patients is one way we can continue to improve our services. Please take a few minutes to complete the written survey that you may receive in the mail after your visit with us. Thank you!             Your Updated Medication List - Protect others around you: Learn how to safely use, store and throw away your medicines at  www.disposemymeds.org.          This list is accurate as of 11/13/18 10:16 AM.  Always use your most recent med list.                   Brand Name Dispense Instructions for use Diagnosis    aspirin 81 MG chewable tablet     120 tablet    Take 1 tablet (81 mg) by mouth daily    S/P AVR (aortic valve replacement), Hx of mitral valve replacement with mechanical valve       gabapentin 300 MG capsule    NEURONTIN     TAKE 1 CAPSULE BY MOUTH DAILY FOR 1 WEEK THEN TAKE TWICE DAILY FOR 1 WEEK THEN TAKE THREE TIMES DAILY THEREAFTER        lisinopril 10 MG tablet    PRINIVIL/ZESTRIL    90 tablet    Take 1 tablet (10 mg) by mouth daily    S/P AVR (aortic valve replacement)       melatonin 3 MG tablet     30 tablet    Take 1 tablet (3 mg) by mouth nightly as needed for sleep    S/P AVR (aortic valve replacement), Hx of mitral valve replacement with mechanical valve       order for DME     1 each    Equipment being ordered: Walker Wheels () Treatment Diagnosis: Gait instability    Acute systolic congestive heart failure (H), Endocarditis of aortic valve       * rosuvastatin 10 MG tablet    CRESTOR    30 tablet    Take 1 tablet (10 mg) by mouth daily    Acute ischemic left MCA stroke (H)       * rosuvastatin 20 MG tablet    CRESTOR          senna-docusate 8.6-50 MG per tablet    SENOKOT-S;PERICOLACE    50 tablet    Take 1-2 tablets by mouth 2 times daily as needed (constipation )    Acute post-operative pain       SUMAtriptan 50 MG tablet    IMITREX     Take one tablet by mouth at onset of headache. May repeat one tablet after 2 hours if headache recurs.        * warfarin 2 MG tablet    COUMADIN    120 tablet    Take 3 mg (1 tablet) daily until next INR check, next INR draw Thursday 4/12. Then dose per goal 2.5-3.5    Hx of mitral valve replacement with mechanical valve, S/P AVR (aortic valve replacement)       * warfarin 2 MG tablet    COUMADIN     Take by mouth, 2 mg (2 mg x 1) on Fri; 3 mg (2 mg x 1.5) all other days or  as directed by INR Nurse        * Notice:  This list has 4 medication(s) that are the same as other medications prescribed for you. Read the directions carefully, and ask your doctor or other care provider to review them with you.

## 2018-11-13 NOTE — PROGRESS NOTES
Subjective:  HPI                    Objective:  System    Physical Exam    General     ROS    Assessment/Plan:    SUBJECTIVE  Subjective changes as noted by pt:   Pt reports that scapular R>L spasm will still kick in at any time depending on how he moves, no change noted.    Current pain level: No change     Changes in function:  Yes (See Goal flowsheet attached for changes in current functional level)     Adverse reaction to treatment or activity:  None    OBJECTIVE  Changes in objective findings:  Yes,   Objective: Pt limits self to lifting up to 10#, any more inc feeling of instability at costochondral region of sternal incision. Sig weakness R UE, poor resistance against manual pressure to forearm while supine, elbow bent 90. Needs verbal and tactile cues for proper scapular retraction  in bent over position. Demonstrates full pec flexibilty supine and standing 90-90 position in doorway. Mod+ tender with min TP at R inf scap border.        ASSESSMENT  [place] continues to require intervention to meet STG and LTG's: PT  Patient is progressing as expected.  Response to therapy has shown an improvement in  ROM , muscle control and posture  Progress made towards STG/LTG?  Yes (See Goal flowsheet attached for updates on achievement of STG and LTG)    PLAN  Current treatment program is being advanced to more complex exercises.    PTA/ATC plan:  Will continue with present plan of care.    Please refer to the daily flowsheet for treatment today, total treatment time and time spent performing 1:1 timed codes.

## 2018-11-15 ENCOUNTER — THERAPY VISIT (OUTPATIENT)
Dept: PHYSICAL THERAPY | Facility: CLINIC | Age: 48
End: 2018-11-15
Payer: COMMERCIAL

## 2018-11-15 DIAGNOSIS — M54.2 CERVICAL PAIN: ICD-10-CM

## 2018-11-15 DIAGNOSIS — M54.9 UPPER BACK PAIN: ICD-10-CM

## 2018-11-15 PROCEDURE — 97112 NEUROMUSCULAR REEDUCATION: CPT | Mod: GP | Performed by: PHYSICAL THERAPIST

## 2018-11-15 PROCEDURE — 97110 THERAPEUTIC EXERCISES: CPT | Mod: GP | Performed by: PHYSICAL THERAPIST

## 2018-11-15 PROCEDURE — 97140 MANUAL THERAPY 1/> REGIONS: CPT | Mod: GP | Performed by: PHYSICAL THERAPIST

## 2018-11-27 ENCOUNTER — THERAPY VISIT (OUTPATIENT)
Dept: PHYSICAL THERAPY | Facility: CLINIC | Age: 48
End: 2018-11-27
Payer: COMMERCIAL

## 2018-11-27 DIAGNOSIS — M54.2 CERVICAL PAIN: ICD-10-CM

## 2018-11-27 DIAGNOSIS — M54.9 UPPER BACK PAIN: ICD-10-CM

## 2018-11-27 PROCEDURE — 97112 NEUROMUSCULAR REEDUCATION: CPT | Mod: GP | Performed by: PHYSICAL THERAPIST

## 2018-11-27 PROCEDURE — 97140 MANUAL THERAPY 1/> REGIONS: CPT | Mod: GP | Performed by: PHYSICAL THERAPIST

## 2018-11-27 PROCEDURE — 97110 THERAPEUTIC EXERCISES: CPT | Mod: GP | Performed by: PHYSICAL THERAPIST

## 2018-11-29 ENCOUNTER — THERAPY VISIT (OUTPATIENT)
Dept: PHYSICAL THERAPY | Facility: CLINIC | Age: 48
End: 2018-11-29
Payer: COMMERCIAL

## 2018-11-29 DIAGNOSIS — M54.2 CERVICAL PAIN: ICD-10-CM

## 2018-11-29 DIAGNOSIS — M54.9 UPPER BACK PAIN: ICD-10-CM

## 2018-11-29 PROCEDURE — 97140 MANUAL THERAPY 1/> REGIONS: CPT | Mod: GP | Performed by: PHYSICAL THERAPIST

## 2018-11-29 PROCEDURE — 97112 NEUROMUSCULAR REEDUCATION: CPT | Mod: GP | Performed by: PHYSICAL THERAPIST

## 2018-11-29 PROCEDURE — 97110 THERAPEUTIC EXERCISES: CPT | Mod: GP | Performed by: PHYSICAL THERAPIST

## 2018-12-02 DIAGNOSIS — Z95.2 S/P AORTIC VALVE REPLACEMENT: Primary | ICD-10-CM

## 2018-12-05 ENCOUNTER — THERAPY VISIT (OUTPATIENT)
Dept: PHYSICAL THERAPY | Facility: CLINIC | Age: 48
End: 2018-12-05
Payer: COMMERCIAL

## 2018-12-05 DIAGNOSIS — M54.2 CERVICAL PAIN: ICD-10-CM

## 2018-12-05 DIAGNOSIS — M54.9 UPPER BACK PAIN: ICD-10-CM

## 2018-12-05 PROCEDURE — 97110 THERAPEUTIC EXERCISES: CPT | Mod: GP

## 2018-12-05 PROCEDURE — 97112 NEUROMUSCULAR REEDUCATION: CPT | Mod: GP

## 2018-12-05 NOTE — MR AVS SNAPSHOT
After Visit Summary   12/5/2018    Harvey Almanzar    MRN: [id number]           Patient Information     Date Of Birth          1970        Visit Information        Provider Department      12/5/2018 8:10 AM Bonita Sanchez PTA [facility] Athletic [facility] Physical Therapy        Today's Diagnoses     Upper back pain        Cervical pain           Follow-ups after your visit        Your next 10 appointments already scheduled     Dec 13, 2018  8:10 AM CST   ADIN Spine with Keri Hernández PT   [facility] Athletic [facility] Physical Therapy ([facility]  )    [address] [place] [id number] [phone]              Who to contact     If you have questions or need follow up information about today's clinic visit or your schedule please contact [facility] ATHLETIC [facility] PHYSICAL [facility] directly at [phone].  Normal or non-critical lab and imaging results will be communicated to you by MyChart, letter or phone within 4 business days after the clinic has received the results. If you do not hear from us within 7 days, please contact the clinic through MyChart or phone. If you have a critical or abnormal lab result, we will notify you by phone as soon as possible.  Submit refill requests through [employer] or call your pharmacy and they will forward the refill request to us. Please allow 3 business days for your refill to be completed.          Additional Information About Your Visit        Care EveryWhere ID     This is your Care EveryWhere ID. This could be used by other organizations to access your [facility] medical records  [id number]         Blood Pressure from Last 3 Encounters:   10/23/18 106/74   10/18/18 101/67   08/21/18 (!) 89/59    Weight from Last 3 Encounters:   10/23/18 83.5 kg (184 lb)   10/18/18 82.6 kg (182 lb)   08/21/18 79.7 kg (175 lb 9.6 oz)              We Performed the Following     Neuromuscular Re-Education      Therapeutic Exercises        Primary Care Provider Office Phone # Fax #    Chapito Kaba [phone]       [facility] [address] [facility] [address]        Equal Access to Services     TE TEAGUE : Hadii aad ku hadnaeo Soomaali, waaxda luqadaha, qaybta kaalmada adeserjioda, lamont lopez laAutumnkatrina turner. So [facility] [phone].    ATENCIÓN: Si habla español, tiene a sorensen disposición servicios gratuitos de asistencia lingüística. Llame al [phone].    We comply with applicable federal civil rights laws and [place] laws. We do not discriminate on the basis of race, color, national origin, age, disability, sex, sexual orientation, or gender identity.            Thank you!     Thank you for [facility]  for your care. Our goal is always to provide you with excellent care. Hearing back from our patients is one way we can continue to improve our services. Please take a few minutes to complete the written survey that you may receive in the mail after your visit with us. Thank you!             Your Updated Medication List - Protect others around you: Learn how to safely use, store and throw away your medicines at www.disposemymeds.org.          This list is accurate as of 12/5/18  9:02 AM.  Always use your most recent med list.                   Brand Name Dispense Instructions for use Diagnosis    aspirin 81 MG chewable tablet    ASA    120 tablet    Take 1 tablet (81 mg) by mouth daily    S/P AVR (aortic valve replacement), Hx of mitral valve replacement with mechanical valve       gabapentin 300 MG capsule    NEURONTIN     TAKE 1 CAPSULE BY MOUTH DAILY FOR 1 WEEK THEN TAKE TWICE DAILY FOR 1 WEEK THEN TAKE THREE TIMES DAILY THEREAFTER        lisinopril 10 MG tablet    PRINIVIL/ZESTRIL    90 tablet    Take 1 tablet (10 mg) by mouth daily    S/P AVR (aortic valve replacement)       melatonin 3 MG tablet     30  tablet    Take 1 tablet (3 mg) by mouth nightly as needed for sleep    S/P AVR (aortic valve replacement), Hx of mitral valve replacement with mechanical valve       order for DME     1 each    Equipment being ordered: Walker Wheels () Treatment Diagnosis: Gait instability    Acute systolic congestive heart failure (H), Endocarditis of aortic valve       * rosuvastatin 10 MG tablet    CRESTOR    30 tablet    Take 1 tablet (10 mg) by mouth daily    Acute ischemic left MCA stroke (H)       * rosuvastatin 20 MG tablet    CRESTOR          senna-docusate 8.6-50 MG tablet    SENOKOT-S/PERICOLACE    50 tablet    Take 1-2 tablets by mouth 2 times daily as needed (constipation )    Acute post-operative pain       SUMAtriptan 50 MG tablet    IMITREX     Take one tablet by mouth at onset of headache. May repeat one tablet after 2 hours if headache recurs.        * warfarin 2 MG tablet    COUMADIN    120 tablet    Take 3 mg (1 tablet) daily until next INR check, next INR draw Thursday 4/12. Then dose per goal 2.5-3.5    Hx of mitral valve replacement with mechanical valve, S/P AVR (aortic valve replacement)       * warfarin 2 MG tablet    COUMADIN     Take by mouth, 2 mg (2 mg x 1) on Fri; 3 mg (2 mg x 1.5) all other days or as directed by INR Nurse        * Notice:  This list has 4 medication(s) that are the same as other medications prescribed for you. Read the directions carefully, and ask your doctor or other care provider to review them with you.

## 2018-12-05 NOTE — PROGRESS NOTES
Subjective:  HPI                    Objective:  System    Physical Exam    General     ROS    Assessment/Plan:    SUBJECTIVE  Subjective changes as noted by pt:   Pt reports that upper back spasms continue but are less frequent. Continues to talk about heart surgery, ramifications of stroke.    Current pain level: No change     Changes in function:  Yes (See Goal flowsheet attached for changes in current functional level)     Adverse reaction to treatment or activity:  None    OBJECTIVE  Changes in objective findings:  Yes,   Objective: Pt able to complete ex as requested very slowly. Weak  R causes difficulty holding handle of tubing and weights. Demonstrates full shldr AROM and trunk mobility. No episodes of cramping during session. OVeral deconditioned     ASSESSMENT  Mouachee continues to require intervention to meet STG and LTG's: PT  Patient is progressing slower than expected.  Response to therapy has shown an improvement in  ROM  and muscle control  Progress made towards STG/LTG?  Yes (See Goal flowsheet attached for updates on achievement of STG and LTG)    PLAN  Continue current treatment plan until patient demonstrates readiness to progress to higher level exercises.    PTA/ATC plan:  Will continue with present plan of care.    Please refer to the daily flowsheet for treatment today, total treatment time and time spent performing 1:1 timed codes.

## 2018-12-13 ENCOUNTER — THERAPY VISIT (OUTPATIENT)
Dept: PHYSICAL THERAPY | Facility: CLINIC | Age: 48
End: 2018-12-13
Payer: COMMERCIAL

## 2018-12-13 DIAGNOSIS — M54.9 UPPER BACK PAIN: ICD-10-CM

## 2018-12-13 DIAGNOSIS — M54.2 CERVICAL PAIN: ICD-10-CM

## 2018-12-13 PROCEDURE — 97112 NEUROMUSCULAR REEDUCATION: CPT | Mod: GP | Performed by: PHYSICAL THERAPIST

## 2018-12-13 PROCEDURE — 97110 THERAPEUTIC EXERCISES: CPT | Mod: GP | Performed by: PHYSICAL THERAPIST

## 2018-12-13 NOTE — PROGRESS NOTES
Subjective:  HPI                    Objective:  System    Physical Exam    General     ROS    Assessment/Plan:    PROGRESS  REPORT    Progress reporting period is from 12/13/2018.       SUBJECTIVE  The pain continues off and on.  I feel it on the right side.   It helps to stretch when it comes on and then it will stay for awhile.  I think it will be something that is there all of the time and will get worse if I don't move.      Changes in function:  Yes (See Goal flowsheet attached for changes in current functional level)   Adverse reaction to treatment or activity: None    OBJECTIVE  Changes noted in objective findings:  Date of service: 12/13/2018  - Pt fatigued with UE strengthening post treatment.  Encouraged neutral postion of cervical spine throughout treatment.  TTP R TP T9, slightly lower than previously.      - Encouraged use of foam roller for stretching, mobility and soft tissue management.  Reinforced activity and HEP compliance.  Pt to work independently per discussion following treatment today.          ASSESSMENT/PLAN  Updated problem list and treatment plan: Diagnosis 1:  Upper back pain     Pain -  hot/cold therapy, manual therapy, self management, education and home program  Decreased strength - therapeutic exercise and therapeutic activities  Decreased proprioception - neuro re-education and therapeutic activities  Decreased function - therapeutic activities  Impaired posture - neuro re-education  STG/LTGs have been met or progress has been made towards goals:  Yes (See Goal flow sheet completed today.)  Assessment of Progress: The patient's condition is improving.  The patient's condition has potential to improve.  Self Management Plans:  Patient has been instructed in a home treatment program.  Patient  has been instructed in self management of symptoms.  I have re-evaluated this patient and find that the nature, scope, duration and intensity of the therapy is appropriate for the medical  condition of the patient.  Harvey continues to require the following intervention to meet STG and LTG's:  PT     Recommendations:  This patient would benefit from continued therapy.     Frequency:  1 visit   Duration:  Over the next 2 months to follow up on self management strategies.  Discussed alternate care options to supplement home program including: chiropractor, acupuncture services.          Please refer to the daily flowsheet for treatment today, total treatment time and time spent performing 1:1 timed codes.

## 2019-07-25 NOTE — PROGRESS NOTES
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  Non-rheumatic mitral regurgitation  Endocarditis of aortic valve    Subjective: up in a chair, awake and alert, improved mobility on right side    Objective:  Temp:  [97.6  F (36.4  C)-98.4  F (36.9  C)] 98.4  F (36.9  C)  Heart Rate:  [64-85] 76  Resp:  [16-20] 20  BP: ()/(59-96) 119/83  MAP:  [79 mmHg-132 mmHg] 87 mmHg  Arterial Line BP: ()/() 116/67  SpO2:  [90 %-100 %] 95 %    Ventilation Mode: CPAP/PS  FiO2 (%): 40 %  Rate Set (breaths/minute): 16 breaths/min  Tidal Volume Set (mL): 450 mL  PEEP (cm H2O): 5 cmH2O  Pressure Support (cm H2O): 10 cmH2O  Oxygen Concentration (%): 40 %  Resp: 20    I/O last 3 completed shifts:  In: 2579.63 [I.V.:884.63; NG/GT:545]  Out: 1715 [Urine:1525; Chest Tube:190]    PE:  General: awake, alert, NAD  Neck: Supple, no tracheal deviation  Cardiovascular: RRR  Pumonary: Non labored breathing on nasal cannula  Abdomen: Soft, non distended, non tender   Ext: W/o edema, warm  Neuro: follows commands, R sided weakness     BMP    Recent Labs  Lab 09/25/17  0344 09/24/17  1550 09/24/17 0410 09/23/17 2056   * 151* 150* 148*   POTASSIUM 4.3 4.5 4.2 4.1   CHLORIDE 120* 118* 117* 114*   CO2 29 28 28 29   BUN 26 34* 35* 46*   CR 0.66 0.75 0.82 0.91   * 152* 157* 141*   MAG 2.5* 2.6* 2.7* 2.8*   PHOS 1.8* 3.2 1.6* 1.7*     CBC    Recent Labs  Lab 09/25/17  0344 09/24/17  0410 09/23/17 2056 09/23/17  0810   WBC 9.1 10.0 9.4 8.2   HGB 9.8* 9.2* 8.9* 7.7*   PLT 53* 54* 45* 45*     INR/PTT    Recent Labs  Lab 09/25/17  0344 09/24/17  0410 09/23/17  0344 09/22/17  0425 09/21/17  1728 09/21/17  1620 09/21/17  1503 09/21/17  1326   INR 3.29* 2.45* 2.01* 1.62* 1.18* 1.04 2.48* 5.83*   PTT  --   --   --   --  48* 48* 57* >240*     ABG    Recent Labs  Lab 09/24/17  1550 09/24/17  1110 09/24/17  0940 09/24/17  0402   PH 7.45 7.47* 7.46* 7.52*   PCO2 41 39 42  To Dr. 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   Neuro: pain control prn, no intervention from neurology  Resp: extubate, pulmonary toilet, continue chest tubes today  CV: no pressor needs, cap pacer wires   GI/FEN: LFTs and bilirubin stable / down-trending, continue to monitor, assess for swallow with SLP when extubated given stroke  Renal: creatinine normal, give lasix today  Endo: Insulin sliding scale  ID: on merropenem per ID recommendations for E coli on mitral valve  Heme: HIT labs still pending, holding heparin, INR to dose based on chromogenic factor X  Prophylaxis: Protonix, hold heparin for HIT concerns  Lines: d/c art line and central line  Dispo: transfer to  if approved by neurology    Tramaine Holt MD  Surgery PGY-3

## 2022-08-24 ENCOUNTER — HOSPITAL ENCOUNTER (EMERGENCY)
Facility: HOSPITAL | Age: 52
Discharge: HOME | End: 2022-08-24
Admitting: STUDENT IN AN ORGANIZED HEALTH CARE EDUCATION/TRAINING PROGRAM
Payer: MEDICARE

## 2022-08-24 VITALS
OXYGEN SATURATION: 99 % | HEART RATE: 80 BPM | WEIGHT: 163 LBS | SYSTOLIC BLOOD PRESSURE: 133 MMHG | DIASTOLIC BLOOD PRESSURE: 77 MMHG | TEMPERATURE: 97.8 F

## 2022-08-24 DIAGNOSIS — Z51.89 VISIT FOR WOUND CHECK: ICD-10-CM

## 2022-08-24 LAB
ERYTHROCYTE [DISTWIDTH] IN BLOOD BY AUTOMATED COUNT: 14.6 % (ref 10–15)
HCT VFR BLD AUTO: 39.4 % (ref 40–53)
HGB BLD-MCNC: 12.9 G/DL (ref 13.3–17.7)
HOLD SPECIMEN: NORMAL
INR PPP: 3.43 (ref 0.85–1.15)
MCH RBC QN AUTO: 32.1 PG (ref 26.5–33)
MCHC RBC AUTO-ENTMCNC: 32.7 G/DL (ref 31.5–36.5)
MCV RBC AUTO: 98 FL (ref 78–100)
PLATELET # BLD AUTO: 220 10E3/UL (ref 150–450)
RBC # BLD AUTO: 4.02 10E6/UL (ref 4.4–5.9)
WBC # BLD AUTO: 5.4 10E3/UL (ref 4–11)

## 2022-08-24 PROCEDURE — 99283 EMERGENCY DEPT VISIT LOW MDM: CPT

## 2022-08-24 PROCEDURE — 36415 COLL VENOUS BLD VENIPUNCTURE: CPT | Performed by: STUDENT IN AN ORGANIZED HEALTH CARE EDUCATION/TRAINING PROGRAM

## 2022-08-24 PROCEDURE — 250N000009 HC RX 250: Performed by: STUDENT IN AN ORGANIZED HEALTH CARE EDUCATION/TRAINING PROGRAM

## 2022-08-24 PROCEDURE — 85027 COMPLETE CBC AUTOMATED: CPT | Performed by: STUDENT IN AN ORGANIZED HEALTH CARE EDUCATION/TRAINING PROGRAM

## 2022-08-24 PROCEDURE — 85610 PROTHROMBIN TIME: CPT | Performed by: STUDENT IN AN ORGANIZED HEALTH CARE EDUCATION/TRAINING PROGRAM

## 2022-08-24 RX ORDER — TRANEXAMIC ACID 100 MG/ML
INJECTION, SOLUTION INTRAVENOUS ONCE
Status: COMPLETED | OUTPATIENT
Start: 2022-08-24 | End: 2022-08-24

## 2022-08-24 RX ADMIN — TRANEXAMIC ACID 1000 MG: 1 INJECTION, SOLUTION INTRAVENOUS at 14:55

## 2022-08-24 ASSESSMENT — ENCOUNTER SYMPTOMS
NUMBNESS: 0
FEVER: 0
COLOR CHANGE: 0
WOUND: 1

## 2022-08-24 NOTE — ED PROVIDER NOTES
EMERGENCY DEPARTMENT ENCOUNTER      NAME: Lara Almanzar  AGE: 52 year old male  YOB: 1970  MRN: [id number]  EVALUATION DATE & TIME: No admission date for patient encounter.    PCP: No primary care provider on file.    ED PROVIDER: Gerald Piper PA-C      Chief Complaint   Patient presents with     Wound Check     FINAL IMPRESSION:  1. Visit for wound check      ED COURSE & MEDICAL DECISION MAKING:    Pertinent Labs & Imaging studies reviewed. (See chart for details)  3:00 PM I met the patient and performed my initial interview and exam.   4:02 PM Patient seen and reevaluated, appears bleeding is better controlled. Redressed the patients wound, sent with pressure dressings to continue at home.     52 year old male presents to the Emergency Department for evaluation of right hand laceration, wound check, oozing blood.     ED Course as of 08/24/22 1605   Wed Aug 24, 2022   1503 Is a 52-year-old male, presents emergency department for evaluation of right thumb laceration that keeps oozing.  Patient is anticoagulated on warfarin, last INR was noted to be 3.  He had a laceration repaired at [facility] on Sunday, 3 sutures in his thumb, since then he has had a difficult time controlling the bleeding.  No other acute abnormalities, no fever, no evidence of any infection.  Patient notes that he has been changing the bandage multiple times, has not been able to get the bleeding to stop.  Does not appear to be a quick bleed, however has been slowly oozing.  Patient had just had the wound wrapped in a pressure dressing, with some TXA soaked gauze placed over the area, will keep this in place for approximately an hour, if this controls bleeding, we will plan to discharge him home with a pressure dressing.  No fevers or infectious symptoms, no other indication for further work-up.  Labs were ordered prior to my initial evaluation, will evaluate his hemoglobin, as well as his INR to make sure he is not  anemic, however I think is unlikely.  No chest pain or shortness of breath.  No other acute abnormalities on examination.  Bleeding is well controlled, the patient would likely discharge in approximately an hour.  Patient is agreeable to this plan.   1604 Seen and reevaluated, INR 3.  4.  Recommend he follows up with his outpatient provider who is giving his warfarin doses.  Evaluated his wound, the bleeding does seem to be well controlled.  Put a pressure dressing in place, recommend he keeps it in place for 24hrs. she is agreeable to this plan.  Wound otherwise appears intact, no other acute findings.  Plan for discharge at this time, recommend follow-up with outpatient provider, recommend follow-up for outpatient suture removal.        At the conclusion of the encounter I discussed the results of all of the tests and the disposition. The questions were answered. The patient or family acknowledged understanding and was agreeable with the care plan.     0 minutes of critical care time     MEDICATIONS GIVEN IN THE EMERGENCY:  Medications   tranexamic acid (CYKLOKAPRON) TOPICAL (injection used topically) (1,000 mg Topical Incomplete 8/24/22 1455)       NEW PRESCRIPTIONS STARTED AT TODAY'S ER VISIT  New Prescriptions    No medications on file     =================================================================    HPI    Patient information was obtained from: the patient     Use of : N/A    Lara Anibal Almanzar is a 52 year old year old male with no pertinent medical history who presents to the ED by personal vehicle for the evaluation of wound check.    Per chart review, the patient presented to [facility] on Sunday 8/21/22.  He underwent a laceration repair and was discharged home.  The patient presented to [facility] again on 8/23 for wound check with intermittent bleeding after laceration repair, but left without being seen.     The patient presents to the ED reporting he presented to [facility] ED on  Sunday for a laceration to his right base of his thumb.  He received 3 stitches and was discharged home.  Since then, he has been pain free without issues with ROM, but blood has continued to leak out of the wound.     The patient denies fever, redness to the area, and any other symptoms or complaints at this time.       REVIEW OF SYSTEMS   Review of Systems   Constitutional: Negative for fever.   Musculoskeletal:        Negative for limited ROM to right thumb.    Skin: Positive for wound (right base of thumb - leaking blood - without pain). Negative for color change (redness around wound).   All other systems reviewed and are negative.    PAST MEDICAL HISTORY:  No past medical history on file.    PAST SURGICAL HISTORY:  No past surgical history on file.        CURRENT MEDICATIONS:    No current outpatient medications on file.       ALLERGIES:  No Known Allergies    FAMILY HISTORY:  No family history on file.    SOCIAL HISTORY:        VITALS:  /77   Pulse 80   Temp 97.8  F (36.6  C) (Tympanic)   Wt 73.9 kg (163 lb)   SpO2 99%     PHYSICAL EXAM    Physical Exam  Vitals and nursing note reviewed.   Constitutional:       General: He is not in acute distress.     Appearance: Normal appearance. He is normal weight. He is not toxic-appearing or diaphoretic.   HENT:      Right Ear: External ear normal.      Left Ear: External ear normal.   Eyes:      Conjunctiva/sclera: Conjunctivae normal.   Cardiovascular:      Rate and Rhythm: Normal rate and regular rhythm.   Pulmonary:      Effort: Pulmonary effort is normal.   Musculoskeletal:         General: Signs of injury present. No swelling, tenderness or deformity. Normal range of motion.      Cervical back: Normal range of motion.   Skin:     Comments: Laceration over the base of the right thumb, sutures in place. Mild blood oozing, but better controlled after TXA.    Neurological:      Mental Status: He is alert. Mental status is at baseline.      Sensory: No  sensory deficit.      Motor: No weakness.         LAB:  All pertinent labs reviewed and interpreted.  Labs Ordered and Resulted from Time of ED Arrival to Time of ED Departure   INR - Abnormal       Result Value    INR 3.43 (*)    CBC WITH PLATELETS - Abnormal    WBC Count 5.4      RBC Count 4.02 (*)     Hemoglobin 12.9 (*)     Hematocrit 39.4 (*)     MCV 98      MCH 32.1      MCHC 32.7      RDW 14.6      Platelet Count 220         RADIOLOGY:  Reviewed all pertinent imaging. Please see official radiology report.  No orders to display       PROCEDURES:   None.     I, Gage Johnston, am serving as a scribe to document services personally performed by Gerald Piper PA-C, based on my observation and the provider's statements to me. I, Gerald Piper PA-C, attest that Gage Johnston is acting in a scribe capacity, has observed my performance of the services and has documented them in accordance with my direction.    Gerald Piper PA-C  Emergency Medicine  [facility] EMERGENCY DEPARTMENT  [facility] [facility] [address]  [phone]  Dept: [phone]     Gerald Piper PA-C  08/24/22 7150

## 2022-08-24 NOTE — DISCHARGE INSTRUCTIONS
He was seen here in the emergency department for evaluation of wound check.  The bleeding is likely secondary to her anticoagulation, your INR was a little bit elevated today, I recommend you follow-up with your primary care provider who is prescribing your warfarin, and potentially hold some doses.  Otherwise no acute abnormalities on your laboratory studies, which I redressed the wound, I recommend you follow-up as an outpatient.    Please follow-up to have your sutures removed.    For pain or fever you may use:  -Tylenol 650 mg every 6 hours.  Max 4000 mg in 24 hours  Do not use thismedication with alcohol as it can cause liver problems.  -Ibuprofen 600 mg every 6 hours.  Max 3500 mg in 24 hours  Do not take this medication if you have a history of a GI bleed or have kidney problems.  You may use both of these medications at the same time or you can alternate them every 3 hours.  For example, Tylenol at 6 AM, ibuprofen at 9 AM, Tylenol at noon, etc.

## 2022-08-24 NOTE — ED NOTES
ED Triage Provider Note  [facility]  Encounter Date: Aug 24, 2022    History:  Chief Complaint   Patient presents with     Wound Check     Lara Almanzar is a 52 year old male who presents to the ED with right hand laceration. Was cut on Sunday on broken glass and seen at [facility] and reports continues to bleed. Is on warfarin.     Review of Systems:  Review of Systems   Constitutional: Negative for fever.   Skin: Positive for wound.   Neurological: Negative for numbness.        Exam:  /77   Pulse 80   Temp 97.8  F (36.6  C) (Tympanic)   Wt 73.9 kg (163 lb)   SpO2 99%   General: No acute distress. Appears stated age.   Cardio: Regular rate, extremities well perfused  Resp: Normal work of breathing, grossly normal respiratory rate  Neuro: Alert. CN II-XII grossly intact. Grossly intact strength/sensation in the right hand  Skin: warm. Laceration to base of right thumb with sutures in place, small amount of oozing blood    Medical Decision Making:  Patient arriving to the ED with problem as above. A medical screening exam was performed. Plan to check INR/CBC and will place some TXA soaked gauze over area while in triage. The patient is appropriate to wait in triage.    Interventions:  Orders Placed This Encounter     INR     CBC (+ platelets, no diff)     tranexamic acid (CYKLOKAPRON) TOPICAL (injection used topically)     Diagnosis:  Laceration right hand         Melany Faulkner MD  08/24/22 2162

## 2022-08-24 NOTE — ED TRIAGE NOTES
Patient arrives by private car for evaluation of a right hand laceration that he had repaired at [facility] on Sunday.  Patient is on blood thinners and reports wound keeps bleeding.

## 2022-11-04 NOTE — PROGRESS NOTES
Cardiology Progress Note  Harvey Almanzar MRN: [id number]  Age: 47 year old, : 1970  Date: 2017              Subjective     ASPEN overnight. Feels breathing improved this AM, however has not ambulated. No chest pain, lightheadedness or palpations. No f/c (now or prior to arrival).           Objective     B/P: 102/61, T: 97.8, P: Data Unavailable, R: 14    Intake/Output Summary (Last 24 hours) at 17 0904  Last data filed at 17 0800   Gross per 24 hour   Intake              235 ml   Output              175 ml   Net               60 ml     Vitals:    09/15/17 2202 17 0400 17 0615   Weight: 68 kg (150 lb) 64.8 kg (142 lb 13.7 oz) 64 kg (141 lb 1.5 oz)         Gen: AA&Ox3, no acute distress  HEENT:AT/ NC, PERRL b/l, EOM grossly intact, mucous membranes pink, moist without plaque or exudate  BACK: no CVA tenderness, no midline bony tenderness  PULM/THORAX: Faint bibasilar crackles.   CV: harsh diastolic murmur heard best at RUSB (c/w AI), systolic murmur at apex (c/w MR)  ABD: soft, nontender, nondistended. Normoactive bowel sounds x 4, no HSM appreciated.  EXT: trace pedal edema, no clubbing or cyanosis. LE warm well perfused No asymmetrical edema or tenderness to palpation in calves bilaterally.            Data:          Lab Results   Component Value Date     2017    Lab Results   Component Value Date    CHLORIDE 95 2017    Lab Results   Component Value Date    BUN 39 2017      Lab Results   Component Value Date    POTASSIUM 3.1 2017    Lab Results   Component Value Date    CO2 29 2017    Lab Results   Component Value Date    CR 1.30 2017        Lab Results   Component Value Date    NTBNPI 96390 (H) 09/15/2017     Lab Results   Component Value Date    WBC 6.2 2017    HGB 11.3 (L) 2017    HCT 34.2 (L) 2017    MCV 88 2017     2017               Medications     Current  [facility]   Emergency Department  Emergency Medicine Attending Sign-out     Care of Uziel Saeed was assumed from previous attending Dr. Janice Castillo and is being seen for Suicide Attempt (Took 35 pills of 250mg advil )  . The patient's initial evaluation and plan have been discussed with the prior provider who initially evaluated the patient. Attestation  I was available and discussed any additional care issues that arose and coordinated the management plans with the resident(s) caring for the patient during my duty period. Any areas of disagreement with resident's documentation of care or procedures are noted on the chart. I was personally present for the key portions of any/all procedures, during my duty period. I have documented in the chart those procedures where I was not present during the key portions. BRIEF PATIENT SUMMARY/MDM COURSE PER INITIAL PROVIDER:   RECENT VITALS:     Temp: 98.5 °F (36.9 °C),  Heart Rate: 76, Resp: 16, BP: (!) 140/79, SpO2: 99 %    This patient is a 14 y.o. Female with suicide attempt. Ingested advil per patient, but tylenol level was in the 80s.   Repeat level and then plan for transfer to [facility] facility    [address] [place] [address] [address]ett [address] / ADDITIONAL COMMENTS   Repeat tylenol   Medically cleared   Signed out to oncoming physician on 11/1/22 att 7:00 AM    Johnnie Ferrer MD  Emergency Medicine Attending  [facility]      Lindsey Barney MD  11/04/22 1218 Facility-Administered Medications:      sodium chloride (PF) 0.9% PF flush 3 mL, 3 mL, Intravenous, Q1H PRN, Frantz Pena MD     sodium chloride (PF) 0.9% PF flush 3 mL, 3 mL, Intravenous, Q8H, Frantz Pena MD     May take oral meds with a sip of water, the morning of IZA procedure., , Does not apply, Continuous PRN, Frantz Pena MD     HOLD: Insulin - REGULAR AM of procedure IF diabetic, , Does not apply, HOLDJean Joseph J, MD     HOLD: Insulin - RAPID/SHORT acting AM of procedure IF diabetic, , Does not apply, HOLDJean Joseph J, MD     HOLD: Oral hypoglycemics AM of procedure IF diabetic, , Does not apply, HOLDJean Joseph J, MD     Give   of usual dose of LONG ACTING insulin AM of procedure IF diabetic, , Does not apply, Continuous PRN, Frantz Pena MD     furosemide (LASIX) injection 40 mg, 40 mg, Intravenous, Once, Frantz Pean MD     naloxone (NARCAN) injection 0.1-0.4 mg, 0.1-0.4 mg, Intravenous, Q2 Min PRN, Everardo Abraham MD     acetaminophen (TYLENOL) tablet 650 mg, 650 mg, Oral, Q4H PRN, Everardo Abraham MD     ondansetron (ZOFRAN-ODT) ODT tab 4 mg, 4 mg, Oral, Q6H PRN **OR** ondansetron (ZOFRAN) injection 4 mg, 4 mg, Intravenous, Q6H PRN, Everardo Abraham MD, 4 mg at 09/16/17 5194     polyethylene glycol (MIRALAX/GLYCOLAX) Packet 17 g, 17 g, Oral, Daily PRN, Everardo Abraham MD     senna-docusate (SENOKOT-S;PERICOLACE) 8.6-50 MG per tablet 1-2 tablet, 1-2 tablet, Oral, BID PRN, Everardo Abraham MD     potassium chloride SA (K-DUR/KLOR-CON M) CR tablet 20-40 mEq, 20-40 mEq, Oral, Q2H PRN, Everardo Abraham MD, 20 mEq at 09/16/17 0834     potassium chloride (KLOR-CON) Packet 20-40 mEq, 20-40 mEq, Oral or Feeding Tube, Q2H PRN, Everardo Abraham MD     potassium chloride 10 mEq in 100 mL intermittent infusion, 10 mEq, Intravenous, Q1H PRN, Everardo Abraham MD     potassium chloride 10 mEq in 100 mL intermittent  infusion with 10 mg lidocaine, 10 mEq, Intravenous, Q1H PRN, Everardo Abraham MD     potassium chloride 20 mEq in 100 mL NON-STANDARD CONC intermittent infusion, 20 mEq, Intravenous, Q1H PRN, Everardo Abraham MD     magnesium sulfate 4 g in 100 mL sterile water (premade), 4 g, Intravenous, Q4H PRN, Everardo Abraham MD     cefTRIAXone (ROCEPHIN) 2 g vial to attach to  ml bag for ADULTS or NS 50 ml bag for PEDS, 2 g, Intravenous, Q24H, Everardo Abraham MD, Last Rate: 200 mL/hr at 09/16/17 1015, 2 g at 09/16/17 1015     nitroPRUsside (NIPRIDE) 50 mg, sodium thiosulfate 500 mg in D5W 125 mL IV infusion (conc: 0.4mg/mL), 0.25-5 mcg/kg/min, Intravenous, Continuous, Ash Johnson MD, Last Rate: 2.4 mL/hr at 09/17/17 0700, 0.25 mcg/kg/min at 09/17/17 0700      ECHO 9/15  Interpretation Summary  Severe left ventricular dilation is present.  Biplane LV EF 46%.  Global right ventricular function is moderately reduced.  Severe mitral insufficiency is present.  Prosthetic aortic valve with significant thickening of aortic root.Possible  vegetation or severe degenerative changes on aortic prosthetic valve with  severe aortic regurgitation.Post operative changes in aortic root or related  to infection.  Right ventricular systolic pressure is 48mmHg above the right atrial pressure.  Dilation of the inferior vena cava is present with abnormal respiratory  variation in diameter.  No pericardial effusion is present.  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     # Troponinemia  - Unlikely due to ACS given clinical scenario, more likely due to above      # Creatinine elevation, unclear CKD vs JANA  Creatinine labile, anywhere from 0.9 - 1.5 since July of 2017. No acute kidney injury present.   - daily BMP  - UA pending  - avoid nephrotoxins      FEN: NPO   PPX: Mechanical  CODE: FULL  DISPO: Requires inpatient management of decompensated heart failure and AI      Patient was discussed with staff attending, Dr. Knight, who agrees with the above assessment and plan.    Frantz Pena MD  PGY-2 Internal Medicine  Cardiology Service  Pager: [phone]              CARDIOLOGY STAFF NOTE  I have seen and examined the patient with the Cards 1 team. I agree with the note above that reflects my assessment and plan of care.  Isma Knight MD [facility]  Cardiology - Electrophysiology

## 2023-04-11 NOTE — PROGRESS NOTES
10/11/17 1000   Session   Session Initial Evaluation and Exercise Prescription   Certified through this date 11/09/17   Cardiac Rehab Assessment   Cardiac Rehab Assessment Patient has a history of having bacterial endocarditis involving his aortic valve and subsequently underwent a homograft replacement in 1996. 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 Early this summer he developed streptococcal parasanguinis bacteremia and was then hospitalized on 9/15/17 due to heart failure. His aortic and mitral valve were both replaced with a mechanical valve and he also has a CABG to his RCA. He denies any symptoms since discharging. The patient's history and clinical status including hemodynamics and ECG were evaluated. The patient was assessed to be stable and appropriate to begin exercise.   The patient's functional capacity and exercise prescription were determined by the completion of the 6 minute walk test. See results below. The patient was oriented to the program.  Risk factor profile was completed. Goals and objectives were discussed. CV response was WNL. No symptoms, complaints or pain were reported. Good prognosis for reaching goals below. Skilled therapy is necessary in order to monitor CV response to exercise, to provide education on risk factors and behavior change counseling needed to achieve patient's goals.  Plan to progress to 30-40 minutes of exercise prior to discharge from cardiac rehab.  Initial THR of 20-30 beats above RHR; Effort rating of 4-6. Initiate muscle conditioning as appropriate. Provide risk factor education and behavior change counseling.      General Information   Treatment Diagnosis Valve Replacement  (AVR and MVR)   Date of Treatment Diagnosis 09/21/17   Secondary Treatment Diagnosis Coronary Artery Bypass  (x1 to RCA)   Significant Past CV History (Valve replacement in 1996)   Comorbidities None   Other Medical History PAST MEDICAL HISTORY:   Past Medical  History:   Diagnosis Date     Acute diastolic congestive heart failure (H) 8/24/2017     Aortic valve replaced 1996    Overview:  Cadaver - tissue valve in [facility]  Amoxicillin 2000 mg prior to dental work please.  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       PAST SURGICAL HISTORY:   Past Surgical History:   Procedure Laterality Date     REDO STERNOTOMY BYPASS CORONARY ARTERY N/A 9/21/2017    Procedure: REDO STERNOTOMY BYPASS CORONARY ARTERY;;  Surgeon: Nicola Gorman MD;  Location: [facility] OR     REDO STERNOTOMY REPLACE VALVE AORTIC N/A 9/21/2017    Procedure: REDO STERNOTOMY REPLACE VALVE AORTIC;;  Surgeon: Nicola Gorman MD;  Location: [facility] OR     REDO STERNOTOMY REPLACE VALVE MITRAL N/A 9/21/2017    Procedure: REDO STERNOTOMY REPLACE VALVE MITRAL;  Left groin cutdown, Redo Median Sternotomy, Lysis of adhesions, Left mini-thoracotomy,Coronary artery bypass graft x 1 using the left greater saphenous vein, using endovein harvest, Mitral valve replacement using [employer] Mechanical 27mm, Aortic Valve Replacement using a 17mm [employer] Mechanical, On Cardiopulmonary Bypass Pump;  Surgeon: Cindy Gorman       FAMILY HISTORY: No family history on file.    SOCIAL HISTORY:   Social History   Substance Use Topics     Smoking status: Former Smoker     Packs/day: 0.50     Years: 31.00     Smokeless tobacco: Never Used     Alcohol use No        Lead up symptoms Fever, cold sweats   Hospital Location [facility]   Hospital Discharge Date 10/05/17   Signs and Symptoms Post Hospital Discharge None   Outpatient Cardiac Rehab Start Date 10/11/17   Primary Physician Chapito Kaba   Primary Physician Follow Up Completed   Surgeon Dr. Gorman and Dr. Leal   Surgeon Follow Up Scheduled  "  Cardiologist Patient is unsure at this time   Cardiologist Follow Up Advised to schedule appointment   Ejection Fraction 45-50   Risk Stratification Moderate   Summary of Cath Report   Summary of Cath Report Available   Date Performed 09/19/17   Left Main Mild   LAD 20-30   LCX Mild   OM 20%   %   (CABGx1)   Living and Work Status    Living Arrangements and Social Status house   Support System Live with an adult   Return to Employment No   Occupation Dental Technician   Preventative Medications   CMS recommended medications Anticoagulants;Antiplatelets   Falls Screen   Have you fallen two or more times in the past year? No   Have you fallen and had an injury in the past year? No   Referral Initiated to Physical Therapy No   Pain   Patient Currently in Pain No   Physical Assessments   Incisions WNL   Edema None   Right Lung Sounds normal   Left Lung Sounds diminished  (at base)   Limitations Surgical   Individualized Treatment Plan   Monitored Sessions Scheduled 24   Monitored Sessions Attended 1   Oxygen   Supplemental Oxygen needed No   Nutrition Management - Weight Management   Assessment Initial Assessment   Age 47   Weight 64.8 kg (142 lb 12 oz)   Height 1.626 m (5' 4.02\")   BMI (Calculated) 24.54   Initial Rate Your Plate Score. Dietary tool to assess eating patterns. Scores range from 24 to 72. The higher the score the healthier the eating pattern. 43   Nutrition Management - Lipids   Lipids Labs Available   Date 09/24/17   Total Cholesterol 66   Triglycerides 136   HDL 9   LDL 29   Nutrition Management - Diabetes   Diabetes No   Nutrition Management Summary   Dietary Recommendations Low Fat;Low Cholesterol;Low Sodium   Stages of Change for Diet Compliance Action   Interventions Planned Attend Nutrition Education Class(es)   Psychosocial Management   Psychosocial Assessment Initial   Is there history of clinical depression or increased risk of depression? No previous history   Current Level of " Stress per Patient Report Mild   Current Coping Skills Uses Stress Management/Relaxation Techniques   Initial Patient Health Questionnaire -9 Score (PHQ-9) for depression. 5-9 Minimal symptoms, 10-14 Minor depression, 15-19 Major depression, moderately severe, > 20 Major depression, severe  2   Discharge PHQ-9 Score for Depression 20   Stages of Change Maintenance   Other Core Components - Hypertension   History of or Diagnosis of Hypertension No   Currently taking Anti-Hypertensives No   Other Core Components - Tobacco   History of Tobacco Use Yes   Quit Date or Planned Quit Date 07/01/17   Tobacco Use Status Recent (Quit < 6 mo ago)   Tobacco Habit Cigarettes   Tobacco Use per Day (average) 1   Years of Tobacco Use 30   Stages of Change Action   Other Core Components Summary   Interventions Planned Educate on importance of maintaining low sodium diet;Educate on benefits of smoking cessation   Patient Goals Yes   Goal #1 Description Remain smoke free   Goal #1 Target Date 11/09/17   Activity/Exercise History   Activity/Exercise Assessment Initial   Activity/Exercise Status prior to event? Was Physically Active   Number of Days Currently participating in Moderate Physical Activity? 0   Number of Days Currently performing  Aerobic Exercise (including rehab)? 0   Number of Minutes per Session Currently of Aerobic Exercise (average)? 0   Current Stage of Change (Physical Activity) Preparation   Current Stage of Change (Aerobic Exercise) Preparation   Patient Goals Goal #1;Goal #2   Goal #1 Description Establish and maintain a regular aerobic exercise routine of three days a week for 30 minutes to return to brisk walking (3.0 mph).   Goal #1 Target Date 12/11/17   Goal #2 Description Establish and maintain a regular muscle conditioning program in order to increase strength to be able to better tolerate his ADLs.   Goal #2 Target Date 12/11/17   Exercise Assessment   6 Minute Walk Predicted - Gender Selection Male   6  Minute Walk Predicted (Male) 1858.86   6 Minute Walk Predicted (Female) 1915.71   Initial 6 Minute Walk Distance (Feet) 1234 ft   Resting HR 94 bpm   Exercise  bpm   Post Exercise HR 96 bpm   Resting BP 98/62   Exercise /62   Post Exercise BP 98/62   Effort Rating 4   Current MET Level 2.8   MET Level Goal 4.5-5.0   ECG Rhythm Sinus rhythm   Ectopy None   Current Symptoms Denies symptoms   Limitations/Restrictions Sternal Precautions   Exercise Prescription   Mode Treadmill;Nustep   Duration/Time 15-30 min   Frequency 3 daysweek   THR (85% of age predicted max HR) 146.2   OMNI Effort Rating (0-10 Scale) 4-6/10   Progression Progress peak intensity by 1/4 MET per week   Recommended Home Exercise   Type of Exercise Walking   Frequency (days per week) 1-2   Duration (minutes per session) 15-30 min   Effort Rating Recommended 4-6/10   30 Day Exercise Plan Initiate aerobic exercise, muscle conditioning, and stretching.   Current Home Exercise   Type of Exercise None   Frequency (days per week) 0   Duration (minutes per session) 0   Follow-up/On-going Support   Provider follow-up needed on the following No follow-up needed   Learning Assessment   Learner Patient   Primary Language English   Preferred Learning Style Listening;Reading;Demonstration;Pictures/Video   Barriers to Learning No barriers noted   Patient Education   Education recommended Anatomy and Physiology of the Heart;Exercise Principles;Medication Overview;Muscle Conditioning;Smoking Cessation   Physician cosignature/electronic signature indicates approval of this ITP document. I have established, reviewed and made necessary changes to the individualized treatment plan and exercise prescription for this patient.     General Sunscreen Counseling: I recommended a broad spectrum sunscreen with a SPF of 30 or higher. Sun protective clothing can be used in lieu of sunscreen but must be worn the entire time you are exposed to the sun's rays. Detail Level: Detailed

## 2023-06-24 NOTE — PLAN OF CARE
Problem: Goal Outcome Summary  Goal: Goal Outcome Summary  Outcome: Improving  D: Pt s/p 9/21Left groin cutdown, Redo Median Sternotomy, Lysis of adhesions, Left mini-thoracotomy,Coronary artery bypass graft x 1 using the left greater saphenous vein, using endovein harvest, Mitral valve replacement using [employer] Mechanical 27mm, Aortic Valve Replacement using a 17mm [employer] Mechanical. 9/23 L MCA ischemic stroke.  I: Monitored/assessed pt. Assisted with cares.  A: Pt VS stable and rhythm remains NSR. Xa therapeutic, Heparin drip at 1050u/hr.   neuros unchanged w/some R sided weakness. Pain managed w/Oxycodone prn.  Denies nausea, tolerating small amts dysphagia 3 diet with thin liquids. NG TF at 50ml/hr with Q2hr 100cc free water. Up assist 1 w/walker. BG discontinues as per order of no insulin for 48hrs.  Beltran patent w/adequate UOP of bjorn/tea colored urine.  Beltran to remain in until MD orders due to penile edema.  P: Continue to monitor/assess pt, contact provider with concerns.            PROVIDER:[TOKEN:[[id number]],FOLLOWUP:[1-3 days]]

## 2024-09-05 NOTE — PLAN OF CARE
Problem: Goal Outcome Summary  Goal: Goal Outcome Summary  Outcome: Improving    A/o x 4. VSSA. Denies SOB/chest pain. Beltran catheter patent and draining. Beltran cares done. Potassium phosphate replaced. Neuros unchanged. Upper extremity right sided weakness noted. Pacer wires capped. Tube feed @ 50 cc hour. MRI done.  Patient pleasant and cooperative with cares. Patient states that he did not sleep well last night. Was tired and sleepy throughout the shift. Reduced stimulation at night to promote sleep. Continue to monitor. Notify MD with any concerns.    [As noted in HPI] : as noted in HPI [As Noted in HPI] : as noted in HPI [Suicidal] : not suicidal

## 2024-12-04 NOTE — PLAN OF CARE
----- Message from Cecelia La sent at 12/4/2024  9:58 AM EST -----  Please let Melanie know that her testing for RSV, COVID, and flu are all negative.  She likely has another viral infection.  Viral infections can cause rash.  Please let me know if her hives have worsened.  If she has persistent cough and congestion later this week or next week, we could consider using antibiotics.  Hope she feels better soon   Problem: Goal Outcome Summary  Goal: Goal Outcome Summary  Outcome: Improving  Patient alert and oriented, neuro's intact except he still has weakness in his R arm and R leg. One CT pulled today, one pleural CT remains. Pleuravac knocked over during transport to [facility], and was replaced, so output unclear. Denies pain. Lungs have crackles in  the bases, patient is using his IS independently. RA sats 95%. Beltran had 400 cc dark bjorn output. TF at 50/hr.  Patient follows commands and is able to make his needs known. SR 60s - 80s. Pacer wires capped. Neuro ordered MRI of the head but thus far it has not been done due to PW in the chest.   A; Pt stable, showing slight improvement in neuro status per neuro MD team.      P: Will cont to monitor.

## (undated) DEVICE — CANNULA VESSEL DLP  30001

## (undated) DEVICE — SUCTION TIP YANKAUER STR K87

## (undated) DEVICE — SU VICRYL 0 CTX 36" J370H

## (undated) DEVICE — PACK ADULT HEART UMMC PV15CG92D

## (undated) DEVICE — SU ETHIBOND 3-0 BBDA 36" X588H

## (undated) DEVICE — TOURNIQUET VASCULAR KIT ARGYLE 8888-585000

## (undated) DEVICE — CONNECTOR DRAIN CHEST Y EXTENSION SET 19909

## (undated) DEVICE — SU PROLENE 7-0 BV-1DA 4X24" M8702

## (undated) DEVICE — BLADE KNIFE BEAVER MICROSHARP GREEN 377515

## (undated) DEVICE — PUNCH AORTIC 4.0MM LONG AP-540

## (undated) DEVICE — SU ETHIBOND 0 CT-1 CR 8X18" CX21D

## (undated) DEVICE — SPONGE RAY-TEC 4X8" 7318

## (undated) DEVICE — SPECIMEN CONTAINER 5OZ STERILE 2600SA

## (undated) DEVICE — SU PROLENE 7-0 BV-1DA 24" 8702H

## (undated) DEVICE — BNDG ELASTIC 4"X5YDS STERILE 6611-4S

## (undated) DEVICE — BLADE KNIFE BEAVER MINI STR BEAVER6900

## (undated) DEVICE — DRSG ABDOMINAL 07 1/2X8" 7197D

## (undated) DEVICE — SU STEEL 6 CCS 4X18" M654G

## (undated) DEVICE — ANTIFOG SOLUTION W/FOAM PAD 31142527

## (undated) DEVICE — SOL NACL 0.9% IRRIG 3000ML BAG 2B7477

## (undated) DEVICE — CLIP HORIZON SM RED WIDE SLOT 001201

## (undated) DEVICE — SU PROLENE 6-0 C-1DA 30" 8706H

## (undated) DEVICE — DRSG PRIMAPORE 02X3" 7133

## (undated) DEVICE — SU PLEDGET SOFT TFE 13MMX7MMX1.5MM D7044

## (undated) DEVICE — ESU PENCIL W/COATED BLADE E2450H

## (undated) DEVICE — SU DERMABOND ADVANCED .7ML DNX12

## (undated) DEVICE — KIT ENDO VASOVIEW HEMOPRO 2 VH-4000

## (undated) DEVICE — SU ETHIBOND 0 TIE 6X30" X306H

## (undated) DEVICE — TUBING INSUFFLATION W/FILTER CPC TO LUER 620-030-301

## (undated) DEVICE — DRSG TELFA 3X8" 1238

## (undated) DEVICE — SU ETHIBOND 2-0 SHDA 30" X563H

## (undated) DEVICE — LINEN TOWEL PACK X30 5481

## (undated) DEVICE — DRAIN CHEST TUBE RIGHT ANGLED 32FR 8132

## (undated) DEVICE — DRAIN CHEST TUBE 32FR STR 8032

## (undated) DEVICE — BNDG ELASTIC 6"X5YDS STERILE 6611-6S

## (undated) DEVICE — SOL NACL 0.9% INJ 1000ML BAG 07983-09

## (undated) DEVICE — SOL NACL 0.9% 10ML VIAL 0409-4888-02

## (undated) DEVICE — DRSG PRIMAPORE 03 1/8X6" 66000318

## (undated) DEVICE — BLADE SAW SAGITTAL STERNOTOMY CV SM LINVATEC 5023-187

## (undated) DEVICE — SU ETHIBOND 5 LRDA 30" B499T

## (undated) DEVICE — BIPOLAR TEMPORARY MYOCARDIAL PACING LEAD

## (undated) DEVICE — SU VICRYL 3-0 FS-1 27" J442H

## (undated) DEVICE — SUCTION CATH AIRLIFE TRI-FLO W/CONTROL PORT 14FR  T60C

## (undated) DEVICE — SU STEEL MYO/WIRE II STERNOTOMY 8 BE-1 3X14" 048-217

## (undated) DEVICE — Device

## (undated) DEVICE — DRSG DRAIN 4X4" 7086

## (undated) DEVICE — SU DEVICE ENDO COR KNOT KIT 030800

## (undated) DEVICE — LINEN GOWN XLG 5407

## (undated) DEVICE — SU DEVICE ENDO COR KNOT QUICK LOAD 030850

## (undated) DEVICE — CLIP HORIZON MED BLUE 002200

## (undated) DEVICE — LINEN TOWEL PACK X6 WHITE 5487

## (undated) DEVICE — NDL COUNTER 20CT 31142493

## (undated) DEVICE — BLADE CLIPPER SGL USE 9680

## (undated) DEVICE — SOL NACL 0.9% IRRIG 1000ML BOTTLE 2F7124

## (undated) DEVICE — DECANTER BAG 2002S

## (undated) DEVICE — SU PROLENE 3-0 SHDA 36" 8522H

## (undated) DEVICE — SU PROLENE 4-0 SHDA 36" 8521H

## (undated) DEVICE — DRAPE IOBAN INCISE 23X17" 6650EZ

## (undated) DEVICE — SU PROLENE 4-0 RB-1DA 36" 8557H

## (undated) DEVICE — BLOWER/MISTER CLEARVIEW 22150

## (undated) DEVICE — SU PROLENE 5-0 RB-1DA 36"  8556H

## (undated) DEVICE — SU PROLENE 5-0 RB-2DA 30" 8710H

## (undated) DEVICE — PREP CHLORAPREP 26ML TINTED ORANGE  260815

## (undated) DEVICE — SU ETHIBOND 2-0 V-7 DA 10X30" PXX77

## (undated) DEVICE — SPONGE SURGIFOAM 100 1974

## (undated) DEVICE — SU VICRYL 2-0 CT-1 27" UND J259H

## (undated) DEVICE — GLOVE PROTEXIS W/NEU-THERA 7.5  2D73TE75

## (undated) DEVICE — SUCTION MANIFOLD DORNOCH ULTRA CART UL-CL500

## (undated) DEVICE — PROTECTOR ARM ONE-STEP TRENDELENBURG 40418

## (undated) DEVICE — SU ETHIBOND 2-0 V-5DA 10X30" PXX52

## (undated) DEVICE — ESU HOLSTER PLASTIC DISP E2400

## (undated) DEVICE — SUCTION DRY CHEST DRAIN OASIS 3600-100

## (undated) DEVICE — ESU ELEC BLADE 6" COATED E1450-6

## (undated) DEVICE — LEAD ELEC MYOCARDIO PACING TEMPORARY MEDTRONIC

## (undated) DEVICE — TOURNIQUET VASCULAR KIT 7 1/2" 79012

## (undated) DEVICE — SU DEVICE ENDO COR KNOT QUICK LOAD RELOAD 030874

## (undated) DEVICE — SOL WATER IRRIG 1000ML BOTTLE 07139-09

## (undated) DEVICE — DRSG KERLIX 4 1/2"X4YDS ROLL 6715

## (undated) DEVICE — TAPE MEDIPORE 4"X2YD 2864

## (undated) DEVICE — CLIP SPRING FOGARTY SOFTJAW CSOFT6

## (undated) DEVICE — DRAPE SLUSH/WARMER 66X44" ORS-320

## (undated) DEVICE — TIES BANDING T50R

## (undated) DEVICE — WIPES FOLEY CARE SURESTEP PROVON DFC100

## (undated) RX ORDER — HEPARIN SODIUM 1000 [USP'U]/ML
INJECTION, SOLUTION INTRAVENOUS; SUBCUTANEOUS
Status: DISPENSED
Start: 2017-09-21

## (undated) RX ORDER — FENTANYL CITRATE 50 UG/ML
INJECTION, SOLUTION INTRAMUSCULAR; INTRAVENOUS
Status: DISPENSED
Start: 2017-09-21

## (undated) RX ORDER — EPHEDRINE SULFATE 50 MG/ML
INJECTION, SOLUTION INTRAMUSCULAR; INTRAVENOUS; SUBCUTANEOUS
Status: DISPENSED
Start: 2017-09-21

## (undated) RX ORDER — CEFAZOLIN SODIUM 1 G/3ML
INJECTION, POWDER, FOR SOLUTION INTRAMUSCULAR; INTRAVENOUS
Status: DISPENSED
Start: 2017-09-21

## (undated) RX ORDER — PROTAMINE SULFATE 10 MG/ML
INJECTION, SOLUTION INTRAVENOUS
Status: DISPENSED
Start: 2017-09-21

## (undated) RX ORDER — PHENYLEPHRINE HCL IN 0.9% NACL 1 MG/10 ML
SYRINGE (ML) INTRAVENOUS
Status: DISPENSED
Start: 2017-09-21

## (undated) RX ORDER — NITROGLYCERIN 5 MG/ML
VIAL (ML) INTRAVENOUS
Status: DISPENSED
Start: 2017-09-19

## (undated) RX ORDER — HEPARIN SODIUM 1000 [USP'U]/ML
INJECTION, SOLUTION INTRAVENOUS; SUBCUTANEOUS
Status: DISPENSED
Start: 2017-09-19

## (undated) RX ORDER — GLYCOPYRROLATE 0.2 MG/ML
INJECTION, SOLUTION INTRAMUSCULAR; INTRAVENOUS
Status: DISPENSED
Start: 2017-09-21

## (undated) RX ORDER — LIDOCAINE HYDROCHLORIDE 20 MG/ML
INJECTION, SOLUTION EPIDURAL; INFILTRATION; INTRACAUDAL; PERINEURAL
Status: DISPENSED
Start: 2017-09-21

## (undated) RX ORDER — FENTANYL CITRATE 50 UG/ML
INJECTION, SOLUTION INTRAMUSCULAR; INTRAVENOUS
Status: DISPENSED
Start: 2017-09-19

## (undated) RX ORDER — ETOMIDATE 2 MG/ML
INJECTION INTRAVENOUS
Status: DISPENSED
Start: 2017-09-21

## (undated) RX ORDER — PROPOFOL 10 MG/ML
INJECTION, EMULSION INTRAVENOUS
Status: DISPENSED
Start: 2017-09-21

## (undated) RX ORDER — ESMOLOL HYDROCHLORIDE 10 MG/ML
INJECTION INTRAVENOUS
Status: DISPENSED
Start: 2017-09-21